# Patient Record
Sex: FEMALE | Race: WHITE | Employment: OTHER | URBAN - METROPOLITAN AREA
[De-identification: names, ages, dates, MRNs, and addresses within clinical notes are randomized per-mention and may not be internally consistent; named-entity substitution may affect disease eponyms.]

---

## 2017-01-27 PROBLEM — J44.9 MODERATE COPD (CHRONIC OBSTRUCTIVE PULMONARY DISEASE) (HCC): Status: ACTIVE | Noted: 2017-01-27

## 2017-03-16 ENCOUNTER — HOSPITAL ENCOUNTER (OUTPATIENT)
Dept: LAB | Age: 67
Discharge: HOME | End: 2017-03-16
Attending: INTERNAL MEDICINE
Payer: MEDICARE

## 2017-03-16 DIAGNOSIS — I25.10 CORONARY ARTERY DISEASE INVOLVING NATIVE CORONARY ARTERY OF NATIVE HEART WITHOUT ANGINA PECTORIS: ICD-10-CM

## 2017-03-16 LAB
CHOLEST SERPL-MCNC: 209 MG/DL
HDLC SERPL-MCNC: 88 MG/DL (ref 40–60)
HDLC SERPL: 2.4 {RATIO}
LDLC SERPL CALC-MCNC: 96.8 MG/DL
LIPID PROFILE,FLP: ABNORMAL
TRIGL SERPL-MCNC: 121 MG/DL (ref 35–150)
VLDLC SERPL CALC-MCNC: 24.2 MG/DL

## 2017-03-16 PROCEDURE — 36415 COLL VENOUS BLD VENIPUNCTURE: CPT | Performed by: INTERNAL MEDICINE

## 2017-03-16 PROCEDURE — 80061 LIPID PANEL: CPT | Performed by: INTERNAL MEDICINE

## 2017-03-23 PROBLEM — R53.83 FATIGUE: Status: ACTIVE | Noted: 2017-03-23

## 2017-03-23 PROBLEM — I10 ESSENTIAL HYPERTENSION WITH GOAL BLOOD PRESSURE LESS THAN 130/85: Status: ACTIVE | Noted: 2017-03-23

## 2017-07-25 ENCOUNTER — APPOINTMENT (OUTPATIENT)
Dept: GENERAL RADIOLOGY | Age: 67
DRG: 189 | End: 2017-07-25
Attending: EMERGENCY MEDICINE
Payer: MEDICARE

## 2017-07-25 ENCOUNTER — HOSPITAL ENCOUNTER (INPATIENT)
Age: 67
LOS: 2 days | Discharge: HOME | DRG: 189 | End: 2017-07-28
Attending: EMERGENCY MEDICINE | Admitting: INTERNAL MEDICINE
Payer: MEDICARE

## 2017-07-25 DIAGNOSIS — E11.8 TYPE 2 DIABETES MELLITUS WITH COMPLICATION, WITHOUT LONG-TERM CURRENT USE OF INSULIN (HCC): Chronic | ICD-10-CM

## 2017-07-25 DIAGNOSIS — J44.1 CHRONIC OBSTRUCTIVE PULMONARY DISEASE WITH ACUTE EXACERBATION (HCC): ICD-10-CM

## 2017-07-25 DIAGNOSIS — F17.200 TOBACCO USE DISORDER: Chronic | ICD-10-CM

## 2017-07-25 DIAGNOSIS — J96.01 ACUTE RESPIRATORY FAILURE WITH HYPOXIA (HCC): ICD-10-CM

## 2017-07-25 DIAGNOSIS — J44.9 CHRONIC OBSTRUCTIVE PULMONARY DISEASE, UNSPECIFIED COPD TYPE (HCC): Chronic | ICD-10-CM

## 2017-07-25 DIAGNOSIS — R06.02 SHORTNESS OF BREATH: Primary | ICD-10-CM

## 2017-07-25 DIAGNOSIS — G47.34 NOCTURNAL HYPOXIA: Chronic | ICD-10-CM

## 2017-07-25 DIAGNOSIS — J44.1 COPD EXACERBATION (HCC): ICD-10-CM

## 2017-07-25 DIAGNOSIS — J40 TRACHEOBRONCHITIS: ICD-10-CM

## 2017-07-25 DIAGNOSIS — G47.33 OSA (OBSTRUCTIVE SLEEP APNEA): Chronic | ICD-10-CM

## 2017-07-25 PROBLEM — R53.83 FATIGUE: Chronic | Status: ACTIVE | Noted: 2017-03-23

## 2017-07-25 PROBLEM — I10 ESSENTIAL HYPERTENSION WITH GOAL BLOOD PRESSURE LESS THAN 130/85: Chronic | Status: ACTIVE | Noted: 2017-03-23

## 2017-07-25 LAB
ALBUMIN SERPL BCP-MCNC: 3.2 G/DL (ref 3.2–4.6)
ALBUMIN/GLOB SERPL: 0.7 {RATIO} (ref 1.2–3.5)
ALP SERPL-CCNC: 144 U/L (ref 50–136)
ALT SERPL-CCNC: 35 U/L (ref 12–65)
ANION GAP BLD CALC-SCNC: 6 MMOL/L (ref 7–16)
AST SERPL W P-5'-P-CCNC: 20 U/L (ref 15–37)
ATRIAL RATE: 101 BPM
BACTERIA SPEC CULT: ABNORMAL
BACTERIA SPEC CULT: ABNORMAL
BASOPHILS # BLD AUTO: 0 K/UL (ref 0–0.2)
BASOPHILS # BLD: 0 % (ref 0–2)
BILIRUB SERPL-MCNC: 0.3 MG/DL (ref 0.2–1.1)
BNP SERPL-MCNC: 21 PG/ML
BUN SERPL-MCNC: 6 MG/DL (ref 8–23)
CALCIUM SERPL-MCNC: 8.7 MG/DL (ref 8.3–10.4)
CALCULATED P AXIS, ECG09: 84 DEGREES
CALCULATED R AXIS, ECG10: 89 DEGREES
CALCULATED T AXIS, ECG11: 86 DEGREES
CHLORIDE SERPL-SCNC: 101 MMOL/L (ref 98–107)
CO2 SERPL-SCNC: 36 MMOL/L (ref 21–32)
CREAT SERPL-MCNC: 0.58 MG/DL (ref 0.6–1)
DIAGNOSIS, 93000: NORMAL
DIFFERENTIAL METHOD BLD: ABNORMAL
EOSINOPHIL # BLD: 0 K/UL (ref 0–0.8)
EOSINOPHIL NFR BLD: 0 % (ref 0.5–7.8)
ERYTHROCYTE [DISTWIDTH] IN BLOOD BY AUTOMATED COUNT: 13.5 % (ref 11.9–14.6)
GLOBULIN SER CALC-MCNC: 4.7 G/DL (ref 2.3–3.5)
GLUCOSE BLD STRIP.AUTO-MCNC: 278 MG/DL (ref 65–100)
GLUCOSE BLD STRIP.AUTO-MCNC: 285 MG/DL (ref 65–100)
GLUCOSE SERPL-MCNC: 147 MG/DL (ref 65–100)
HCT VFR BLD AUTO: 48 % (ref 35.8–46.3)
HGB BLD-MCNC: 16.1 G/DL (ref 11.7–15.4)
IMM GRANULOCYTES # BLD: 0.1 K/UL (ref 0–0.5)
IMM GRANULOCYTES NFR BLD AUTO: 0.5 % (ref 0–5)
LYMPHOCYTES # BLD AUTO: 14 % (ref 13–44)
LYMPHOCYTES # BLD: 1.4 K/UL (ref 0.5–4.6)
MCH RBC QN AUTO: 31.1 PG (ref 26.1–32.9)
MCHC RBC AUTO-ENTMCNC: 33.5 G/DL (ref 31.4–35)
MCV RBC AUTO: 92.7 FL (ref 79.6–97.8)
MONOCYTES # BLD: 0.2 K/UL (ref 0.1–1.3)
MONOCYTES NFR BLD AUTO: 2 % (ref 4–12)
NEUTS SEG # BLD: 8.4 K/UL (ref 1.7–8.2)
NEUTS SEG NFR BLD AUTO: 84 % (ref 43–78)
P-R INTERVAL, ECG05: 100 MS
PLATELET # BLD AUTO: 287 K/UL (ref 150–450)
PMV BLD AUTO: 9.7 FL (ref 10.8–14.1)
POTASSIUM SERPL-SCNC: 4.3 MMOL/L (ref 3.5–5.1)
PROT SERPL-MCNC: 7.9 G/DL (ref 6.3–8.2)
Q-T INTERVAL, ECG07: 316 MS
QRS DURATION, ECG06: 88 MS
QTC CALCULATION (BEZET), ECG08: 409 MS
RBC # BLD AUTO: 5.18 M/UL (ref 4.05–5.25)
SERVICE CMNT-IMP: ABNORMAL
SODIUM SERPL-SCNC: 143 MMOL/L (ref 136–145)
TROPONIN I SERPL-MCNC: <0.02 NG/ML (ref 0.02–0.05)
VENTRICULAR RATE, ECG03: 101 BPM
WBC # BLD AUTO: 10.1 K/UL (ref 4.3–11.1)

## 2017-07-25 PROCEDURE — 93005 ELECTROCARDIOGRAM TRACING: CPT | Performed by: EMERGENCY MEDICINE

## 2017-07-25 PROCEDURE — 71020 XR CHEST PA LAT: CPT

## 2017-07-25 PROCEDURE — 74011250637 HC RX REV CODE- 250/637: Performed by: INTERNAL MEDICINE

## 2017-07-25 PROCEDURE — 83880 ASSAY OF NATRIURETIC PEPTIDE: CPT | Performed by: EMERGENCY MEDICINE

## 2017-07-25 PROCEDURE — 87641 MR-STAPH DNA AMP PROBE: CPT | Performed by: INTERNAL MEDICINE

## 2017-07-25 PROCEDURE — 94644 CONT INHLJ TX 1ST HOUR: CPT

## 2017-07-25 PROCEDURE — 74011000250 HC RX REV CODE- 250: Performed by: INTERNAL MEDICINE

## 2017-07-25 PROCEDURE — 99218 HC RM OBSERVATION: CPT

## 2017-07-25 PROCEDURE — 74011000250 HC RX REV CODE- 250: Performed by: EMERGENCY MEDICINE

## 2017-07-25 PROCEDURE — 74011250636 HC RX REV CODE- 250/636: Performed by: INTERNAL MEDICINE

## 2017-07-25 PROCEDURE — 74011250636 HC RX REV CODE- 250/636: Performed by: EMERGENCY MEDICINE

## 2017-07-25 PROCEDURE — 94640 AIRWAY INHALATION TREATMENT: CPT

## 2017-07-25 PROCEDURE — 77010033678 HC OXYGEN DAILY

## 2017-07-25 PROCEDURE — 82962 GLUCOSE BLOOD TEST: CPT

## 2017-07-25 PROCEDURE — 99285 EMERGENCY DEPT VISIT HI MDM: CPT | Performed by: EMERGENCY MEDICINE

## 2017-07-25 PROCEDURE — 99223 1ST HOSP IP/OBS HIGH 75: CPT | Performed by: INTERNAL MEDICINE

## 2017-07-25 PROCEDURE — 85025 COMPLETE CBC W/AUTO DIFF WBC: CPT | Performed by: EMERGENCY MEDICINE

## 2017-07-25 PROCEDURE — 94660 CPAP INITIATION&MGMT: CPT

## 2017-07-25 PROCEDURE — 94760 N-INVAS EAR/PLS OXIMETRY 1: CPT

## 2017-07-25 PROCEDURE — 84484 ASSAY OF TROPONIN QUANT: CPT | Performed by: EMERGENCY MEDICINE

## 2017-07-25 PROCEDURE — 80053 COMPREHEN METABOLIC PANEL: CPT | Performed by: EMERGENCY MEDICINE

## 2017-07-25 PROCEDURE — 96374 THER/PROPH/DIAG INJ IV PUSH: CPT | Performed by: EMERGENCY MEDICINE

## 2017-07-25 PROCEDURE — 74011636637 HC RX REV CODE- 636/637: Performed by: INTERNAL MEDICINE

## 2017-07-25 RX ORDER — ALBUTEROL SULFATE 0.83 MG/ML
2.5 SOLUTION RESPIRATORY (INHALATION)
Status: DISCONTINUED | OUTPATIENT
Start: 2017-07-25 | End: 2017-07-28 | Stop reason: HOSPADM

## 2017-07-25 RX ORDER — TRAZODONE HYDROCHLORIDE 50 MG/1
100 TABLET ORAL
Status: DISCONTINUED | OUTPATIENT
Start: 2017-07-25 | End: 2017-07-28 | Stop reason: HOSPADM

## 2017-07-25 RX ORDER — GABAPENTIN 300 MG/1
300 CAPSULE ORAL
Status: DISCONTINUED | OUTPATIENT
Start: 2017-07-25 | End: 2017-07-28 | Stop reason: HOSPADM

## 2017-07-25 RX ORDER — SODIUM CHLORIDE 0.9 % (FLUSH) 0.9 %
5-10 SYRINGE (ML) INJECTION EVERY 8 HOURS
Status: DISCONTINUED | OUTPATIENT
Start: 2017-07-25 | End: 2017-07-28 | Stop reason: HOSPADM

## 2017-07-25 RX ORDER — INSULIN LISPRO 100 [IU]/ML
INJECTION, SOLUTION INTRAVENOUS; SUBCUTANEOUS
Status: DISCONTINUED | OUTPATIENT
Start: 2017-07-25 | End: 2017-07-28 | Stop reason: HOSPADM

## 2017-07-25 RX ORDER — ALBUTEROL SULFATE 0.83 MG/ML
10 SOLUTION RESPIRATORY (INHALATION)
Status: DISCONTINUED | OUTPATIENT
Start: 2017-07-25 | End: 2017-07-25

## 2017-07-25 RX ORDER — MIRTAZAPINE 30 MG/1
15 TABLET, FILM COATED ORAL
Status: DISCONTINUED | OUTPATIENT
Start: 2017-07-25 | End: 2017-07-28 | Stop reason: HOSPADM

## 2017-07-25 RX ORDER — SODIUM CHLORIDE 9 MG/ML
50 INJECTION, SOLUTION INTRAVENOUS CONTINUOUS
Status: DISCONTINUED | OUTPATIENT
Start: 2017-07-25 | End: 2017-07-27

## 2017-07-25 RX ORDER — BUDESONIDE 0.5 MG/2ML
500 INHALANT ORAL 2 TIMES DAILY
Status: DISCONTINUED | OUTPATIENT
Start: 2017-07-25 | End: 2017-07-25 | Stop reason: SDUPTHER

## 2017-07-25 RX ORDER — ONDANSETRON 2 MG/ML
4 INJECTION INTRAMUSCULAR; INTRAVENOUS
Status: DISCONTINUED | OUTPATIENT
Start: 2017-07-25 | End: 2017-07-28 | Stop reason: HOSPADM

## 2017-07-25 RX ORDER — METHYLPHENIDATE HYDROCHLORIDE 5 MG/1
5 TABLET ORAL 2 TIMES DAILY
Status: DISCONTINUED | OUTPATIENT
Start: 2017-07-25 | End: 2017-07-28 | Stop reason: HOSPADM

## 2017-07-25 RX ORDER — GUAIFENESIN 100 MG/5ML
100 SOLUTION ORAL
Status: DISCONTINUED | OUTPATIENT
Start: 2017-07-25 | End: 2017-07-28 | Stop reason: HOSPADM

## 2017-07-25 RX ORDER — ENOXAPARIN SODIUM 100 MG/ML
40 INJECTION SUBCUTANEOUS EVERY 24 HOURS
Status: DISCONTINUED | OUTPATIENT
Start: 2017-07-25 | End: 2017-07-28 | Stop reason: HOSPADM

## 2017-07-25 RX ORDER — DULOXETIN HYDROCHLORIDE 30 MG/1
30 CAPSULE, DELAYED RELEASE ORAL DAILY
Status: DISCONTINUED | OUTPATIENT
Start: 2017-07-26 | End: 2017-07-28 | Stop reason: HOSPADM

## 2017-07-25 RX ORDER — MUPIROCIN 20 MG/G
OINTMENT TOPICAL 2 TIMES DAILY
Status: DISCONTINUED | OUTPATIENT
Start: 2017-07-25 | End: 2017-07-28 | Stop reason: HOSPADM

## 2017-07-25 RX ORDER — LEVOFLOXACIN 5 MG/ML
750 INJECTION, SOLUTION INTRAVENOUS EVERY 24 HOURS
Status: DISCONTINUED | OUTPATIENT
Start: 2017-07-25 | End: 2017-07-28 | Stop reason: HOSPADM

## 2017-07-25 RX ORDER — ROPINIROLE 1 MG/1
1 TABLET, FILM COATED ORAL DAILY
Status: DISCONTINUED | OUTPATIENT
Start: 2017-07-26 | End: 2017-07-28 | Stop reason: HOSPADM

## 2017-07-25 RX ORDER — IPRATROPIUM BROMIDE 0.5 MG/2.5ML
0.5 SOLUTION RESPIRATORY (INHALATION)
Status: COMPLETED | OUTPATIENT
Start: 2017-07-25 | End: 2017-07-25

## 2017-07-25 RX ORDER — ACETAMINOPHEN 325 MG/1
650 TABLET ORAL
Status: DISCONTINUED | OUTPATIENT
Start: 2017-07-25 | End: 2017-07-28 | Stop reason: HOSPADM

## 2017-07-25 RX ORDER — ALBUTEROL SULFATE 2.5 MG/.5ML
5 SOLUTION RESPIRATORY (INHALATION)
Status: COMPLETED | OUTPATIENT
Start: 2017-07-25 | End: 2017-07-25

## 2017-07-25 RX ORDER — BISACODYL 5 MG
5 TABLET, DELAYED RELEASE (ENTERIC COATED) ORAL DAILY PRN
Status: DISCONTINUED | OUTPATIENT
Start: 2017-07-25 | End: 2017-07-28 | Stop reason: HOSPADM

## 2017-07-25 RX ORDER — METFORMIN HYDROCHLORIDE 500 MG/1
500 TABLET ORAL 2 TIMES DAILY WITH MEALS
COMMUNITY
End: 2018-10-24

## 2017-07-25 RX ORDER — DILTIAZEM HYDROCHLORIDE 180 MG/1
360 CAPSULE, COATED, EXTENDED RELEASE ORAL DAILY
Status: DISCONTINUED | OUTPATIENT
Start: 2017-07-26 | End: 2017-07-28 | Stop reason: HOSPADM

## 2017-07-25 RX ORDER — BUDESONIDE 0.5 MG/2ML
500 INHALANT ORAL
Status: DISCONTINUED | OUTPATIENT
Start: 2017-07-25 | End: 2017-07-28 | Stop reason: HOSPADM

## 2017-07-25 RX ORDER — ASPIRIN 81 MG/1
81 TABLET ORAL DAILY
Status: DISCONTINUED | OUTPATIENT
Start: 2017-07-26 | End: 2017-07-28 | Stop reason: HOSPADM

## 2017-07-25 RX ORDER — SODIUM CHLORIDE 0.9 % (FLUSH) 0.9 %
5-10 SYRINGE (ML) INJECTION AS NEEDED
Status: DISCONTINUED | OUTPATIENT
Start: 2017-07-25 | End: 2017-07-28 | Stop reason: HOSPADM

## 2017-07-25 RX ADMIN — TRAZODONE HYDROCHLORIDE 100 MG: 50 TABLET ORAL at 21:57

## 2017-07-25 RX ADMIN — SODIUM CHLORIDE 75 ML/HR: 900 INJECTION, SOLUTION INTRAVENOUS at 17:29

## 2017-07-25 RX ADMIN — METHYLPREDNISOLONE SODIUM SUCCINATE 125 MG: 125 INJECTION, POWDER, FOR SOLUTION INTRAMUSCULAR; INTRAVENOUS at 12:05

## 2017-07-25 RX ADMIN — ENOXAPARIN SODIUM 40 MG: 40 INJECTION SUBCUTANEOUS at 16:41

## 2017-07-25 RX ADMIN — ALBUTEROL SULFATE 2.5 MG: 2.5 SOLUTION RESPIRATORY (INHALATION) at 19:28

## 2017-07-25 RX ADMIN — GUAIFENESIN 100 MG: 100 SOLUTION ORAL at 21:55

## 2017-07-25 RX ADMIN — LEVOFLOXACIN 750 MG: 5 INJECTION, SOLUTION INTRAVENOUS at 16:40

## 2017-07-25 RX ADMIN — ALBUTEROL SULFATE 2.5 MG: 2.5 SOLUTION RESPIRATORY (INHALATION) at 23:11

## 2017-07-25 RX ADMIN — METHYLPREDNISOLONE SODIUM SUCCINATE 60 MG: 125 INJECTION, POWDER, FOR SOLUTION INTRAMUSCULAR; INTRAVENOUS at 23:57

## 2017-07-25 RX ADMIN — INSULIN LISPRO 6 UNITS: 100 INJECTION, SOLUTION INTRAVENOUS; SUBCUTANEOUS at 16:42

## 2017-07-25 RX ADMIN — MIRTAZAPINE 15 MG: 30 TABLET, FILM COATED ORAL at 21:56

## 2017-07-25 RX ADMIN — IPRATROPIUM BROMIDE 0.5 MG: 0.5 SOLUTION RESPIRATORY (INHALATION) at 12:10

## 2017-07-25 RX ADMIN — ACETAMINOPHEN 650 MG: 325 TABLET ORAL at 16:43

## 2017-07-25 RX ADMIN — INSULIN LISPRO 6 UNITS: 100 INJECTION, SOLUTION INTRAVENOUS; SUBCUTANEOUS at 21:58

## 2017-07-25 RX ADMIN — MUPIROCIN: 20 OINTMENT TOPICAL at 23:59

## 2017-07-25 RX ADMIN — GABAPENTIN 300 MG: 300 CAPSULE ORAL at 21:57

## 2017-07-25 RX ADMIN — Medication 5 ML: at 16:42

## 2017-07-25 RX ADMIN — METHYLPHENIDATE HYDROCHLORIDE 5 MG: 5 TABLET ORAL at 17:45

## 2017-07-25 RX ADMIN — Medication 5 ML: at 21:57

## 2017-07-25 RX ADMIN — ALBUTEROL SULFATE 5 MG: 2.5 SOLUTION RESPIRATORY (INHALATION) at 12:09

## 2017-07-25 RX ADMIN — BUDESONIDE 500 MCG: 0.5 SUSPENSION RESPIRATORY (INHALATION) at 19:28

## 2017-07-25 RX ADMIN — METHYLPREDNISOLONE SODIUM SUCCINATE 60 MG: 125 INJECTION, POWDER, FOR SOLUTION INTRAMUSCULAR; INTRAVENOUS at 17:31

## 2017-07-25 NOTE — PROGRESS NOTES
TRANSFER - IN REPORT:    Verbal report received from SERGO Galvan on [address]  being received from ER for routine progression of care      Report consisted of patients Situation, Background, Assessment and   Recommendations(SBAR). Information from the following report(s) ED Summary was reviewed with the receiving nurse. Opportunity for questions and clarification was provided.

## 2017-07-25 NOTE — ED PROVIDER NOTES
HPI Comments: Patient is a 31-year-old female who presents with shortness of breath. States she's had worsening cough tthat is productive of green sputum for the past week. States she was seen by her primary care provider who started her on Keflex for presumed pneumonia versus cough and shortness of breath have worsened. Also states significant wheezing. Denies any chest pain, no abdominal pain, no fevers or chills, no further complaints. Patient is a 77 y.o. female presenting with shortness of breath. The history is provided by the patient. No  was used. Shortness of Breath   Associated symptoms include wheezing. Pertinent negatives include no fever, no headaches, no rhinorrhea, no sore throat, no neck pain, no cough, no chest pain, no vomiting, no abdominal pain, no rash and no leg swelling. Past Medical History:   Diagnosis Date    Acute respiratory failure ([address] 1/29/2015    Intubated 1/29/2015    Acute respiratory failure ([address] 1/29/2015    Intubated 1/29/2015     Arrhythmia     angina    Cancer ([address]     basal cell,squamous cell    Chest pain, unspecified 7/7/2016    Chiari I malformation (HCC)     history of    COPD exacerbation ([address] 9/14/12-9/17/12    COPD exacerbation ([address] 4/2013 to 5/2/13    hospitalization    Hypertension 1/29/2015    Paroxysmal tachycardia/NOS 7/7/2016    Pneumonia 9/16/2012    Pneumothorax     HX.  of  2 on the left- required chest tube    Sleep apnea 7/7/2016       Past Surgical History:   Procedure Laterality Date    HX CATARACT REMOVAL      HX HEART CATHETERIZATION  1/12    mild nonobstructive CAD    HX HEENT  2009    Bilateral cataracts and bleth    HX HYSTERECTOMY  1982    HX ORTHOPAEDIC  1998    right shoulder sx; left thumb    HX OTHER SURGICAL      left thumb sx,forehead resection of squamous cell    HX SEPTOPLASTY  04/2010    HX SHOULDER ARTHROSCOPY  1990         Family History:   Problem Relation Age of Onset    Cancer Mother      breast    Cancer Sister      esophageal    Other Father      neurological degeneration       Social History     Social History    Marital status: SINGLE     Spouse name: N/A    Number of children: N/A    Years of education: N/A     Occupational History    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 Retired . ALLERGIES: Azithromycin and Sulfa (sulfonamide antibiotics)    Review of Systems   Constitutional: Negative for chills and fever. HENT: Negative for rhinorrhea and sore throat. Eyes: Negative for visual disturbance. Respiratory: Positive for shortness of breath and wheezing. Negative for cough. Cardiovascular: Negative for chest pain and leg swelling. Gastrointestinal: Negative for abdominal pain, diarrhea, nausea and vomiting. Genitourinary: Negative for dysuria. Musculoskeletal: Negative for back pain and neck pain. Skin: Negative for rash. Neurological: Negative for weakness and headaches. Psychiatric/Behavioral: The patient is not nervous/anxious. Vitals:    07/25/17 1111   BP: 133/58   SpO2: (!) 89%            Physical Exam   Constitutional: She is oriented to person, place, and time. She appears well-developed and well-nourished. HENT:   Head: Normocephalic. Right Ear: External ear normal.   Left Ear: External ear normal.   Eyes: Conjunctivae and EOM are normal. Pupils are equal, round, and reactive to light.    Neck: Normal range of motion. Neck supple. No tracheal deviation present. Cardiovascular: Normal rate, regular rhythm, normal heart sounds and intact distal pulses. No murmur heard. Pulmonary/Chest: Effort normal. No respiratory distress. She has wheezes. Abdominal: Soft. There is no tenderness. Musculoskeletal: Normal range of motion. Neurological: She is alert and oriented to person, place, and time. No cranial nerve deficit. Skin: No rash noted. Nursing note and vitals reviewed. MDM  Number of Diagnoses or Management Options  Shortness of breath: new and requires workup     Amount and/or Complexity of Data Reviewed  Clinical lab tests: ordered and reviewed  Tests in the radiology section of CPT®: ordered and reviewed  Tests in the medicine section of CPT®: ordered and reviewed  Review and summarize past medical records: yes    Risk of Complications, Morbidity, and/or Mortality  Presenting problems: high  Diagnostic procedures: high  Management options: high    Patient Progress  Patient progress: stable    ED Course       Procedures    Recent Results (from the past 12 hour(s))   CBC WITH AUTOMATED DIFF    Collection Time: 07/25/17 11:25 AM   Result Value Ref Range    WBC 10.1 4.3 - 11.1 K/uL    RBC 5.18 4.05 - 5.25 M/uL    HGB 16.1 (H) 11.7 - 15.4 g/dL    HCT 48.0 (H) 35.8 - 46.3 %    MCV 92.7 79.6 - 97.8 FL    MCH 31.1 26.1 - 32.9 PG    MCHC 33.5 31.4 - 35.0 g/dL    RDW 13.5 11.9 - 14.6 %    PLATELET 544 946 - 364 K/uL    MPV 9.7 (L) 10.8 - 14.1 FL    DF AUTOMATED      NEUTROPHILS 84 (H) 43 - 78 %    LYMPHOCYTES 14 13 - 44 %    MONOCYTES 2 (L) 4.0 - 12.0 %    EOSINOPHILS 0 (L) 0.5 - 7.8 %    BASOPHILS 0 0.0 - 2.0 %    IMMATURE GRANULOCYTES 0.5 0.0 - 5.0 %    ABS. NEUTROPHILS 8.4 (H) 1.7 - 8.2 K/UL    ABS. LYMPHOCYTES 1.4 0.5 - 4.6 K/UL    ABS. MONOCYTES 0.2 0.1 - 1.3 K/UL    ABS. EOSINOPHILS 0.0 0.0 - 0.8 K/UL    ABS. BASOPHILS 0.0 0.0 - 0.2 K/UL    ABS. IMM.  GRANS. 0.1 0.0 - 0.5 K/UL   METABOLIC PANEL, COMPREHENSIVE    Collection Time: 07/25/17 11:25 AM   Result Value Ref Range    Sodium 143 136 - 145 mmol/L    Potassium 4.3 3.5 - 5.1 mmol/L    Chloride 101 98 - 107 mmol/L    CO2 36 (H) 21 - 32 mmol/L    Anion gap 6 (L) 7 - 16 mmol/L    Glucose 147 (H) 65 - 100 mg/dL    BUN 6 (L) 8 - 23 MG/DL    Creatinine 0.58 (L) 0.6 - 1.0 MG/DL    GFR est AA >60 >60 ml/min/1.73m2    GFR est non-AA >60 >60 ml/min/1.73m2    Calcium 8.7 8.3 - 10.4 MG/DL    Bilirubin, total 0.3 0.2 - 1.1 MG/DL    ALT (SGPT) 35 12 - 65 U/L    AST (SGOT) 20 15 - 37 U/L    Alk. phosphatase 144 (H) 50 - 136 U/L    Protein, total 7.9 6.3 - 8.2 g/dL    Albumin 3.2 3.2 - 4.6 g/dL    Globulin 4.7 (H) 2.3 - 3.5 g/dL    A-G Ratio 0.7 (L) 1.2 - 3.5     TROPONIN I    Collection Time: 07/25/17 11:25 AM   Result Value Ref Range    Troponin-I, Qt. <0.02 (L) 0.02 - 0.05 NG/ML   BNP    Collection Time: 07/25/17 11:25 AM   Result Value Ref Range    BNP 21 pg/mL   EKG, 12 LEAD, INITIAL    Collection Time: 07/25/17 12:20 PM   Result Value Ref Range    Ventricular Rate 101 BPM    Atrial Rate 101 BPM    P-R Interval 100 ms    QRS Duration 88 ms    Q-T Interval 316 ms    QTC Calculation (Bezet) 409 ms    Calculated P Axis 84 degrees    Calculated R Axis 89 degrees    Calculated T Axis 86 degrees    Diagnosis       !! AGE AND GENDER SPECIFIC ECG ANALYSIS !! Sinus tachycardia with short LA  Right atrial enlargement  Pulmonary disease pattern  RSR' or QR pattern in V1 suggests right ventricular conduction delay  Abnormal ECG  When compared with ECG of 16-JUN-2015 08:57,  No significant change was found       Xr Chest Pa Lat    Result Date: 7/25/2017  Chest X-ray INDICATION:  Shortness of breath PA and lateral views of the chest were obtained. FINDINGS: The lungs are slightly hyperexpanded. There are no infiltrates or effusions. The heart size is normal.  The bony thorax is intact.       IMPRESSION: Stable chronic lung disease          76 yo female with WHEEZING:    Continued hypoxia/tachycardia despite rx with continued moderate/severe wheezing.   [facility] pulmonology

## 2017-07-25 NOTE — ED TRIAGE NOTES
Pt. Arrives via EMS for increased shob. Baseline COPD. Recent increase in O2 usage. Being treated for a upper resp infection and has been on steroids.

## 2017-07-25 NOTE — IP AVS SNAPSHOT
Camelia Batista 
 
 
 [address] [facility]  [address] [facility] 
[phone] Patient: Isaiah Holland MRN: [id number] NNS:[id number] Current Discharge Medication List  
  
START taking these medications Dose & Instructions Dispensing Information Comments Morning Noon Evening Bedtime  
 levoFLOXacin 750 mg tablet Commonly known as:  Molly Love Your last dose was:  7/27/2017 5pm  
Your next dose is:  07/28/17 5pm  
   
 Dose:  750 mg Take 1 Tab by mouth daily for 4 days. Quantity:  4 Tab Refills:  0 CONTINUE these medications which have CHANGED Dose & Instructions Dispensing Information Comments Morning Noon Evening Bedtime  
 predniSONE 20 mg tablet Commonly known as:  Nikole Montiel What changed:  additional instructions Your last dose was:  07/28/17 Your next dose is:  07/29/17 Take 2 tablets daily for 3 days then, take 1 1/2 tablets daily for 3 days then, take 1 tablet daily for 3 days then, take 1/2 tablet daily for 3 days then stop. #20 Quantity:  20 Tab Refills:  0 CONTINUE these medications which have NOT CHANGED Dose & Instructions Dispensing Information Comments Morning Noon Evening Bedtime * albuterol 2.5 mg /3 mL (0.083 %) nebulizer solution Commonly known as:  PROVENTIL VENTOLIN Your last dose was:  07/28/17 8am  
Your next dose is:  Resume home schedule Dose:  2.5 mg  
2.5 mg by Nebulization route two (2) times a day. Refills:  0  
     
  
   
   
  
   
  
 * albuterol 90 mcg/actuation inhaler Commonly known as:  PROAIR HFA Your next dose is: Take on as needed schedule Dose:  2 Puff Take 2 Puffs by inhalation every six (6) hours as needed for Wheezing. Quantity:  3 Inhaler Refills:  3  
     
   
   
   
  
 aspirin delayed-release 81 mg tablet Your last dose was:  07/28/17 am  
Your next dose is:  07/29/17 am  
   
 Take  by mouth daily. Refills:  0  
     
  
   
   
   
  
 budesonide 0.5 mg/2 mL Nbsp Commonly known as:  PULMICORT Your last dose was:  07/28/17 8am  
Your next dose is:  07/28/17 8pm  
   
 Dose:  500 mcg 2 mL by Nebulization route two (2) times a day. Quantity:  180 Package Refills:  4 DX:COPD 496  
    
  
   
   
   
  
  
 cpap machine kit  
   
 by Does Not Apply route. 15cm with 5L of oxygen bled in Refills:  0  
     
   
   
   
  
 CYMBALTA 30 mg capsule Generic drug:  DULoxetine Your last dose was:  07/28/17 am  
Your next dose is:  07/29/17 am  
   
 Dose:  30 mg Take 30 mg by mouth daily. Refills:  0  
     
  
   
   
   
  
 dilTIAZem 360 mg ER capsule Commonly known as:  [facility] Your last dose was:  07/28/17 am  
Your next dose is:  07/29/17 am  
   
 Dose:  360 mg Take 1 Cap by mouth daily. Quantity:  30 Cap Refills:  11  
     
  
   
   
   
  
 gabapentin 300 mg capsule Commonly known as:  NEURONTIN Your last dose was:  7/27/2017 9pm  
Your next dose is:  07/28/17 bedtime Dose:  300 mg Take 300 mg by mouth nightly. 1 tab po qhs Refills:  0  
     
   
   
   
  
  
 metFORMIN 500 mg tablet Commonly known as:  GLUCOPHAGE Your next dose is:  Resume home schedule Dose:  500 mg Take 500 mg by mouth two (2) times daily (with meals). Refills:  0  
     
  
   
   
  
   
  
 methylphenidate 5 mg tablet Commonly known as:  RITALIN Your last dose was:  07/28/17 9am  
Your next dose is:  07/28/17 6pm  
   
 Dose:  5 mg Take 5 mg by mouth two (2) times a day. Refills:  0  
     
  
   
   
  
   
  
 mirtazapine 30 mg tablet Commonly known as:  Matty Font Your last dose was:  7/27/2017 9pm  
Your next dose is:  07/28/17 bedtime Dose:  15 mg Take 15 mg by mouth nightly. Refills:  0  
     
   
   
   
  
  
 nitroglycerin 0.4 mg SL tablet Commonly known as:  NITROSTAT Your next dose is: Take on as needed schedule  
   
 by SubLINGual route every five (5) minutes as needed. Refills:  0 OXYGEN-AIR DELIVERY SYSTEMS  
   
 by Nasal route. 5 lpm qhs Refills:  0  
     
   
   
   
  
 PATADAY 0.2 % Drop ophthalmic solution Generic drug:  olopatadine Your next dose is:  Resume home schedule Dose:  1 Drop Administer 1 drop to both eyes daily. Refills:  0  
     
  
   
   
   
  
 prednisoLONE acetate 1 % ophthalmic suspension Commonly known as:  PRED FORTE Your next dose is:  Resume home schedule Dose:  1 Drop Administer 1 drop to both eyes four (4) times daily. Refills:  0  
     
  
   
  
   
  
   
  
  
 rOPINIRole 1 mg tablet Commonly known as:  [place] Bouidominguezon Your last dose was:  07/28/17 am  
Your next dose is:  07/29/17 am  
   
 Take  by mouth daily. Refills:  0  
     
  
   
   
   
  
 traZODone 100 mg tablet Commonly known as:  Jose Manuel Carcamo Your last dose was:  7/27/2017 9pm  
Your next dose is:  07/28/17 bedtime Dose:  100 mg Take 100 mg by mouth nightly. Refills:  0  
     
   
   
   
  
  
 * Notice: This list has 2 medication(s) that are the same as other medications prescribed for you. Read the directions carefully, and ask your doctor or other care provider to review them with you. Where to Get Your Medications Information on where to get these meds will be given to you by the nurse or doctor. ! Ask your nurse or doctor about these medications  
  levoFLOXacin 750 mg tablet  
 predniSONE 20 mg tablet

## 2017-07-25 NOTE — PROGRESS NOTES
TRANSFER - IN REPORT:    Verbal report received from Tonya Carlton, [facility] on [employer]  being received from ED for routine progression of care      Report consisted of patients Situation, Background, Assessment and   Recommendations(SBAR). Information from the following report(s) SBAR, Kardex, ED Summary and Recent Results was reviewed with the receiving nurse. Assessment completed upon patients arrival to unit and care assumed.

## 2017-07-25 NOTE — H&P
HISTORY AND PHYSICAL      Shruti Aranda    7/25/2017    Date of Admission:  7/25/2017    The patient's chart is reviewed and the patient is discussed with the staff. Subjective:     Patient is a 77 y.o.  female presents with dyspnea and a congested, productive cough. She got sick several weeks ago following a tooth extraction. She had facial pain and was diagnosed with a \"MRSA\" infection by her dentist and oral surgeon and was treated with Keflex. This improved but then about 10 days ago she developed a productive cough (initially green then yellow secretions), dyspnea with wheezing. She went to the urgent care last Monday and was prescribed Keflex. She started on Prednisone which she had a home - took 30 mg per day for 3 days, then 25 mg per day for 3 days and she is currently taking 20 mg per day. She is still smoking. She has a home nebulizer which she uses four times per day (Albuterol QID and Pulmicort BID). She has home oxygen which she uses prn during the day (when she is sick) and every night with her CPAP (6 liters). She is followed in the sleep center with Dr. Pam Blas. She traveled to the beach a couple of weeks ago and felt OK then. She is unaware of any exposures.  She is typically able to manage her own ADLs, still drives, cleans house, etc.     Review of Systems  A comprehensive review of systems was negative except for: Constitutional: positive for none  Eyes: positive for photophobia - wears sunglasses  Ears, nose, mouth, throat, and face: positive for none  Respiratory: positive for cough, sputum, wheezing or dyspnea on exertion  Cardiovascular: positive for lower extremity edema  Gastrointestinal: positive for none  Genitourinary: positive for none  Integument/breast: positive for none  Hematologic/lymphatic: positive for none  Musculoskeletal: positive for knee and back pain  Neurological: positive for none  Behvioral/Psych: positive for sleep disturbance and tobacco use and depression  Endocrine: positive for diabetes  Allergic/Immunologic: positive for none    Patient Active Problem List   Diagnosis Code    Chronic sinusitis J32.9    JACQUELYN (obstructive sleep apnea) G47.33    Tobacco use disorder F17.200    Diabetes mellitus ([facility]) E11.9    Fibromyalgia M79.7    Restless leg syndrome G25.81    Depression F32.9    Debility R53.81    Chronic respiratory failure ([facility]) J96.10    ADHD (attention deficit hyperactivity disorder) F90.9    Coronary artery disease I25.10    Right heart enlargement I51.7    Polycythemia D75.1    Moderate COPD (chronic obstructive pulmonary disease) ([facility]) J44.9    Essential hypertension with goal blood pressure less than 130/85 I10    Fatigue R53.83    COPD exacerbation ([facility]) J44.1    Tracheobronchitis J40    Nocturnal hypoxia G47.34    Acute respiratory failure with hypoxia ([facility]) J96.01       Prior to Admission Medications   Prescriptions Last Dose Informant Patient Reported? Taking? DULoxetine (CYMBALTA) 30 mg capsule   Yes No   Sig: Take 30 mg by mouth daily. OXYGEN-AIR DELIVERY SYSTEMS   Yes No   Sig: by Nasal route. 5 lpm qhs   albuterol (PROAIR HFA) 90 mcg/actuation inhaler   No No   Sig: Take 2 Puffs by inhalation every six (6) hours as needed for Wheezing. albuterol (PROVENTIL VENTOLIN) 2.5 mg /3 mL (0.083 %) nebulizer solution   Yes No   Si.5 mg by Nebulization route two (2) times a day. aspirin delayed-release 81 mg tablet   Yes No   Sig: Take  by mouth daily. budesonide (PULMICORT) 0.5 mg/2 mL nebulizer suspension   No No   Si mL by Nebulization route two (2) times a day. cpap machine kit   Yes No   Sig: by Does Not Apply route. 15cm with 5L of oxygen bled in   dilTIAZem (TIAZAC) 360 mg ER capsule   No No   Sig: Take 1 Cap by mouth daily. gabapentin (NEURONTIN) 300 mg capsule   Yes No   Sig: Take 300 mg by mouth nightly.  1 tab po qhs   methylphenidate (RITALIN) 5 mg tablet   Yes No   Sig: Take 5 mg by mouth two (2) times a day. mirtazapine (REMERON) 30 mg tablet   Yes No   Sig: Take 30 mg by mouth nightly. 1/2 tablet   nitroglycerin (NITROSTAT) 0.4 mg SL tablet   Yes No   Sig: by SubLINGual route every five (5) minutes as needed. olopatadine (PATADAY) 0.2 % drop ophthalmic solution   Yes No   Sig: Administer 1 drop to both eyes daily. predniSONE (DELTASONE) 20 mg tablet   No No   Sig: 3 tablets daily for 5 days, then 2 tablets daily for 5 days, then one tablet a day until seen. prednisoLONE acetate (PRED FORTE) 1 % ophthalmic suspension   Yes No   Sig: Administer 1 drop to both eyes four (4) times daily. rOPINIRole (REQUIP) 1 mg tablet   Yes No   Sig: Take  by mouth daily. traZODone (DESYREL) 100 mg tablet   Yes No   Sig: Take 100 mg by mouth nightly. Facility-Administered Medications: None       Past Medical History:   Diagnosis Date    Acute respiratory failure ([facility] 1/29/2015    Intubated 1/29/2015    Acute respiratory failure ([facility] 1/29/2015    Intubated 1/29/2015     Cancer ([facility]     basal cell,squamous cell    Chest pain, unspecified 7/7/2016    Chiari I malformation ([facility]     history of    COPD exacerbation ([facility] 9/14/12-9/17/12    COPD exacerbation ([facility] 4/2013 to 5/2/13    hospitalization    Paroxysmal tachycardia/NOS 7/7/2016    Pneumonia 9/16/2012    Pneumothorax     HX.  of  2 on the left- required chest tube     Past Surgical History:   Procedure Laterality Date    HX CATARACT REMOVAL      HX HEART CATHETERIZATION  1/12    mild nonobstructive CAD    HX HEENT  2009    Bilateral cataracts and bleth    HX HYSTERECTOMY  1982    HX ORTHOPAEDIC  1998    right shoulder sx; left thumb    HX OTHER SURGICAL      left thumb sx,forehead resection of squamous cell    HX SEPTOPLASTY  04/2010    HX SHOULDER ARTHROSCOPY  1990     Social History     Social History    Marital status: SINGLE     Spouse name: N/A    Number of children: N/A    Years of education: N/A Occupational History    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 Retired . Family History   Problem Relation Age of Onset   [facility] Cancer Mother      breast    Other Father      neurological degeneration    Cancer Sister      exophageal     Allergies   Allergen Reactions    Azithromycin Diarrhea    Sulfa (Sulfonamide Antibiotics) Itching       No current facility-administered medications for this encounter. Current Outpatient Prescriptions   Medication Sig    albuterol (PROAIR HFA) 90 mcg/actuation inhaler Take 2 Puffs by inhalation every six (6) hours as needed for Wheezing.  DULoxetine (CYMBALTA) 30 mg capsule Take 30 mg by mouth daily.  dilTIAZem (TIAZAC) 360 mg ER capsule Take 1 Cap by mouth daily.  mirtazapine (REMERON) 30 mg tablet Take 30 mg by mouth nightly. 1/2 tablet    predniSONE (DELTASONE) 20 mg tablet 3 tablets daily for 5 days, then 2 tablets daily for 5 days, then one tablet a day until seen.  albuterol (PROVENTIL VENTOLIN) 2.5 mg /3 mL (0.083 %) nebulizer solution 2.5 mg by Nebulization route two (2) times a day.  budesonide (PULMICORT) 0.5 mg/2 mL nebulizer suspension 2 mL by Nebulization route two (2) times a day.  olopatadine (PATADAY) 0.2 % drop ophthalmic solution Administer 1 drop to both eyes daily.  OXYGEN-AIR DELIVERY SYSTEMS by Nasal route. 5 lpm qhs    aspirin delayed-release 81 mg tablet Take  by mouth daily.  methylphenidate (RITALIN) 5 mg tablet Take 5 mg by mouth two (2) times a day.  cpap machine kit by Does Not Apply route. 15cm with 5L of oxygen bled in    traZODone (DESYREL) 100 mg tablet Take 100 mg by mouth nightly.  rOPINIRole (REQUIP) 1 mg tablet Take  by mouth daily.  nitroglycerin (NITROSTAT) 0.4 mg SL tablet by SubLINGual route every five (5) minutes as needed.  prednisoLONE acetate (PRED FORTE) 1 % ophthalmic suspension Administer 1 drop to both eyes four (4) times daily.  gabapentin (NEURONTIN) 300 mg capsule Take 300 mg by mouth nightly. 1 tab po qhs           Objective:     Vitals:    07/25/17 1235 07/25/17 1246 07/25/17 1249 07/25/17 1256   BP: 132/59   157/75   Pulse: (!) 114 (!) 120  (!) 117   SpO2: 95%  (!) 89% (!) 77%       PHYSICAL EXAM     Constitutional:  the patient is well developed and in no acute distress  HEENT:  Sclera clear, pupils equal, oral mucosa moist  Respiratory: wheezing and rhonchi bilaterally. Congested sounding cough. Wearing nasal cannula  Cardiovascular:  RRR without M,G,R  Abd/GI: soft and non-tender; with positive bowel sounds. Ext: warm without cyanosis. There is no lower leg edema. Skin:  no jaundice or rashes, no wounds   Neuro: no gross neuro deficits     Musculoskeletal: moves all four extremities with equal strength. No deformities      Chest Xray:       Recent Labs      07/25/17   1125   WBC  10.1   HGB  16.1*   HCT  48.0*   PLT  287     Recent Labs      07/25/17   1125   NA  143   K  4.3   CL  101   GLU  147*   CO2  36*   BUN  6*   CREA  0.58*   CA  8.7   ALB  3.2   SGOT  20         Assessment:  (Medical Decision Making)     Hospital Problems  Date Reviewed: 1/27/2017          Codes Class Noted POA    COPD exacerbation ([facility] ICD-10-CM: J44.1  ICD-9-CM: 491.21  7/25/2017 Yes    Continue IV steroids, bronchodilators, and levaquin    Tracheobronchitis ICD-10-CM: J40  ICD-9-CM: 807  7/25/2017 Yes    Suspect bacterial infection.  Check sputum cultures and will treat with levaquin. Consider repeating CXR to r/o pneumonia once volume resuscitated    Nocturnal hypoxia (Chronic) ICD-10-CM: G47.34  ICD-9-CM: 327.24  7/25/2017 Yes    On CPAP nightly with O2. Now has resting hypoxemia    * (Principal)Acute respiratory failure with hypoxia [facility]) ICD-10-CM: J96.01  ICD-9-CM: 518.81  7/25/2017 Unknown    With severe hypoxemia and O2 sat in 70s on room air. JACQUELYN (obstructive sleep apnea) (Chronic) ICD-10-CM: G47.33  ICD-9-CM: 327.23  1/29/2015 Yes    Overview Addendum 2/2/2015  9:38 AM by Marlyn Murguia NP     Uses home CPAP with 6L bleed in O2         Continue nightly CPAP inpatient    Tobacco use disorder (Chronic) ICD-10-CM: M38.955  ICD-9-CM: 305.1  9/14/2012 Yes    Smoking cessation education    Diabetes mellitus ([facility] (Chronic) ICD-10-CM: E11.9  ICD-9-CM: 250.00  9/14/2012 Yes    FSBS ac/hs with sliding scale. Holding metformin while inpatient          Plan:  (Medical Decision Making)   1. Admit to floor bed  2. IV steroids bronchodilators  3. abx for bronchitis - get sputum culture - she reports recent MRSA in facial/oral infection and has a history of positive swab  4. Smoking cessation will be recommended - she was resistant to discussion in the ER  5. Nocturnal CPAP  6. assess oxygen needs at discharge - was wearing 6 liters with CPAP with sleep at home    RAMIN Greer  More than 50% of time documented was spent face-to-face contact with the patient and in the care of the patient on the floor/unit where the patient is located    I have spoken with and examined the patient. I agree with the above assessment and plan as documented. Mrs. Mariangel Lake has acute hypoxic respiratory failure with 4lpm O2 requirement and high work of breathing with speaking. This is due to a COPD exacerbation, in setting of GOLD Stage III COPD at baseline, as well as active smoking.      Gen: thin, gets winded speaking  Lungs:  Coarse on right with rhonchi  Heart:  RRR no MRG  Abd: NTND  Ext: no edema    Impression/Assessment:  66yoF with GOLD III COPD with acute respiratory failure due to moderately severe COPD exacerbation requiring hospitalization for monitoring of dyspnea/hypoxemia. Recommendations:   --Admit to observation, but there is a considerable likelihood that she may need inpatient length stay. Will continue aggressive therapy with frequent bronchodilators, IV steroids, nebulized glucocorticoids. --Levaquin for COPD exacerbation with possible bacterial infection. Sputum cultures ordered.   --FSBS and sliding scale insulin: anticipate hyperglycemia on steroids  --Continue home medications for pain, ADHD, depression  --DNR    Stevenson Vera MD

## 2017-07-25 NOTE — Clinical Note
Patient Class[de-identified] Observation [797] Type of Bed: Medical [8] Reason for Observation: COPD exacerbation Admitting Diagnosis: Acute respiratory failure with hypoxia ([facility] [[id number]] Admitting Physician: Jessie Boudreaux [[id number]] Attending Physician: Jessie Boudreaux [[id number]]

## 2017-07-25 NOTE — PROGRESS NOTES
Received shift report from Paulette Albright, [facility]. Assessment complete. Patient resting in bed, low and locked, call light within reach. Nasal swab sent to lab. Safety measures in place. Instructed to call for assistance, patient verbalized understanding.

## 2017-07-25 NOTE — ED NOTES
TRANSFER - OUT REPORT:    Verbal report given to Shonda RN(name) on Shruti Carreon  being transferred to  808(unit) for routine progression of care       Report consisted of patients Situation, Background, Assessment and   Recommendations(SBAR). Information from the following report(s) ED Summary was reviewed with the receiving nurse. Lines:       Opportunity for questions and clarification was provided.       Patient transported with:

## 2017-07-26 PROBLEM — J44.1 CHRONIC OBSTRUCTIVE PULMONARY DISEASE WITH ACUTE EXACERBATION (HCC): Status: ACTIVE | Noted: 2017-07-26

## 2017-07-26 LAB
ANION GAP BLD CALC-SCNC: 8 MMOL/L (ref 7–16)
BUN SERPL-MCNC: 8 MG/DL (ref 8–23)
CALCIUM SERPL-MCNC: 8.5 MG/DL (ref 8.3–10.4)
CHLORIDE SERPL-SCNC: 107 MMOL/L (ref 98–107)
CO2 SERPL-SCNC: 29 MMOL/L (ref 21–32)
CREAT SERPL-MCNC: 0.53 MG/DL (ref 0.6–1)
ERYTHROCYTE [DISTWIDTH] IN BLOOD BY AUTOMATED COUNT: 13.2 % (ref 11.9–14.6)
GLUCOSE BLD STRIP.AUTO-MCNC: 174 MG/DL (ref 65–100)
GLUCOSE BLD STRIP.AUTO-MCNC: 178 MG/DL (ref 65–100)
GLUCOSE BLD STRIP.AUTO-MCNC: 179 MG/DL (ref 65–100)
GLUCOSE BLD STRIP.AUTO-MCNC: 253 MG/DL (ref 65–100)
GLUCOSE SERPL-MCNC: 186 MG/DL (ref 65–100)
HCT VFR BLD AUTO: 45.2 % (ref 35.8–46.3)
HGB BLD-MCNC: 14.7 G/DL (ref 11.7–15.4)
MCH RBC QN AUTO: 30 PG (ref 26.1–32.9)
MCHC RBC AUTO-ENTMCNC: 32.5 G/DL (ref 31.4–35)
MCV RBC AUTO: 92.2 FL (ref 79.6–97.8)
PLATELET # BLD AUTO: 299 K/UL (ref 150–450)
PMV BLD AUTO: 9.7 FL (ref 10.8–14.1)
POTASSIUM SERPL-SCNC: 4.3 MMOL/L (ref 3.5–5.1)
RBC # BLD AUTO: 4.9 M/UL (ref 4.05–5.25)
SODIUM SERPL-SCNC: 144 MMOL/L (ref 136–145)
WBC # BLD AUTO: 11.7 K/UL (ref 4.3–11.1)

## 2017-07-26 PROCEDURE — 36415 COLL VENOUS BLD VENIPUNCTURE: CPT | Performed by: INTERNAL MEDICINE

## 2017-07-26 PROCEDURE — 77030020120 HC VLV RESP PEP HI -B

## 2017-07-26 PROCEDURE — 85027 COMPLETE CBC AUTOMATED: CPT | Performed by: INTERNAL MEDICINE

## 2017-07-26 PROCEDURE — 99218 HC RM OBSERVATION: CPT

## 2017-07-26 PROCEDURE — 80048 BASIC METABOLIC PNL TOTAL CA: CPT | Performed by: INTERNAL MEDICINE

## 2017-07-26 PROCEDURE — 82962 GLUCOSE BLOOD TEST: CPT

## 2017-07-26 PROCEDURE — 94640 AIRWAY INHALATION TREATMENT: CPT

## 2017-07-26 PROCEDURE — 74011636637 HC RX REV CODE- 636/637: Performed by: INTERNAL MEDICINE

## 2017-07-26 PROCEDURE — 77010033678 HC OXYGEN DAILY

## 2017-07-26 PROCEDURE — 65270000029 HC RM PRIVATE

## 2017-07-26 PROCEDURE — 99232 SBSQ HOSP IP/OBS MODERATE 35: CPT | Performed by: INTERNAL MEDICINE

## 2017-07-26 PROCEDURE — 94760 N-INVAS EAR/PLS OXIMETRY 1: CPT

## 2017-07-26 PROCEDURE — 94660 CPAP INITIATION&MGMT: CPT

## 2017-07-26 PROCEDURE — 74011250637 HC RX REV CODE- 250/637: Performed by: INTERNAL MEDICINE

## 2017-07-26 PROCEDURE — 77030012341 HC CHMB SPCR OPTC MDI VYRM -A

## 2017-07-26 PROCEDURE — 74011000250 HC RX REV CODE- 250: Performed by: INTERNAL MEDICINE

## 2017-07-26 PROCEDURE — 74011250636 HC RX REV CODE- 250/636: Performed by: INTERNAL MEDICINE

## 2017-07-26 RX ADMIN — LEVOFLOXACIN 750 MG: 5 INJECTION, SOLUTION INTRAVENOUS at 17:45

## 2017-07-26 RX ADMIN — MUPIROCIN: 20 OINTMENT TOPICAL at 17:51

## 2017-07-26 RX ADMIN — GUAIFENESIN 100 MG: 100 SOLUTION ORAL at 14:53

## 2017-07-26 RX ADMIN — SODIUM CHLORIDE 75 ML/HR: 900 INJECTION, SOLUTION INTRAVENOUS at 05:57

## 2017-07-26 RX ADMIN — Medication 5 ML: at 05:52

## 2017-07-26 RX ADMIN — INSULIN LISPRO 2 UNITS: 100 INJECTION, SOLUTION INTRAVENOUS; SUBCUTANEOUS at 12:11

## 2017-07-26 RX ADMIN — ASPIRIN 81 MG: 81 TABLET, COATED ORAL at 09:29

## 2017-07-26 RX ADMIN — DILTIAZEM HYDROCHLORIDE 360 MG: 180 CAPSULE, EXTENDED RELEASE ORAL at 09:29

## 2017-07-26 RX ADMIN — Medication 5 ML: at 14:48

## 2017-07-26 RX ADMIN — METHYLPHENIDATE HYDROCHLORIDE 5 MG: 5 TABLET ORAL at 17:46

## 2017-07-26 RX ADMIN — ALBUTEROL SULFATE 2.5 MG: 2.5 SOLUTION RESPIRATORY (INHALATION) at 11:15

## 2017-07-26 RX ADMIN — MUPIROCIN: 20 OINTMENT TOPICAL at 11:48

## 2017-07-26 RX ADMIN — MIRTAZAPINE 15 MG: 30 TABLET, FILM COATED ORAL at 21:46

## 2017-07-26 RX ADMIN — METHYLPHENIDATE HYDROCHLORIDE 5 MG: 5 TABLET ORAL at 09:28

## 2017-07-26 RX ADMIN — ALBUTEROL SULFATE 2.5 MG: 2.5 SOLUTION RESPIRATORY (INHALATION) at 19:25

## 2017-07-26 RX ADMIN — ACETAMINOPHEN 650 MG: 325 TABLET ORAL at 14:53

## 2017-07-26 RX ADMIN — BUDESONIDE 500 MCG: 0.5 SUSPENSION RESPIRATORY (INHALATION) at 20:00

## 2017-07-26 RX ADMIN — METHYLPREDNISOLONE SODIUM SUCCINATE 40 MG: 125 INJECTION, POWDER, FOR SOLUTION INTRAMUSCULAR; INTRAVENOUS at 17:46

## 2017-07-26 RX ADMIN — METHYLPREDNISOLONE SODIUM SUCCINATE 60 MG: 125 INJECTION, POWDER, FOR SOLUTION INTRAMUSCULAR; INTRAVENOUS at 05:51

## 2017-07-26 RX ADMIN — ALBUTEROL SULFATE 2.5 MG: 2.5 SOLUTION RESPIRATORY (INHALATION) at 15:08

## 2017-07-26 RX ADMIN — ALBUTEROL SULFATE 2.5 MG: 2.5 SOLUTION RESPIRATORY (INHALATION) at 03:40

## 2017-07-26 RX ADMIN — ALBUTEROL SULFATE 2.5 MG: 2.5 SOLUTION RESPIRATORY (INHALATION) at 07:20

## 2017-07-26 RX ADMIN — INSULIN LISPRO 6 UNITS: 100 INJECTION, SOLUTION INTRAVENOUS; SUBCUTANEOUS at 17:45

## 2017-07-26 RX ADMIN — INSULIN LISPRO 2 UNITS: 100 INJECTION, SOLUTION INTRAVENOUS; SUBCUTANEOUS at 05:48

## 2017-07-26 RX ADMIN — METHYLPREDNISOLONE SODIUM SUCCINATE 40 MG: 125 INJECTION, POWDER, FOR SOLUTION INTRAMUSCULAR; INTRAVENOUS at 12:10

## 2017-07-26 RX ADMIN — TRAZODONE HYDROCHLORIDE 100 MG: 50 TABLET ORAL at 21:34

## 2017-07-26 RX ADMIN — DULOXETINE 30 MG: 30 CAPSULE, DELAYED RELEASE ORAL at 09:29

## 2017-07-26 RX ADMIN — GABAPENTIN 300 MG: 300 CAPSULE ORAL at 21:34

## 2017-07-26 RX ADMIN — ACETAMINOPHEN 650 MG: 325 TABLET ORAL at 09:36

## 2017-07-26 RX ADMIN — ENOXAPARIN SODIUM 40 MG: 40 INJECTION SUBCUTANEOUS at 17:45

## 2017-07-26 RX ADMIN — INSULIN LISPRO 2 UNITS: 100 INJECTION, SOLUTION INTRAVENOUS; SUBCUTANEOUS at 21:35

## 2017-07-26 RX ADMIN — ROPINIROLE HYDROCHLORIDE 1 MG: 1 TABLET, FILM COATED ORAL at 09:28

## 2017-07-26 RX ADMIN — Medication 5 ML: at 21:36

## 2017-07-26 RX ADMIN — BUDESONIDE 500 MCG: 0.5 SUSPENSION RESPIRATORY (INHALATION) at 07:20

## 2017-07-26 RX ADMIN — GUAIFENESIN 100 MG: 100 SOLUTION ORAL at 09:36

## 2017-07-26 NOTE — PROGRESS NOTES
Ria in micro reports MRSA nasal swab positive. Contact isolation, Bactroban and Hibiclens bath ordered per protocol. Patient notified.

## 2017-07-26 NOTE — PROGRESS NOTES
Problem: Patient Education: Go to Patient Education Activity  Goal: Patient/Family Education  Outcome: Resolved/Met Date Met:  07/25/17  MRSA Facts About sheet given and reviewed with patient. Pt states she is  very familiar with isolation protocol from previous admission.

## 2017-07-26 NOTE — PROGRESS NOTES
Patient with dry hacking cough. Dr Wandy Cruz notified and ordered Robitussin DM. Orders confirmed and read back.

## 2017-07-26 NOTE — PROGRESS NOTES
Shruti Mcdaniel  Admission Date: 7/25/2017             Daily Progress Note: 7/26/2017    The patient's chart is reviewed and the patient is discussed with the staff.     Subjective:     Still with sob  On O2 @     Current Facility-Administered Medications   Medication Dose Route Frequency    methylPREDNISolone (PF) (SOLU-MEDROL) injection 40 mg  40 mg IntraVENous Q6H    albuterol (PROVENTIL VENTOLIN) nebulizer solution 2.5 mg  2.5 mg Nebulization Q4H RT    aspirin delayed-release tablet 81 mg  81 mg Oral DAILY    dilTIAZem CD (CARDIZEM CD) capsule 360 mg  360 mg Oral DAILY    DULoxetine (CYMBALTA) capsule 30 mg  30 mg Oral DAILY    gabapentin (NEURONTIN) capsule 300 mg  300 mg Oral QHS    methylphenidate (RITALIN) tablet 5 mg  5 mg Oral BID    mirtazapine (REMERON) tablet 15 mg  15 mg Oral QHS    rOPINIRole (REQUIP) tablet 1 mg  1 mg Oral DAILY    traZODone (DESYREL) tablet 100 mg  100 mg Oral QHS    sodium chloride (NS) flush 5-10 mL  5-10 mL IntraVENous Q8H    sodium chloride (NS) flush 5-10 mL  5-10 mL IntraVENous PRN    insulin lispro (HUMALOG) injection   SubCUTAneous AC&HS    budesonide (PULMICORT) 500 mcg/2 ml nebulizer suspension  500 mcg Nebulization BID RT    levoFLOXacin (LEVAQUIN) 750 mg in D5W IVPB  750 mg IntraVENous Q24H    acetaminophen (TYLENOL) tablet 650 mg  650 mg Oral Q4H PRN    ondansetron (ZOFRAN) injection 4 mg  4 mg IntraVENous Q4H PRN    bisacodyl (DULCOLAX) tablet 5 mg  5 mg Oral DAILY PRN    enoxaparin (LOVENOX) injection 40 mg  40 mg SubCUTAneous Q24H    albuterol (PROVENTIL VENTOLIN) nebulizer solution 2.5 mg  2.5 mg Nebulization Q4H PRN    0.9% sodium chloride infusion  50 mL/hr IntraVENous CONTINUOUS    guaiFENesin (ROBITUSSIN) 100 mg/5 mL oral liquid 100 mg  100 mg Oral Q4H PRN    mupirocin (BACTROBAN) 2 % ointment   Both Nostrils BID       Review of Systems         Constitutional: negative for fever, chills, sweats  Cardiovascular: negative for chest pain, palpitations, syncope, edema  Gastrointestinal:  negative for dysphagia, reflux, vomiting, diarrhea, abdominal pain, or melena  Neurologic:  negative for focal weakness, numbness, headache    Objective:     Vitals:    07/26/17 0323 07/26/17 0340 07/26/17 0723 07/26/17 0739   BP: 137/62   129/53   Pulse: 88   90   Resp: 18   18   Temp: 97.8 °F (36.6 °C)   97.5 °F (36.4 °C)   SpO2: 95% 94% 92% 92%   Weight:       Height:         Intake and Output:   07/24 1901 - 07/26 0700  In: 360 [P.O.:360]  Out: -   07/26 0701 - 07/26 1900  In: 1032 [I.V.:1032]  Out: -     Physical Exam:   Constitution:  the patient is well developed and in no acute distress  EENMT:  Sclera clear, pupils equal, oral mucosa moist  Respiratory: bilateral wheezing  Cardiovascular:  RRR without M,G,R  Gastrointestinal: soft and non-tender; with positive bowel sounds. Musculoskeletal: warm without cyanosis. There is  no lower leg edema. Skin:  no jaundice or rashes, no wounds   Neurologic: no gross neuro deficits     Psychiatric:  alert and oriented x 3    CXR:       LAB  Recent Labs      07/26/17   0527  07/25/17   2132  07/25/17   1639   GLUCPOC  174*  285*  278*      Recent Labs      07/26/17   0517  07/25/17   1125   WBC  11.7*  10.1   HGB  14.7  16.1*   HCT  45.2  48.0*   PLT  299  287     Recent Labs      07/26/17   0517  07/25/17   1125   NA  144  143   K  4.3  4.3   CL  107  101   CO2  29  36*   GLU  186*  147*   BUN  8  6*   CREA  0.53*  0.58*   CA  8.5  8.7   TROIQ   --   <0.02*   ALB   --   3.2   TBILI   --   0.3   ALT   --   35   SGOT   --   20     No results for input(s): PH, PCO2, PO2, HCO3 in the last 72 hours. No results for input(s): LCAD, LAC in the last 72 hours.       Assessment:  (Medical Decision Making)     Hospital Problems  Date Reviewed: 1/27/2017          Codes Class Noted POA    Chronic obstructive pulmonary disease with acute exacerbation ([facility] ICD-10-CM: J44.1  ICD-9-CM: 491.21  7/26/2017 Unknown Ongoing  Not better     COPD exacerbation ([facility] ICD-10-CM: J44.1  ICD-9-CM: 491.21  7/25/2017 Yes        Tracheobronchitis ICD-10-CM: J40  ICD-9-CM: 345  7/25/2017 Yes    On LVQ D #2    Nocturnal hypoxia (Chronic) ICD-10-CM: G47.34  ICD-9-CM: 327.24  7/25/2017 Yes        * (Principal)Acute respiratory failure with hypoxia ([facility]) ICD-10-CM: J96.01  ICD-9-CM: 518.81  7/25/2017 Unknown    Severe     JACQUELYN (obstructive sleep apnea) (Chronic) ICD-10-CM: C21.96  ICD-9-CM: 327.23  1/29/2015 Yes    Overview Addendum 2/2/2015  9:38 AM by Avelino Manzano NP     Uses home CPAP with 6L bleed in O2             Tobacco use disorder (Chronic) ICD-10-CM: Q87.244  ICD-9-CM: 305.1  9/14/2012 Yes        Diabetes mellitus ([facility]) (Chronic) ICD-10-CM: E11.9  ICD-9-CM: 250.00  9/14/2012 Yes    -563          Plan:  (Medical Decision Making)     Cont. Steroids  Cont. BD and O2  LVQ D #2  SSI  CPAP with sleep    More than 50% of the time documented was spent in face-to-face contact with the patient and in the care of the patient on the floor/unit where the patient is located.     Stefany Fish MD

## 2017-07-26 NOTE — PROGRESS NOTES
Problem: Interdisciplinary Rounds  Goal: Interdisciplinary Rounds  Outcome: Progressing Towards Goal  Interdisciplinary team rounds were held 7/26/2017 with the following team members:Care Management, Nursing and Clinical Coordinator and the patient. Plan of care discussed. See clinical pathway and/or care plan for interventions and desired outcomes.

## 2017-07-27 PROBLEM — E11.9 DIABETES MELLITUS (HCC): Chronic | Status: ACTIVE | Noted: 2017-07-26

## 2017-07-27 PROBLEM — G47.33 OSA (OBSTRUCTIVE SLEEP APNEA): Chronic | Status: ACTIVE | Noted: 2017-07-26

## 2017-07-27 PROBLEM — F17.200 TOBACCO USE DISORDER: Chronic | Status: ACTIVE | Noted: 2017-07-26

## 2017-07-27 LAB
ERYTHROCYTE [DISTWIDTH] IN BLOOD BY AUTOMATED COUNT: 13.7 % (ref 11.9–14.6)
GLUCOSE BLD STRIP.AUTO-MCNC: 210 MG/DL (ref 65–100)
GLUCOSE BLD STRIP.AUTO-MCNC: 222 MG/DL (ref 65–100)
GLUCOSE BLD STRIP.AUTO-MCNC: 250 MG/DL (ref 65–100)
GLUCOSE BLD STRIP.AUTO-MCNC: 300 MG/DL (ref 65–100)
HCT VFR BLD AUTO: 43 % (ref 35.8–46.3)
HGB BLD-MCNC: 14.4 G/DL (ref 11.7–15.4)
MCH RBC QN AUTO: 30.4 PG (ref 26.1–32.9)
MCHC RBC AUTO-ENTMCNC: 33.5 G/DL (ref 31.4–35)
MCV RBC AUTO: 90.7 FL (ref 79.6–97.8)
PLATELET # BLD AUTO: 276 K/UL (ref 150–450)
PMV BLD AUTO: 9.5 FL (ref 10.8–14.1)
RBC # BLD AUTO: 4.74 M/UL (ref 4.05–5.25)
WBC # BLD AUTO: 20.2 K/UL (ref 4.3–11.1)

## 2017-07-27 PROCEDURE — 74011250636 HC RX REV CODE- 250/636: Performed by: INTERNAL MEDICINE

## 2017-07-27 PROCEDURE — 85027 COMPLETE CBC AUTOMATED: CPT | Performed by: INTERNAL MEDICINE

## 2017-07-27 PROCEDURE — 74011000250 HC RX REV CODE- 250: Performed by: INTERNAL MEDICINE

## 2017-07-27 PROCEDURE — 94660 CPAP INITIATION&MGMT: CPT

## 2017-07-27 PROCEDURE — 74011250637 HC RX REV CODE- 250/637: Performed by: INTERNAL MEDICINE

## 2017-07-27 PROCEDURE — 77010033678 HC OXYGEN DAILY

## 2017-07-27 PROCEDURE — 94640 AIRWAY INHALATION TREATMENT: CPT

## 2017-07-27 PROCEDURE — 99232 SBSQ HOSP IP/OBS MODERATE 35: CPT | Performed by: INTERNAL MEDICINE

## 2017-07-27 PROCEDURE — 82962 GLUCOSE BLOOD TEST: CPT

## 2017-07-27 PROCEDURE — 36415 COLL VENOUS BLD VENIPUNCTURE: CPT | Performed by: INTERNAL MEDICINE

## 2017-07-27 PROCEDURE — 74011636637 HC RX REV CODE- 636/637: Performed by: INTERNAL MEDICINE

## 2017-07-27 PROCEDURE — 94760 N-INVAS EAR/PLS OXIMETRY 1: CPT

## 2017-07-27 PROCEDURE — 65270000029 HC RM PRIVATE

## 2017-07-27 RX ADMIN — ASPIRIN 81 MG: 81 TABLET, COATED ORAL at 09:35

## 2017-07-27 RX ADMIN — MIRTAZAPINE 15 MG: 30 TABLET, FILM COATED ORAL at 21:33

## 2017-07-27 RX ADMIN — DULOXETINE 30 MG: 30 CAPSULE, DELAYED RELEASE ORAL at 09:35

## 2017-07-27 RX ADMIN — SODIUM CHLORIDE 50 ML/HR: 900 INJECTION, SOLUTION INTRAVENOUS at 04:30

## 2017-07-27 RX ADMIN — Medication 5 ML: at 21:33

## 2017-07-27 RX ADMIN — GABAPENTIN 300 MG: 300 CAPSULE ORAL at 21:33

## 2017-07-27 RX ADMIN — MUPIROCIN: 20 OINTMENT TOPICAL at 09:36

## 2017-07-27 RX ADMIN — METHYLPREDNISOLONE SODIUM SUCCINATE 40 MG: 125 INJECTION, POWDER, FOR SOLUTION INTRAMUSCULAR; INTRAVENOUS at 05:58

## 2017-07-27 RX ADMIN — ALBUTEROL SULFATE 2.5 MG: 2.5 SOLUTION RESPIRATORY (INHALATION) at 19:48

## 2017-07-27 RX ADMIN — ENOXAPARIN SODIUM 40 MG: 40 INJECTION SUBCUTANEOUS at 16:50

## 2017-07-27 RX ADMIN — METHYLPREDNISOLONE SODIUM SUCCINATE 40 MG: 125 INJECTION, POWDER, FOR SOLUTION INTRAMUSCULAR; INTRAVENOUS at 00:20

## 2017-07-27 RX ADMIN — ALBUTEROL SULFATE 2.5 MG: 2.5 SOLUTION RESPIRATORY (INHALATION) at 11:19

## 2017-07-27 RX ADMIN — ALBUTEROL SULFATE 2.5 MG: 2.5 SOLUTION RESPIRATORY (INHALATION) at 23:49

## 2017-07-27 RX ADMIN — ROPINIROLE HYDROCHLORIDE 1 MG: 1 TABLET, FILM COATED ORAL at 09:35

## 2017-07-27 RX ADMIN — Medication 5 ML: at 05:59

## 2017-07-27 RX ADMIN — INSULIN LISPRO 4 UNITS: 100 INJECTION, SOLUTION INTRAVENOUS; SUBCUTANEOUS at 05:57

## 2017-07-27 RX ADMIN — ALBUTEROL SULFATE 2.5 MG: 2.5 SOLUTION RESPIRATORY (INHALATION) at 07:28

## 2017-07-27 RX ADMIN — MUPIROCIN: 20 OINTMENT TOPICAL at 16:52

## 2017-07-27 RX ADMIN — METHYLPREDNISOLONE SODIUM SUCCINATE 40 MG: 40 INJECTION, POWDER, FOR SOLUTION INTRAMUSCULAR; INTRAVENOUS at 16:51

## 2017-07-27 RX ADMIN — INSULIN LISPRO 4 UNITS: 100 INJECTION, SOLUTION INTRAVENOUS; SUBCUTANEOUS at 11:42

## 2017-07-27 RX ADMIN — BUDESONIDE 500 MCG: 0.5 SUSPENSION RESPIRATORY (INHALATION) at 07:28

## 2017-07-27 RX ADMIN — INSULIN LISPRO 6 UNITS: 100 INJECTION, SOLUTION INTRAVENOUS; SUBCUTANEOUS at 16:51

## 2017-07-27 RX ADMIN — Medication 10 ML: at 16:52

## 2017-07-27 RX ADMIN — BUDESONIDE 500 MCG: 0.5 SUSPENSION RESPIRATORY (INHALATION) at 19:48

## 2017-07-27 RX ADMIN — METHYLPHENIDATE HYDROCHLORIDE 5 MG: 5 TABLET ORAL at 09:35

## 2017-07-27 RX ADMIN — INSULIN LISPRO 8 UNITS: 100 INJECTION, SOLUTION INTRAVENOUS; SUBCUTANEOUS at 21:34

## 2017-07-27 RX ADMIN — TRAZODONE HYDROCHLORIDE 100 MG: 50 TABLET ORAL at 21:33

## 2017-07-27 RX ADMIN — DILTIAZEM HYDROCHLORIDE 360 MG: 180 CAPSULE, EXTENDED RELEASE ORAL at 09:35

## 2017-07-27 RX ADMIN — LEVOFLOXACIN 750 MG: 5 INJECTION, SOLUTION INTRAVENOUS at 16:51

## 2017-07-27 RX ADMIN — METHYLPHENIDATE HYDROCHLORIDE 5 MG: 5 TABLET ORAL at 16:51

## 2017-07-27 NOTE — PROGRESS NOTES
Mrs. Monae Median resting in bed with TV on. Alert, oriented in all spheres. Weaned to 2 lpm NC at this time. States some dyspnea on exertion only. Lungs diminished in bases bilaterally. No cough. No edema noted. Without complaints or needs. Will monitor closely with call light in lap and door open.

## 2017-07-27 NOTE — PROGRESS NOTES
Physical assessment completed, pt alert and oriented, resting in bed denies pain or distress, on 5 liters of Oxygen, breathing even and unlabored.

## 2017-07-27 NOTE — PROGRESS NOTES
Mrs. Ramírez Sayer resting in bed watching TV. Uneventful day. Ambulates in room and hallway freely without assistance. Room air ambulating O2 sat 83%. Remains on 2 lpm NC. Without needs or complaints. Call light in lap and door open.

## 2017-07-27 NOTE — PROGRESS NOTES
Shruti Lundberg [facility]  Admission Date: 7/25/2017             Daily Progress Note: 7/27/2017    The patient's chart is reviewed and the patient is discussed with the staff.    77 y.o. CF presents with dyspnea, wheezing, productive cough with yellow/green sputum. She had recent tooth extraction, developed facial pain and was diagnosed with a \"MRSA\" infection. Was treated with Keflex and Prednisone. She still smokes, uses home O2 PRN during the day, wears home CPAP with 6L at night and followed in sleep lab. Uses home nebulizer 4x daily with Albuterol and Pulmicort. Subjective:     Sitting up in bed eating breakfast and denies shortness of breath. No significant cough and no sputum production.     Current Facility-Administered Medications   Medication Dose Route Frequency    methylPREDNISolone (PF) (SOLU-MEDROL) injection 40 mg  40 mg IntraVENous Q6H    albuterol (PROVENTIL VENTOLIN) nebulizer solution 2.5 mg  2.5 mg Nebulization Q4H RT    aspirin delayed-release tablet 81 mg  81 mg Oral DAILY    dilTIAZem CD (CARDIZEM CD) capsule 360 mg  360 mg Oral DAILY    DULoxetine (CYMBALTA) capsule 30 mg  30 mg Oral DAILY    gabapentin (NEURONTIN) capsule 300 mg  300 mg Oral QHS    methylphenidate (RITALIN) tablet 5 mg  5 mg Oral BID    mirtazapine (REMERON) tablet 15 mg  15 mg Oral QHS    rOPINIRole (REQUIP) tablet 1 mg  1 mg Oral DAILY    traZODone (DESYREL) tablet 100 mg  100 mg Oral QHS    sodium chloride (NS) flush 5-10 mL  5-10 mL IntraVENous Q8H    sodium chloride (NS) flush 5-10 mL  5-10 mL IntraVENous PRN    insulin lispro (HUMALOG) injection   SubCUTAneous AC&HS    budesonide (PULMICORT) 500 mcg/2 ml nebulizer suspension  500 mcg Nebulization BID RT    levoFLOXacin (LEVAQUIN) 750 mg in D5W IVPB  750 mg IntraVENous Q24H    acetaminophen (TYLENOL) tablet 650 mg  650 mg Oral Q4H PRN    ondansetron (ZOFRAN) injection 4 mg  4 mg IntraVENous Q4H PRN    bisacodyl (DULCOLAX) tablet 5 mg 5 mg Oral DAILY PRN    enoxaparin (LOVENOX) injection 40 mg  40 mg SubCUTAneous Q24H    albuterol (PROVENTIL VENTOLIN) nebulizer solution 2.5 mg  2.5 mg Nebulization Q4H PRN    0.9% sodium chloride infusion  50 mL/hr IntraVENous CONTINUOUS    guaiFENesin (ROBITUSSIN) 100 mg/5 mL oral liquid 100 mg  100 mg Oral Q4H PRN    mupirocin (BACTROBAN) 2 % ointment   Both Nostrils BID       Review of Systems  Constitutional: negative for fever, chills, sweats  Cardiovascular: negative for chest pain, palpitations, syncope, edema  Gastrointestinal:  negative for dysphagia, reflux, vomiting, diarrhea, abdominal pain, or melena  Neurologic:  negative for focal weakness, numbness, headache    Objective:     Vitals:    07/26/17 1926 07/26/17 2301 07/26/17 2307 07/27/17 0354   BP:   123/60 151/58   Pulse:   87 74   Resp:   18 18   Temp:   98.1 °F (36.7 °C) 98.5 °F (36.9 °C)   SpO2: 97% 96% 91% 94%   Weight:       Height:         Intake and Output:   07/25 1901 - 07/27 0700  In: 2472 [P.O.:1440; I.V.:1032]  Out: -        Physical Exam:   Constitution:  the patient is well developed and in no acute distress, NC 6L, sat 94%  EENMT:  Sclera clear, pupils equal, oral mucosa moist  Respiratory: few bibasilar crackles, no wheezing and no sputum production  Cardiovascular:  RRR without M,G,R  Gastrointestinal: soft and non-tender; with positive bowel sounds. Musculoskeletal: warm without cyanosis. There is no lower leg edema.   Skin:  no jaundice or rashes, no wounds   Neurologic: no gross neuro deficits     Psychiatric:  alert and oriented x 3    CXR: None today    CXR 7/25/17:        LAB  Recent Labs      07/27/17   0534  07/26/17   2033  07/26/17   1532  07/26/17   1107  07/26/17   0527   GLUCPOC  210*  179*  253*  178*  174*      Recent Labs      07/27/17   0545  07/26/17   0517  07/25/17   1125   WBC  20.2*  11.7*  10.1   HGB  14.4  14.7  16.1*   HCT  43.0  45.2  48.0*   PLT  276  299  287     Recent Labs      07/26/17   0521 07/25/17   1125   NA  144  143   K  4.3  4.3   CL  107  101   CO2  29  36*   GLU  186*  147*   BUN  8  6*   CREA  0.53*  0.58*   CA  8.5  8.7   TROIQ   --   <0.02*   ALB   --   3.2   TBILI   --   0.3   ALT   --   35   SGOT   --   20     No results for input(s): PH, PCO2, PO2, HCO3 in the last 72 hours. No results for input(s): LCAD, LAC in the last 72 hours. Assessment:  (Medical Decision Making)     Hospital Problems  Date Reviewed: 7/27/2017          Codes Class Noted POA    JACQUELYN (obstructive sleep apnea) (Chronic) ICD-10-CM: I79.78  ICD-9-CM: 327.23  7/26/2017 Yes     Uses home CPAP with 6L bleed in O2         Wearing facility machine here    Tobacco use disorder (Chronic) ICD-10-CM: E17.891  ICD-9-CM: 305.1  7/26/2017 Yes    chronic    Diabetes mellitus (HCC) (Chronic) ICD-10-CM: E11.9  ICD-9-CM: 250.00  7/26/2017 Yes    Chronic--ranges 178-253--on SSI    Chronic obstructive pulmonary disease with acute exacerbation ([facility] ICD-10-CM: J44.1  ICD-9-CM: 491.21  7/26/2017 Yes    Continue current    Tracheobronchitis ICD-10-CM: J40  ICD-9-CM: 802  7/25/2017 Yes    improving    Nocturnal hypoxia (Chronic) ICD-10-CM: G47.34  ICD-9-CM: 327.24  7/25/2017 Yes    Chronic--uses CPAP     * (Principal)Acute respiratory failure with hypoxia (HCC) ICD-10-CM: J96.01  ICD-9-CM: 518.81  7/25/2017 Yes    On NC 6L--wean as tolerated          Plan:  (Medical Decision Making)     --NS 50ml/hr--stop  --Albuterol, Pulmicort  --Solu Medrol 40mg q6h  --Levaquin day 3  --No cultures obtained  --WBC up to 20.2  --Wearing CPAP here with 6L O2 bleed in--has home equipment she is compliant with. --Wean O2 as tolerated--used O2 PRN during the day at home. --Home 1-2 days    More than 50% of the time documented was spent in face-to-face contact with the patient and in the care of the patient on the floor/unit where the patient is located.     Jewell Castro NP     Lungs:  Fine exp wheezes anteriorly with good air movement  Heart:  RRR with no Murmur/Rubs/Gallops    Additional Comments:  Clinically better. Will drop steroids to BID and try to wean oxygen. Compliant with CPAP. I have spoken with and examined the patient. I agree with the above assessment and plan as documented.     Kilo Devi MD

## 2017-07-28 VITALS
OXYGEN SATURATION: 90 % | DIASTOLIC BLOOD PRESSURE: 63 MMHG | HEART RATE: 62 BPM | WEIGHT: 153.7 LBS | BODY MASS INDEX: 26.24 KG/M2 | TEMPERATURE: 97.7 F | SYSTOLIC BLOOD PRESSURE: 134 MMHG | RESPIRATION RATE: 19 BRPM | HEIGHT: 64 IN

## 2017-07-28 LAB — GLUCOSE BLD STRIP.AUTO-MCNC: 158 MG/DL (ref 65–100)

## 2017-07-28 PROCEDURE — 77010033678 HC OXYGEN DAILY

## 2017-07-28 PROCEDURE — 74011250637 HC RX REV CODE- 250/637: Performed by: INTERNAL MEDICINE

## 2017-07-28 PROCEDURE — 94760 N-INVAS EAR/PLS OXIMETRY 1: CPT

## 2017-07-28 PROCEDURE — 99238 HOSP IP/OBS DSCHRG MGMT 30/<: CPT | Performed by: INTERNAL MEDICINE

## 2017-07-28 PROCEDURE — 94640 AIRWAY INHALATION TREATMENT: CPT

## 2017-07-28 PROCEDURE — 74011000250 HC RX REV CODE- 250: Performed by: INTERNAL MEDICINE

## 2017-07-28 PROCEDURE — 74011636637 HC RX REV CODE- 636/637: Performed by: INTERNAL MEDICINE

## 2017-07-28 PROCEDURE — 82962 GLUCOSE BLOOD TEST: CPT

## 2017-07-28 PROCEDURE — 74011250636 HC RX REV CODE- 250/636: Performed by: INTERNAL MEDICINE

## 2017-07-28 RX ORDER — PREDNISONE 20 MG/1
TABLET ORAL
Qty: 20 TAB | Refills: 0 | Status: SHIPPED | OUTPATIENT
Start: 2017-07-28 | End: 2017-08-24

## 2017-07-28 RX ORDER — LEVOFLOXACIN 750 MG/1
750 TABLET ORAL DAILY
Qty: 4 TAB | Refills: 0 | Status: SHIPPED | OUTPATIENT
Start: 2017-07-28 | End: 2017-08-01

## 2017-07-28 RX ADMIN — BUDESONIDE 500 MCG: 0.5 SUSPENSION RESPIRATORY (INHALATION) at 08:05

## 2017-07-28 RX ADMIN — DULOXETINE 30 MG: 30 CAPSULE, DELAYED RELEASE ORAL at 09:01

## 2017-07-28 RX ADMIN — METHYLPHENIDATE HYDROCHLORIDE 5 MG: 5 TABLET ORAL at 09:00

## 2017-07-28 RX ADMIN — Medication 5 ML: at 06:07

## 2017-07-28 RX ADMIN — ALBUTEROL SULFATE 2.5 MG: 2.5 SOLUTION RESPIRATORY (INHALATION) at 08:05

## 2017-07-28 RX ADMIN — INSULIN LISPRO 2 UNITS: 100 INJECTION, SOLUTION INTRAVENOUS; SUBCUTANEOUS at 06:07

## 2017-07-28 RX ADMIN — METHYLPREDNISOLONE SODIUM SUCCINATE 40 MG: 40 INJECTION, POWDER, FOR SOLUTION INTRAMUSCULAR; INTRAVENOUS at 09:00

## 2017-07-28 RX ADMIN — DILTIAZEM HYDROCHLORIDE 360 MG: 180 CAPSULE, EXTENDED RELEASE ORAL at 09:01

## 2017-07-28 RX ADMIN — ALBUTEROL SULFATE 2.5 MG: 2.5 SOLUTION RESPIRATORY (INHALATION) at 03:49

## 2017-07-28 RX ADMIN — ASPIRIN 81 MG: 81 TABLET, COATED ORAL at 09:00

## 2017-07-28 RX ADMIN — ROPINIROLE HYDROCHLORIDE 1 MG: 1 TABLET, FILM COATED ORAL at 09:00

## 2017-07-28 RX ADMIN — MUPIROCIN: 20 OINTMENT TOPICAL at 09:03

## 2017-07-28 NOTE — PROGRESS NOTES
Patient is discharging home today. She has home O2 through [facility] that she uses on a PRN basis. She also has home CPAP and home nebulizer. No new discharge planning needs identified at this time. Case Management will remain available to assist as needed.     Care Management Interventions  Transition of Care Consult (CM Consult): Discharge Planning  Discharge Durable Medical Equipment: No  Physical Therapy Consult: No  Occupational Therapy Consult: No  Speech Therapy Consult: No  Current Support Network: Own Home  Confirm Follow Up Transport: Family  Plan discussed with Pt/Family/Caregiver: Yes  Freedom of Choice Offered: Yes  Discharge Location  Discharge Placement: Home

## 2017-07-28 NOTE — PROGRESS NOTES
Discharge instructions, follow up information, medication list, prescriptions (levaquin and prednisone), and medication side effects sheet provided and explained to the pt. IV removed from right AC, no remote telemetry to remove. Opportunity for questions provided. E-signer not working, patient given copy to sign and placed in chart. Instructed to call once ready to leave.

## 2017-07-28 NOTE — PROGRESS NOTES
Primary diagnosis of Respiratory Failure. COPD education given. Patient will not be a Bundled Payment patient, so not eligible for the Health  program.  Patient given 24 hour Help Line number with action plan. Seen by CM- no needs.       Heather Estrada RN- [facility], BSN  [facility] [employer]  [phone]

## 2017-07-28 NOTE — PROGRESS NOTES
Physical assessment completed, pt alert and oriented, on 2 liters of oxygen via nasal cannula, call light within reach.

## 2017-07-28 NOTE — DISCHARGE SUMMARY
Discharge Note    Shruti Sutherland  Admission date:  7/25/2017  Discharge date:  7/28/2017     Admitting Diagnosis:  Acute respiratory failure with hypoxia (HCC)  Acute respiratory failure with hypoxia (HCC)  Chronic obstructive pulmonary disease with acute exacerbation [facility])    Discharge Diagnoses:   Hospital Problems  Date Reviewed: 7/28/2017          Codes Class Noted POA    JACQUELYN (obstructive sleep apnea) (Chronic) ICD-10-CM: G47.33  ICD-9-CM: 327.23  7/26/2017 Yes    Overview Addendum 2/2/2015  9:38 AM by Ibeth Doherty NP     Uses home CPAP with 6L bleed in O2             Tobacco use disorder (Chronic) ICD-10-CM: N94.980  ICD-9-CM: 305.1  7/26/2017 Yes        Diabetes mellitus (HCC) (Chronic) ICD-10-CM: E11.9  ICD-9-CM: 250.00  7/26/2017 Yes        Chronic obstructive pulmonary disease with acute exacerbation ([facility] ICD-10-CM: J44.1  ICD-9-CM: 491.21  7/26/2017 Yes        Tracheobronchitis ICD-10-CM: J40  ICD-9-CM: 058  7/25/2017 Yes        Nocturnal hypoxia (Chronic) ICD-10-CM: G47.34  ICD-9-CM: 327.24  7/25/2017 Yes        * (Principal)Acute respiratory failure with hypoxia (HCC) ICD-10-CM: J96.01  ICD-9-CM: 518.81  7/25/2017 Yes              Consultants:  none    Studies/Procedures:  CXR    Condition on Discharge:  Stable    Disposition:  home      Hospital course:  66 y.o. CF presents with dyspnea, wheezing, productive cough with yellow/green sputum. She had recent tooth extraction, developed facial pain and was diagnosed with a \"MRSA\" infection. Was treated with Keflex and Prednisone. She still smokes, uses home O2 PRN during the day, wears home CPAP with 6L at night and followed in sleep lab. Uses home nebulizer 4x daily with Albuterol and Pulmicort. Patient was admitted for further medical management and continued on supplemental O2, given antibiotics, IV steroids and nebulizer treatments. She wore CPAP during hospital stay.     Symptoms improved with medical therapy and is now ready for discharge. Will be discharged to home with Prednisone taper and complete antibiotic course. We will follow up in the office for continued care in 4 weeks. Physical Exam:   Constitution:  the patient is well developed and in no acute distress  EENMT:  Sclera clear, pupils equal, oral mucosa moist  Respiratory: few anterior crackles, no wheezing  Cardiovascular:  RRR without M,G,R  Gastrointestinal: soft and non-tender; with positive bowel sounds. Musculoskeletal: warm without cyanosis. There is no lower leg edema. Skin:  no jaundice or rashes, no wounds   Neurologic: no gross neuro deficits     Psychiatric:  alert and oriented x 3      LAB  Recent Labs      07/27/17   0545  07/26/17   0517  07/25/17   1125   WBC  20.2*  11.7*  10.1   HGB  14.4  14.7  16.1*   HCT  43.0  45.2  48.0*   PLT  276  299  287     Recent Labs      07/26/17   0517  07/25/17   1125   NA  144  143   K  4.3  4.3   CL  107  101   CO2  29  36*   BUN  8  6*   CREA  0.53*  0.58*   ALB   --   3.2     No results for input(s): PH, PCO2, PO2, HCO3 in the last 72 hours. Discharge Medications:   Current Discharge Medication List      START taking these medications    Details   levoFLOXacin (LEVAQUIN) 750 mg tablet Take 1 Tab by mouth daily for 4 days. Qty: 4 Tab, Refills: 0         CONTINUE these medications which have CHANGED    Details   predniSONE (DELTASONE) 20 mg tablet Take 2 tablets daily for 3 days then, take 1 1/2 tablets daily for 3 days then, take 1 tablet daily for 3 days then, take 1/2 tablet daily for 3 days then stop. #20  Qty: 20 Tab, Refills: 0         CONTINUE these medications which have NOT CHANGED    Details   metFORMIN (GLUCOPHAGE) 500 mg tablet Take 500 mg by mouth two (2) times daily (with meals). albuterol (PROAIR HFA) 90 mcg/actuation inhaler Take 2 Puffs by inhalation every six (6) hours as needed for Wheezing.   Qty: 3 Inhaler, Refills: 3    Associated Diagnoses: Chronic obstructive pulmonary disease, unspecified COPD type ([facility])      DULoxetine (CYMBALTA) 30 mg capsule Take 30 mg by mouth daily. dilTIAZem (TIAZAC) 360 mg ER capsule Take 1 Cap by mouth daily. Qty: 30 Cap, Refills: 11      mirtazapine (REMERON) 30 mg tablet Take 15 mg by mouth nightly. albuterol (PROVENTIL VENTOLIN) 2.5 mg /3 mL (0.083 %) nebulizer solution 2.5 mg by Nebulization route two (2) times a day. budesonide (PULMICORT) 0.5 mg/2 mL nebulizer suspension 2 mL by Nebulization route two (2) times a day. Qty: 180 Package, Refills: 4    Comments: DX:COPD 496  Associated Diagnoses: COPD (chronic obstructive pulmonary disease) ([facility])      olopatadine (PATADAY) 0.2 % drop ophthalmic solution Administer 1 drop to both eyes daily. Associated Diagnoses: Chronic sinusitis; JACQUELYN (obstructive sleep apnea); Hypoxia; COPD (chronic obstructive pulmonary disease) ([address] Tobacco use disorder      OXYGEN-AIR DELIVERY SYSTEMS by Nasal route. 5 lpm qhs      aspirin delayed-release 81 mg tablet Take  by mouth daily. methylphenidate (RITALIN) 5 mg tablet Take 5 mg by mouth two (2) times a day. cpap machine kit by Does Not Apply route. 15cm with 5L of oxygen bled in      traZODone (DESYREL) 100 mg tablet Take 100 mg by mouth nightly. rOPINIRole (REQUIP) 1 mg tablet Take  by mouth daily. nitroglycerin (NITROSTAT) 0.4 mg SL tablet by SubLINGual route every five (5) minutes as needed. prednisoLONE acetate (PRED FORTE) 1 % ophthalmic suspension Administer 1 drop to both eyes four (4) times daily. Associated Diagnoses: Chronic sinusitis; JACQUELYN (obstructive sleep apnea); Hypoxia; COPD (chronic obstructive pulmonary disease) ([address] Tobacco use disorder      gabapentin (NEURONTIN) 300 mg capsule Take 300 mg by mouth nightly. 1 tab po qhs             Followup/Outpt Studies:  --Follow up appointment with [facility] in 4 weeks.   --Continue home O2 during the day and CPAP at night with 6L bleed in O2  --Continue Prednisone taper  --Continue Levaquin to complete 7 day antibiotic course. --Total discharge greater than 30 minutes in duration. More than 50% of the time documented was spent in face-to-face contact with the patient and in the care of the patient on the floor/unit where the patient is located. Faviola Espinosa, NP     Lungs: decreased BS with scattered rhonchi and fair air movement  Heart:  RRR with no Murmur/Rubs/Gallops    Additional Comments: Better and wants to go home. Will have her complete 7 day course of LVQ. F/U in office in 1 month. I have spoken with and examined the patient. I agree with the above assessment and plan as documented.     Kilo Devi MD

## 2017-10-02 PROBLEM — R07.9 CHEST PAIN AT REST: Status: ACTIVE | Noted: 2017-10-02

## 2018-01-31 ENCOUNTER — APPOINTMENT (RX ONLY)
Dept: URBAN - METROPOLITAN AREA CLINIC 349 | Facility: CLINIC | Age: 68
Setting detail: DERMATOLOGY
End: 2018-01-31

## 2018-01-31 DIAGNOSIS — L82.0 INFLAMED SEBORRHEIC KERATOSIS: ICD-10-CM

## 2018-01-31 DIAGNOSIS — L30.9 DERMATITIS, UNSPECIFIED: ICD-10-CM

## 2018-01-31 DIAGNOSIS — L82.1 OTHER SEBORRHEIC KERATOSIS: ICD-10-CM

## 2018-01-31 DIAGNOSIS — L85.3 XEROSIS CUTIS: ICD-10-CM

## 2018-01-31 DIAGNOSIS — Z85.828 PERSONAL HISTORY OF OTHER MALIGNANT NEOPLASM OF SKIN: ICD-10-CM

## 2018-01-31 DIAGNOSIS — L72.8 OTHER FOLLICULAR CYSTS OF THE SKIN AND SUBCUTANEOUS TISSUE: ICD-10-CM

## 2018-01-31 PROBLEM — F32.9 MAJOR DEPRESSIVE DISORDER, SINGLE EPISODE, UNSPECIFIED: Status: ACTIVE | Noted: 2018-01-31

## 2018-01-31 PROBLEM — L70.0 ACNE VULGARIS: Status: ACTIVE | Noted: 2018-01-31

## 2018-01-31 PROBLEM — F41.9 ANXIETY DISORDER, UNSPECIFIED: Status: ACTIVE | Noted: 2018-01-31

## 2018-01-31 PROCEDURE — ? DEFER

## 2018-01-31 PROCEDURE — ? COUNSELING

## 2018-01-31 PROCEDURE — ? TREATMENT REGIMEN

## 2018-01-31 PROCEDURE — 17110 DESTRUCTION B9 LES UP TO 14: CPT

## 2018-01-31 PROCEDURE — ? PRESCRIPTION

## 2018-01-31 PROCEDURE — 99202 OFFICE O/P NEW SF 15 MIN: CPT | Mod: 25

## 2018-01-31 PROCEDURE — ? LIQUID NITROGEN

## 2018-01-31 RX ORDER — TRIAMCINOLONE ACETONIDE 1 MG/G
CREAM TOPICAL
Qty: 1 | Refills: 1 | Status: ERX | COMMUNITY
Start: 2018-01-31

## 2018-01-31 RX ADMIN — TRIAMCINOLONE ACETONIDE: 1 CREAM TOPICAL at 00:00

## 2018-01-31 ASSESSMENT — LOCATION SIMPLE DESCRIPTION DERM
LOCATION SIMPLE: UPPER BACK
LOCATION SIMPLE: RIGHT EAR
LOCATION SIMPLE: NOSE
LOCATION SIMPLE: CHEST
LOCATION SIMPLE: LEFT TEMPLE
LOCATION SIMPLE: RIGHT UPPER BACK
LOCATION SIMPLE: POSTERIOR NECK

## 2018-01-31 ASSESSMENT — PAIN INTENSITY VAS: HOW INTENSE IS YOUR PAIN 0 BEING NO PAIN, 10 BEING THE MOST SEVERE PAIN POSSIBLE?: 1/10 PAIN

## 2018-01-31 ASSESSMENT — LOCATION DETAILED DESCRIPTION DERM
LOCATION DETAILED: UPPER STERNUM
LOCATION DETAILED: NASAL TIP
LOCATION DETAILED: RIGHT POSTERIOR EAR
LOCATION DETAILED: RIGHT MEDIAL TRAPEZIAL NECK
LOCATION DETAILED: SUPERIOR THORACIC SPINE
LOCATION DETAILED: RIGHT SUPERIOR UPPER BACK
LOCATION DETAILED: MIDDLE STERNUM
LOCATION DETAILED: RIGHT SUPERIOR MEDIAL UPPER BACK
LOCATION DETAILED: LEFT LATERAL TEMPLE
LOCATION DETAILED: LEFT MEDIAL SUPERIOR CHEST

## 2018-01-31 ASSESSMENT — LOCATION ZONE DERM
LOCATION ZONE: NECK
LOCATION ZONE: FACE
LOCATION ZONE: TRUNK
LOCATION ZONE: NOSE
LOCATION ZONE: EAR

## 2018-01-31 NOTE — HPI: RASH
How Severe Is Your Rash?: mild
Is This A New Presentation, Or A Follow-Up?: Rash
Additional History: Patient states she cannot use soap on her face due to it breaking out.  She doesn’t remember what she has used in the past for this condition.

## 2018-01-31 NOTE — PROCEDURE: TREATMENT REGIMEN
Initiate Treatment: Neutrigenia oil free acne wash \\nMoisturizer daily \\nApply TMC to body
Detail Level: Zone

## 2018-01-31 NOTE — PROCEDURE: LIQUID NITROGEN
Medical Necessity Clause: This procedure was medically necessary because the lesions that were treated were:
Detail Level: Detailed
Render Post-Care Instructions In Note?: no
Include Z78.9 (Other Specified Conditions Influencing Health Status) As An Associated Diagnosis?: Yes
Medical Necessity Information: It is in your best interest to select a reason for this procedure from the list below. All of these items fulfill various CMS LCD requirements except the new and changing color options.
Post-Care Instructions: I reviewed with the patient in detail post-care instructions. Patient is to wear sunprotection, and avoid picking at any of the treated lesions. Pt may apply Vaseline to crusted or scabbing areas.
Consent: The patient's consent was obtained including but not limited to risks of crusting, scabbing, blistering, scarring, darker or lighter pigmentary change, recurrence, incomplete removal and infection.
Number Of Freeze-Thaw Cycles: 1 freeze-thaw cycle

## 2018-02-28 ENCOUNTER — APPOINTMENT (OUTPATIENT)
Dept: CT IMAGING | Age: 68
End: 2018-02-28
Attending: STUDENT IN AN ORGANIZED HEALTH CARE EDUCATION/TRAINING PROGRAM
Payer: MEDICARE

## 2018-02-28 ENCOUNTER — APPOINTMENT (OUTPATIENT)
Dept: GENERAL RADIOLOGY | Age: 68
End: 2018-02-28
Attending: STUDENT IN AN ORGANIZED HEALTH CARE EDUCATION/TRAINING PROGRAM
Payer: MEDICARE

## 2018-02-28 ENCOUNTER — HOSPITAL ENCOUNTER (EMERGENCY)
Age: 68
Discharge: HOME | End: 2018-02-28
Attending: STUDENT IN AN ORGANIZED HEALTH CARE EDUCATION/TRAINING PROGRAM
Payer: MEDICARE

## 2018-02-28 VITALS
DIASTOLIC BLOOD PRESSURE: 61 MMHG | HEART RATE: 119 BPM | OXYGEN SATURATION: 95 % | TEMPERATURE: 98.4 F | SYSTOLIC BLOOD PRESSURE: 124 MMHG | RESPIRATION RATE: 31 BRPM

## 2018-02-28 DIAGNOSIS — R07.89 ATYPICAL CHEST PAIN: ICD-10-CM

## 2018-02-28 DIAGNOSIS — J44.1 COPD EXACERBATION (HCC): Primary | ICD-10-CM

## 2018-02-28 LAB
ALBUMIN SERPL-MCNC: 3 G/DL (ref 3.2–4.6)
ALBUMIN/GLOB SERPL: 0.7 {RATIO} (ref 1.2–3.5)
ALP SERPL-CCNC: 132 U/L (ref 50–136)
ALT SERPL-CCNC: 15 U/L (ref 12–65)
ANION GAP SERPL CALC-SCNC: 8 MMOL/L (ref 7–16)
AST SERPL-CCNC: 13 U/L (ref 15–37)
ATRIAL RATE: 113 BPM
ATRIAL RATE: 125 BPM
BASOPHILS # BLD: 0 K/UL (ref 0–0.2)
BASOPHILS NFR BLD: 0 % (ref 0–2)
BILIRUB SERPL-MCNC: 0.3 MG/DL (ref 0.2–1.1)
BNP SERPL-MCNC: 34 PG/ML
BUN SERPL-MCNC: 5 MG/DL (ref 8–23)
CALCIUM SERPL-MCNC: 8.1 MG/DL (ref 8.3–10.4)
CALCULATED P AXIS, ECG09: 79 DEGREES
CALCULATED P AXIS, ECG09: 88 DEGREES
CALCULATED R AXIS, ECG10: 88 DEGREES
CALCULATED R AXIS, ECG10: 91 DEGREES
CALCULATED T AXIS, ECG11: 82 DEGREES
CALCULATED T AXIS, ECG11: 85 DEGREES
CHLORIDE SERPL-SCNC: 104 MMOL/L (ref 98–107)
CO2 SERPL-SCNC: 27 MMOL/L (ref 21–32)
CREAT SERPL-MCNC: 0.5 MG/DL (ref 0.6–1)
D DIMER PPP FEU-MCNC: 0.79 UG/ML(FEU)
DIAGNOSIS, 93000: NORMAL
DIAGNOSIS, 93000: NORMAL
DIFFERENTIAL METHOD BLD: ABNORMAL
EOSINOPHIL # BLD: 1.8 K/UL (ref 0–0.8)
EOSINOPHIL NFR BLD: 19 % (ref 0.5–7.8)
ERYTHROCYTE [DISTWIDTH] IN BLOOD BY AUTOMATED COUNT: 14.5 % (ref 11.9–14.6)
GLOBULIN SER CALC-MCNC: 4.3 G/DL (ref 2.3–3.5)
GLUCOSE SERPL-MCNC: 135 MG/DL (ref 65–100)
HCT VFR BLD AUTO: 45.5 % (ref 35.8–46.3)
HGB BLD-MCNC: 15.1 G/DL (ref 11.7–15.4)
IMM GRANULOCYTES # BLD: 0 K/UL (ref 0–0.5)
IMM GRANULOCYTES NFR BLD AUTO: 0 % (ref 0–5)
LYMPHOCYTES # BLD: 1.1 K/UL (ref 0.5–4.6)
LYMPHOCYTES NFR BLD: 12 % (ref 13–44)
MCH RBC QN AUTO: 30.6 PG (ref 26.1–32.9)
MCHC RBC AUTO-ENTMCNC: 33.2 G/DL (ref 31.4–35)
MCV RBC AUTO: 92.3 FL (ref 79.6–97.8)
MONOCYTES # BLD: 0.1 K/UL (ref 0.1–1.3)
MONOCYTES NFR BLD: 1 % (ref 4–12)
NEUTS SEG # BLD: 6.6 K/UL (ref 1.7–8.2)
NEUTS SEG NFR BLD: 68 % (ref 43–78)
P-R INTERVAL, ECG05: 114 MS
P-R INTERVAL, ECG05: 120 MS
PLATELET # BLD AUTO: 278 K/UL (ref 150–450)
PMV BLD AUTO: 9.5 FL (ref 10.8–14.1)
POTASSIUM SERPL-SCNC: 4.1 MMOL/L (ref 3.5–5.1)
PROT SERPL-MCNC: 7.3 G/DL (ref 6.3–8.2)
Q-T INTERVAL, ECG07: 306 MS
Q-T INTERVAL, ECG07: 312 MS
QRS DURATION, ECG06: 84 MS
QRS DURATION, ECG06: 84 MS
QTC CALCULATION (BEZET), ECG08: 427 MS
QTC CALCULATION (BEZET), ECG08: 441 MS
RBC # BLD AUTO: 4.93 M/UL (ref 4.05–5.25)
SODIUM SERPL-SCNC: 139 MMOL/L (ref 136–145)
TROPONIN I BLD-MCNC: 0 NG/ML (ref 0.02–0.05)
TROPONIN I SERPL-MCNC: <0.02 NG/ML (ref 0.02–0.05)
VENTRICULAR RATE, ECG03: 113 BPM
VENTRICULAR RATE, ECG03: 125 BPM
WBC # BLD AUTO: 9.7 K/UL (ref 4.3–11.1)

## 2018-02-28 PROCEDURE — 84484 ASSAY OF TROPONIN QUANT: CPT | Performed by: STUDENT IN AN ORGANIZED HEALTH CARE EDUCATION/TRAINING PROGRAM

## 2018-02-28 PROCEDURE — 71260 CT THORAX DX C+: CPT

## 2018-02-28 PROCEDURE — 94640 AIRWAY INHALATION TREATMENT: CPT

## 2018-02-28 PROCEDURE — 71046 X-RAY EXAM CHEST 2 VIEWS: CPT

## 2018-02-28 PROCEDURE — 85379 FIBRIN DEGRADATION QUANT: CPT | Performed by: STUDENT IN AN ORGANIZED HEALTH CARE EDUCATION/TRAINING PROGRAM

## 2018-02-28 PROCEDURE — 93005 ELECTROCARDIOGRAM TRACING: CPT | Performed by: STUDENT IN AN ORGANIZED HEALTH CARE EDUCATION/TRAINING PROGRAM

## 2018-02-28 PROCEDURE — 96360 HYDRATION IV INFUSION INIT: CPT | Performed by: STUDENT IN AN ORGANIZED HEALTH CARE EDUCATION/TRAINING PROGRAM

## 2018-02-28 PROCEDURE — 83880 ASSAY OF NATRIURETIC PEPTIDE: CPT | Performed by: STUDENT IN AN ORGANIZED HEALTH CARE EDUCATION/TRAINING PROGRAM

## 2018-02-28 PROCEDURE — 74011000250 HC RX REV CODE- 250: Performed by: STUDENT IN AN ORGANIZED HEALTH CARE EDUCATION/TRAINING PROGRAM

## 2018-02-28 PROCEDURE — A9270 NON-COVERED ITEM OR SERVICE: HCPCS | Performed by: STUDENT IN AN ORGANIZED HEALTH CARE EDUCATION/TRAINING PROGRAM

## 2018-02-28 PROCEDURE — 74011250636 HC RX REV CODE- 250/636: Performed by: STUDENT IN AN ORGANIZED HEALTH CARE EDUCATION/TRAINING PROGRAM

## 2018-02-28 PROCEDURE — 74011636320 HC RX REV CODE- 636/320: Performed by: STUDENT IN AN ORGANIZED HEALTH CARE EDUCATION/TRAINING PROGRAM

## 2018-02-28 PROCEDURE — 74011636637 HC RX REV CODE- 636/637: Performed by: STUDENT IN AN ORGANIZED HEALTH CARE EDUCATION/TRAINING PROGRAM

## 2018-02-28 PROCEDURE — 85025 COMPLETE CBC W/AUTO DIFF WBC: CPT | Performed by: STUDENT IN AN ORGANIZED HEALTH CARE EDUCATION/TRAINING PROGRAM

## 2018-02-28 PROCEDURE — 74011000258 HC RX REV CODE- 258: Performed by: STUDENT IN AN ORGANIZED HEALTH CARE EDUCATION/TRAINING PROGRAM

## 2018-02-28 PROCEDURE — 80053 COMPREHEN METABOLIC PANEL: CPT | Performed by: STUDENT IN AN ORGANIZED HEALTH CARE EDUCATION/TRAINING PROGRAM

## 2018-02-28 PROCEDURE — 99285 EMERGENCY DEPT VISIT HI MDM: CPT | Performed by: STUDENT IN AN ORGANIZED HEALTH CARE EDUCATION/TRAINING PROGRAM

## 2018-02-28 RX ORDER — IPRATROPIUM BROMIDE AND ALBUTEROL SULFATE 2.5; .5 MG/3ML; MG/3ML
3 SOLUTION RESPIRATORY (INHALATION)
Status: COMPLETED | OUTPATIENT
Start: 2018-02-28 | End: 2018-02-28

## 2018-02-28 RX ORDER — SODIUM CHLORIDE 0.9 % (FLUSH) 0.9 %
10 SYRINGE (ML) INJECTION
Status: COMPLETED | OUTPATIENT
Start: 2018-02-28 | End: 2018-02-28

## 2018-02-28 RX ADMIN — SODIUM CHLORIDE 100 ML: 900 INJECTION, SOLUTION INTRAVENOUS at 14:48

## 2018-02-28 RX ADMIN — PREDNISONE 60 MG: 50 TABLET ORAL at 16:06

## 2018-02-28 RX ADMIN — Medication 10 ML: at 14:48

## 2018-02-28 RX ADMIN — SODIUM CHLORIDE 500 ML: 900 INJECTION, SOLUTION INTRAVENOUS at 13:17

## 2018-02-28 RX ADMIN — IPRATROPIUM BROMIDE AND ALBUTEROL SULFATE 3 ML: .5; 3 SOLUTION RESPIRATORY (INHALATION) at 13:46

## 2018-02-28 RX ADMIN — IOPAMIDOL 100 ML: 755 INJECTION, SOLUTION INTRAVENOUS at 14:48

## 2018-02-28 NOTE — ED PROVIDER NOTES
HPI Comments: 66-year-old female patient presents with reports of shortness of breath, intermittent chest discomfort and weakness. Patient states her symptoms have been present for at least a week. Her chest pain is described as dull pain at the left side of her chest lasting minutes and then resolving spontaneously. Patient reports increased pain and fatigue with exertion. She also feels that her heart races at times. She reports a history of COPD and requires CPAP at night. She does not use oxygen during the day, she is \"sick\". Patient states she woke this morning feeling very fatigued and short of breath. She checked her oxygen saturation and heart rate at which time she noticed a heart rate of 140 and her oxygen level at 83%. She denies history of cardiac stents, MI  In the past.  She states she was told she had an enlarged right heart by her cardiologist.  She currently uses nebulizers and inhalers as needed. She states these abdominal little to improve her symptoms. Of note, patient was recently treated with Levaquin for upper respiratory congestion. She reports mild improvement in her symptoms but states her shortness of breath has returned and is much worse. The history is provided by the patient. Past Medical History:   Diagnosis Date    Acute respiratory failure ([address] 1/29/2015    Intubated 1/29/2015    Acute respiratory failure ([address] 1/29/2015    Intubated 1/29/2015     Cancer ([address]     basal cell,squamous cell    Chest pain, unspecified 7/7/2016    Chiari I malformation (HCC)     history of    COPD exacerbation ([address] 9/14/12-9/17/12    COPD exacerbation ([address] 4/2013 to 5/2/13    hospitalization    Paroxysmal tachycardia/NOS 7/7/2016    Pneumonia 9/16/2012    Pneumothorax     HX.  of  2 on the left- required chest tube       Past Surgical History:   Procedure Laterality Date    HX CATARACT REMOVAL      HX HEART CATHETERIZATION  1/12    mild nonobstructive CAD    HX HEENT 2009    Bilateral cataracts and bleth    HX HYSTERECTOMY  1982    HX ORTHOPAEDIC  1998    right shoulder sx; left thumb    HX OTHER SURGICAL      left thumb sx,forehead resection of squamous cell    HX SEPTOPLASTY  04/2010    HX SHOULDER ARTHROSCOPY  1990         Family History:   Problem Relation Age of Onset    Cancer Mother      breast    Other Father      neurological degeneration    Cancer Sister      exophageal       Social History     Social History    Marital status: SINGLE     Spouse name: N/A    Number of children: N/A    Years of education: N/A     Occupational History    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 Retired . ALLERGIES: Azithromycin and Sulfa (sulfonamide antibiotics)    Review of Systems   Constitutional: Negative for chills, diaphoresis and fever. HENT: Negative for congestion, sneezing and sore throat. Eyes: Negative for visual disturbance. Respiratory: Negative for cough, chest tightness, shortness of breath and wheezing. Cardiovascular: Negative for chest pain and leg swelling. Gastrointestinal: Negative for abdominal pain, blood in stool, diarrhea, nausea and vomiting. Endocrine: Negative for polyuria. Genitourinary: Negative for difficulty urinating, dysuria, flank pain, hematuria and urgency. Musculoskeletal: Negative for back pain, myalgias, neck pain and neck stiffness.    Skin: Negative for color change and rash. Neurological: Negative for dizziness, syncope, speech difficulty, weakness, light-headedness, numbness and headaches. Psychiatric/Behavioral: Negative for behavioral problems. All other systems reviewed and are negative. Vitals:    02/28/18 1139   BP: 119/65   Pulse: (!) 116   Temp: 98.4 °F (36.9 °C)   SpO2: 91%            Physical Exam   Constitutional: She is oriented to person, place, and time. She appears well-developed and well-nourished. No distress. Alert and oriented to person, place and time. No acute distress. Speaks in clear, fluent sentences. HENT:   Head: Normocephalic and atraumatic. Right Ear: External ear normal.   Left Ear: External ear normal.   Nose: Nose normal.   Mouth/Throat: Oropharynx is clear and moist.   Eyes: Conjunctivae and EOM are normal. Pupils are equal, round, and reactive to light. Neck: Normal range of motion. Neck supple. No JVD present. No tracheal deviation present. Cardiovascular: Normal rate, regular rhythm, S1 normal, S2 normal, normal heart sounds and intact distal pulses. Exam reveals no gallop, no distant heart sounds and no friction rub. No murmur heard. Pulmonary/Chest: Effort normal. No accessory muscle usage or stridor. No tachypnea and no bradypnea. No respiratory distress. She has decreased breath sounds. She has wheezes. She has no rhonchi. She has no rales. She exhibits no tenderness. Diminished in bilateral bases, diffuse, faint end expiratory wheezing. Abdominal: Soft. Normal appearance. She exhibits no distension and no mass. There is no hepatosplenomegaly, splenomegaly or hepatomegaly. There is no tenderness. There is no rigidity, no rebound, no guarding, no CVA tenderness, no tenderness at McBurney's point and negative Christine's sign. Musculoskeletal: Normal range of motion. She exhibits no edema, tenderness or deformity. Neurological: She is alert and oriented to person, place, and time. No cranial nerve deficit. Skin: Skin is warm and dry. No rash noted. She is not diaphoretic. Psychiatric: She has a normal mood and affect. Her behavior is normal.   Nursing note and vitals reviewed. MDM  Number of Diagnoses or Management Options  Diagnosis management comments: EKG obtained on arrival shows a sinus tachycardia with a rate of 125. There is no evidence of acute ischemia. Review of patient's chart reveals previous echocardiogram obtained in 3/16/17 that showed a normal EF at 65-70% and mild tricuspid regurgitation. Initial troponin within normal limits. Attempted to collect ABG, patient unwilling to undergo further attempts at sample collection. 1:38 PM    CT imaging shows no evidence of pulmonary embolism or focal infectious process. Patient's tachycardia is improved at this time however she remained slightly tachycardic following her DuoNeb therapy. Repeat troponin is within normal limits. Repeat EKG shows a sinus tachycardia without evidence of acute ischemia. Patient is currently pain-free and states her breathing is normalized. I suspect patient's symptoms are likely secondary to COPD exacerbation. She is not hypoxic at this time and her vital signs are improved. We will attempt an outpatient course of steroids and regular albuterol therapy. Patient wishes to go home rather than stay for admission and agrees to follow up or return if her symptoms worsen. In addition I will arrange close outpatient follow-up with cardiology given patient's reports of intermittent chest pain. Low suspicion for cardiac cause of pain as it has been intermittent and lasting only seconds with onset. Again patient is pain-free at this time.              Amount and/or Complexity of Data Reviewed  Clinical lab tests: ordered and reviewed  Tests in the radiology section of CPT®: ordered and reviewed  Tests in the medicine section of CPT®: ordered and reviewed  Independent visualization of images, tracings, or specimens: yes    Risk of Complications, Morbidity, and/or Mortality  Presenting problems: moderate  Diagnostic procedures: low  Management options: moderate    Patient Progress  Patient progress: stable        ED Course       Procedures

## 2018-02-28 NOTE — ED NOTES
I have reviewed discharge instructions with the patient. The patient verbalized understanding. Patient left ED via Discharge Method: ambulatory to Home with self  . Opportunity for questions and clarification provided. Patient given 0 scripts. To continue your aftercare when you leave the hospital, you may receive an automated call from our care team to check in on how you are doing. This is a free service and part of our promise to provide the best care and service to meet your aftercare needs.  If you have questions, or wish to unsubscribe from this service please call [phone]. Thank you for Choosing our [facility].

## 2018-02-28 NOTE — DISCHARGE INSTRUCTIONS
COPD Exacerbation Plan: Care Instructions  Your Care Instructions    If you have chronic obstructive pulmonary disease (COPD), your usual shortness of breath could suddenly get worse. You may start coughing more and have more mucus. This flare-up is called a COPD exacerbation (say \"[id number]-COZ-dj-BAY-kelli\"). A lung infection or air pollution could set off an exacerbation. Sometimes it can happen after a quick change in temperature or being around chemicals. Work with your doctor to make a plan for dealing with an exacerbation. You can better manage it if you plan ahead. Follow-up care is a key part of your treatment and safety. Be sure to make and go to all appointments, and call your doctor if you are having problems. It's also a good idea to know your test results and keep a list of the medicines you take. How can you care for yourself at home? During an exacerbation  · Do not panic if you start to have one. Quick treatment at home may help you prevent serious breathing problems. If you have a COPD exacerbation plan that you developed with your doctor, follow it. · Take your medicines exactly as your doctor tells you. ¨ Use your inhaler as directed by your doctor. If your symptoms do not get better after you use your medicine, have someone take you to the emergency room. Call an ambulance if necessary. ¨ With inhaled medicines, a spacer or a nebulizer may help you get more medicine to your lungs. Ask your doctor or pharmacist how to use them properly. Practice using the spacer in front of a mirror before you have an exacerbation. This may help you get the medicine into your lungs quickly. ¨ If your doctor has given you steroid pills, take them as directed. ¨ Your doctor may have given you a prescription for antibiotics, which you can fill if you need it. ¨ Talk to your doctor if you have any problems with your medicine.  And call your doctor if you have to use your antibiotic or steroid pills.  Preventing an exacerbation  · Do not smoke. This is the most important step you can take to prevent more damage to your lungs and prevent problems. If you already smoke, it is never too late to stop. If you need help quitting, talk to your doctor about stop-smoking programs and medicines. These can increase your chances of quitting for good. · Take your daily medicines as prescribed. · Avoid colds and flu. ¨ Get a pneumococcal vaccine. ¨ Get a flu vaccine each year, as soon as it is available. Ask those you live or work with to do the same, so they will not get the flu and infect you. ¨ Try to stay away from people with colds or the flu. ¨ Wash your hands often. · Avoid secondhand smoke; air pollution; cold, dry air; hot, humid air; and high altitudes. Stay at home with your windows closed when air pollution is bad. · Learn breathing techniques for COPD, such as breathing through pursed lips. These techniques can help you breathe easier during an exacerbation. When should you call for help? Call 911 anytime you think you may need emergency care. For example, call if:  ? · You have severe trouble breathing. ? · You have severe chest pain. ?Call your doctor now or seek immediate medical care if:  ? · You have new or worse shortness of breath. ? · You develop new chest pain. ? · You are coughing more deeply or more often, especially if you notice more mucus or a change in the color of your mucus. ? · You cough up blood. ? · You have new or increased swelling in your legs or belly. ? · You have a fever. ? Watch closely for changes in your health, and be sure to contact your doctor if:  ? · You need to use your antibiotic or steroid pills. ? · Your symptoms are getting worse. Where can you learn more? Go to http://kathy-karen.info/. Enter K428 in the search box to learn more about \"COPD Exacerbation Plan: Care Instructions. \"  Current as of:  May 12, 2017  Content Version: 11.4  © 9985-6376 [employer]. Care instructions adapted under license by [employer] (which disclaims liability or warranty for this information). If you have questions about a medical condition or this instruction, always ask your healthcare professional. [address] any warranty or liability for your use of this information. Chest Pain: Care Instructions  Your Care Instructions    There are many things that can cause chest pain. Some are not serious and will get better on their own in a few days. But some kinds of chest pain need more testing and treatment. Your doctor may have recommended a follow-up visit in the next 8 to 12 hours. If you are not getting better, you may need more tests or treatment. Even though your doctor has released you, you still need to watch for any problems. The doctor carefully checked you, but sometimes problems can develop later. If you have new symptoms or if your symptoms do not get better, get medical care right away. If you have worse or different chest pain or pressure that lasts more than 5 minutes or you passed out (lost consciousness), call 911 or seek other emergency help right away. A medical visit is only one step in your treatment. Even if you feel better, you still need to do what your doctor recommends, such as going to all suggested follow-up appointments and taking medicines exactly as directed. This will help you recover and help prevent future problems. How can you care for yourself at home? · Rest until you feel better. · Take your medicine exactly as prescribed. Call your doctor if you think you are having a problem with your medicine. · Do not drive after taking a prescription pain medicine. When should you call for help? Call 911 if:  ? · You passed out (lost consciousness). ? · You have severe difficulty breathing. ? · You have symptoms of a heart attack.  These may include:  ¨ Chest pain or pressure, or a strange feeling in your chest.  ¨ Sweating. ¨ Shortness of breath. ¨ Nausea or vomiting. ¨ Pain, pressure, or a strange feeling in your back, neck, jaw, or upper belly or in one or both shoulders or arms. ¨ Lightheadedness or sudden weakness. ¨ A fast or irregular heartbeat. After you call 911, the  may tell you to chew 1 adult-strength or 2 to 4 low-dose aspirin. Wait for an ambulance. Do not try to drive yourself. ?Call your doctor today if:  ? · You have any trouble breathing. ? · Your chest pain gets worse. ? · You are dizzy or lightheaded, or you feel like you may faint. ? · You are not getting better as expected. ? · You are having new or different chest pain. Where can you learn more? Go to http://kathy-karen.info/. Enter A120 in the search box to learn more about \"Chest Pain: Care Instructions. \"  Current as of: March 20, 2017  Content Version: 11.4  © 9457-4440 [employer]. Care instructions adapted under license by [employer] (which disclaims liability or warranty for this information). If you have questions about a medical condition or this instruction, always ask your healthcare professional. [place] [address] any warranty or liability for your use of this information.

## 2018-02-28 NOTE — ED TRIAGE NOTES
Pt states having chest pain and shortness of breath since last week. Pt has a productive cough. Pt was given asa and nitro by ems with no relief.

## 2018-03-22 PROBLEM — R07.89 CHEST DISCOMFORT: Status: ACTIVE | Noted: 2018-03-22

## 2018-03-22 PROBLEM — I47.9 PAROXYSMAL TACHYCARDIA (HCC): Status: ACTIVE | Noted: 2018-03-22

## 2018-03-22 PROBLEM — G47.30 SLEEP APNEA: Status: ACTIVE | Noted: 2018-03-22

## 2018-03-27 ENCOUNTER — APPOINTMENT (RX ONLY)
Dept: URBAN - METROPOLITAN AREA CLINIC 349 | Facility: CLINIC | Age: 68
Setting detail: DERMATOLOGY
End: 2018-03-27

## 2018-03-27 DIAGNOSIS — L72.8 OTHER FOLLICULAR CYSTS OF THE SKIN AND SUBCUTANEOUS TISSUE: ICD-10-CM

## 2018-03-27 PROBLEM — L55.1 SUNBURN OF SECOND DEGREE: Status: ACTIVE | Noted: 2018-03-27

## 2018-03-27 PROCEDURE — 11441 EXC FACE-MM B9+MARG 0.6-1 CM: CPT | Mod: 76

## 2018-03-27 PROCEDURE — 12052 INTMD RPR FACE/MM 2.6-5.0 CM: CPT

## 2018-03-27 PROCEDURE — ? PATHOLOGY BILLING

## 2018-03-27 PROCEDURE — A4550 SURGICAL TRAYS: HCPCS

## 2018-03-27 PROCEDURE — ? COUNSELING

## 2018-03-27 PROCEDURE — 11441 EXC FACE-MM B9+MARG 0.6-1 CM: CPT

## 2018-03-27 PROCEDURE — 88304 TISSUE EXAM BY PATHOLOGIST: CPT

## 2018-03-27 PROCEDURE — ? EXCISION

## 2018-03-27 ASSESSMENT — LOCATION DETAILED DESCRIPTION DERM
LOCATION DETAILED: LEFT SUPERIOR PREAURICULAR CHEEK
LOCATION DETAILED: LEFT POSTERIOR EAR
LOCATION DETAILED: LEFT SUPERIOR PREAURICULAR CHEEK

## 2018-03-27 ASSESSMENT — LOCATION ZONE DERM
LOCATION ZONE: FACE
LOCATION ZONE: EAR
LOCATION ZONE: FACE

## 2018-03-27 ASSESSMENT — LOCATION SIMPLE DESCRIPTION DERM
LOCATION SIMPLE: LEFT EAR
LOCATION SIMPLE: LEFT CHEEK
LOCATION SIMPLE: LEFT CHEEK

## 2018-03-27 NOTE — PROCEDURE: EXCISION
Show Date Of Previous Biopsy Variable: Yes
Dressing: dry sterile dressing
Cartilage Graft Text: The defect edges were debeveled with a #15 scalpel blade.  Given the location of the defect, shape of the defect, the fact the defect involved a full thickness cartilage defect a cartilage graft was deemed most appropriate.  An appropriate donor site was identified, cleansed, and anesthetized. The cartilage graft was then harvested and transferred to the recipient site, oriented appropriately and then sutured into place.  The secondary defect was then repaired using a primary closure.
Star Wedge Flap Text: The defect edges were debeveled with a #15 scalpel blade.  Given the location of the defect, shape of the defect and the proximity to free margins a star wedge flap was deemed most appropriate.  Using a sterile surgical marker, an appropriate rotation flap was drawn incorporating the defect and placing the expected incisions within the relaxed skin tension lines where possible. The area thus outlined was incised deep to adipose tissue with a #15 scalpel blade.  The skin margins were undermined to an appropriate distance in all directions utilizing iris scissors.
Island Pedicle Flap-Requiring Vessel Identification Text: The defect edges were debeveled with a #15 scalpel blade.  Given the location of the defect, shape of the defect and the proximity to free margins an island pedicle advancement flap was deemed most appropriate.  Using a sterile surgical marker, an appropriate advancement flap was drawn, based on the axial vessel mentioned above, incorporating the defect, outlining the appropriate donor tissue and placing the expected incisions within the relaxed skin tension lines where possible.    The area thus outlined was incised deep to adipose tissue with a #15 scalpel blade.  The skin margins were undermined to an appropriate distance in all directions around the primary defect and laterally outward around the island pedicle utilizing iris scissors.  There was minimal undermining beneath the pedicle flap.
Additional Primary Defect Width (In Cm): -
Crescentic Intermediate Repair Preamble Text (Leave Blank If You Do Not Want): Undermining was performed with blunt dissection.
Z Plasty Text: The lesion was extirpated to the level of the fat with a #15 scalpel blade.  Given the location of the defect, shape of the defect and the proximity to free margins a Z-plasty was deemed most appropriate for repair.  Using a sterile surgical marker, the appropriate transposition arms of the Z-plasty were drawn incorporating the defect and placing the expected incisions within the relaxed skin tension lines where possible.    The area thus outlined was incised deep to adipose tissue with a #15 scalpel blade.  The skin margins were undermined to an appropriate distance in all directions utilizing iris scissors.  The opposing transposition arms were then transposed into place in opposite direction and anchored with interrupted buried subcutaneous sutures.
O-L Flap Text: The defect edges were debeveled with a #15 scalpel blade.  Given the location of the defect, shape of the defect and the proximity to free margins an O-L flap was deemed most appropriate.  Using a sterile surgical marker, an appropriate advancement flap was drawn incorporating the defect and placing the expected incisions within the relaxed skin tension lines where possible.    The area thus outlined was incised deep to adipose tissue with a #15 scalpel blade.  The skin margins were undermined to an appropriate distance in all directions utilizing iris scissors.
Bill [id number] For Specimen Handling/Conveyance To Laboratory?: no
Ear Star Wedge Flap Text: The defect edges were debeveled with a #15 blade scalpel.  Given the location of the defect and the proximity to free margins (helical rim) an ear star wedge flap was deemed most appropriate.  Using a sterile surgical marker, the appropriate flap was drawn incorporating the defect and placing the expected incisions between the helical rim and antihelix where possible.  The area thus outlined was incised through and through with a #15 scalpel blade.
V-Y Plasty Text: The defect edges were debeveled with a #15 scalpel blade.  Given the location of the defect, shape of the defect and the proximity to free margins an V-Y advancement flap was deemed most appropriate.  Using a sterile surgical marker, an appropriate advancement flap was drawn incorporating the defect and placing the expected incisions within the relaxed skin tension lines where possible.    The area thus outlined was incised deep to adipose tissue with a #15 scalpel blade.  The skin margins were undermined to an appropriate distance in all directions utilizing iris scissors.
Keystone Flap Text: The defect edges were debeveled with a #15 scalpel blade.  Given the location of the defect, shape of the defect a keystone flap was deemed most appropriate.  Using a sterile surgical marker, an appropriate keystone flap was drawn incorporating the defect, outlining the appropriate donor tissue and placing the expected incisions within the relaxed skin tension lines where possible. The area thus outlined was incised deep to adipose tissue with a #15 scalpel blade.  The skin margins were undermined to an appropriate distance in all directions around the primary defect and laterally outward around the flap utilizing iris scissors.
Detail Level: Detailed
Rotation Flap Text: The defect edges were debeveled with a #15 scalpel blade.  Given the location of the defect, shape of the defect and the proximity to free margins a rotation flap was deemed most appropriate.  Using a sterile surgical marker, an appropriate rotation flap was drawn incorporating the defect and placing the expected incisions within the relaxed skin tension lines where possible.    The area thus outlined was incised deep to adipose tissue with a #15 scalpel blade.  The skin margins were undermined to an appropriate distance in all directions utilizing iris scissors.
Excision Method: Elliptical
Crescentic Complex Repair Preamble Text (Leave Blank If You Do Not Want): Extensive wide undermining was performed.
Anesthesia Volume In Cc: 2
Advancement Flap (Double) Text: The defect edges were debeveled with a #15 scalpel blade.  Given the location of the defect and the proximity to free margins a double advancement flap was deemed most appropriate.  Using a sterile surgical marker, the appropriate advancement flaps were drawn incorporating the defect and placing the expected incisions within the relaxed skin tension lines where possible.    The area thus outlined was incised deep to adipose tissue with a #15 scalpel blade.  The skin margins were undermined to an appropriate distance in all directions utilizing iris scissors.
Size Of Margin In Cm: 0.1
No Repair - Repaired With Adjacent Surgical Defect Text (Leave Blank If You Do Not Want): After the excision the defect was repaired concurrently with another surgical defect which was in close approximation.
Cheek Interpolation Flap Text: A decision was made to reconstruct the defect utilizing an interpolation axial flap and a staged reconstruction.  A telfa template was made of the defect.  This telfa template was then used to outline the Cheek Interpolation flap.  The donor area for the pedicle flap was then injected with anesthesia.  The flap was excised through the skin and subcutaneous tissue down to the layer of the underlying musculature.  The interpolation flap was carefully excised within this deep plane to maintain its blood supply.  The edges of the donor site were undermined.   The donor site was closed in a primary fashion.  The pedicle was then rotated into position and sutured.  Once the tube was sutured into place, adequate blood supply was confirmed with blanching and refill.  The pedicle was then wrapped with xeroform gauze and dressed appropriately with a telfa and gauze bandage to ensure continued blood supply and protect the attached pedicle.
Complex Repair And Modified Advancement Flap Text: The defect edges were debeveled with a #15 scalpel blade.  The primary defect was closed partially with a complex linear closure.  Given the location of the remaining defect, shape of the defect and the proximity to free margins a modified advancement flap was deemed most appropriate for complete closure of the defect.  Using a sterile surgical marker, an appropriate advancement flap was drawn incorporating the defect and placing the expected incisions within the relaxed skin tension lines where possible.    The area thus outlined was incised deep to adipose tissue with a #15 scalpel blade.  The skin margins were undermined to an appropriate distance in all directions utilizing iris scissors.
Advancement Flap (Single) Text: The defect edges were debeveled with a #15 scalpel blade.  Given the location of the defect and the proximity to free margins a single advancement flap was deemed most appropriate.  Using a sterile surgical marker, an appropriate advancement flap was drawn incorporating the defect and placing the expected incisions within the relaxed skin tension lines where possible.    The area thus outlined was incised deep to adipose tissue with a #15 scalpel blade.  The skin margins were undermined to an appropriate distance in all directions utilizing iris scissors.
Complex Repair And Bilobe Flap Text: The defect edges were debeveled with a #15 scalpel blade.  The primary defect was closed partially with a complex linear closure.  Given the location of the remaining defect, shape of the defect and the proximity to free margins a bilobe flap was deemed most appropriate for complete closure of the defect.  Using a sterile surgical marker, an appropriate advancement flap was drawn incorporating the defect and placing the expected incisions within the relaxed skin tension lines where possible.    The area thus outlined was incised deep to adipose tissue with a #15 scalpel blade.  The skin margins were undermined to an appropriate distance in all directions utilizing iris scissors.
Ftsg Text: The defect edges were debeveled with a #15 scalpel blade.  Given the location of the defect, shape of the defect and the proximity to free margins a full thickness skin graft was deemed most appropriate.  Using a sterile surgical marker, the primary defect shape was transferred to the donor site. The area thus outlined was incised deep to adipose tissue with a #15 scalpel blade.  The harvested graft was then trimmed of adipose tissue until only dermis and epidermis was left.  The skin margins of the secondary defect were undermined to an appropriate distance in all directions utilizing iris scissors.  The secondary defect was closed with interrupted buried subcutaneous sutures.  The skin edges were then re-apposed with running  sutures.  The skin graft was then placed in the primary defect and oriented appropriately.
Split-Thickness Skin Graft Text: The defect edges were debeveled with a #15 scalpel blade.  Given the location of the defect, shape of the defect and the proximity to free margins a split thickness skin graft was deemed most appropriate.  Using a sterile surgical marker, the primary defect shape was transferred to the donor site. The split thickness graft was then harvested.  The skin graft was then placed in the primary defect and oriented appropriately.
Lip Wedge Excision Repair Text: Given the location of the defect and the proximity to free margins a full thickness wedge repair was deemed most appropriate.  Using a sterile surgical marker, the appropriate repair was drawn incorporating the defect and placing the expected incisions perpendicular to the vermillion border.  The vermillion border was also meticulously outlined to ensure appropriate reapproximation during the repair.  The area thus outlined was incised through and through with a #15 scalpel blade.  The muscularis and dermis were reaproximated with deep sutures following hemostasis. Care was taken to realign the vermillion border before proceeding with the superficial closure.  Once the vermillion was realigned the superfical and mucosal closure was finished.
Skin Substitute Text: The defect edges were debeveled with a #15 scalpel blade.  Given the location of the defect, shape of the defect and the proximity to free margins a skin substitute graft was deemed most appropriate.  The graft material was trimmed to fit the size of the defect. The graft was then placed in the primary defect and oriented appropriately.
Complex Repair And Dorsal Nasal Flap Text: The defect edges were debeveled with a #15 scalpel blade.  The primary defect was closed partially with a complex linear closure.  Given the location of the remaining defect, shape of the defect and the proximity to free margins a dorsal nasal flap was deemed most appropriate for complete closure of the defect.  Using a sterile surgical marker, an appropriate flap was drawn incorporating the defect and placing the expected incisions within the relaxed skin tension lines where possible.    The area thus outlined was incised deep to adipose tissue with a #15 scalpel blade.  The skin margins were undermined to an appropriate distance in all directions utilizing iris scissors.
Complex Repair And Skin Substitute Graft Text: The defect edges were debeveled with a #15 scalpel blade.  The primary defect was closed partially with a complex linear closure.  Given the location of the remaining defect, shape of the defect and the proximity to free margins a skin substitute graft was deemed most appropriate to repair the remaining defect.  The graft was trimmed to fit the size of the remaining defect.  The graft was then placed in the primary defect, oriented appropriately, and sutured into place.
Complex Repair And Xenograft Text: The defect edges were debeveled with a #15 scalpel blade.  The primary defect was closed partially with a complex linear closure.  Given the location of the defect, shape of the defect and the proximity to free margins an tissue cultured epidermal autograft was deemed most appropriate to repair the remaining defect.  The graft was trimmed to fit the size of the remaining defect.  The graft was then placed in the primary defect, oriented appropriately, and sutured into place.
O-T Advancement Flap Text: The defect edges were debeveled with a #15 scalpel blade.  Given the location of the defect, shape of the defect and the proximity to free margins an O-T advancement flap was deemed most appropriate.  Using a sterile surgical marker, an appropriate advancement flap was drawn incorporating the defect and placing the expected incisions within the relaxed skin tension lines where possible.    The area thus outlined was incised deep to adipose tissue with a #15 scalpel blade.  The skin margins were undermined to an appropriate distance in all directions utilizing iris scissors.
Repair Type: Intermediate
Cheek-To-Nose Interpolation Flap Text: A decision was made to reconstruct the defect utilizing an interpolation axial flap and a staged reconstruction.  A telfa template was made of the defect.  This telfa template was then used to outline the Cheek-To-Nose Interpolation flap.  The donor area for the pedicle flap was then injected with anesthesia.  The flap was excised through the skin and subcutaneous tissue down to the layer of the underlying musculature.  The interpolation flap was carefully excised within this deep plane to maintain its blood supply.  The edges of the donor site were undermined.   The donor site was closed in a primary fashion.  The pedicle was then rotated into position and sutured.  Once the tube was sutured into place, adequate blood supply was confirmed with blanching and refill.  The pedicle was then wrapped with xeroform gauze and dressed appropriately with a telfa and gauze bandage to ensure continued blood supply and protect the attached pedicle.
Paramedian Forehead Flap Text: A decision was made to reconstruct the defect utilizing an interpolation axial flap and a staged reconstruction.  A telfa template was made of the defect.  This telfa template was then used to outline the paramedian forehead pedicle flap.  The donor area for the pedicle flap was then injected with anesthesia.  The flap was excised through the skin and subcutaneous tissue down to the layer of the underlying musculature.  The pedicle flap was carefully excised within this deep plane to maintain its blood supply.  The edges of the donor site were undermined.   The donor site was closed in a primary fashion.  The pedicle was then rotated into position and sutured.  Once the tube was sutured into place, adequate blood supply was confirmed with blanching and refill.  The pedicle was then wrapped with xeroform gauze and dressed appropriately with a telfa and gauze bandage to ensure continued blood supply and protect the attached pedicle.
Alar Island Pedicle Flap Text: The defect edges were debeveled with a #15 scalpel blade.  Given the location of the defect, shape of the defect and the proximity to the alar rim an island pedicle advancement flap was deemed most appropriate.  Using a sterile surgical marker, an appropriate advancement flap was drawn incorporating the defect, outlining the appropriate donor tissue and placing the expected incisions within the nasal ala running parallel to the alar rim. The area thus outlined was incised with a #15 scalpel blade.  The skin margins were undermined minimally to an appropriate distance in all directions around the primary defect and laterally outward around the island pedicle utilizing iris scissors.  There was minimal undermining beneath the pedicle flap.
O-Z Plasty Text: The defect edges were debeveled with a #15 scalpel blade.  Given the location of the defect, shape of the defect and the proximity to free margins an O-Z plasty (double transposition flap) was deemed most appropriate.  Using a sterile surgical marker, the appropriate transposition flaps were drawn incorporating the defect and placing the expected incisions within the relaxed skin tension lines where possible.    The area thus outlined was incised deep to adipose tissue with a #15 scalpel blade.  The skin margins were undermined to an appropriate distance in all directions utilizing iris scissors.  Hemostasis was achieved with electrocautery.  The flaps were then transposed into place, one clockwise and the other counterclockwise, and anchored with interrupted buried subcutaneous sutures.
Wound Care: Aquaphor
Complex Repair And Rotation Flap Text: The defect edges were debeveled with a #15 scalpel blade.  The primary defect was closed partially with a complex linear closure.  Given the location of the remaining defect, shape of the defect and the proximity to free margins a rotation flap was deemed most appropriate for complete closure of the defect.  Using a sterile surgical marker, an appropriate advancement flap was drawn incorporating the defect and placing the expected incisions within the relaxed skin tension lines where possible.    The area thus outlined was incised deep to adipose tissue with a #15 scalpel blade.  The skin margins were undermined to an appropriate distance in all directions utilizing iris scissors.
Slit Excision Additional Text (Leave Blank If You Do Not Want): A linear line was drawn on the skin overlying the lesion. An incision was made slowly until the lesion was visualized.  Once visualized, the lesion was removed with blunt dissection.
Bilobed Flap Text: The defect edges were debeveled with a #15 scalpel blade.  Given the location of the defect and the proximity to free margins a bilobe flap was deemed most appropriate.  Using a sterile surgical marker, an appropriate bilobe flap drawn around the defect.    The area thus outlined was incised deep to adipose tissue with a #15 scalpel blade.  The skin margins were undermined to an appropriate distance in all directions utilizing iris scissors.
Perilesional Excision Additional Text (Leave Blank If You Do Not Want): The margin was drawn around the clinically apparent lesion. Incisions were then made along these lines to the appropriate tissue plane and the lesion was extirpated.
H Plasty Text: Given the location of the defect, shape of the defect and the proximity to free margins a H-plasty was deemed most appropriate for repair.  Using a sterile surgical marker, the appropriate advancement arms of the H-plasty were drawn incorporating the defect and placing the expected incisions within the relaxed skin tension lines where possible. The area thus outlined was incised deep to adipose tissue with a #15 scalpel blade. The skin margins were undermined to an appropriate distance in all directions utilizing iris scissors.  The opposing advancement arms were then advanced into place in opposite direction and anchored with interrupted buried subcutaneous sutures.
Complex Repair And Melolabial Flap Text: The defect edges were debeveled with a #15 scalpel blade.  The primary defect was closed partially with a complex linear closure.  Given the location of the remaining defect, shape of the defect and the proximity to free margins a melolabial flap was deemed most appropriate for complete closure of the defect.  Using a sterile surgical marker, an appropriate advancement flap was drawn incorporating the defect and placing the expected incisions within the relaxed skin tension lines where possible.    The area thus outlined was incised deep to adipose tissue with a #15 scalpel blade.  The skin margins were undermined to an appropriate distance in all directions utilizing iris scissors.
Complex Repair And Single Advancement Flap Text: The defect edges were debeveled with a #15 scalpel blade.  The primary defect was closed partially with a complex linear closure.  Given the location of the remaining defect, shape of the defect and the proximity to free margins a single advancement flap was deemed most appropriate for complete closure of the defect.  Using a sterile surgical marker, an appropriate advancement flap was drawn incorporating the defect and placing the expected incisions within the relaxed skin tension lines where possible.    The area thus outlined was incised deep to adipose tissue with a #15 scalpel blade.  The skin margins were undermined to an appropriate distance in all directions utilizing iris scissors.
A-T Advancement Flap Text: The defect edges were debeveled with a #15 scalpel blade.  Given the location of the defect, shape of the defect and the proximity to free margins an A-T advancement flap was deemed most appropriate.  Using a sterile surgical marker, an appropriate advancement flap was drawn incorporating the defect and placing the expected incisions within the relaxed skin tension lines where possible.    The area thus outlined was incised deep to adipose tissue with a #15 scalpel blade.  The skin margins were undermined to an appropriate distance in all directions utilizing iris scissors.
Island Pedicle Flap Text: The defect edges were debeveled with a #15 scalpel blade.  Given the location of the defect, shape of the defect and the proximity to free margins an island pedicle advancement flap was deemed most appropriate.  Using a sterile surgical marker, an appropriate advancement flap was drawn incorporating the defect, outlining the appropriate donor tissue and placing the expected incisions within the relaxed skin tension lines where possible.    The area thus outlined was incised deep to adipose tissue with a #15 scalpel blade.  The skin margins were undermined to an appropriate distance in all directions around the primary defect and laterally outward around the island pedicle utilizing iris scissors.  There was minimal undermining beneath the pedicle flap.
Hatchet Flap Text: The defect edges were debeveled with a #15 scalpel blade.  Given the location of the defect, shape of the defect and the proximity to free margins a hatchet flap was deemed most appropriate.  Using a sterile surgical marker, an appropriate hatchet flap was drawn incorporating the defect and placing the expected incisions within the relaxed skin tension lines where possible.    The area thus outlined was incised deep to adipose tissue with a #15 scalpel blade.  The skin margins were undermined to an appropriate distance in all directions utilizing iris scissors.
Complex Repair And M Plasty Text: The defect edges were debeveled with a #15 scalpel blade.  The primary defect was closed partially with a complex linear closure.  Given the location of the remaining defect, shape of the defect and the proximity to free margins an M plasty was deemed most appropriate for complete closure of the defect.  Using a sterile surgical marker, an appropriate advancement flap was drawn incorporating the defect and placing the expected incisions within the relaxed skin tension lines where possible.    The area thus outlined was incised deep to adipose tissue with a #15 scalpel blade.  The skin margins were undermined to an appropriate distance in all directions utilizing iris scissors.
Burow's Advancement Flap Text: The defect edges were debeveled with a #15 scalpel blade.  Given the location of the defect and the proximity to free margins a Burow's advancement flap was deemed most appropriate.  Using a sterile surgical marker, the appropriate advancement flap was drawn incorporating the defect and placing the expected incisions within the relaxed skin tension lines where possible.    The area thus outlined was incised deep to adipose tissue with a #15 scalpel blade.  The skin margins were undermined to an appropriate distance in all directions utilizing iris scissors.
Secondary Defect Width (In Cm): 0
Tissue Cultured Epidermal Autograft Text: The defect edges were debeveled with a #15 scalpel blade.  Given the location of the defect, shape of the defect and the proximity to free margins a tissue cultured epidermal autograft was deemed most appropriate.  The graft was then trimmed to fit the size of the defect.  The graft was then placed in the primary defect and oriented appropriately.
Composite Graft Text: The defect edges were debeveled with a #15 scalpel blade.  Given the location of the defect, shape of the defect, the proximity to free margins and the fact the defect was full thickness a composite graft was deemed most appropriate.  The defect was outline and then transferred to the donor site.  A full thickness graft was then excised from the donor site. The graft was then placed in the primary defect, oriented appropriately and then sutured into place.  The secondary defect was then repaired using a primary closure.
Helical Rim Advancement Flap Text: The defect edges were debeveled with a #15 blade scalpel.  Given the location of the defect and the proximity to free margins (helical rim) a double helical rim advancement flap was deemed most appropriate.  Using a sterile surgical marker, the appropriate advancement flaps were drawn incorporating the defect and placing the expected incisions between the helical rim and antihelix where possible.  The area thus outlined was incised through and through with a #15 scalpel blade.  With a skin hook and iris scissors, the flaps were gently and sharply undermined and freed up.
Complex Repair And Transposition Flap Text: The defect edges were debeveled with a #15 scalpel blade.  The primary defect was closed partially with a complex linear closure.  Given the location of the remaining defect, shape of the defect and the proximity to free margins a transposition flap was deemed most appropriate for complete closure of the defect.  Using a sterile surgical marker, an appropriate advancement flap was drawn incorporating the defect and placing the expected incisions within the relaxed skin tension lines where possible.    The area thus outlined was incised deep to adipose tissue with a #15 scalpel blade.  The skin margins were undermined to an appropriate distance in all directions utilizing iris scissors.
Muscle Hinge Flap Text: The defect edges were debeveled with a #15 scalpel blade.  Given the size, depth and location of the defect and the proximity to free margins a muscle hinge flap was deemed most appropriate.  Using a sterile surgical marker, an appropriate hinge flap was drawn incorporating the defect. The area thus outlined was incised with a #15 scalpel blade.  The skin margins were undermined to an appropriate distance in all directions utilizing iris scissors.
Medical Necessity Information: It is in your best interest to select a reason for this procedure from the list below. All of these items fulfill various CMS LCD requirements except lesion extends to a margin.
Anesthesia Type: 2% lidocaine with epinephrine
Complex Repair And Ftsg Text: The defect edges were debeveled with a #15 scalpel blade.  The primary defect was closed partially with a complex linear closure.  Given the location of the defect, shape of the defect and the proximity to free margins a full thickness skin graft was deemed most appropriate to repair the remaining defect.  The graft was trimmed to fit the size of the remaining defect.  The graft was then placed in the primary defect, oriented appropriately, and sutured into place.
Melolabial Interpolation Flap Text: A decision was made to reconstruct the defect utilizing an interpolation axial flap and a staged reconstruction.  A telfa template was made of the defect.  This telfa template was then used to outline the melolabial interpolation flap.  The donor area for the pedicle flap was then injected with anesthesia.  The flap was excised through the skin and subcutaneous tissue down to the layer of the underlying musculature.  The pedicle flap was carefully excised within this deep plane to maintain its blood supply.  The edges of the donor site were undermined.   The donor site was closed in a primary fashion.  The pedicle was then rotated into position and sutured.  Once the tube was sutured into place, adequate blood supply was confirmed with blanching and refill.  The pedicle was then wrapped with xeroform gauze and dressed appropriately with a telfa and gauze bandage to ensure continued blood supply and protect the attached pedicle.
Complex Repair And W Plasty Text: The defect edges were debeveled with a #15 scalpel blade.  The primary defect was closed partially with a complex linear closure.  Given the location of the remaining defect, shape of the defect and the proximity to free margins a W plasty was deemed most appropriate for complete closure of the defect.  Using a sterile surgical marker, an appropriate advancement flap was drawn incorporating the defect and placing the expected incisions within the relaxed skin tension lines where possible.    The area thus outlined was incised deep to adipose tissue with a #15 scalpel blade.  The skin margins were undermined to an appropriate distance in all directions utilizing iris scissors.
Epidermal Autograft Text: The defect edges were debeveled with a #15 scalpel blade.  Given the location of the defect, shape of the defect and the proximity to free margins an epidermal autograft was deemed most appropriate.  Using a sterile surgical marker, the primary defect shape was transferred to the donor site. The epidermal graft was then harvested.  The skin graft was then placed in the primary defect and oriented appropriately.
Complex Repair And Dermal Autograft Text: The defect edges were debeveled with a #15 scalpel blade.  The primary defect was closed partially with a complex linear closure.  Given the location of the defect, shape of the defect and the proximity to free margins an dermal autograft was deemed most appropriate to repair the remaining defect.  The graft was trimmed to fit the size of the remaining defect.  The graft was then placed in the primary defect, oriented appropriately, and sutured into place.
Bi-Rhombic Flap Text: The defect edges were debeveled with a #15 scalpel blade.  Given the location of the defect and the proximity to free margins a bi-rhombic flap was deemed most appropriate.  Using a sterile surgical marker, an appropriate rhombic flap was drawn incorporating the defect. The area thus outlined was incised deep to adipose tissue with a #15 scalpel blade.  The skin margins were undermined to an appropriate distance in all directions utilizing iris scissors.
Path Notes (To The Dermatopathologist): Please check margins.
Complex Repair And Double Advancement Flap Text: The defect edges were debeveled with a #15 scalpel blade.  The primary defect was closed partially with a complex linear closure.  Given the location of the remaining defect, shape of the defect and the proximity to free margins a double advancement flap was deemed most appropriate for complete closure of the defect.  Using a sterile surgical marker, an appropriate advancement flap was drawn incorporating the defect and placing the expected incisions within the relaxed skin tension lines where possible.    The area thus outlined was incised deep to adipose tissue with a #15 scalpel blade.  The skin margins were undermined to an appropriate distance in all directions utilizing iris scissors.
Double Island Pedicle Flap Text: The defect edges were debeveled with a #15 scalpel blade.  Given the location of the defect, shape of the defect and the proximity to free margins a double island pedicle advancement flap was deemed most appropriate.  Using a sterile surgical marker, an appropriate advancement flap was drawn incorporating the defect, outlining the appropriate donor tissue and placing the expected incisions within the relaxed skin tension lines where possible.    The area thus outlined was incised deep to adipose tissue with a #15 scalpel blade.  The skin margins were undermined to an appropriate distance in all directions around the primary defect and laterally outward around the island pedicle utilizing iris scissors.  There was minimal undermining beneath the pedicle flap.
Size Of Lesion In Cm: 0.5
Complex Repair And Split-Thickness Skin Graft Text: The defect edges were debeveled with a #15 scalpel blade.  The primary defect was closed partially with a complex linear closure.  Given the location of the defect, shape of the defect and the proximity to free margins a split thickness skin graft was deemed most appropriate to repair the remaining defect.  The graft was trimmed to fit the size of the remaining defect.  The graft was then placed in the primary defect, oriented appropriately, and sutured into place.
Crescentic Advancement Flap Text: The defect edges were debeveled with a #15 scalpel blade.  Given the location of the defect and the proximity to free margins a crescentic advancement flap was deemed most appropriate.  Using a sterile surgical marker, the appropriate advancement flap was drawn incorporating the defect and placing the expected incisions within the relaxed skin tension lines where possible.    The area thus outlined was incised deep to adipose tissue with a #15 scalpel blade.  The skin margins were undermined to an appropriate distance in all directions utilizing iris scissors.
W Plasty Text: The lesion was extirpated to the level of the fat with a #15 scalpel blade.  Given the location of the defect, shape of the defect and the proximity to free margins a W-plasty was deemed most appropriate for repair.  Using a sterile surgical marker, the appropriate transposition arms of the W-plasty were drawn incorporating the defect and placing the expected incisions within the relaxed skin tension lines where possible.    The area thus outlined was incised deep to adipose tissue with a #15 scalpel blade.  The skin margins were undermined to an appropriate distance in all directions utilizing iris scissors.  The opposing transposition arms were then transposed into place in opposite direction and anchored with interrupted buried subcutaneous sutures.
Epidermal Sutures: 5-0 Nylon
Post-Care Instructions: I reviewed with the patient in detail post-care instructions. Patient is not to engage in any heavy lifting, exercise, or swimming for the next 14 days. Should the patient develop any fevers, chills, bleeding, severe pain patient will contact the office immediately.  After the procedure, the patient was observed for 5-10 minutes and was oriented to person, place and time and demied feeling dizzy, queasy, and stated that they did not feel that they were going to faint.
Purse String (Intermediate) Text: Given the location of the defect and the characteristics of the surrounding skin a pursestring intermediate closure was deemed most appropriate.  Undermining was performed circumfirentially around the surgical defect.  A purstring suture was then placed and tightened.
Suture Removal: 6 days
Epidermal Closure Graft Donor Site (Optional): simple interrupted
Complex Repair And A-T Advancement Flap Text: The defect edges were debeveled with a #15 scalpel blade.  The primary defect was closed partially with a complex linear closure.  Given the location of the remaining defect, shape of the defect and the proximity to free margins an A-T advancement flap was deemed most appropriate for complete closure of the defect.  Using a sterile surgical marker, an appropriate advancement flap was drawn incorporating the defect and placing the expected incisions within the relaxed skin tension lines where possible.    The area thus outlined was incised deep to adipose tissue with a #15 scalpel blade.  The skin margins were undermined to an appropriate distance in all directions utilizing iris scissors.
Interpolation Flap Text: A decision was made to reconstruct the defect utilizing an interpolation axial flap and a staged reconstruction.  A telfa template was made of the defect.  This telfa template was then used to outline the interpolation flap.  The donor area for the pedicle flap was then injected with anesthesia.  The flap was excised through the skin and subcutaneous tissue down to the layer of the underlying musculature.  The interpolation flap was carefully excised within this deep plane to maintain its blood supply.  The edges of the donor site were undermined.   The donor site was closed in a primary fashion.  The pedicle was then rotated into position and sutured.  Once the tube was sutured into place, adequate blood supply was confirmed with blanching and refill.  The pedicle was then wrapped with xeroform gauze and dressed appropriately with a telfa and gauze bandage to ensure continued blood supply and protect the attached pedicle.
Eliptical Excision Additional Text (Leave Blank If You Do Not Want): The margin was drawn around the clinically apparent lesion.  An elliptical shape was then drawn on the skin incorporating the lesion and margins.  Incisions were then made along these lines to the appropriate tissue plane and the lesion was extirpated.
Hemostasis: Electrocautery
Melolabial Transposition Flap Text: The defect edges were debeveled with a #15 scalpel blade.  Given the location of the defect and the proximity to free margins a melolabial flap was deemed most appropriate.  Using a sterile surgical marker, an appropriate melolabial transposition flap was drawn incorporating the defect.    The area thus outlined was incised deep to adipose tissue with a #15 scalpel blade.  The skin margins were undermined to an appropriate distance in all directions utilizing iris scissors.
Intermediate / Complex Repair - Final Wound Length In Cm: 1.5
Complex Repair And Rhombic Flap Text: The defect edges were debeveled with a #15 scalpel blade.  The primary defect was closed partially with a complex linear closure.  Given the location of the remaining defect, shape of the defect and the proximity to free margins a rhombic flap was deemed most appropriate for complete closure of the defect.  Using a sterile surgical marker, an appropriate advancement flap was drawn incorporating the defect and placing the expected incisions within the relaxed skin tension lines where possible.    The area thus outlined was incised deep to adipose tissue with a #15 scalpel blade.  The skin margins were undermined to an appropriate distance in all directions utilizing iris scissors.
Rhombic Flap Text: The defect edges were debeveled with a #15 scalpel blade.  Given the location of the defect and the proximity to free margins a rhombic flap was deemed most appropriate.  Using a sterile surgical marker, an appropriate rhombic flap was drawn incorporating the defect.    The area thus outlined was incised deep to adipose tissue with a #15 scalpel blade.  The skin margins were undermined to an appropriate distance in all directions utilizing iris scissors.
Dermal Autograft Text: The defect edges were debeveled with a #15 scalpel blade.  Given the location of the defect, shape of the defect and the proximity to free margins a dermal autograft was deemed most appropriate.  Using a sterile surgical marker, the primary defect shape was transferred to the donor site. The area thus outlined was incised deep to adipose tissue with a #15 scalpel blade.  The harvested graft was then trimmed of adipose and epidermal tissue until only dermis was left.  The skin graft was then placed in the primary defect and oriented appropriately.
Posterior Auricular Interpolation Flap Text: A decision was made to reconstruct the defect utilizing an interpolation axial flap and a staged reconstruction.  A telfa template was made of the defect.  This telfa template was then used to outline the posterior auricular interpolation flap.  The donor area for the pedicle flap was then injected with anesthesia.  The flap was excised through the skin and subcutaneous tissue down to the layer of the underlying musculature.  The pedicle flap was carefully excised within this deep plane to maintain its blood supply.  The edges of the donor site were undermined.   The donor site was closed in a primary fashion.  The pedicle was then rotated into position and sutured.  Once the tube was sutured into place, adequate blood supply was confirmed with blanching and refill.  The pedicle was then wrapped with xeroform gauze and dressed appropriately with a telfa and gauze bandage to ensure continued blood supply and protect the attached pedicle.
Bilobed Transposition Flap Text: The defect edges were debeveled with a #15 scalpel blade.  Given the location of the defect and the proximity to free margins a bilobed transposition flap was deemed most appropriate.  Using a sterile surgical marker, an appropriate bilobe flap drawn around the defect.    The area thus outlined was incised deep to adipose tissue with a #15 scalpel blade.  The skin margins were undermined to an appropriate distance in all directions utilizing iris scissors.
Saucerization Excision Additional Text (Leave Blank If You Do Not Want): The margin was drawn around the clinically apparent lesion.  Incisions were then made along these lines, in a tangential fashion, to the appropriate tissue plane and the lesion was extirpated.
Complex Repair And Z Plasty Text: The defect edges were debeveled with a #15 scalpel blade.  The primary defect was closed partially with a complex linear closure.  Given the location of the remaining defect, shape of the defect and the proximity to free margins a Z plasty was deemed most appropriate for complete closure of the defect.  Using a sterile surgical marker, an appropriate advancement flap was drawn incorporating the defect and placing the expected incisions within the relaxed skin tension lines where possible.    The area thus outlined was incised deep to adipose tissue with a #15 scalpel blade.  The skin margins were undermined to an appropriate distance in all directions utilizing iris scissors.
Island Pedicle Flap With Canthal Suspension Text: The defect edges were debeveled with a #15 scalpel blade.  Given the location of the defect, shape of the defect and the proximity to free margins an island pedicle advancement flap was deemed most appropriate.  Using a sterile surgical marker, an appropriate advancement flap was drawn incorporating the defect, outlining the appropriate donor tissue and placing the expected incisions within the relaxed skin tension lines where possible. The area thus outlined was incised deep to adipose tissue with a #15 scalpel blade.  The skin margins were undermined to an appropriate distance in all directions around the primary defect and laterally outward around the island pedicle utilizing iris scissors.  There was minimal undermining beneath the pedicle flap. A suspension suture was placed in the canthal tendon to prevent tension and prevent ectropion.
Complex Repair And Epidermal Autograft Text: The defect edges were debeveled with a #15 scalpel blade.  The primary defect was closed partially with a complex linear closure.  Given the location of the defect, shape of the defect and the proximity to free margins an epidermal autograft was deemed most appropriate to repair the remaining defect.  The graft was trimmed to fit the size of the remaining defect.  The graft was then placed in the primary defect, oriented appropriately, and sutured into place.
Repair Performed By Another Provider Text (Leave Blank If You Do Not Want): After the tissue was excised the defect was repaired by another provider.
Billing Type: Third-Party Bill
Purse String (Simple) Text: Given the location of the defect and the characteristics of the surrounding skin a purse string simple closure was deemed most appropriate.  Undermining was performed circumferentially around the surgical defect.  A purse string suture was then placed and tightened.
Excision Depth: adipose tissue
Dorsal Nasal Flap Text: The defect edges were debeveled with a #15 scalpel blade.  Given the location of the defect and the proximity to free margins a dorsal nasal flap was deemed most appropriate.  Using a sterile surgical marker, an appropriate dorsal nasal flap was drawn around the defect.    The area thus outlined was incised deep to adipose tissue with a #15 scalpel blade.  The skin margins were undermined to an appropriate distance in all directions utilizing iris scissors.
Epidermal Closure: running
Estimated Blood Loss (Cc): minimal
Home Suture Removal Text: Patient was provided a home suture removal kit and will remove their sutures at home.  If they have any questions or difficulties they will call the office.
Spiral Flap Text: The defect edges were debeveled with a #15 scalpel blade.  Given the location of the defect, shape of the defect and the proximity to free margins a spiral flap was deemed most appropriate.  Using a sterile surgical marker, an appropriate rotation flap was drawn incorporating the defect and placing the expected incisions within the relaxed skin tension lines where possible. The area thus outlined was incised deep to adipose tissue with a #15 scalpel blade.  The skin margins were undermined to an appropriate distance in all directions utilizing iris scissors.
Complex Repair And V-Y Plasty Text: The defect edges were debeveled with a #15 scalpel blade.  The primary defect was closed partially with a complex linear closure.  Given the location of the remaining defect, shape of the defect and the proximity to free margins a V-Y plasty was deemed most appropriate for complete closure of the defect.  Using a sterile surgical marker, an appropriate advancement flap was drawn incorporating the defect and placing the expected incisions within the relaxed skin tension lines where possible.    The area thus outlined was incised deep to adipose tissue with a #15 scalpel blade.  The skin margins were undermined to an appropriate distance in all directions utilizing iris scissors.
Xenograft Text: The defect edges were debeveled with a #15 scalpel blade.  Given the location of the defect, shape of the defect and the proximity to free margins a xenograft was deemed most appropriate.  The graft was then trimmed to fit the size of the defect.  The graft was then placed in the primary defect and oriented appropriately.
Modified Advancement Flap Text: The defect edges were debeveled with a #15 scalpel blade.  Given the location of the defect, shape of the defect and the proximity to free margins a modified advancement flap was deemed most appropriate.  Using a sterile surgical marker, an appropriate advancement flap was drawn incorporating the defect and placing the expected incisions within the relaxed skin tension lines where possible.    The area thus outlined was incised deep to adipose tissue with a #15 scalpel blade.  The skin margins were undermined to an appropriate distance in all directions utilizing iris scissors.
Fusiform Excision Additional Text (Leave Blank If You Do Not Want): The margin was drawn around the clinically apparent lesion.  A fusiform shape was then drawn on the skin incorporating the lesion and margins.  Incisions were then made along these lines to the appropriate tissue plane and the lesion was extirpated.
V-Y Flap Text: The defect edges were debeveled with a #15 scalpel blade.  Given the location of the defect, shape of the defect and the proximity to free margins a V-Y flap was deemed most appropriate.  Using a sterile surgical marker, an appropriate advancement flap was drawn incorporating the defect and placing the expected incisions within the relaxed skin tension lines where possible.    The area thus outlined was incised deep to adipose tissue with a #15 scalpel blade.  The skin margins were undermined to an appropriate distance in all directions utilizing iris scissors.
Transposition Flap Text: The defect edges were debeveled with a #15 scalpel blade.  Given the location of the defect and the proximity to free margins a transposition flap was deemed most appropriate.  Using a sterile surgical marker, an appropriate transposition flap was drawn incorporating the defect.    The area thus outlined was incised deep to adipose tissue with a #15 scalpel blade.  The skin margins were undermined to an appropriate distance in all directions utilizing iris scissors.
Complex Repair And O-L Flap Text: The defect edges were debeveled with a #15 scalpel blade.  The primary defect was closed partially with a complex linear closure.  Given the location of the remaining defect, shape of the defect and the proximity to free margins an O-L flap was deemed most appropriate for complete closure of the defect.  Using a sterile surgical marker, an appropriate flap was drawn incorporating the defect and placing the expected incisions within the relaxed skin tension lines where possible.    The area thus outlined was incised deep to adipose tissue with a #15 scalpel blade.  The skin margins were undermined to an appropriate distance in all directions utilizing iris scissors.
Accession #: dr mart read
Excisional Biopsy Additional Text (Leave Blank If You Do Not Want): The margin was drawn around the clinically apparent lesion. An elliptical shape was then drawn on the skin incorporating the lesion and margins.  Incisions were then made along these lines to the appropriate tissue plane and the lesion was extirpated.
O-T Plasty Text: The defect edges were debeveled with a #15 scalpel blade.  Given the location of the defect, shape of the defect and the proximity to free margins an O-T plasty was deemed most appropriate.  Using a sterile surgical marker, an appropriate O-T plasty was drawn incorporating the defect and placing the expected incisions within the relaxed skin tension lines where possible.    The area thus outlined was incised deep to adipose tissue with a #15 scalpel blade.  The skin margins were undermined to an appropriate distance in all directions utilizing iris scissors.
Complex Repair And Double M Plasty Text: The defect edges were debeveled with a #15 scalpel blade.  The primary defect was closed partially with a complex linear closure.  Given the location of the remaining defect, shape of the defect and the proximity to free margins a double M plasty was deemed most appropriate for complete closure of the defect.  Using a sterile surgical marker, an appropriate advancement flap was drawn incorporating the defect and placing the expected incisions within the relaxed skin tension lines where possible.    The area thus outlined was incised deep to adipose tissue with a #15 scalpel blade.  The skin margins were undermined to an appropriate distance in all directions utilizing iris scissors.
Scalpel Size: 10 blade
Mastoid Interpolation Flap Text: A decision was made to reconstruct the defect utilizing an interpolation axial flap and a staged reconstruction.  A telfa template was made of the defect.  This telfa template was then used to outline the mastoid interpolation flap.  The donor area for the pedicle flap was then injected with anesthesia.  The flap was excised through the skin and subcutaneous tissue down to the layer of the underlying musculature.  The pedicle flap was carefully excised within this deep plane to maintain its blood supply.  The edges of the donor site were undermined.   The donor site was closed in a primary fashion.  The pedicle was then rotated into position and sutured.  Once the tube was sutured into place, adequate blood supply was confirmed with blanching and refill.  The pedicle was then wrapped with xeroform gauze and dressed appropriately with a telfa and gauze bandage to ensure continued blood supply and protect the attached pedicle.
Mucosal Advancement Flap Text: Given the location of the defect, shape of the defect and the proximity to free margins a mucosal advancement flap was deemed most appropriate. Incisions were made with a 15 blade scalpel in the appropriate fashion along the cutaneous vermillion border and the mucosal lip. The remaining actinically damaged mucosal tissue was excised.  The mucosal advancement flap was then elevated to the gingival sulcus with care taken to preserve the neurovascular structures and advanced into the primary defect. Care was taken to ensure that precise realignment of the vermillion border was achieved.
S Plasty Text: Given the location and shape of the defect, and the orientation of relaxed skin tension lines, an S-plasty was deemed most appropriate for repair.  Using a sterile surgical marker, the appropriate outline of the S-plasty was drawn, incorporating the defect and placing the expected incisions within the relaxed skin tension lines where possible.  The area thus outlined was incised deep to adipose tissue with a #15 scalpel blade.  The skin margins were undermined to an appropriate distance in all directions utilizing iris scissors. The skin flaps were advanced over the defect.  The opposing margins were then approximated with interrupted buried subcutaneous sutures.
Consent was obtained from the patient. The risks and benefits to therapy were discussed in detail. Specifically, the risks of infection, scarring, bleeding, prolonged wound healing, incomplete removal, allergy to anesthesia, nerve injury and recurrence were addressed. Prior to the procedure, the treatment site was clearly identified and confirmed by the patient. All components of Universal Protocol/PAUSE Rule completed.
Medical Necessity Clause: This procedure was medically necessary because the lesion that was treated was:
Deep Sutures: 4-0 Nylon
Trilobed Flap Text: The defect edges were debeveled with a #15 scalpel blade.  Given the location of the defect and the proximity to free margins a trilobed flap was deemed most appropriate.  Using a sterile surgical marker, an appropriate trilobed flap drawn around the defect.    The area thus outlined was incised deep to adipose tissue with a #15 scalpel blade.  The skin margins were undermined to an appropriate distance in all directions utilizing iris scissors.
Complex Repair And O-T Advancement Flap Text: The defect edges were debeveled with a #15 scalpel blade.  The primary defect was closed partially with a complex linear closure.  Given the location of the remaining defect, shape of the defect and the proximity to free margins an O-T advancement flap was deemed most appropriate for complete closure of the defect.  Using a sterile surgical marker, an appropriate advancement flap was drawn incorporating the defect and placing the expected incisions within the relaxed skin tension lines where possible.    The area thus outlined was incised deep to adipose tissue with a #15 scalpel blade.  The skin margins were undermined to an appropriate distance in all directions utilizing iris scissors.
Bilateral Helical Rim Advancement Flap Text: The defect edges were debeveled with a #15 blade scalpel.  Given the location of the defect and the proximity to free margins (helical rim) a bilateral helical rim advancement flap was deemed most appropriate.  Using a sterile surgical marker, the appropriate advancement flaps were drawn incorporating the defect and placing the expected incisions between the helical rim and antihelix where possible.  The area thus outlined was incised through and through with a #15 scalpel blade.  With a skin hook and iris scissors, the flaps were gently and sharply undermined and freed up.

## 2018-03-28 PROBLEM — J96.01 ACUTE RESPIRATORY FAILURE WITH HYPOXIA (HCC): Status: RESOLVED | Noted: 2017-07-25 | Resolved: 2018-03-28

## 2018-03-28 PROBLEM — J40 TRACHEOBRONCHITIS: Status: RESOLVED | Noted: 2017-07-25 | Resolved: 2018-03-28

## 2018-03-28 PROBLEM — J44.1 CHRONIC OBSTRUCTIVE PULMONARY DISEASE WITH ACUTE EXACERBATION (HCC): Status: RESOLVED | Noted: 2017-07-26 | Resolved: 2018-03-28

## 2018-03-28 PROBLEM — J44.1 COPD EXACERBATION (HCC): Status: RESOLVED | Noted: 2017-07-25 | Resolved: 2018-03-28

## 2018-04-02 ENCOUNTER — APPOINTMENT (RX ONLY)
Dept: URBAN - METROPOLITAN AREA CLINIC 349 | Facility: CLINIC | Age: 68
Setting detail: DERMATOLOGY
End: 2018-04-02

## 2018-04-02 DIAGNOSIS — Z48.02 ENCOUNTER FOR REMOVAL OF SUTURES: ICD-10-CM

## 2018-04-02 PROBLEM — Z85.828 PERSONAL HISTORY OF OTHER MALIGNANT NEOPLASM OF SKIN: Status: ACTIVE | Noted: 2018-04-02

## 2018-04-02 PROBLEM — J30.1 ALLERGIC RHINITIS DUE TO POLLEN: Status: ACTIVE | Noted: 2018-04-02

## 2018-04-02 PROCEDURE — ? SUTURE REMOVAL (GLOBAL PERIOD)

## 2018-04-02 PROCEDURE — 99024 POSTOP FOLLOW-UP VISIT: CPT

## 2018-04-02 ASSESSMENT — LOCATION SIMPLE DESCRIPTION DERM
LOCATION SIMPLE: LEFT EAR
LOCATION SIMPLE: LEFT TEMPLE

## 2018-04-02 ASSESSMENT — LOCATION DETAILED DESCRIPTION DERM
LOCATION DETAILED: LEFT LATERAL TEMPLE
LOCATION DETAILED: LEFT POSTERIOR EAR

## 2018-04-02 ASSESSMENT — LOCATION ZONE DERM
LOCATION ZONE: FACE
LOCATION ZONE: EAR

## 2018-04-02 NOTE — PROCEDURE: SUTURE REMOVAL (GLOBAL PERIOD)
Detail Level: Detailed
Add 01567 Cpt? (Important Note: In 2017 The Use Of 04945 Is Being Tracked By Cms To Determine Future Global Period Reimbursement For Global Periods): yes

## 2018-11-29 ENCOUNTER — HOSPITAL ENCOUNTER (OUTPATIENT)
Dept: CT IMAGING | Age: 68
Discharge: HOME | End: 2018-11-29
Attending: NURSE PRACTITIONER
Payer: MEDICARE

## 2018-11-29 DIAGNOSIS — R91.8 LUNG NODULES: ICD-10-CM

## 2018-11-29 PROCEDURE — 71250 CT THORAX DX C-: CPT

## 2018-11-29 NOTE — PROGRESS NOTES
Pt was notified that Ct reveals some mild scarring, but no worrisome nodules or masses. Pt verbalized understanding.

## 2019-09-19 ENCOUNTER — HOSPITAL ENCOUNTER (INPATIENT)
Age: 69
LOS: 6 days | Discharge: HOME HEALTH | DRG: 190 | End: 2019-09-25
Attending: EMERGENCY MEDICINE | Admitting: HOSPITALIST
Payer: MEDICARE

## 2019-09-19 ENCOUNTER — APPOINTMENT (OUTPATIENT)
Dept: GENERAL RADIOLOGY | Age: 69
DRG: 190 | End: 2019-09-19
Attending: EMERGENCY MEDICINE
Payer: MEDICARE

## 2019-09-19 DIAGNOSIS — F17.200 TOBACCO USE DISORDER: Chronic | ICD-10-CM

## 2019-09-19 DIAGNOSIS — J96.01 ACUTE RESPIRATORY FAILURE WITH HYPOXIA (HCC): Primary | ICD-10-CM

## 2019-09-19 DIAGNOSIS — J44.1 COPD WITH ACUTE EXACERBATION (HCC): ICD-10-CM

## 2019-09-19 DIAGNOSIS — R53.81 DEBILITY: Chronic | ICD-10-CM

## 2019-09-19 DIAGNOSIS — J44.1 ACUTE EXACERBATION OF CHRONIC OBSTRUCTIVE PULMONARY DISEASE (COPD) (HCC): ICD-10-CM

## 2019-09-19 DIAGNOSIS — G47.33 OSA (OBSTRUCTIVE SLEEP APNEA): Chronic | ICD-10-CM

## 2019-09-19 DIAGNOSIS — J96.21 ACUTE ON CHRONIC RESPIRATORY FAILURE WITH HYPOXIA AND HYPERCAPNIA (HCC): ICD-10-CM

## 2019-09-19 DIAGNOSIS — J41.1 MUCOPURULENT CHRONIC BRONCHITIS (HCC): ICD-10-CM

## 2019-09-19 DIAGNOSIS — G47.34 NOCTURNAL HYPOXIA: Chronic | ICD-10-CM

## 2019-09-19 DIAGNOSIS — J96.22 ACUTE ON CHRONIC RESPIRATORY FAILURE WITH HYPOXIA AND HYPERCAPNIA (HCC): ICD-10-CM

## 2019-09-19 DIAGNOSIS — I25.10 CORONARY ARTERY DISEASE INVOLVING NATIVE CORONARY ARTERY OF NATIVE HEART WITHOUT ANGINA PECTORIS: Chronic | ICD-10-CM

## 2019-09-19 DIAGNOSIS — D75.1 POLYCYTHEMIA: Chronic | ICD-10-CM

## 2019-09-19 DIAGNOSIS — E11.8 TYPE 2 DIABETES MELLITUS WITH COMPLICATION, WITHOUT LONG-TERM CURRENT USE OF INSULIN (HCC): Chronic | ICD-10-CM

## 2019-09-19 DIAGNOSIS — J20.9 ACUTE TRACHEOBRONCHITIS: ICD-10-CM

## 2019-09-19 DIAGNOSIS — G25.81 RESTLESS LEG SYNDROME: Chronic | ICD-10-CM

## 2019-09-19 LAB
ALBUMIN SERPL-MCNC: 3.6 G/DL (ref 3.2–4.6)
ALBUMIN/GLOB SERPL: 0.9 {RATIO} (ref 1.2–3.5)
ALP SERPL-CCNC: 130 U/L (ref 50–136)
ALT SERPL-CCNC: 32 U/L (ref 12–65)
ANION GAP SERPL CALC-SCNC: 3 MMOL/L (ref 7–16)
AST SERPL-CCNC: 19 U/L (ref 15–37)
BILIRUB SERPL-MCNC: 0.3 MG/DL (ref 0.2–1.1)
BUN SERPL-MCNC: 5 MG/DL (ref 8–23)
CALCIUM SERPL-MCNC: 9.1 MG/DL (ref 8.3–10.4)
CHLORIDE SERPL-SCNC: 103 MMOL/L (ref 98–107)
CO2 SERPL-SCNC: 30 MMOL/L (ref 21–32)
CREAT SERPL-MCNC: 0.56 MG/DL (ref 0.6–1)
DIFFERENTIAL METHOD BLD: ABNORMAL
ERYTHROCYTE [DISTWIDTH] IN BLOOD BY AUTOMATED COUNT: 15.6 % (ref 11.9–14.6)
FLUAV AG NPH QL IA: NEGATIVE
FLUBV AG NPH QL IA: NEGATIVE
GLOBULIN SER CALC-MCNC: 3.8 G/DL (ref 2.3–3.5)
GLUCOSE BLD STRIP.AUTO-MCNC: 272 MG/DL (ref 65–100)
GLUCOSE SERPL-MCNC: 171 MG/DL (ref 65–100)
HCT VFR BLD AUTO: 48.5 % (ref 35.8–46.3)
HGB BLD-MCNC: 15.9 G/DL (ref 11.7–15.4)
LACTATE BLD-SCNC: 1.82 MMOL/L (ref 0.5–1.9)
LYMPHOCYTES # BLD: 0.8 K/UL (ref 0.5–4.6)
LYMPHOCYTES NFR BLD MANUAL: 6 % (ref 16–44)
MAGNESIUM SERPL-MCNC: 2.1 MG/DL (ref 1.8–2.4)
MCH RBC QN AUTO: 30.6 PG (ref 26.1–32.9)
MCHC RBC AUTO-ENTMCNC: 32.8 G/DL (ref 31.4–35)
MCV RBC AUTO: 93.4 FL (ref 79.6–97.8)
MONOCYTES # BLD: 0.3 K/UL (ref 0.1–1.3)
MONOCYTES NFR BLD MANUAL: 2 % (ref 3–9)
NEUTS SEG # BLD: 11.6 K/UL (ref 1.7–8.2)
NEUTS SEG NFR BLD MANUAL: 92 % (ref 47–75)
NRBC # BLD: 0 K/UL (ref 0–0.2)
PLATELET # BLD AUTO: 296 K/UL (ref 150–450)
PLATELET COMMENTS,PCOM: ADEQUATE
PMV BLD AUTO: 9.4 FL (ref 9.4–12.3)
POTASSIUM SERPL-SCNC: 3.6 MMOL/L (ref 3.5–5.1)
PROT SERPL-MCNC: 7.4 G/DL (ref 6.3–8.2)
RBC # BLD AUTO: 5.19 M/UL (ref 4.05–5.2)
RBC MORPH BLD: ABNORMAL
SODIUM SERPL-SCNC: 136 MMOL/L (ref 136–145)
SPECIMEN SOURCE: NORMAL
WBC # BLD AUTO: 12.7 K/UL (ref 4.3–11.1)

## 2019-09-19 PROCEDURE — 74011250636 HC RX REV CODE- 250/636: Performed by: HOSPITALIST

## 2019-09-19 PROCEDURE — 87804 INFLUENZA ASSAY W/OPTIC: CPT

## 2019-09-19 PROCEDURE — 77030018846 HC SOL IRR STRL H20 ICUM -A

## 2019-09-19 PROCEDURE — 74011250636 HC RX REV CODE- 250/636: Performed by: EMERGENCY MEDICINE

## 2019-09-19 PROCEDURE — 71045 X-RAY EXAM CHEST 1 VIEW: CPT

## 2019-09-19 PROCEDURE — 94640 AIRWAY INHALATION TREATMENT: CPT

## 2019-09-19 PROCEDURE — 80053 COMPREHEN METABOLIC PANEL: CPT

## 2019-09-19 PROCEDURE — 74011250637 HC RX REV CODE- 250/637: Performed by: HOSPITALIST

## 2019-09-19 PROCEDURE — 74011636637 HC RX REV CODE- 636/637: Performed by: HOSPITALIST

## 2019-09-19 PROCEDURE — 74011000250 HC RX REV CODE- 250: Performed by: EMERGENCY MEDICINE

## 2019-09-19 PROCEDURE — 99284 EMERGENCY DEPT VISIT MOD MDM: CPT | Performed by: EMERGENCY MEDICINE

## 2019-09-19 PROCEDURE — 83735 ASSAY OF MAGNESIUM: CPT

## 2019-09-19 PROCEDURE — 65270000029 HC RM PRIVATE

## 2019-09-19 PROCEDURE — 93005 ELECTROCARDIOGRAM TRACING: CPT | Performed by: EMERGENCY MEDICINE

## 2019-09-19 PROCEDURE — 83605 ASSAY OF LACTIC ACID: CPT

## 2019-09-19 PROCEDURE — 82962 GLUCOSE BLOOD TEST: CPT

## 2019-09-19 PROCEDURE — 94760 N-INVAS EAR/PLS OXIMETRY 1: CPT

## 2019-09-19 PROCEDURE — 81003 URINALYSIS AUTO W/O SCOPE: CPT | Performed by: EMERGENCY MEDICINE

## 2019-09-19 PROCEDURE — 96368 THER/DIAG CONCURRENT INF: CPT | Performed by: EMERGENCY MEDICINE

## 2019-09-19 PROCEDURE — 96365 THER/PROPH/DIAG IV INF INIT: CPT | Performed by: EMERGENCY MEDICINE

## 2019-09-19 PROCEDURE — 94660 CPAP INITIATION&MGMT: CPT

## 2019-09-19 PROCEDURE — 77030013032 HC MSK BPAP/CPAP FISP -B

## 2019-09-19 PROCEDURE — 85025 COMPLETE CBC W/AUTO DIFF WBC: CPT

## 2019-09-19 RX ORDER — SODIUM CHLORIDE 0.9 % (FLUSH) 0.9 %
5-40 SYRINGE (ML) INJECTION EVERY 8 HOURS
Status: DISCONTINUED | OUTPATIENT
Start: 2019-09-19 | End: 2019-09-25 | Stop reason: HOSPADM

## 2019-09-19 RX ORDER — SODIUM CHLORIDE 0.9 % (FLUSH) 0.9 %
5-40 SYRINGE (ML) INJECTION AS NEEDED
Status: DISCONTINUED | OUTPATIENT
Start: 2019-09-19 | End: 2019-09-25 | Stop reason: HOSPADM

## 2019-09-19 RX ORDER — GUAIFENESIN/DEXTROMETHORPHAN 100-10MG/5
10 SYRUP ORAL
Status: DISCONTINUED | OUTPATIENT
Start: 2019-09-19 | End: 2019-09-20

## 2019-09-19 RX ORDER — MAGNESIUM SULFATE HEPTAHYDRATE 40 MG/ML
2 INJECTION, SOLUTION INTRAVENOUS
Status: COMPLETED | OUTPATIENT
Start: 2019-09-19 | End: 2019-09-19

## 2019-09-19 RX ORDER — BISACODYL 5 MG
5 TABLET, DELAYED RELEASE (ENTERIC COATED) ORAL DAILY PRN
Status: DISCONTINUED | OUTPATIENT
Start: 2019-09-19 | End: 2019-09-25 | Stop reason: HOSPADM

## 2019-09-19 RX ORDER — DEXTROSE 50 % IN WATER (D50W) INTRAVENOUS SYRINGE
25-50 AS NEEDED
Status: DISCONTINUED | OUTPATIENT
Start: 2019-09-19 | End: 2019-09-25 | Stop reason: HOSPADM

## 2019-09-19 RX ORDER — DULOXETIN HYDROCHLORIDE 30 MG/1
30 CAPSULE, DELAYED RELEASE ORAL DAILY
Status: DISCONTINUED | OUTPATIENT
Start: 2019-09-20 | End: 2019-09-25 | Stop reason: HOSPADM

## 2019-09-19 RX ORDER — INSULIN LISPRO 100 [IU]/ML
INJECTION, SOLUTION INTRAVENOUS; SUBCUTANEOUS
Status: DISCONTINUED | OUTPATIENT
Start: 2019-09-19 | End: 2019-09-25 | Stop reason: HOSPADM

## 2019-09-19 RX ORDER — KETOTIFEN FUMARATE 0.35 MG/ML
1 SOLUTION/ DROPS OPHTHALMIC 2 TIMES DAILY
Status: DISCONTINUED | OUTPATIENT
Start: 2019-09-20 | End: 2019-09-25 | Stop reason: HOSPADM

## 2019-09-19 RX ORDER — DEXTROSE 40 %
15 GEL (GRAM) ORAL AS NEEDED
Status: DISCONTINUED | OUTPATIENT
Start: 2019-09-19 | End: 2019-09-25 | Stop reason: HOSPADM

## 2019-09-19 RX ORDER — BUDESONIDE 0.5 MG/2ML
500 INHALANT ORAL
Status: DISCONTINUED | OUTPATIENT
Start: 2019-09-19 | End: 2019-09-25 | Stop reason: HOSPADM

## 2019-09-19 RX ORDER — PREDNISOLONE ACETATE 10 MG/ML
1 SUSPENSION/ DROPS OPHTHALMIC 4 TIMES DAILY
Status: DISCONTINUED | OUTPATIENT
Start: 2019-09-20 | End: 2019-09-23

## 2019-09-19 RX ORDER — ONDANSETRON 2 MG/ML
4 INJECTION INTRAMUSCULAR; INTRAVENOUS
Status: DISCONTINUED | OUTPATIENT
Start: 2019-09-19 | End: 2019-09-25 | Stop reason: HOSPADM

## 2019-09-19 RX ORDER — IPRATROPIUM BROMIDE AND ALBUTEROL SULFATE 2.5; .5 MG/3ML; MG/3ML
3 SOLUTION RESPIRATORY (INHALATION)
Status: DISCONTINUED | OUTPATIENT
Start: 2019-09-19 | End: 2019-09-25 | Stop reason: HOSPADM

## 2019-09-19 RX ORDER — GABAPENTIN 300 MG/1
300 CAPSULE ORAL 2 TIMES DAILY
Status: DISCONTINUED | OUTPATIENT
Start: 2019-09-19 | End: 2019-09-25 | Stop reason: HOSPADM

## 2019-09-19 RX ORDER — ENOXAPARIN SODIUM 100 MG/ML
40 INJECTION SUBCUTANEOUS EVERY 24 HOURS
Status: DISCONTINUED | OUTPATIENT
Start: 2019-09-19 | End: 2019-09-25 | Stop reason: HOSPADM

## 2019-09-19 RX ORDER — LEVOFLOXACIN 5 MG/ML
750 INJECTION, SOLUTION INTRAVENOUS EVERY 24 HOURS
Status: DISCONTINUED | OUTPATIENT
Start: 2019-09-19 | End: 2019-09-19 | Stop reason: HOSPADM

## 2019-09-19 RX ORDER — MIRTAZAPINE 15 MG/1
15 TABLET, FILM COATED ORAL
Status: DISCONTINUED | OUTPATIENT
Start: 2019-09-19 | End: 2019-09-25 | Stop reason: HOSPADM

## 2019-09-19 RX ORDER — NITROGLYCERIN 0.4 MG/1
0.4 TABLET SUBLINGUAL AS NEEDED
Status: DISCONTINUED | OUTPATIENT
Start: 2019-09-19 | End: 2019-09-25 | Stop reason: HOSPADM

## 2019-09-19 RX ORDER — ACETAMINOPHEN 325 MG/1
650 TABLET ORAL
Status: DISCONTINUED | OUTPATIENT
Start: 2019-09-19 | End: 2019-09-25 | Stop reason: HOSPADM

## 2019-09-19 RX ORDER — IPRATROPIUM BROMIDE AND ALBUTEROL SULFATE 2.5; .5 MG/3ML; MG/3ML
3 SOLUTION RESPIRATORY (INHALATION)
Status: DISCONTINUED | OUTPATIENT
Start: 2019-09-19 | End: 2019-09-20

## 2019-09-19 RX ORDER — ASPIRIN 81 MG/1
81 TABLET ORAL DAILY
Status: DISCONTINUED | OUTPATIENT
Start: 2019-09-20 | End: 2019-09-25 | Stop reason: HOSPADM

## 2019-09-19 RX ORDER — IPRATROPIUM BROMIDE AND ALBUTEROL SULFATE 2.5; .5 MG/3ML; MG/3ML
3 SOLUTION RESPIRATORY (INHALATION)
Status: COMPLETED | OUTPATIENT
Start: 2019-09-19 | End: 2019-09-19

## 2019-09-19 RX ORDER — DILTIAZEM HYDROCHLORIDE 180 MG/1
360 CAPSULE, COATED, EXTENDED RELEASE ORAL DAILY
Status: DISCONTINUED | OUTPATIENT
Start: 2019-09-20 | End: 2019-09-25 | Stop reason: HOSPADM

## 2019-09-19 RX ADMIN — IPRATROPIUM BROMIDE AND ALBUTEROL SULFATE 3 ML: .5; 3 SOLUTION RESPIRATORY (INHALATION) at 18:34

## 2019-09-19 RX ADMIN — GABAPENTIN 300 MG: 300 CAPSULE ORAL at 22:16

## 2019-09-19 RX ADMIN — MAGNESIUM SULFATE HEPTAHYDRATE 2 G: 40 INJECTION, SOLUTION INTRAVENOUS at 18:43

## 2019-09-19 RX ADMIN — ENOXAPARIN SODIUM 40 MG: 40 INJECTION SUBCUTANEOUS at 21:27

## 2019-09-19 RX ADMIN — INSULIN LISPRO 6 UNITS: 100 INJECTION, SOLUTION INTRAVENOUS; SUBCUTANEOUS at 22:16

## 2019-09-19 RX ADMIN — MIRTAZAPINE 15 MG: 15 TABLET, FILM COATED ORAL at 21:27

## 2019-09-19 RX ADMIN — IPRATROPIUM BROMIDE AND ALBUTEROL SULFATE 3 ML: .5; 3 SOLUTION RESPIRATORY (INHALATION) at 19:42

## 2019-09-19 RX ADMIN — Medication 5 ML: at 22:19

## 2019-09-19 RX ADMIN — SODIUM CHLORIDE 1000 ML: 900 INJECTION, SOLUTION INTRAVENOUS at 18:13

## 2019-09-19 RX ADMIN — LEVOFLOXACIN 750 MG: 5 INJECTION, SOLUTION INTRAVENOUS at 18:43

## 2019-09-19 NOTE — ED TRIAGE NOTES
Pt in from home via [facility] c/o sob. States history of copd. States she normally has flare ups with seasonal changes. Pt was 92% on home BIPAP. States productive cough with green sputum. Pt wears 6 liters nasal canula at night, states she has been having to wear o2 at all times. Given combivent 125mg solumedrol by ems.

## 2019-09-19 NOTE — ED PROVIDER NOTES
70-year-old female presents with progressive dyspnea over 3-day. States that changes in weather and allergies were usually trigger her symptoms  Does report some itchy eyes and irritation that is led to now increased work of breathing and wheezing    Patient has a long-standing history of COPD continues to smoke about 1 pack/day  She denies any specific ill contacts    Patient has been using her albuterol nebulizers every 3 hours with minimal improvement of her symptoms during the day today. She does report a cough productive of thick green and yellow sputum    Patient does not wear oxygen during the day normally but has had to use it continuously over the last 24 hours to provide some relief of her symptoms    Patient has a history of sleep apnea and is prescribed 6 L nasal cannula oxygen at night along with her CPAP    She is a patient of [facility]. The history is provided by the patient and the EMS personnel. COPD   This is a recurrent problem. The average episode lasts 3 days. The current episode started more than 2 days ago. The problem has been gradually worsening. Associated symptoms include cough, sputum production and wheezing. Pertinent negatives include no fever, no headaches, no rhinorrhea, no ear pain, no neck pain, no chest pain, no vomiting, no abdominal pain and no rash. The problem's precipitants include weather/humidity. Risk factors include smoking. She has tried beta-agonist inhalers for the symptoms. The treatment provided mild relief. She has had prior hospitalizations. She has had prior ED visits. Associated medical issues include COPD and pneumonia.         Past Medical History:   Diagnosis Date    Acute respiratory failure ([address] 1/29/2015    Intubated 1/29/2015    Acute respiratory failure ([address] 1/29/2015    Intubated 1/29/2015     Cancer ([facility]     basal cell,squamous cell    Chest pain, unspecified 7/7/2016    Chiari I malformation (HCC)     history of    COPD exacerbation ([facility] 9/14/12-9/17/12    COPD exacerbation ([facility] 4/2013 to 5/2/13    hospitalization    Paroxysmal tachycardia/NOS 7/7/2016    Pneumonia 9/16/2012    Pneumothorax     HX. of  2 on the left- required chest tube       Past Surgical History:   Procedure Laterality Date    HX CATARACT REMOVAL      HX HEART CATHETERIZATION  1/12    mild nonobstructive CAD    HX HEENT  2009    Bilateral cataracts and bleth    HX HYSTERECTOMY  1982    HX ORTHOPAEDIC  1998    right shoulder sx; left thumb    HX OTHER SURGICAL      left thumb sx,forehead resection of squamous cell    HX SEPTOPLASTY  04/2010    HX SHOULDER ARTHROSCOPY  1990         Family History:   Problem Relation Age of Onset    Cancer Mother         breast    Other Father         neurological degeneration    Cancer Sister         exophageal       Social History     Socioeconomic History    Marital status: SINGLE     Spouse name: Not on file    Number of children: Not on file    Years of education: Not on file    Highest education level: Not on file   Occupational History    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 beginning 11-5-12--LESS THAN 1 PK QD, CURRENT 0.50-1 ppd 6/14/16   Substance and Sexual Activity    Alcohol use:  Yes     Alcohol/week: 1.0 standard drinks     Types: 1 Glasses of wine per week     Comment: per month    Drug use: No    Sexual activity: Not on file   Lifestyle    Physical activity:     Days per week: Not on file     Minutes per session: Not on file    Stress: Not on file   Relationships    Social connections:     Talks on phone: Not on file     Gets together: Not on file     Attends [employer] service: Not on file     Active member of club or organization: Not on file     Attends meetings of clubs or organizations: Not on file     Relationship status: Not on file    Intimate partner violence:     Fear of current or ex partner: Not on file     Emotionally abused: Not on file     Physically abused: Not on file     Forced sexual activity: Not on file   Other Topics Concern    Not on file   Social History Narrative    Lives with granddaughter. Retired . ALLERGIES: Azithromycin and Sulfa (sulfonamide antibiotics)    Review of Systems   Constitutional: Negative for chills and fever. HENT: Negative for congestion, ear pain and rhinorrhea. Eyes: Negative for photophobia and discharge. Respiratory: Positive for cough, sputum production and wheezing. Negative for shortness of breath. Cardiovascular: Negative for chest pain and palpitations. Gastrointestinal: Negative for abdominal pain, constipation, diarrhea and vomiting. Endocrine: Negative for cold intolerance and heat intolerance. Genitourinary: Negative for dysuria and flank pain. Musculoskeletal: Positive for arthralgias and back pain. Negative for myalgias and neck pain. Skin: Negative for rash and wound. Allergic/Immunologic: Negative for environmental allergies and food allergies. Neurological: Negative for syncope and headaches. Hematological: Negative for adenopathy. Does not bruise/bleed easily. Psychiatric/Behavioral: Negative for dysphoric mood. The patient is not nervous/anxious. All other systems reviewed and are negative. Vitals:    09/19/19 1800   BP: 173/74   Pulse: (!) 120   Resp: 25   Temp: 99 °F (37.2 °C)   SpO2: 92%   Weight: 68.9 kg (152 lb)   Height: 5' 4\" (1.626 m)            Physical Exam   Constitutional: She is oriented to person, place, and time. She appears well-developed and well-nourished. She appears distressed. HENT:   Head: Normocephalic and atraumatic. Mouth/Throat: Oropharynx is clear and moist. No oropharyngeal exudate. Eyes: Pupils are equal, round, and reactive to light. Conjunctivae and EOM are normal.   Neck: Normal range of motion. Neck supple. No JVD present. Cardiovascular: Regular rhythm, normal heart sounds and intact distal pulses. Tachycardia present. Exam reveals no gallop and no friction rub. No murmur heard. Pulmonary/Chest: Effort normal. Tachypnea noted. She has decreased breath sounds. She has wheezes. Abdominal: Soft. Normal appearance and bowel sounds are normal. She exhibits no distension and no mass. There is no hepatosplenomegaly. There is no tenderness. Musculoskeletal: Normal range of motion. She exhibits no edema or deformity. Neurological: She is alert and oriented to person, place, and time. She has normal strength. No cranial nerve deficit or sensory deficit. She displays a negative Romberg sign. Gait normal.   Skin: Skin is warm and dry. Capillary refill takes less than 2 seconds. No rash noted. Psychiatric: She has a normal mood and affect. Her speech is normal and behavior is normal. Judgment and thought content normal. Cognition and memory are normal.   Nursing note and vitals reviewed.        MDM  Number of Diagnoses or Management Options  Acute exacerbation of chronic obstructive pulmonary disease (COPD) ([facility]): new and requires workup  Acute respiratory failure with hypoxia [facility]): new and requires workup  Mucopurulent chronic bronchitis ([facility]): new and requires workup  Diagnosis management comments: 66-year-old female with COPD exacerbation  Medrol administered in route with 1 DuoNeb treatment    Exam reveals persistent wheezing but reasonable airflow    Patient does meet sirs criteria    8:33 PM  Lactic negative  Chest x-ray COPD but no infiltrate    Reexam after treatment reveals persistent loud bilateral wheezing  Patient continues to have an oxygen demand or desat to 88%    Case reviewed with hospitalist  Will admit    7:02 PM  Lactic acid 1.8  Chest x-ray unremarkable       Amount and/or Complexity of Data Reviewed  Clinical lab tests: ordered and reviewed  Tests in the radiology section of CPT®: ordered and reviewed  Tests in the medicine section of CPT®: ordered and reviewed  Review and summarize past medical records: yes  Discuss the patient with other providers: yes  Independent visualization of images, tracings, or specimens: yes    Risk of Complications, Morbidity, and/or Mortality  Presenting problems: high  Diagnostic procedures: high  Management options: high  General comments: Elements of this note have been dictated via voice recognition software. Text and phrases may be limited by the accuracy of the software. The chart has been reviewed, but errors may still be present.       Critical Care  Total time providing critical care: 30-74 minutes (55)    Patient Progress  Patient progress: stable         Procedures

## 2019-09-19 NOTE — ED NOTES
Took report from Shyla Wallis, [address] [place] [address].  Provided snack to pt per request with md approval.

## 2019-09-20 LAB
ANION GAP SERPL CALC-SCNC: 2 MMOL/L (ref 7–16)
ATRIAL RATE: 122 BPM
BUN SERPL-MCNC: 5 MG/DL (ref 8–23)
CALCIUM SERPL-MCNC: 8.4 MG/DL (ref 8.3–10.4)
CALCULATED P AXIS, ECG09: 87 DEGREES
CALCULATED R AXIS, ECG10: 90 DEGREES
CALCULATED T AXIS, ECG11: 83 DEGREES
CHLORIDE SERPL-SCNC: 108 MMOL/L (ref 98–107)
CO2 SERPL-SCNC: 32 MMOL/L (ref 21–32)
CREAT SERPL-MCNC: 0.57 MG/DL (ref 0.6–1)
DIAGNOSIS, 93000: NORMAL
ERYTHROCYTE [DISTWIDTH] IN BLOOD BY AUTOMATED COUNT: 15.7 % (ref 11.9–14.6)
GLUCOSE BLD STRIP.AUTO-MCNC: 141 MG/DL (ref 65–100)
GLUCOSE BLD STRIP.AUTO-MCNC: 141 MG/DL (ref 65–100)
GLUCOSE BLD STRIP.AUTO-MCNC: 264 MG/DL (ref 65–100)
GLUCOSE SERPL-MCNC: 150 MG/DL (ref 65–100)
HCT VFR BLD AUTO: 48.8 % (ref 35.8–46.3)
HGB BLD-MCNC: 15.8 G/DL (ref 11.7–15.4)
MCH RBC QN AUTO: 30.5 PG (ref 26.1–32.9)
MCHC RBC AUTO-ENTMCNC: 32.4 G/DL (ref 31.4–35)
MCV RBC AUTO: 94.2 FL (ref 79.6–97.8)
NRBC # BLD: 0 K/UL (ref 0–0.2)
P-R INTERVAL, ECG05: 136 MS
PLATELET # BLD AUTO: 284 K/UL (ref 150–450)
PMV BLD AUTO: 9.4 FL (ref 9.4–12.3)
POTASSIUM SERPL-SCNC: 4.1 MMOL/L (ref 3.5–5.1)
Q-T INTERVAL, ECG07: 310 MS
QRS DURATION, ECG06: 82 MS
QTC CALCULATION (BEZET), ECG08: 441 MS
RBC # BLD AUTO: 5.18 M/UL (ref 4.05–5.2)
SODIUM SERPL-SCNC: 142 MMOL/L (ref 136–145)
VENTRICULAR RATE, ECG03: 122 BPM
WBC # BLD AUTO: 11.1 K/UL (ref 4.3–11.1)

## 2019-09-20 PROCEDURE — 80048 BASIC METABOLIC PNL TOTAL CA: CPT

## 2019-09-20 PROCEDURE — 99223 1ST HOSP IP/OBS HIGH 75: CPT | Performed by: INTERNAL MEDICINE

## 2019-09-20 PROCEDURE — 94664 DEMO&/EVAL PT USE INHALER: CPT

## 2019-09-20 PROCEDURE — 77010033711 HC HIGH FLOW OXYGEN

## 2019-09-20 PROCEDURE — 82962 GLUCOSE BLOOD TEST: CPT

## 2019-09-20 PROCEDURE — 94640 AIRWAY INHALATION TREATMENT: CPT

## 2019-09-20 PROCEDURE — 74011250637 HC RX REV CODE- 250/637: Performed by: INTERNAL MEDICINE

## 2019-09-20 PROCEDURE — 85027 COMPLETE CBC AUTOMATED: CPT

## 2019-09-20 PROCEDURE — 36415 COLL VENOUS BLD VENIPUNCTURE: CPT

## 2019-09-20 PROCEDURE — 65610000001 HC ROOM ICU GENERAL

## 2019-09-20 PROCEDURE — 77030013032 HC MSK BPAP/CPAP FISP -B

## 2019-09-20 PROCEDURE — 77030018846 HC SOL IRR STRL H20 ICUM -A

## 2019-09-20 PROCEDURE — 74011000250 HC RX REV CODE- 250: Performed by: INTERNAL MEDICINE

## 2019-09-20 PROCEDURE — 74011000250 HC RX REV CODE- 250: Performed by: HOSPITALIST

## 2019-09-20 PROCEDURE — 77030012793 HC CIRC VNTLTR FISP -B

## 2019-09-20 PROCEDURE — 94660 CPAP INITIATION&MGMT: CPT

## 2019-09-20 PROCEDURE — 74011250637 HC RX REV CODE- 250/637: Performed by: HOSPITALIST

## 2019-09-20 PROCEDURE — 74011250636 HC RX REV CODE- 250/636: Performed by: INTERNAL MEDICINE

## 2019-09-20 PROCEDURE — 74011250636 HC RX REV CODE- 250/636: Performed by: HOSPITALIST

## 2019-09-20 PROCEDURE — 74011636637 HC RX REV CODE- 636/637: Performed by: HOSPITALIST

## 2019-09-20 PROCEDURE — 74011000250 HC RX REV CODE- 250

## 2019-09-20 RX ORDER — BENZONATATE 100 MG/1
100 CAPSULE ORAL
Status: DISCONTINUED | OUTPATIENT
Start: 2019-09-20 | End: 2019-09-25 | Stop reason: HOSPADM

## 2019-09-20 RX ORDER — ALBUTEROL SULFATE 0.83 MG/ML
SOLUTION RESPIRATORY (INHALATION)
Status: COMPLETED
Start: 2019-09-20 | End: 2019-09-20

## 2019-09-20 RX ORDER — MORPHINE SULFATE 2 MG/ML
2 INJECTION, SOLUTION INTRAMUSCULAR; INTRAVENOUS
Status: COMPLETED | OUTPATIENT
Start: 2019-09-20 | End: 2019-09-20

## 2019-09-20 RX ORDER — GUAIFENESIN 600 MG/1
1200 TABLET, EXTENDED RELEASE ORAL EVERY 12 HOURS
Status: DISCONTINUED | OUTPATIENT
Start: 2019-09-20 | End: 2019-09-25 | Stop reason: HOSPADM

## 2019-09-20 RX ORDER — IPRATROPIUM BROMIDE AND ALBUTEROL SULFATE 2.5; .5 MG/3ML; MG/3ML
3 SOLUTION RESPIRATORY (INHALATION)
Status: DISCONTINUED | OUTPATIENT
Start: 2019-09-20 | End: 2019-09-25 | Stop reason: HOSPADM

## 2019-09-20 RX ORDER — MORPHINE SULFATE 2 MG/ML
2 INJECTION, SOLUTION INTRAMUSCULAR; INTRAVENOUS
Status: DISCONTINUED | OUTPATIENT
Start: 2019-09-20 | End: 2019-09-25 | Stop reason: HOSPADM

## 2019-09-20 RX ADMIN — IPRATROPIUM BROMIDE AND ALBUTEROL SULFATE 3 ML: .5; 3 SOLUTION RESPIRATORY (INHALATION) at 05:00

## 2019-09-20 RX ADMIN — IPRATROPIUM BROMIDE AND ALBUTEROL SULFATE 3 ML: .5; 3 SOLUTION RESPIRATORY (INHALATION) at 15:41

## 2019-09-20 RX ADMIN — PREDNISOLONE ACETATE 1 DROP: 10 SUSPENSION/ DROPS OPHTHALMIC at 13:13

## 2019-09-20 RX ADMIN — IPRATROPIUM BROMIDE AND ALBUTEROL SULFATE 3 ML: .5; 3 SOLUTION RESPIRATORY (INHALATION) at 19:20

## 2019-09-20 RX ADMIN — METHYLPREDNISOLONE SODIUM SUCCINATE 60 MG: 125 INJECTION, POWDER, FOR SOLUTION INTRAMUSCULAR; INTRAVENOUS at 13:13

## 2019-09-20 RX ADMIN — MORPHINE SULFATE 2 MG: 2 INJECTION, SOLUTION INTRAMUSCULAR; INTRAVENOUS at 16:47

## 2019-09-20 RX ADMIN — BUDESONIDE 500 MCG: 0.5 INHALANT RESPIRATORY (INHALATION) at 19:20

## 2019-09-20 RX ADMIN — ENOXAPARIN SODIUM 40 MG: 40 INJECTION SUBCUTANEOUS at 21:14

## 2019-09-20 RX ADMIN — KETOTIFEN FUMARATE 1 DROP: 0.35 SOLUTION/ DROPS OPHTHALMIC at 09:33

## 2019-09-20 RX ADMIN — MIRTAZAPINE 15 MG: 15 TABLET, FILM COATED ORAL at 21:13

## 2019-09-20 RX ADMIN — ALBUTEROL SULFATE 2.5 MG: 2.5 SOLUTION RESPIRATORY (INHALATION) at 09:29

## 2019-09-20 RX ADMIN — GUAIFENESIN 1200 MG: 600 TABLET ORAL at 21:13

## 2019-09-20 RX ADMIN — PREDNISOLONE ACETATE 1 DROP: 10 SUSPENSION/ DROPS OPHTHALMIC at 18:09

## 2019-09-20 RX ADMIN — IPRATROPIUM BROMIDE AND ALBUTEROL SULFATE 3 ML: .5; 3 SOLUTION RESPIRATORY (INHALATION) at 23:13

## 2019-09-20 RX ADMIN — DULOXETINE HYDROCHLORIDE 30 MG: 30 CAPSULE, DELAYED RELEASE ORAL at 09:16

## 2019-09-20 RX ADMIN — Medication 1 AMPULE: at 21:14

## 2019-09-20 RX ADMIN — Medication 10 ML: at 13:14

## 2019-09-20 RX ADMIN — GUAIFENESIN 1200 MG: 600 TABLET ORAL at 09:16

## 2019-09-20 RX ADMIN — METHYLPREDNISOLONE SODIUM SUCCINATE 60 MG: 125 INJECTION, POWDER, FOR SOLUTION INTRAMUSCULAR; INTRAVENOUS at 21:13

## 2019-09-20 RX ADMIN — PREDNISOLONE ACETATE 1 DROP: 10 SUSPENSION/ DROPS OPHTHALMIC at 21:22

## 2019-09-20 RX ADMIN — ASPIRIN 81 MG: 81 TABLET, COATED ORAL at 09:16

## 2019-09-20 RX ADMIN — DILTIAZEM HYDROCHLORIDE 360 MG: 180 CAPSULE, COATED, EXTENDED RELEASE ORAL at 09:15

## 2019-09-20 RX ADMIN — ACETAMINOPHEN 650 MG: 325 TABLET, FILM COATED ORAL at 15:37

## 2019-09-20 RX ADMIN — LEVOFLOXACIN 750 MG: 500 TABLET, FILM COATED ORAL at 21:13

## 2019-09-20 RX ADMIN — Medication 10 ML: at 21:14

## 2019-09-20 RX ADMIN — INSULIN LISPRO 6 UNITS: 100 INJECTION, SOLUTION INTRAVENOUS; SUBCUTANEOUS at 16:38

## 2019-09-20 RX ADMIN — KETOTIFEN FUMARATE 1 DROP: 0.35 SOLUTION/ DROPS OPHTHALMIC at 18:09

## 2019-09-20 RX ADMIN — METHYLPREDNISOLONE SODIUM SUCCINATE 60 MG: 125 INJECTION, POWDER, FOR SOLUTION INTRAMUSCULAR; INTRAVENOUS at 09:17

## 2019-09-20 RX ADMIN — MORPHINE SULFATE 2 MG: 2 INJECTION, SOLUTION INTRAMUSCULAR; INTRAVENOUS at 09:24

## 2019-09-20 RX ADMIN — BUDESONIDE 500 MCG: 0.5 INHALANT RESPIRATORY (INHALATION) at 09:30

## 2019-09-20 RX ADMIN — Medication 1 AMPULE: at 13:14

## 2019-09-20 RX ADMIN — IPRATROPIUM BROMIDE AND ALBUTEROL SULFATE 3 ML: .5; 3 SOLUTION RESPIRATORY (INHALATION) at 09:30

## 2019-09-20 RX ADMIN — GABAPENTIN 300 MG: 300 CAPSULE ORAL at 21:13

## 2019-09-20 RX ADMIN — PREDNISOLONE ACETATE 1 DROP: 10 SUSPENSION/ DROPS OPHTHALMIC at 09:33

## 2019-09-20 RX ADMIN — GABAPENTIN 300 MG: 300 CAPSULE ORAL at 09:16

## 2019-09-20 NOTE — PROGRESS NOTES
Pt complaining of increasing SOB. Asked to be placed back on BiPap. Pt placed back on BiPap settings 12/6, 45%, rate 16    Pt also complaining of severe headache. Administered PRN tylenol.

## 2019-09-20 NOTE — PROGRESS NOTES
Patient placed on bipap of 12/6 R 16 and 45% fio2 per Dr. Anabel Holguin for increased respiratory distress. Continuous sat # 09 placed on patient for monitoring. Patient tolerating at this time, sats are within range.  RN notified

## 2019-09-20 NOTE — H&P
Hospitalist H&P/Consult Note     Admit Date:  2019  5:53 PM   Name:  Sunny Jamison   Age:  71 y.o.  :  1950   MRN:  [id number]   PCP:  Kayleigh Le MD  Treatment Team: Attending Provider: Geoff Jones MD    HPI:   Patient is a 70 y/o female with hx COPD, smoking, JACQUELYN-CPAP, nocturnal O2 use who presents to ED with acute shortness of breath and wheezing. Symptoms began  and she developed production of thick green sputum yesterday. She tried multiple nebulizer treatments at home without relief and even took prednisone. She has chills but no fever. In ED she required increased O2 and had diffuse wheezing. Still symptomatic after multiple duonebs and IV solumedrol. Will admit for acute COPD exacerbation with tracheobronchitis and acute respiratory failure. 10 systems reviewed and negative except as noted in HPI. Past Medical History:   Diagnosis Date    Acute respiratory failure ([address] 2015    Intubated 2015    Acute respiratory failure ([address] 2015    Intubated 2015     Cancer ([address]     basal cell,squamous cell    Chest pain, unspecified 2016    Chiari I malformation (HCC)     history of    COPD exacerbation ([address] 12-12    COPD exacerbation ([address] 2013 to 13    hospitalization    Paroxysmal tachycardia/NOS 2016    Pneumonia 2012    Pneumothorax     HX. of  2 on the left- required chest tube      Past Surgical History:   Procedure Laterality Date    HX CATARACT REMOVAL      HX HEART CATHETERIZATION      mild nonobstructive CAD    HX HEENT      Bilateral cataracts and bleth    HX HYSTERECTOMY      HX ORTHOPAEDIC      right shoulder sx; left thumb    HX OTHER SURGICAL      left thumb sx,forehead resection of squamous cell    HX SEPTOPLASTY  2010    HX SHOULDER ARTHROSCOPY        Prior to Admission Medications   Prescriptions Last Dose Informant Patient Reported? Taking?    DULoxetine (CYMBALTA) 30 mg capsule   Yes Yes   Sig: Take 30 mg by mouth daily. OXYGEN-AIR DELIVERY SYSTEMS   Yes Yes   Sig: by Nasal route. 6 lpm qhs   albuterol (PROAIR HFA) 90 mcg/actuation inhaler   No Yes   Sig: Take 2 Puffs by inhalation every four (4) hours as needed for Wheezing. albuterol (PROVENTIL VENTOLIN) 2.5 mg /3 mL (0.083 %) nebulizer solution   Yes Yes   Si.5 mg by Nebulization route two (2) times a day. aspirin delayed-release 81 mg tablet   Yes Yes   Sig: Take  by mouth daily. budesonide-formoterol (SYMBICORT) 160-4.5 mcg/actuation HFAA   No Yes   Sig: Take 2 Puffs by inhalation two (2) times a day. cpap machine kit   Yes Yes   Sig: by Does Not Apply route. 15cm with 5L of oxygen bled in   dilTIAZem (TIAZAC) 360 mg ER capsule   No Yes   Sig: Take 1 Cap by mouth daily. gabapentin (NEURONTIN) 300 mg capsule   Yes Yes   Sig: Take 300 mg by mouth two (2) times a day. 1 tab po qhs    methylphenidate (RITALIN) 5 mg tablet   Yes Yes   Sig: Take 5 mg by mouth two (2) times a day. mirtazapine (REMERON) 30 mg tablet   Yes Yes   Sig: Take 15 mg by mouth nightly. nitroglycerin (NITROSTAT) 0.4 mg SL tablet   Yes Yes   Sig: by SubLINGual route every five (5) minutes as needed. olopatadine (PATADAY) 0.2 % drop ophthalmic solution   Yes Yes   Sig: Administer 1 drop to both eyes daily. predniSONE (DELTASONE) 20 mg tablet   No Yes   Si tabs po qd x 3 days, 1 and 1/2 tabs po qd x 3 days, 1 tab po qd x 3 days, 1/2 tab po qd x 3 days, or as directed. predniSONE (DELTASONE) 20 mg tablet   No Yes   Si tabs po qd x 3 days, 1 and 1/2 tabs po qd x 3 days, 1 tab po qd x 3 days, 1/2 tab po qd x 3 days, or as directed. prednisoLONE acetate (PRED FORTE) 1 % ophthalmic suspension   Yes Yes   Sig: Administer 1 drop to both eyes four (4) times daily. tiotropium bromide (SPIRIVA RESPIMAT) 2.5 mcg/actuation inhaler   No Yes   Sig: Take 2 Puffs by inhalation daily.    traZODone (DESYREL) 100 mg tablet   Yes Yes Sig: Take 100 mg by mouth nightly. Facility-Administered Medications: None     Allergies   Allergen Reactions    Azithromycin Diarrhea    Sulfa (Sulfonamide Antibiotics) Itching      Social History     Tobacco Use    Smoking status: Current Every Day Smoker     Packs/day: 1.00     Years: 45.00     Pack years: 45.00     Types: Cigarettes    Smokeless tobacco: Current User    Tobacco comment: 1 2 ppd; enrolled in [address] beginning 11-5-12--LESS THAN 1 PK QD, CURRENT 0.50-1 ppd 6/14/16   Substance Use Topics    Alcohol use: Yes     Alcohol/week: 1.0 standard drinks     Types: 1 Glasses of wine per week     Comment: per month      Family History   Problem Relation Age of Onset    Cancer Mother         breast    Other Father         neurological degeneration    Cancer Sister         exophageal      Immunization History   Administered Date(s) Administered    (RETIRED) Pneumococcal Vaccine (Unspecified Type) 09/15/2010    Influenza Vaccine 09/01/2014, 12/08/2015, 10/01/2016, 11/01/2017    Pneumococcal Vaccine (Unspecified Type) 01/01/2010, 10/01/2016       Objective:     Patient Vitals for the past 24 hrs:   Temp Pulse Resp BP SpO2   09/19/19 2104 97.9 °F (36.6 °C) (!) 111 (!) 32 139/66 90 %   09/19/19 2033  (!) 112 (!) 31 168/67 92 %   09/19/19 2027  (!) 116 (!) 38  93 %   09/19/19 1835     92 %   09/19/19 1800 99 °F (37.2 °C) (!) 120 25 173/74 92 %     Oxygen Therapy  O2 Sat (%): 90 % (09/19/19 2104)  Pulse via Oximetry: 112 beats per minute (09/19/19 2033)  O2 Device: Nasal cannula (09/19/19 1835)  O2 Flow Rate (L/min): 4 l/min (09/19/19 1835)  No intake or output data in the 24 hours ending 09/19/19 2245    Physical Exam:  General:    Well nourished. Alert. Congested cough  Eyes:   Normal sclera. Extraocular movements intact. ENT:  Normocephalic, atraumatic. Moist mucous membranes  CV:   Tachycardic. No murmur, rub, or gallop.     Lungs:  Diffuse wheezing with prolonged expiratory phase  Abdomen: Soft, nontender, nondistended. Bowel sounds normal.   Extremities: Warm and dry. No cyanosis or edema. Neurologic: CN II-XII grossly intact. Sensation intact. Skin:     No rashes or jaundice. No wounds. Psych:  Normal mood and affect. I reviewed the labs, imaging, EKGs, telemetry, and other studies done this admission. Data Review:   Recent Results (from the past 24 hour(s))   EKG, 12 LEAD, INITIAL    Collection Time: 09/19/19  6:00 PM   Result Value Ref Range    Ventricular Rate 122 BPM    Atrial Rate 122 BPM    P-R Interval 136 ms    QRS Duration 82 ms    Q-T Interval 310 ms    QTC Calculation (Bezet) 441 ms    Calculated P Axis 87 degrees    Calculated R Axis 90 degrees    Calculated T Axis 83 degrees    Diagnosis       Sinus tachycardia  Biatrial enlargement  Rightward axis  Pulmonary disease pattern  RSR' or QR pattern in V1 suggests right ventricular conduction delay  Septal infarct , age undetermined  Abnormal ECG  When compared with ECG of 17-NOV-2014 22:11,  Vent. rate has increased BY  46 BPM  RSR' pattern in V1 is now Present  Septal infarct is now Present     CBC WITH AUTOMATED DIFF    Collection Time: 09/19/19  6:14 PM   Result Value Ref Range    WBC 12.7 (H) 4.3 - 11.1 K/uL    RBC 5.19 4.05 - 5.2 M/uL    HGB 15.9 (H) 11.7 - 15.4 g/dL    HCT 48.5 (H) 35.8 - 46.3 %    MCV 93.4 79.6 - 97.8 FL    MCH 30.6 26.1 - 32.9 PG    MCHC 32.8 31.4 - 35.0 g/dL    RDW 15.6 (H) 11.9 - 14.6 %    PLATELET 513 247 - 174 K/uL    MPV 9.4 9.4 - 12.3 FL    ABSOLUTE NRBC 0.00 0.0 - 0.2 K/uL    NEUTROPHILS 92 (H) 47 - 75 %    LYMPHOCYTES 6 (L) 16 - 44 %    MONOCYTES 2 (L) 3 - 9 %    ABS. NEUTROPHILS 11.6 (H) 1.7 - 8.2 K/UL    ABS. LYMPHOCYTES 0.8 0.5 - 4.6 K/UL    ABS.  MONOCYTES 0.3 0.1 - 1.3 K/UL    RBC COMMENTS NORMOCYTIC/NORMOCHROMIC      PLATELET COMMENTS ADEQUATE      DF MANUAL     METABOLIC PANEL, COMPREHENSIVE    Collection Time: 09/19/19  6:14 PM   Result Value Ref Range    Sodium 136 136 - 145 mmol/L    Potassium 3.6 3.5 - 5.1 mmol/L    Chloride 103 98 - 107 mmol/L    CO2 30 21 - 32 mmol/L    Anion gap 3 (L) 7 - 16 mmol/L    Glucose 171 (H) 65 - 100 mg/dL    BUN 5 (L) 8 - 23 MG/DL    Creatinine 0.56 (L) 0.6 - 1.0 MG/DL    GFR est AA >60 >60 ml/min/1.73m2    GFR est non-AA >60 >60 ml/min/1.73m2    Calcium 9.1 8.3 - 10.4 MG/DL    Bilirubin, total 0.3 0.2 - 1.1 MG/DL    ALT (SGPT) 32 12 - 65 U/L    AST (SGOT) 19 15 - 37 U/L    Alk. phosphatase 130 50 - 136 U/L    Protein, total 7.4 6.3 - 8.2 g/dL    Albumin 3.6 3.2 - 4.6 g/dL    Globulin 3.8 (H) 2.3 - 3.5 g/dL    A-G Ratio 0.9 (L) 1.2 - 3.5     INFLUENZA A & B AG (RAPID TEST)    Collection Time: 09/19/19  6:14 PM   Result Value Ref Range    Influenza A Ag NEGATIVE  NEG      Influenza B Ag NEGATIVE  NEG      Source NASOPHARYNGEAL     MAGNESIUM    Collection Time: 09/19/19  6:14 PM   Result Value Ref Range    Magnesium 2.1 1.8 - 2.4 mg/dL   POC LACTIC ACID    Collection Time: 09/19/19  6:20 PM   Result Value Ref Range    Lactic Acid (POC) 1.82 0.5 - 1.9 mmol/L   GLUCOSE, POC    Collection Time: 09/19/19  9:46 PM   Result Value Ref Range    Glucose (POC) 272 (H) 65 - 100 mg/dL       Imaging Studies:  CXR Results  (Last 48 hours)               09/19/19 1815  XR CHEST PORT Final result    Impression:  IMPRESSION:    1. No acute cardiopulmonary process evident on single frontal view of the   chest.                   Narrative:  CHEST X-RAY, single portable view  9/19/2019       History: Shortness of breath, productive cough with green sputum, and decreased   oxygen saturation. Technique: Single frontal view of the chest.       Comparison: Chest x-ray 2/28/2018       Findings: The cardiac silhouette is normal in respect to size. The lungs are expanded   without evidence for pneumothorax. No consolidative airspace process or   pleural effusion is seen.                CT Results  (Last 48 hours)    None          Assessment and Plan:     Hospital Problems as of 9/19/2019 Date Reviewed: 6/12/2019          Codes Class Noted - Resolved POA    * (Principal) COPD with acute exacerbation ([facility] ICD-10-CM: J44.1  ICD-9-CM: 491.21  9/19/2019 - Present Yes        Acute tracheobronchitis ICD-10-CM: J20.9  ICD-9-CM: 466.0  9/19/2019 - Present Yes        JACQUELYN (obstructive sleep apnea) (Chronic) ICD-10-CM: G47.33  ICD-9-CM: 327.23  7/26/2017 - Present Yes    Overview Addendum 2/2/2015  9:38 AM by Mariluz Saleh, NP     Uses home CPAP with 6L bleed in O2             Tobacco use disorder (Chronic) ICD-10-CM: J14.891  ICD-9-CM: 305.1  7/26/2017 - Present Yes        Diabetes mellitus (HCC) (Chronic) ICD-10-CM: E11.9  ICD-9-CM: 250.00  7/26/2017 - Present Yes        Nocturnal hypoxia (Chronic) ICD-10-CM: G47.34  ICD-9-CM: 327.24  7/25/2017 - Present Yes        Essential hypertension with goal blood pressure less than 130/85 (Chronic) ICD-10-CM: I10  ICD-9-CM: 401.9  3/23/2017 - Present Yes        Coronary artery disease (Chronic) ICD-10-CM: I25.10  ICD-9-CM: 414.00  3/3/2015 - Present Yes        Debility (Chronic) ICD-10-CM: R53.81  ICD-9-CM: 799.3  6/5/2013 - Present Yes        Fibromyalgia (Chronic) ICD-10-CM: M79.7  ICD-9-CM: 729.1  4/30/2013 - Present Yes        Restless leg syndrome (Chronic) ICD-10-CM: G25.81  ICD-9-CM: 333.94  4/30/2013 - Present Yes              PLAN:  · Admit patient to medical bed  · Supplemental O2 to maintain sats 91% or greater  · duonebs Q 6 hr scheduled, and Q 4 hr prn  · IV solumedrol 60 mg BID.  Inhaled pulmicort BID  · Flutter valve, mucolytic  · Obtain sputum for culture  · Continue levaquin for bronchitis  · Continue other home medications  · Patient uses CPAP at night for JACQUELYN  · Provide influenza vaccine if patient desires  · Patient needs a home supply of prednisone for COPD flares  · Smoking cessation counseling to be provided  · SQ lovenox for DVT prophylaxis  · Follow-up with [facility] Pulmonary after discharge      Estimated LOS:  2 midnights    Signed:  Wheeler Closs, MD

## 2019-09-20 NOTE — CONSULTS
CONSULT NOTE    Erinnlamar Callaway Zonia    9/20/2019    Date of Admission:  9/19/2019    The patient's chart is reviewed and the patient is discussed with the staff. HPI:   Porfirio Banda is a 72yoF who is followed at [facility] for GOLD stage 2 COPD (1.3L; 57% predicted) with nocturnal hypoxemia with 45pkyr smoking history and active 1ppd smoking. PMH is also notable for JACQUELYN. , mild pulmonary hypertension with RVSP 30mmHg in 2017. She has required intubation in the past for severe exacerbation. She was admitted to [facility] on 9/19 with about 5-6 days of coughing, dyspnea and wheezing. In the ER she was treated with duonebs and solumedrol but hypoxemia and severe wheezing persisted. This morning she has polycythemia and mild hyperglycemia. This morning she is wearing her CPAP mask and feels she still cannot catch her breath. Her RR is in the high 30s currently. She is not lethargic or sleepy. No chest pain. REVIEW OF SYSTEMS:  CONSTITUTIONAL:  There is no history of fever, chills, night sweats, weight loss, weight gain, persistent fatigue, or   EYES:  Denies problems with eye pain, erythema, blurred vision, or visual field loss. ENTM:  Denies history of tinnitus, epistaxis, sore throat, hoarseness, or dysphonia. LYMPH:  Denies swollen glands. CARDIAC:  No chest pain, pressure, discomfort, palpitations, orthopnea, murmurs, or edema. GI:  No dysphagia, heartburn reflux, nausea/vomiting, diarrhea, abdominal pain, or bleeding. :Denies history of dysuria, hematuria, polyuria, or decreased urine output. MS:  No history of myalgias, arthralgias, bone pain, or muscle cramps. SKIN:  No history of rashes, jaundice, cyanosis, nodules, or ulcers. ENDO:  Negative for heat or cold intolerance. No history of DM. PSYCH:  Negative for anxiety, depression, insomnia, hallucinations.   NEURO:  There is no history of AMS, persistent headache, decreased level of consciousness, seizures, or motor or sensory deficits. Patient Active Problem List   Diagnosis Code    Chronic sinusitis J32.9    JACQUELYN (obstructive sleep apnea) G47.33    Tobacco use disorder F17.200    Diabetes mellitus ([facility]) E11.9    Fibromyalgia M79.7    Restless leg syndrome G25.81    Depression F32.9    Debility R53.81    Chronic respiratory failure ([facility]) J96.10    ADHD (attention deficit hyperactivity disorder) F90.9    Coronary artery disease I25.10    Right heart enlargement I51.7    Polycythemia D75.1    Moderate COPD (chronic obstructive pulmonary disease) ([facility]) J44.9    Essential hypertension with goal blood pressure less than 130/85 I10    Fatigue R53.83    Nocturnal hypoxia G47.34    Chest pain at rest R07.9    Chest discomfort R07.89    Sleep apnea G47.30    Paroxysmal tachycardia/NOS I47.9    Acute tracheobronchitis J20.9    COPD with acute exacerbation ([facility]) J44.1           Prior to Admission Medications   Prescriptions Last Dose Informant Patient Reported? Taking? DULoxetine (CYMBALTA) 30 mg capsule   Yes Yes   Sig: Take 30 mg by mouth daily. OXYGEN-AIR DELIVERY SYSTEMS   Yes Yes   Sig: by Nasal route. 6 lpm qhs   albuterol (PROAIR HFA) 90 mcg/actuation inhaler   No Yes   Sig: Take 2 Puffs by inhalation every four (4) hours as needed for Wheezing. albuterol (PROVENTIL VENTOLIN) 2.5 mg /3 mL (0.083 %) nebulizer solution   Yes Yes   Si.5 mg by Nebulization route two (2) times a day. aspirin delayed-release 81 mg tablet   Yes Yes   Sig: Take  by mouth daily. budesonide-formoterol (SYMBICORT) 160-4.5 mcg/actuation HFAA   No Yes   Sig: Take 2 Puffs by inhalation two (2) times a day. cpap machine kit   Yes Yes   Sig: by Does Not Apply route. 15cm with 5L of oxygen bled in   dilTIAZem (TIAZAC) 360 mg ER capsule   No Yes   Sig: Take 1 Cap by mouth daily. gabapentin (NEURONTIN) 300 mg capsule   Yes Yes   Sig: Take 300 mg by mouth two (2) times a day.  1 tab po qhs    methylphenidate (RITALIN) 5 mg tablet   Yes Yes   Sig: Take 5 mg by mouth two (2) times a day. mirtazapine (REMERON) 30 mg tablet   Yes Yes   Sig: Take 15 mg by mouth nightly. nitroglycerin (NITROSTAT) 0.4 mg SL tablet   Yes Yes   Sig: by SubLINGual route every five (5) minutes as needed. olopatadine (PATADAY) 0.2 % drop ophthalmic solution   Yes Yes   Sig: Administer 1 drop to both eyes daily. predniSONE (DELTASONE) 20 mg tablet   No Yes   Si tabs po qd x 3 days, 1 and 1/2 tabs po qd x 3 days, 1 tab po qd x 3 days, 1/2 tab po qd x 3 days, or as directed. predniSONE (DELTASONE) 20 mg tablet   No Yes   Si tabs po qd x 3 days, 1 and 1/2 tabs po qd x 3 days, 1 tab po qd x 3 days, 1/2 tab po qd x 3 days, or as directed. prednisoLONE acetate (PRED FORTE) 1 % ophthalmic suspension   Yes Yes   Sig: Administer 1 drop to both eyes four (4) times daily. tiotropium bromide (SPIRIVA RESPIMAT) 2.5 mcg/actuation inhaler   No Yes   Sig: Take 2 Puffs by inhalation daily. traZODone (DESYREL) 100 mg tablet   Yes Yes   Sig: Take 100 mg by mouth nightly. Facility-Administered Medications: None       Past Medical History:   Diagnosis Date    Acute respiratory failure ([facility] 2015    Intubated 2015    Acute respiratory failure ([facility] [address] [facility] 2015    Intubated 2015     Cancer ([facility] [address] [facility]     basal cell,squamous cell    Chest pain, unspecified 2016    Chiari I malformation ([facility] [address]     history of    COPD exacerbation ([facility] [address] 12-12    COPD exacerbation ([facility] [address] [facility] 2013 to 13    hospitalization    Paroxysmal tachycardia/NOS 2016    Pneumonia 2012    Pneumothorax     HX.  of  2 on the left- required chest tube     Past Surgical History:   Procedure Laterality Date    HX CATARACT REMOVAL      HX HEART CATHETERIZATION      mild nonobstructive CAD    HX HEENT      Bilateral cataracts and bleth    HX HYSTERECTOMY  1982    HX ORTHOPAEDIC  1998    right shoulder sx; left thumb    HX OTHER SURGICAL      left thumb sx,forehead resection of squamous cell    HX SEPTOPLASTY  04/2010    HX SHOULDER ARTHROSCOPY  1990     Social History     Socioeconomic History    Marital status: SINGLE     Spouse name: Not on file    Number of children: Not on file    Years of education: Not on file    Highest education level: Not on file   Occupational History    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 beginning 11-5-12--LESS THAN 1 PK QD, CURRENT 0.50-1 ppd 6/14/16   Substance and Sexual Activity    Alcohol use: Yes     Alcohol/week: 1.0 standard drinks     Types: 1 Glasses of wine per week     Comment: per month    Drug use: No    Sexual activity: Not on file   Lifestyle    Physical activity:     Days per week: Not on file     Minutes per session: Not on file    Stress: Not on file   Relationships    Social connections:     Talks on phone: Not on file     Gets together: Not on file     Attends Congregation service: Not on file     Active member of club or organization: Not on file     Attends meetings of clubs or organizations: Not on file     Relationship status: Not on file    Intimate partner violence:     Fear of current or ex partner: Not on file     Emotionally abused: Not on file     Physically abused: Not on file     Forced sexual activity: Not on file   Other Topics Concern    Not on file   Social History Narrative    Lives with granddaughter. Retired .      Family History   Problem Relation Age of Onset    Cancer Mother         breast    Other Father         neurological degeneration    Cancer Sister         exophageal     Allergies   Allergen Reactions    Azithromycin Diarrhea    Sulfa (Sulfonamide Antibiotics) Itching       Current Facility-Administered Medications   Medication Dose Route Frequency    guaiFENesin ER (MUCINEX) tablet 1,200 mg  1,200 mg Oral Q12H    benzonatate (TESSALON) capsule 100 mg  100 mg Oral TID PRN    albuterol-ipratropium (DUO-NEB) 2.5 MG-0.5 MG/3 ML  3 mL Nebulization Q6H RT    albuterol-ipratropium (DUO-NEB) 2.5 MG-0.5 MG/3 ML  3 mL Nebulization Q4H PRN    levoFLOXacin (LEVAQUIN) tablet 750 mg  750 mg Oral Q24H    aspirin delayed-release tablet 81 mg  81 mg Oral DAILY    DULoxetine (CYMBALTA) capsule 30 mg  30 mg Oral DAILY    gabapentin (NEURONTIN) capsule 300 mg  300 mg Oral BID    mirtazapine (REMERON) tablet 15 mg  15 mg Oral QHS    tiotropium (SPIRIVA) inhalation capsule 18 mcg  18 mcg Inhalation DAILY    nitroglycerin (NITROSTAT) tablet 0.4 mg  0.4 mg SubLINGual PRN    ketotifen (ZADITOR) 0.025 % (0.035 %) ophthalmic solution 1 Drop  1 Drop Both Eyes BID    prednisoLONE acetate (PRED FORTE) 1 % ophthalmic suspension 1 Drop  1 Drop Both Eyes QID    dilTIAZem CD (CARDIZEM CD) capsule 360 mg  360 mg Oral DAILY    dextrose 40% (GLUTOSE) oral gel 1 Tube  15 g Oral PRN    glucagon (GLUCAGEN) injection 1 mg  1 mg IntraMUSCular PRN    dextrose (D50W) injection syrg 12.5-25 g  25-50 mL IntraVENous PRN    insulin lispro (HUMALOG) injection   SubCUTAneous AC&HS    sodium chloride (NS) flush 5-40 mL  5-40 mL IntraVENous Q8H    sodium chloride (NS) flush 5-40 mL  5-40 mL IntraVENous PRN    budesonide (PULMICORT) 500 mcg/2 ml nebulizer suspension  500 mcg Nebulization BID RT    methylPREDNISolone (PF) (Solu-MEDROL) injection 60 mg  60 mg IntraVENous Q12H    acetaminophen (TYLENOL) tablet 650 mg  650 mg Oral Q4H PRN    ondansetron (ZOFRAN) injection 4 mg  4 mg IntraVENous Q4H PRN    bisacodyl (DULCOLAX) tablet 5 mg  5 mg Oral DAILY PRN    enoxaparin (LOVENOX) injection 40 mg  40 mg SubCUTAneous Q24H    influenza vaccine 2019-20 (6 mos+)(PF) (FLUARIX/FLULAVAL/FLUZONE QUAD) injection 0.5 mL  0.5 mL IntraMUSCular PRIOR TO DISCHARGE         Objective:     Vitals:    09/19/19 2104 09/19/19 2130 09/20/19 0030 09/20/19 0354   BP: 139/66  136/82 144/77   Pulse: (!) 111  100 (!) 101   Resp: (!) 32  26 24   Temp: 97.9 °F (36.6 °C)  98.1 °F (36.7 °C) 98.8 °F (37.1 °C)   SpO2: 90% 94% 95%    Weight:       Height:           PHYSICAL EXAM     Constitutional:  the patient is well developed and in no acute distress  EENMT:  Sclera clear, pupils equal, oral mucosa moist  Respiratory: wheezing in bases and decreased in upper lobes  Cardiovascular:  RRR without M,G,R  Gastrointestinal: soft and non-tender; with positive bowel sounds. Musculoskeletal: warm without cyanosis. There is no lower extremity edema. Skin:  no jaundice or rashes, no wounds   Neurologic: no gross neuro deficits     Psychiatric:  alert and oriented x 3    CXR:        Recent Labs     09/20/19  0358 09/19/19  1814   WBC 11.1 12.7*   HGB 15.8* 15.9*   HCT 48.8* 48.5*    296     Recent Labs     09/20/19  0358 09/19/19  1814    136   K 4.1 3.6   * 103   * 171*   CO2 32 30   BUN 5* 5*   CREA 0.57* 0.56*   MG  --  2.1   CA 8.4 9.1   ALB  --  3.6   TBILI  --  0.3   ALT  --  32   SGOT  --  19     No results for input(s): PH, PCO2, PO2, HCO3, PHI, PCO2I, PO2I, HCO3I in the last 72 hours. No results for input(s): LCAD, LAC in the last 72 hours.     Assessment:  (Medical Decision Making)     Hospital Problems  Date Reviewed: 6/12/2019          Codes Class Noted POA    Acute tracheobronchitis ICD-10-CM: J20.9  ICD-9-CM: 466.0  9/19/2019 Yes    Coughing isn't that severe    * (Principal) COPD with acute exacerbation ([facility] ICD-10-CM: J44.1  ICD-9-CM: 491.21  9/19/2019 Yes    Severe, likely needs BiPAP    JACQUELYN (obstructive sleep apnea) (Chronic) ICD-10-CM: G47.33  ICD-9-CM: 327.23  7/26/2017 Yes    Overview Addendum 2/2/2015  9:38 AM by Marycarmen Encarnacion NP     Uses home CPAP with 6L bleed in O2         BiPAP    Tobacco use disorder (Chronic) ICD-10-CM: G60.141  ICD-9-CM: 305.1  7/26/2017 Yes        Diabetes mellitus (HCC) (Chronic) ICD-10-CM: E11.9  ICD-9-CM: 250.00  7/26/2017 Yes    Monitor on steroids    Nocturnal hypoxia (Chronic) ICD-10-CM: G47.34  ICD-9-CM: 327.24  7/25/2017 Yes        Essential hypertension with goal blood pressure less than 130/85 (Chronic) ICD-10-CM: I10  ICD-9-CM: 401.9  3/23/2017 Yes        Coronary artery disease (Chronic) ICD-10-CM: I25.10  ICD-9-CM: 414.00  3/3/2015 Yes        Debility (Chronic) ICD-10-CM: R53.81  ICD-9-CM: 799.3  6/5/2013 Yes        Fibromyalgia (Chronic) ICD-10-CM: M79.7  ICD-9-CM: 729.1  4/30/2013 Yes        Restless leg syndrome (Chronic) ICD-10-CM: G25.81  ICD-9-CM: 333.94  4/30/2013 Yes            Plan:  (Medical Decision Making)     --Recommend BiPAP since patient has high work of breathing and poor air movement even on her CPAP  --Add prn morphine for dyspnea  --Steroids increased slightly, bronchodilators increased in frequency. --Will plan to see tomorrow    More than 50% of the time documented was spent in face-to-face contact with the patient and in the care of the patient on the floor/unit where the patient is located. Thank you very much for this referral.  We appreciate the opportunity to participate in this patient's care. Will follow along with above stated plan.     Sun Jackosn MD

## 2019-09-20 NOTE — PROGRESS NOTES
Pt O2 sat 88% on 6L NC. Placed pt on high flow NC at 8L. Pt O2 sat up to 92%. Pt denies any SOB. RR in 25s. Pt eating comfortably.

## 2019-09-20 NOTE — PROGRESS NOTES
Received report from Lorena Paul in [place] consists of situation,background,assessment and recommendation. Opportunity was given for questions and clarification.

## 2019-09-20 NOTE — CDMP QUERY
Patient admitted with acute exacerbation of chronic obstructive pulmonary disease. Documentation reflects acute respiratory failure in note dated 9/19. If possible, please specify in the progress notes and d/c summary if acute respiratory failure was: 
 
? Ruled out after study ? Still Suspected after study ? Confirmed after study The medical record reflects the following: 
  Risk Factors: age, history of COPD, smoking, JACQUELYN, nocturnal O2 use, patient currently admitted to hospital with acute exacerbation of COPD Clinical Indicators: per H&P written on 9/19 @ 2100 by Dr. Carmelina Wyman \"Will admit for acute COPD exacerbation with tracheobronchitis and acute respiratory failure. \" Treatment: Supplemental O2 to maintain sats 91& or greater, Pulmonary consult, continue to monitor Thanks, 
BREEZY Kahn, RN, [facility] Compliant Documentation Management Program 
[phone]

## 2019-09-20 NOTE — PROGRESS NOTES
Pt transported to ICU with 100% NRB mask. Placed back on bipap at previous settings. Pt tolerated transport well.

## 2019-09-20 NOTE — PROGRESS NOTES
Lying quietly with eyes closed. RR even and unlabored. No apparent distress noted. CPAP in use. Call light within reach.

## 2019-09-20 NOTE — PROGRESS NOTES
Pt admitted to ICU from [facility]. Pt arrived via stretcher on 15L rebreather mask. Pt exhibited increased WOB and dyspnea. Pt able to transfer self from stretcher to bed. Placed on BiPap at 45%. Pt bathed and dressed in gown. Skin CDI with no notable abnormalities. Lung fields coarse with wheezing noted. S1S2 auscultated. Bowel sounds present in all quadrants. Pulses palpable in all extremities.

## 2019-09-20 NOTE — PROGRESS NOTES
TRANSFER - IN REPORT:    Verbal report received from Melvin Chase RN(name) on [facility]  being received from Ortho(unit) for change in patient condition(increased WOB requiring BiPap)      Report consisted of patients Situation, Background, Assessment and   Recommendations(SBAR). Information from the following report(s) SBAR, Kardex, Intake/Output, MAR, Recent Results and Cardiac Rhythm ST was reviewed with the receiving nurse. Opportunity for questions and clarification was provided. Assessment completed upon patients arrival to unit and care assumed.

## 2019-09-20 NOTE — PROGRESS NOTES
To room in wheelchair,transferred to bed without difficulty. Oriented to bed controls,room lights and call system. O2 at [facility]. Specimen cup placed at bedside for sputum collection. Lungs wheezy throughout,stridorous bases.

## 2019-09-20 NOTE — PROGRESS NOTES
Pt complaining of headache and anxiety about breathing on the BiPap. Administered morphine per PRN order.

## 2019-09-20 NOTE — PROGRESS NOTES
TRANSFER - OUT REPORT:    Verbal report given to Misti Pete RN(name) on [facility]  being transferred to ICU rm375(unit) for routine progression of care       Report consisted of patients Situation, Background, Assessment and   Recommendations(SBAR). Information from the following report(s) SBAR, Kardex, Procedure Summary, Intake/Output, MAR and Recent Results was reviewed with the receiving nurse. Lines:   Peripheral IV 09/19/19 Left Forearm (Active)   Site Assessment Clean, dry, & intact 9/20/2019  9:21 AM   Phlebitis Assessment 0 9/20/2019  9:21 AM   Infiltration Assessment 0 9/20/2019  9:21 AM   Dressing Status Clean, dry, & intact 9/20/2019  9:21 AM   Dressing Type Tape;Transparent 9/20/2019  9:21 AM   Hub Color/Line Status Capped;Flushed 9/20/2019  9:21 AM   Alcohol Cap Used No 9/19/2019  9:04 PM       Peripheral IV 09/19/19 Right Forearm (Active)   Site Assessment Clean, dry, & intact 9/20/2019  9:21 AM   Phlebitis Assessment 0 9/20/2019  9:21 AM   Infiltration Assessment 0 9/20/2019  9:21 AM   Dressing Status Clean, dry, & intact 9/20/2019  9:21 AM   Dressing Type Tape;Transparent 9/20/2019  9:21 AM   Hub Color/Line Status Capped 9/20/2019  9:21 AM   Alcohol Cap Used No 9/19/2019  9:04 PM        Opportunity for questions and clarification was provided.       Patient transported with bipap

## 2019-09-20 NOTE — ED NOTES
TRANSFER - OUT REPORT:    Verbal report given to SERGO Turner on Aaliyah Mayberry  being transferred to [facility] for routine progression of care       Report consisted of patients Situation, Background, Assessment and   Recommendations(SBAR). Information from the following report(s) SBAR was reviewed with the receiving nurse. Lines:   Peripheral IV 09/19/19 Left Forearm (Active)   Site Assessment Clean, dry, & intact 9/19/2019  7:03 PM   Phlebitis Assessment 0 9/19/2019  7:03 PM   Infiltration Assessment 0 9/19/2019  7:03 PM   Dressing Status Clean, dry, & intact 9/19/2019  7:03 PM   Dressing Type Transparent 9/19/2019  7:03 PM   Hub Color/Line Status Green 9/19/2019  7:03 PM       Peripheral IV 09/19/19 Right Forearm (Active)   Site Assessment Clean, dry, & intact 9/19/2019  7:11 PM   Phlebitis Assessment 0 9/19/2019  7:11 PM   Infiltration Assessment 0 9/19/2019  7:11 PM   Dressing Status Clean, dry, & intact 9/19/2019  7:11 PM   Dressing Type Transparent 9/19/2019  7:11 PM   Hub Color/Line Status Blue 9/19/2019  7:11 PM        Opportunity for questions and clarification was provided.       Patient transported with:   O2 @ 3 liters with RN

## 2019-09-20 NOTE — PROGRESS NOTES
Problem: Falls - Risk of  Goal: *Absence of Falls  Description  Document Clyde Guardado Fall Risk and appropriate interventions in the flowsheet.   Outcome: Progressing Towards Goal  Note:   Fall Risk Interventions:  Mobility Interventions: Assess mobility with egress test, Bed/chair exit alarm, Communicate number of staff needed for ambulation/transfer, Patient to call before getting OOB         Medication Interventions: Evaluate medications/consider consulting pharmacy, Patient to call before getting OOB, Teach patient to arise slowly    Elimination Interventions: Call light in reach, Patient to call for help with toileting needs              Problem: Patient Education: Go to Patient Education Activity  Goal: Patient/Family Education  Outcome: Progressing Towards Goal     Problem: Chronic Obstructive Pulmonary Disease (COPD)  Goal: *Oxygen saturation during activity within specified parameters  Outcome: Progressing Towards Goal  Goal: *Able to remain out of bed as prescribed  Outcome: Progressing Towards Goal  Goal: *Absence of hypoxia  Outcome: Progressing Towards Goal  Goal: *Optimize nutritional status  Outcome: Progressing Towards Goal     Problem: Patient Education: Go to Patient Education Activity  Goal: Patient/Family Education  Outcome: Progressing Towards Goal     Problem: Patient Education: Go to Patient Education Activity  Goal: Patient/Family Education  Outcome: Progressing Towards Goal

## 2019-09-20 NOTE — PROGRESS NOTES
Hospitalist Progress Note    2019  Admit Date: 2019  5:53 PM   NAME: Yesi Aguayo   :  1950   MRN:  [id number]   Attending: Aaron Ferrell MD  PCP:  Angelina Canada MD    SUBJECTIVE:   Uyen Romero is a 72 yo F admitted with COPD exacerbation. She wears oxygen at home prn at 3 LPM, but normally does not have to wear. She states this morning she feels slightly better overall, but still feels poorly and having a hard time getting air in. Still on nasal CPAP mask due to this. States she has been intubated in the past during COPD exacerbation. Review of Systems negative with exception of pertinent positives noted above  PHYSICAL EXAM     Visit Vitals  /77   Pulse (!) 101   Temp 98.8 °F (37.1 °C)   Resp 24   Ht 5' 4\" (1.626 m)   Wt 68.9 kg (152 lb)   SpO2 95%   Breastfeeding? No   BMI 26.09 kg/m²      Temp (24hrs), Av.5 °F (36.9 °C), Min:97.9 °F (36.6 °C), Max:99 °F (37.2 °C)    Patient Vitals for the past 24 hrs:   Temp Pulse Resp BP SpO2   19 0354 98.8 °F (37.1 °C) (!) 101 24 144/77    19 0030 98.1 °F (36.7 °C) 100 26 136/82 95 %   19     94 %   19 97.9 °F (36.6 °C) (!) 111 (!) 32 139/66 90 %   19  (!) 112 (!) 31 168/67 92 %   19  (!) 116 (!) 38  93 %   19 1835     92 %   19 1800 99 °F (37.2 °C) (!) 120 25 173/74 92 %       Oxygen Therapy  O2 Sat (%): 95 % (19)  Pulse via Oximetry: 102 beats per minute (19)  O2 Device: Nasal cannula (19)  O2 Flow Rate (L/min): 4 l/min (19 2130)  No intake or output data in the 24 hours ending 19 0844   General: No acute distress, CPAP mask in place   Lungs:  Diffuse wheezes.    Heart:  Regular rhythm, tachycardic,  No murmur, rub, or gallop  Abdomen: Soft, Non distended, Non tender, Positive bowel sounds  Extremities: No cyanosis, clubbing or edema  Neurologic:  No focal deficits    Recent Results (from the past 24 hour(s))   EKG, 12 LEAD, INITIAL    Collection Time: 09/19/19  6:00 PM   Result Value Ref Range    Ventricular Rate 122 BPM    Atrial Rate 122 BPM    P-R Interval 136 ms    QRS Duration 82 ms    Q-T Interval 310 ms    QTC Calculation (Bezet) 441 ms    Calculated P Axis 87 degrees    Calculated R Axis 90 degrees    Calculated T Axis 83 degrees    Diagnosis       Sinus tachycardia  Biatrial enlargement  Rightward axis  Pulmonary disease pattern  RSR' or QR pattern in V1 suggests right ventricular conduction delay  Septal infarct , age undetermined  Abnormal ECG  When compared with ECG of 17-NOV-2014 22:11,  Vent. rate has increased BY  46 BPM  RSR' pattern in V1 is now Present  Septal infarct is now Present  Confirmed by [facility] TOSHIA & JAMES [facility]  MD (), Malachi HUSTON ([id number]) on 9/20/2019 6:28:09 AM     CBC WITH AUTOMATED DIFF    Collection Time: 09/19/19  6:14 PM   Result Value Ref Range    WBC 12.7 (H) 4.3 - 11.1 K/uL    RBC 5.19 4.05 - 5.2 M/uL    HGB 15.9 (H) 11.7 - 15.4 g/dL    HCT 48.5 (H) 35.8 - 46.3 %    MCV 93.4 79.6 - 97.8 FL    MCH 30.6 26.1 - 32.9 PG    MCHC 32.8 31.4 - 35.0 g/dL    RDW 15.6 (H) 11.9 - 14.6 %    PLATELET 002 225 - 480 K/uL    MPV 9.4 9.4 - 12.3 FL    ABSOLUTE NRBC 0.00 0.0 - 0.2 K/uL    NEUTROPHILS 92 (H) 47 - 75 %    LYMPHOCYTES 6 (L) 16 - 44 %    MONOCYTES 2 (L) 3 - 9 %    ABS. NEUTROPHILS 11.6 (H) 1.7 - 8.2 K/UL    ABS. LYMPHOCYTES 0.8 0.5 - 4.6 K/UL    ABS.  MONOCYTES 0.3 0.1 - 1.3 K/UL    RBC COMMENTS NORMOCYTIC/NORMOCHROMIC      PLATELET COMMENTS ADEQUATE      DF MANUAL     METABOLIC PANEL, COMPREHENSIVE    Collection Time: 09/19/19  6:14 PM   Result Value Ref Range    Sodium 136 136 - 145 mmol/L    Potassium 3.6 3.5 - 5.1 mmol/L    Chloride 103 98 - 107 mmol/L    CO2 30 21 - 32 mmol/L    Anion gap 3 (L) 7 - 16 mmol/L    Glucose 171 (H) 65 - 100 mg/dL    BUN 5 (L) 8 - 23 MG/DL    Creatinine 0.56 (L) 0.6 - 1.0 MG/DL    GFR est AA >60 >60 ml/min/1.73m2    GFR est non-AA >60 >60 ml/min/1.73m2    Calcium 9.1 8.3 - 10.4 MG/DL    Bilirubin, total 0.3 0.2 - 1.1 MG/DL    ALT (SGPT) 32 12 - 65 U/L    AST (SGOT) 19 15 - 37 U/L    Alk. phosphatase 130 50 - 136 U/L    Protein, total 7.4 6.3 - 8.2 g/dL    Albumin 3.6 3.2 - 4.6 g/dL    Globulin 3.8 (H) 2.3 - 3.5 g/dL    A-G Ratio 0.9 (L) 1.2 - 3.5     INFLUENZA A & B AG (RAPID TEST)    Collection Time: 09/19/19  6:14 PM   Result Value Ref Range    Influenza A Ag NEGATIVE  NEG      Influenza B Ag NEGATIVE  NEG      Source NASOPHARYNGEAL     MAGNESIUM    Collection Time: 09/19/19  6:14 PM   Result Value Ref Range    Magnesium 2.1 1.8 - 2.4 mg/dL   POC LACTIC ACID    Collection Time: 09/19/19  6:20 PM   Result Value Ref Range    Lactic Acid (POC) 1.82 0.5 - 1.9 mmol/L   GLUCOSE, POC    Collection Time: 09/19/19  9:46 PM   Result Value Ref Range    Glucose (POC) 272 (H) 65 - 671 mg/dL   METABOLIC PANEL, BASIC    Collection Time: 09/20/19  3:58 AM   Result Value Ref Range    Sodium 142 136 - 145 mmol/L    Potassium 4.1 3.5 - 5.1 mmol/L    Chloride 108 (H) 98 - 107 mmol/L    CO2 32 21 - 32 mmol/L    Anion gap 2 (L) 7 - 16 mmol/L    Glucose 150 (H) 65 - 100 mg/dL    BUN 5 (L) 8 - 23 MG/DL    Creatinine 0.57 (L) 0.6 - 1.0 MG/DL    GFR est AA >60 >60 ml/min/1.73m2    GFR est non-AA >60 >60 ml/min/1.73m2    Calcium 8.4 8.3 - 10.4 MG/DL   CBC W/O DIFF    Collection Time: 09/20/19  3:58 AM   Result Value Ref Range    WBC 11.1 4.3 - 11.1 K/uL    RBC 5.18 4.05 - 5.2 M/uL    HGB 15.8 (H) 11.7 - 15.4 g/dL    HCT 48.8 (H) 35.8 - 46.3 %    MCV 94.2 79.6 - 97.8 FL    MCH 30.5 26.1 - 32.9 PG    MCHC 32.4 31.4 - 35.0 g/dL    RDW 15.7 (H) 11.9 - 14.6 %    PLATELET 068 231 - 849 K/uL    MPV 9.4 9.4 - 12.3 FL    ABSOLUTE NRBC 0.00 0.0 - 0.2 K/uL     Imaging:    XR CHEST PORT   Final Result   IMPRESSION:    1.   No acute cardiopulmonary process evident on single frontal view of the   chest.                     ASSESSMENT      Hospital Problems as of 9/20/2019 Date Reviewed: 6/12/2019          Codes Class Noted - Resolved POA    Acute tracheobronchitis ICD-10-CM: J20.9  ICD-9-CM: 466.0  9/19/2019 - Present Yes        * (Principal) COPD with acute exacerbation ([address] ICD-10-CM: J44.1  ICD-9-CM: 491.21  9/19/2019 - Present Yes        JACQUELYN (obstructive sleep apnea) (Chronic) ICD-10-CM: G47.33  ICD-9-CM: 327.23  7/26/2017 - Present Yes    Overview Addendum 2/2/2015  9:38 AM by Rosanna Morrison, NP     Uses home CPAP with 6L bleed in O2             Tobacco use disorder (Chronic) ICD-10-CM: B03.837  ICD-9-CM: 305.1  7/26/2017 - Present Yes        Diabetes mellitus (HCC) (Chronic) ICD-10-CM: E11.9  ICD-9-CM: 250.00  7/26/2017 - Present Yes        Nocturnal hypoxia (Chronic) ICD-10-CM: G47.34  ICD-9-CM: 327.24  7/25/2017 - Present Yes        Essential hypertension with goal blood pressure less than 130/85 (Chronic) ICD-10-CM: I10  ICD-9-CM: 401.9  3/23/2017 - Present Yes        Coronary artery disease (Chronic) ICD-10-CM: I25.10  ICD-9-CM: 414.00  3/3/2015 - Present Yes        Debility (Chronic) ICD-10-CM: R53.81  ICD-9-CM: 799.3  6/5/2013 - Present Yes        Fibromyalgia (Chronic) ICD-10-CM: M79.7  ICD-9-CM: 729.1  4/30/2013 - Present Yes        Restless leg syndrome (Chronic) ICD-10-CM: G25.81  ICD-9-CM: 333.94  4/30/2013 - Present Yes            Plan:  · COPD exacerbation  · Continue levaquin  · Start scheduled mucinex and stop robitussin  · Prn tessalon  · IV solumedrol  · Nebs  · Will ask pulmonology to evaluate as she was intubated last admission and she feels she is having a hard time breathe  · HTN  · Continue present treatment  · JACQUELYN  · CPAP    Dispo- home when improved    DVT Prophylaxis: Lovenox    Signed By: Marine Floyd MD     September 20, 2019

## 2019-09-21 LAB
APPEARANCE UR: CLEAR
ARTERIAL PATENCY WRIST A: YES
BASE EXCESS BLD CALC-SCNC: 7 MMOL/L
BDY SITE: ABNORMAL
BILIRUB UR QL: NEGATIVE
BODY TEMPERATURE: 98.6
CO2 BLD-SCNC: 37 MMOL/L
COLLECT TIME,HTIME: 745
COLOR UR: YELLOW
FLOW RATE ISTAT,IFRATE: 8 L/MIN
GAS FLOW.O2 O2 DELIVERY SYS: ABNORMAL L/MIN
GLUCOSE BLD STRIP.AUTO-MCNC: 129 MG/DL (ref 65–100)
GLUCOSE BLD STRIP.AUTO-MCNC: 156 MG/DL (ref 65–100)
GLUCOSE BLD STRIP.AUTO-MCNC: 220 MG/DL (ref 65–100)
GLUCOSE BLD STRIP.AUTO-MCNC: 263 MG/DL (ref 65–100)
GLUCOSE UR STRIP.AUTO-MCNC: NEGATIVE MG/DL
HCO3 BLD-SCNC: 35.3 MMOL/L (ref 22–26)
HGB UR QL STRIP: NEGATIVE
KETONES UR QL STRIP.AUTO: NEGATIVE MG/DL
LEUKOCYTE ESTERASE UR QL STRIP.AUTO: NEGATIVE
NITRITE UR QL STRIP.AUTO: NEGATIVE
PCO2 BLD: 64.8 MMHG (ref 35–45)
PH BLD: 7.34 [PH] (ref 7.35–7.45)
PH UR STRIP: 6 [PH] (ref 5–9)
PO2 BLD: 104 MMHG (ref 75–100)
PROT UR STRIP-MCNC: NEGATIVE MG/DL
SAO2 % BLD: 97 % (ref 95–98)
SERVICE CMNT-IMP: ABNORMAL
SERVICE CMNT-IMP: ABNORMAL
SP GR UR REFRACTOMETRY: 1.01 (ref 1–1.02)
SPECIMEN TYPE: ABNORMAL
UROBILINOGEN UR QL STRIP.AUTO: 0.2 EU/DL (ref 0.2–1)

## 2019-09-21 PROCEDURE — 94640 AIRWAY INHALATION TREATMENT: CPT

## 2019-09-21 PROCEDURE — 99233 SBSQ HOSP IP/OBS HIGH 50: CPT | Performed by: INTERNAL MEDICINE

## 2019-09-21 PROCEDURE — 77010033711 HC HIGH FLOW OXYGEN

## 2019-09-21 PROCEDURE — 74011000250 HC RX REV CODE- 250: Performed by: HOSPITALIST

## 2019-09-21 PROCEDURE — 36600 WITHDRAWAL OF ARTERIAL BLOOD: CPT

## 2019-09-21 PROCEDURE — 65610000001 HC ROOM ICU GENERAL

## 2019-09-21 PROCEDURE — 74011250636 HC RX REV CODE- 250/636: Performed by: INTERNAL MEDICINE

## 2019-09-21 PROCEDURE — 81003 URINALYSIS AUTO W/O SCOPE: CPT

## 2019-09-21 PROCEDURE — 82803 BLOOD GASES ANY COMBINATION: CPT

## 2019-09-21 PROCEDURE — 74011250637 HC RX REV CODE- 250/637: Performed by: HOSPITALIST

## 2019-09-21 PROCEDURE — 74011000250 HC RX REV CODE- 250: Performed by: INTERNAL MEDICINE

## 2019-09-21 PROCEDURE — 82962 GLUCOSE BLOOD TEST: CPT

## 2019-09-21 PROCEDURE — 74011636637 HC RX REV CODE- 636/637: Performed by: HOSPITALIST

## 2019-09-21 PROCEDURE — 74011250637 HC RX REV CODE- 250/637: Performed by: INTERNAL MEDICINE

## 2019-09-21 PROCEDURE — 74011250636 HC RX REV CODE- 250/636: Performed by: HOSPITALIST

## 2019-09-21 RX ORDER — PANTOPRAZOLE SODIUM 40 MG/1
40 TABLET, DELAYED RELEASE ORAL
Status: DISCONTINUED | OUTPATIENT
Start: 2019-09-21 | End: 2019-09-25 | Stop reason: HOSPADM

## 2019-09-21 RX ADMIN — PREDNISOLONE ACETATE 1 DROP: 10 SUSPENSION/ DROPS OPHTHALMIC at 16:28

## 2019-09-21 RX ADMIN — GUAIFENESIN 1200 MG: 600 TABLET ORAL at 08:08

## 2019-09-21 RX ADMIN — DILTIAZEM HYDROCHLORIDE 360 MG: 180 CAPSULE, COATED, EXTENDED RELEASE ORAL at 08:08

## 2019-09-21 RX ADMIN — LEVOFLOXACIN 750 MG: 500 TABLET, FILM COATED ORAL at 21:26

## 2019-09-21 RX ADMIN — INSULIN LISPRO 2 UNITS: 100 INJECTION, SOLUTION INTRAVENOUS; SUBCUTANEOUS at 08:18

## 2019-09-21 RX ADMIN — PANTOPRAZOLE SODIUM 40 MG: 40 TABLET, DELAYED RELEASE ORAL at 08:08

## 2019-09-21 RX ADMIN — METHYLPREDNISOLONE SODIUM SUCCINATE 60 MG: 125 INJECTION, POWDER, FOR SOLUTION INTRAMUSCULAR; INTRAVENOUS at 21:29

## 2019-09-21 RX ADMIN — BUDESONIDE 500 MCG: 0.5 INHALANT RESPIRATORY (INHALATION) at 19:11

## 2019-09-21 RX ADMIN — PREDNISOLONE ACETATE 1 DROP: 10 SUSPENSION/ DROPS OPHTHALMIC at 12:12

## 2019-09-21 RX ADMIN — ENOXAPARIN SODIUM 40 MG: 40 INJECTION SUBCUTANEOUS at 21:28

## 2019-09-21 RX ADMIN — GABAPENTIN 300 MG: 300 CAPSULE ORAL at 08:08

## 2019-09-21 RX ADMIN — DULOXETINE HYDROCHLORIDE 30 MG: 30 CAPSULE, DELAYED RELEASE ORAL at 08:08

## 2019-09-21 RX ADMIN — IPRATROPIUM BROMIDE AND ALBUTEROL SULFATE 3 ML: .5; 3 SOLUTION RESPIRATORY (INHALATION) at 15:14

## 2019-09-21 RX ADMIN — Medication 1 AMPULE: at 21:28

## 2019-09-21 RX ADMIN — METHYLPREDNISOLONE SODIUM SUCCINATE 60 MG: 125 INJECTION, POWDER, FOR SOLUTION INTRAMUSCULAR; INTRAVENOUS at 13:17

## 2019-09-21 RX ADMIN — MORPHINE SULFATE 2 MG: 2 INJECTION, SOLUTION INTRAMUSCULAR; INTRAVENOUS at 08:09

## 2019-09-21 RX ADMIN — PREDNISOLONE ACETATE 1 DROP: 10 SUSPENSION/ DROPS OPHTHALMIC at 21:29

## 2019-09-21 RX ADMIN — GUAIFENESIN 1200 MG: 600 TABLET ORAL at 21:26

## 2019-09-21 RX ADMIN — IPRATROPIUM BROMIDE AND ALBUTEROL SULFATE 3 ML: .5; 3 SOLUTION RESPIRATORY (INHALATION) at 03:37

## 2019-09-21 RX ADMIN — INSULIN LISPRO 4 UNITS: 100 INJECTION, SOLUTION INTRAVENOUS; SUBCUTANEOUS at 21:41

## 2019-09-21 RX ADMIN — IPRATROPIUM BROMIDE AND ALBUTEROL SULFATE 3 ML: .5; 3 SOLUTION RESPIRATORY (INHALATION) at 07:23

## 2019-09-21 RX ADMIN — ASPIRIN 81 MG: 81 TABLET, COATED ORAL at 08:08

## 2019-09-21 RX ADMIN — GABAPENTIN 300 MG: 300 CAPSULE ORAL at 21:27

## 2019-09-21 RX ADMIN — MORPHINE SULFATE 2 MG: 2 INJECTION, SOLUTION INTRAMUSCULAR; INTRAVENOUS at 21:30

## 2019-09-21 RX ADMIN — METHYLPREDNISOLONE SODIUM SUCCINATE 60 MG: 125 INJECTION, POWDER, FOR SOLUTION INTRAMUSCULAR; INTRAVENOUS at 05:42

## 2019-09-21 RX ADMIN — IPRATROPIUM BROMIDE AND ALBUTEROL SULFATE 3 ML: .5; 3 SOLUTION RESPIRATORY (INHALATION) at 19:11

## 2019-09-21 RX ADMIN — PREDNISOLONE ACETATE 1 DROP: 10 SUSPENSION/ DROPS OPHTHALMIC at 08:09

## 2019-09-21 RX ADMIN — IPRATROPIUM BROMIDE AND ALBUTEROL SULFATE 3 ML: .5; 3 SOLUTION RESPIRATORY (INHALATION) at 12:06

## 2019-09-21 RX ADMIN — BUDESONIDE 500 MCG: 0.5 INHALANT RESPIRATORY (INHALATION) at 07:23

## 2019-09-21 RX ADMIN — KETOTIFEN FUMARATE 1 DROP: 0.35 SOLUTION/ DROPS OPHTHALMIC at 16:28

## 2019-09-21 RX ADMIN — MORPHINE SULFATE 2 MG: 2 INJECTION, SOLUTION INTRAMUSCULAR; INTRAVENOUS at 13:17

## 2019-09-21 RX ADMIN — INSULIN LISPRO 6 UNITS: 100 INJECTION, SOLUTION INTRAVENOUS; SUBCUTANEOUS at 12:12

## 2019-09-21 RX ADMIN — Medication 10 ML: at 21:29

## 2019-09-21 RX ADMIN — MIRTAZAPINE 15 MG: 15 TABLET, FILM COATED ORAL at 21:27

## 2019-09-21 RX ADMIN — KETOTIFEN FUMARATE 1 DROP: 0.35 SOLUTION/ DROPS OPHTHALMIC at 08:09

## 2019-09-21 RX ADMIN — Medication 10 ML: at 13:17

## 2019-09-21 RX ADMIN — Medication 10 ML: at 05:42

## 2019-09-21 RX ADMIN — Medication 1 AMPULE: at 08:09

## 2019-09-21 NOTE — PROGRESS NOTES
ICU Pulmonary Daily Progress NOTE    Shruti Alvarez    9/21/2019     Shruti Alvarez is a 72yoF who is followed at [address] for GOLD stage 2 COPD (1.3L; 57% predicted) with nocturnal hypoxemia with 45pkyr smoking history and active 1ppd smoking. PMH is also notable for JACQUELYN. , mild pulmonary hypertension with RVSP 30mmHg in 2017. She has required intubation in the past for severe exacerbation. She was admitted to [facility] on 9/19 with about 5-6 days of coughing, dyspnea and wheezing. In the ER she was treated with duonebs and solumedrol but hypoxemia and severe wheezing persisted. This morning she has polycythemia and mild hyperglycemia. This morning she is wearing her CPAP mask and feels she still cannot catch her breath. Her RR is in the high 30s currently. She is not lethargic or sleepy. No chest pain. Date of Admission:  9/19/2019    The patient's chart is reviewed and the patient is discussed with the staff. HPI:     Patient today is feels less dyspnea. Still on BIPAP at 12/6 and 45%. RR in 20's. Having some gerd.  sTill wheezing but less    Review of Systems:  -Fever  -Headaches  -Chest pain  +Dyspnea, +wheezing, +cough  -Abdominal pain, -constipation +gerd  -Leg swelling  All other organ systems grossly normal.        Current Facility-Administered Medications   Medication Dose Route Frequency    guaiFENesin ER (MUCINEX) tablet 1,200 mg  1,200 mg Oral Q12H    benzonatate (TESSALON) capsule 100 mg  100 mg Oral TID PRN    morphine injection 2 mg  2 mg IntraVENous Q4H PRN    methylPREDNISolone (PF) (Solu-MEDROL) injection 60 mg  60 mg IntraVENous Q8H    albuterol-ipratropium (DUO-NEB) 2.5 MG-0.5 MG/3 ML  3 mL Nebulization Q4H RT    alcohol 62% (NOZIN) nasal  1 Ampule  1 Ampule Topical Q12H    albuterol-ipratropium (DUO-NEB) 2.5 MG-0.5 MG/3 ML  3 mL Nebulization Q4H PRN    levoFLOXacin (LEVAQUIN) tablet 750 mg  750 mg Oral Q24H    aspirin delayed-release tablet 81 mg  81 mg Oral DAILY    DULoxetine (CYMBALTA) capsule 30 mg  30 mg Oral DAILY    gabapentin (NEURONTIN) capsule 300 mg  300 mg Oral BID    mirtazapine (REMERON) tablet 15 mg  15 mg Oral QHS    nitroglycerin (NITROSTAT) tablet 0.4 mg  0.4 mg SubLINGual PRN    ketotifen (ZADITOR) 0.025 % (0.035 %) ophthalmic solution 1 Drop  1 Drop Both Eyes BID    prednisoLONE acetate (PRED FORTE) 1 % ophthalmic suspension 1 Drop  1 Drop Both Eyes QID    dilTIAZem CD (CARDIZEM CD) capsule 360 mg  360 mg Oral DAILY    dextrose 40% (GLUTOSE) oral gel 1 Tube  15 g Oral PRN    glucagon (GLUCAGEN) injection 1 mg  1 mg IntraMUSCular PRN    dextrose (D50W) injection syrg 12.5-25 g  25-50 mL IntraVENous PRN    insulin lispro (HUMALOG) injection   SubCUTAneous AC&HS    sodium chloride (NS) flush 5-40 mL  5-40 mL IntraVENous Q8H    sodium chloride (NS) flush 5-40 mL  5-40 mL IntraVENous PRN    budesonide (PULMICORT) 500 mcg/2 ml nebulizer suspension  500 mcg Nebulization BID RT    acetaminophen (TYLENOL) tablet 650 mg  650 mg Oral Q4H PRN    ondansetron (ZOFRAN) injection 4 mg  4 mg IntraVENous Q4H PRN    bisacodyl (DULCOLAX) tablet 5 mg  5 mg Oral DAILY PRN    enoxaparin (LOVENOX) injection 40 mg  40 mg SubCUTAneous Q24H    influenza vaccine 2019-20 (6 mos+)(PF) (FLUARIX/FLULAVAL/FLUZONE QUAD) injection 0.5 mL  0.5 mL IntraMUSCular PRIOR TO DISCHARGE         Objective:     Vitals:    09/21/19 0337 09/21/19 0403 09/21/19 0405 09/21/19 0512   BP:  136/67  156/87   Pulse:  85  (!) 106   Resp:  15  (!) 36   Temp:       SpO2: 94% 95%  90%   Weight:   160 lb (72.6 kg)    Height:           PHYSICAL EXAM     Constitutional:  the patient is well developed and in no acute distress on BiPAP and comfortable on 45% and 12/6   EENMT:  Sclera clear, pupils equal, oral mucosa moist  Respiratory: mild expiratory wheezing in bases and decreased in upper lobes  Cardiovascular:  RRR without M,G,R  Gastrointestinal: soft and non-tender; with positive bowel sounds. Musculoskeletal: warm without cyanosis. There is no lower extremity edema. Skin:  no jaundice or rashes, no wounds   Neurologic: no gross neuro deficits     Psychiatric:  alert and oriented x 3    CXR:        Recent Labs     09/20/19  0358 09/19/19  1814   WBC 11.1 12.7*   HGB 15.8* 15.9*   HCT 48.8* 48.5*    296     Recent Labs     09/20/19  0358 09/19/19  1814    136   K 4.1 3.6   * 103   * 171*   CO2 32 30   BUN 5* 5*   CREA 0.57* 0.56*   MG  --  2.1   CA 8.4 9.1   ALB  --  3.6   TBILI  --  0.3   ALT  --  32   SGOT  --  19     No results for input(s): PH, PCO2, PO2, HCO3, PHI, PCO2I, PO2I, HCO3I in the last 72 hours. No results for input(s): LCAD, LAC in the last 72 hours. No results for input(s): PH, PCO2, PO2, HCO3, PHI, PCO2I, PO2I, HCO3I in the last 72 hours. No results for input(s): LCAD, LAC in the last 72 hours.       Assessment:  (Medical Decision Making)     Hospital Problems  Date Reviewed: 6/12/2019          Codes Class Noted POA    Acute tracheobronchitis ICD-10-CM: J20.9  ICD-9-CM: 466.0  9/19/2019 Yes         * (Principal) [facility] ICD-10-CM: J44.1  ICD-9-CM: 491.21  9/19/2019 Yes    On BIPAP    JACQUELYN (obstructive sleep apnea) (Chronic) ICD-10-CM: G47.33  ICD-9-CM: 327.23  7/26/2017 Yes    Overview Addendum 2/2/2015  9:38 AM by [place] NP     Uses home CPAP with 6L bleed in O2         BiPAP    Tobacco use disorder (Chronic) ICD-10-CM: F03.095  ICD-9-CM: 305.1  7/26/2017 Yes        Diabetes mellitus (HCC) (Chronic) ICD-10-CM: E11.9  ICD-9-CM: 250.00  7/26/2017 Yes    Monitor on steroids    Nocturnal hypoxia (Chronic) ICD-10-CM: G47.34  ICD-9-CM: 327.24  7/25/2017 Yes        Essential hypertension with goal blood pressure less than 130/85 (Chronic) ICD-10-CM: I10  ICD-9-CM: 401.9  3/23/2017 Yes        Coronary artery disease (Chronic) ICD-10-CM: I25.10  ICD-9-CM: 414.00  3/3/2015 Yes        Debility (Chronic) ICD-10-CM: R53.81  ICD-9-CM: 799.3  6/5/2013 Yes        Fibromyalgia (Chronic) ICD-10-CM: M79.7  ICD-9-CM: 729.1  4/30/2013 Yes        Restless leg syndrome (Chronic) ICD-10-CM: G73.53  ICD-9-CM: 333.94  4/30/2013 Yes          Elderly female with COPD/smoker with COPD exacerbation on BIPAP  Plan:  (Medical Decision Making)     --on BIPAP and change to optiflow to see if able to tolerate. Will recommend BIPAP again tonight  --taper FIO2 as tolerated  --continue high dose steroids today and taper tomorrow  --continue nebs  --continue morphine prn  --get baseline ABG reported was not able to get it yesterday  --continue remaining treatment. Will call tomorrow if need to see patient tomorrow, otherwise will seen on Monday. More than 50% of the time documented was spent in face-to-face contact with the patient and in the care of the patient on the floor/unit where the patient is located.        Edelmira Harkins MD

## 2019-09-21 NOTE — PROGRESS NOTES
ABG results shared with Dr Leslie Park. ABG was drawn with patient on 8 LPM HFNC.   Placed patient on airvo 40/45% after treatment

## 2019-09-21 NOTE — PROGRESS NOTES
Patient alert and oriented. Follow commands. Denies pain. Tachypnea. Head of bed elevated. Patient on 8 liters high flow nasal cannula. Oxygen saturation 93 %. . Sinus rhythm on cardiac monitor. Heart rate 94. Instructed patient to call for any needs. Verbalize an understanding. Bed in low/lock position. Bed alarm on. Call light within reach.

## 2019-09-21 NOTE — PROGRESS NOTES
Hospitalist Progress Note    2019  Admit Date: 2019  5:53 PM   NAME: Jose Rafael Shelby   :  1950   MRN:  [id number]   Attending: Bridgette Vogel MD  PCP:  Polly Godfrey MD    SUBJECTIVE:   Doron Ozuna is a 72 yo F admitted with COPD exacerbation. She wears oxygen at home prn at 3 LPM, but normally does not have to wear. She states this morning she feels slightly better overall, but still feels poorly and having a hard time getting air in. Still on nasal CPAP mask due to this. States she has been intubated in the past during COPD exacerbation. - reports breathing some better today. Was on BiPAP through the night and off presently. Still not feeling well and breathing still not near baseline. Review of Systems negative with exception of pertinent positives noted above  PHYSICAL EXAM     Visit Vitals  /72   Pulse (!) 101   Temp 98.4 °F (36.9 °C)   Resp 29   Ht 5' 4\" (1.626 m)   Wt 72.6 kg (160 lb)   SpO2 92%   Breastfeeding?  No   BMI 27.46 kg/m²      Temp (24hrs), Av.1 °F (36.7 °C), Min:96.6 °F (35.9 °C), Max:98.9 °F (37.2 °C)    Patient Vitals for the past 24 hrs:   Temp Pulse Resp BP SpO2   19 0903  (!) 101 29 145/72 92 %   19 0803  96 18 157/73 91 %   19 0731     94 %   19 0721 98.4 °F (36.9 °C)       19 0720     94 %   19 0703 98.4 °F (36.9 °C) 89 17 161/77 94 %   19 0603  (!) 103 22 161/76 95 %   19 0512  (!) 106 (!) 36 156/87 90 %   19 0403  85 15 136/67 95 %   19 0337     94 %   19 0303 97.3 °F (36.3 °C) 86 14 117/56 94 %   19 0203  83 16 123/62 95 %   19 0103  79 15 129/75 95 %   19 0003  86 20 143/63 94 %   19 2314     95 %   19 2303 96.6 °F (35.9 °C) 91 25 154/67 95 %   19 2203  93  147/77 94 %   19 2134     92 %   19 2103  93 13 144/72 96 %   196  84 30 125/54 95 %   19  92 28 139/73 91 % 09/20/19 1930 98.9 °F (37.2 °C) 94 (!) 33 145/72 94 %   09/20/19 1920     93 %   09/20/19 1900  95 19 139/63 93 %   09/20/19 1830  94 19 135/63 93 %   09/20/19 1700  86 16 147/69 94 %   09/20/19 1641 98.4 °F (36.9 °C)       09/20/19 1630 98.4 °F (36.9 °C) (!) 107 (!) 33 156/68 93 %   09/20/19 1600  96 17 139/61 94 %   09/20/19 1542     93 %   09/20/19 1530  100 (!) 33 153/63 93 %   09/20/19 1500  100 25 152/61 92 %   09/20/19 1430  97 21 155/69 90 %   09/20/19 1400  (!) 101 17 156/75 93 %   09/20/19 1330  (!) 101 (!) 99 147/64 94 %   09/20/19 1321   30     09/20/19 1320  96  149/66 93 %   09/20/19 1205 98.6 °F (37 °C) (!) 113 24 150/70 95 %   09/20/19 0932     95 %       Oxygen Therapy  O2 Sat (%): 92 % (09/21/19 0903)  Pulse via Oximetry: 106 beats per minute (09/21/19 0903)  O2 Device: Hi flow nasal cannula;Humidifier (09/21/19 0720)  O2 Flow Rate (L/min): 7 l/min (09/21/19 0720)  FIO2 (%): 45 % (09/21/19 0337)    Intake/Output Summary (Last 24 hours) at 9/21/2019 0927  Last data filed at 9/21/2019 0512  Gross per 24 hour   Intake 940 ml   Output 400 ml   Net 540 ml      General: No acute distress  Lungs:  Less wheezing, slight use of neck accessory muscles.    Heart:  Regular rhythm, tachycardic,  No murmur, rub, or gallop  Abdomen: Soft, Non distended, Non tender, Positive bowel sounds  Extremities: No cyanosis, clubbing or edema  Neurologic:  No focal deficits    Recent Results (from the past 24 hour(s))   GLUCOSE, POC    Collection Time: 09/20/19 11:52 AM   Result Value Ref Range    Glucose (POC) 141 (H) 65 - 100 mg/dL   GLUCOSE, POC    Collection Time: 09/20/19  4:35 PM   Result Value Ref Range    Glucose (POC) 264 (H) 65 - 100 mg/dL   GLUCOSE, POC    Collection Time: 09/20/19  9:17 PM   Result Value Ref Range    Glucose (POC) 141 (H) 65 - 100 mg/dL   URINALYSIS W/ RFLX MICROSCOPIC    Collection Time: 09/21/19  5:10 AM   Result Value Ref Range    Color YELLOW      Appearance CLEAR      Specific gravity 1.014 1.001 - 1.023      pH (UA) 6.0 5.0 - 9.0      Protein NEGATIVE  NEG mg/dL    Glucose NEGATIVE  mg/dL    Ketone NEGATIVE  NEG mg/dL    Bilirubin NEGATIVE  NEG      Blood NEGATIVE  NEG      Urobilinogen 0.2 0.2 - 1.0 EU/dL    Nitrites NEGATIVE  NEG      Leukocyte Esterase NEGATIVE  NEG     POC G3    Collection Time: 09/21/19  7:47 AM   Result Value Ref Range    Device: High Flow Nasal Cannula      pH (POC) 7.344 (L) 7.35 - 7.45      pCO2 (POC) 64.8 (HH) 35 - 45 MMHG    pO2 (POC) 104 (H) 75 - 100 MMHG    HCO3 (POC) 35.3 (H) 22 - 26 MMOL/L    sO2 (POC) 97 95 - 98 %    Base excess (POC) 7 mmol/L    Allens test (POC) YES      Site RIGHT RADIAL      Patient temp. 98.6      Specimen type (POC) ARTERIAL      Performed by [email or web address]     CO2, POC 37 MMOL/L    Flow rate (POC) 8.000 L/min    Critical value read back 00:01     COLLECT TIME 745     GLUCOSE, POC    Collection Time: 09/21/19  7:49 AM   Result Value Ref Range    Glucose (POC) 156 (H) 65 - 100 mg/dL     Imaging:    XR CHEST PORT   Final Result   IMPRESSION:    1.   No acute cardiopulmonary process evident on single frontal view of the   chest.                     ASSESSMENT      Hospital Problems as of 9/21/2019 Date Reviewed: 6/12/2019          Codes Class Noted - Resolved POA    Acute tracheobronchitis ICD-10-CM: J20.9  ICD-9-CM: 466.0  9/19/2019 - Present Yes        * (Principal) [facility] ICD-10-CM: J44.1  ICD-9-CM: 491.21  9/19/2019 - Present Yes        JACQUELYN (obstructive sleep apnea) (Chronic) ICD-10-CM: G47.33  ICD-9-CM: 327.23  7/26/2017 - Present Yes    Overview Addendum 2/2/2015  9:38 AM by Cassia Smith NP     Uses home CPAP with 6L bleed in O2             Tobacco use disorder (Chronic) ICD-10-CM: H18.464  ICD-9-CM: 305.1  7/26/2017 - Present Yes        Diabetes mellitus ([address] (Chronic) ICD-10-CM: E11.9  ICD-9-CM: 250.00  7/26/2017 - Present Yes        Nocturnal hypoxia (Chronic) ICD-10-CM: G47.34  ICD-9-CM: 327.24  7/25/2017 - Present Yes        Essential hypertension with goal blood pressure less than 130/85 (Chronic) ICD-10-CM: I10  ICD-9-CM: 401.9  3/23/2017 - Present Yes        Coronary artery disease (Chronic) ICD-10-CM: I25.10  ICD-9-CM: 414.00  3/3/2015 - Present Yes        Debility (Chronic) ICD-10-CM: R53.81  ICD-9-CM: 799.3  6/5/2013 - Present Yes        Fibromyalgia (Chronic) ICD-10-CM: M79.7  ICD-9-CM: 729.1  4/30/2013 - Present Yes        Restless leg syndrome (Chronic) ICD-10-CM: G25.81  ICD-9-CM: 333.94  4/30/2013 - Present Yes            Plan:  · COPD exacerbation  · Continue levaquin  · Continue mucinex  · Prn tessalon  · IV solumedrol- continue per pulm  · Nebs  · BiPAP qHS per pulmonology- appreciate recs    · HTN  · Continue present treatment    · JACQUELYN  · CPAP at home (BiPAP here due to COPD excacerbation)    Dispo- home when improved    DVT Prophylaxis: Lovenox    Signed By: Franco Baer MD     September 21, 2019

## 2019-09-22 PROBLEM — J96.21 ACUTE ON CHRONIC RESPIRATORY FAILURE WITH HYPOXIA AND HYPERCAPNIA (HCC): Status: ACTIVE | Noted: 2019-09-22

## 2019-09-22 PROBLEM — J96.22 ACUTE ON CHRONIC RESPIRATORY FAILURE WITH HYPOXIA AND HYPERCAPNIA (HCC): Status: ACTIVE | Noted: 2019-09-22

## 2019-09-22 LAB
ANION GAP SERPL CALC-SCNC: 4 MMOL/L (ref 7–16)
BASOPHILS # BLD: 0 K/UL (ref 0–0.2)
BASOPHILS NFR BLD: 0 % (ref 0–2)
BUN SERPL-MCNC: 16 MG/DL (ref 8–23)
CALCIUM SERPL-MCNC: 8.4 MG/DL (ref 8.3–10.4)
CHLORIDE SERPL-SCNC: 104 MMOL/L (ref 98–107)
CO2 SERPL-SCNC: 36 MMOL/L (ref 21–32)
CREAT SERPL-MCNC: 0.59 MG/DL (ref 0.6–1)
DIFFERENTIAL METHOD BLD: ABNORMAL
EOSINOPHIL # BLD: 0 K/UL (ref 0–0.8)
EOSINOPHIL NFR BLD: 0 % (ref 0.5–7.8)
ERYTHROCYTE [DISTWIDTH] IN BLOOD BY AUTOMATED COUNT: 15.3 % (ref 11.9–14.6)
GLUCOSE BLD STRIP.AUTO-MCNC: 157 MG/DL (ref 65–100)
GLUCOSE BLD STRIP.AUTO-MCNC: 165 MG/DL (ref 65–100)
GLUCOSE BLD STRIP.AUTO-MCNC: 205 MG/DL (ref 65–100)
GLUCOSE BLD STRIP.AUTO-MCNC: 212 MG/DL (ref 65–100)
GLUCOSE SERPL-MCNC: 185 MG/DL (ref 65–100)
HCT VFR BLD AUTO: 50.6 % (ref 35.8–46.3)
HGB BLD-MCNC: 16.1 G/DL (ref 11.7–15.4)
IMM GRANULOCYTES # BLD AUTO: 0.1 K/UL (ref 0–0.5)
IMM GRANULOCYTES NFR BLD AUTO: 1 % (ref 0–5)
LYMPHOCYTES # BLD: 0.7 K/UL (ref 0.5–4.6)
LYMPHOCYTES NFR BLD: 5 % (ref 13–44)
MCH RBC QN AUTO: 30.3 PG (ref 26.1–32.9)
MCHC RBC AUTO-ENTMCNC: 31.8 G/DL (ref 31.4–35)
MCV RBC AUTO: 95.1 FL (ref 79.6–97.8)
MONOCYTES # BLD: 0.2 K/UL (ref 0.1–1.3)
MONOCYTES NFR BLD: 2 % (ref 4–12)
NEUTS SEG # BLD: 12 K/UL (ref 1.7–8.2)
NEUTS SEG NFR BLD: 92 % (ref 43–78)
NRBC # BLD: 0 K/UL (ref 0–0.2)
PLATELET # BLD AUTO: 275 K/UL (ref 150–450)
PMV BLD AUTO: 9.4 FL (ref 9.4–12.3)
POTASSIUM SERPL-SCNC: 4 MMOL/L (ref 3.5–5.1)
RBC # BLD AUTO: 5.32 M/UL (ref 4.05–5.2)
SODIUM SERPL-SCNC: 144 MMOL/L (ref 136–145)
WBC # BLD AUTO: 13 K/UL (ref 4.3–11.1)

## 2019-09-22 PROCEDURE — 74011636637 HC RX REV CODE- 636/637: Performed by: HOSPITALIST

## 2019-09-22 PROCEDURE — 74011250636 HC RX REV CODE- 250/636: Performed by: INTERNAL MEDICINE

## 2019-09-22 PROCEDURE — 94660 CPAP INITIATION&MGMT: CPT

## 2019-09-22 PROCEDURE — 74011250637 HC RX REV CODE- 250/637: Performed by: INTERNAL MEDICINE

## 2019-09-22 PROCEDURE — 74011250636 HC RX REV CODE- 250/636: Performed by: HOSPITALIST

## 2019-09-22 PROCEDURE — 94640 AIRWAY INHALATION TREATMENT: CPT

## 2019-09-22 PROCEDURE — 85025 COMPLETE CBC W/AUTO DIFF WBC: CPT

## 2019-09-22 PROCEDURE — 74011000250 HC RX REV CODE- 250: Performed by: HOSPITALIST

## 2019-09-22 PROCEDURE — 74011250637 HC RX REV CODE- 250/637: Performed by: HOSPITALIST

## 2019-09-22 PROCEDURE — 36415 COLL VENOUS BLD VENIPUNCTURE: CPT

## 2019-09-22 PROCEDURE — 65610000001 HC ROOM ICU GENERAL

## 2019-09-22 PROCEDURE — 82962 GLUCOSE BLOOD TEST: CPT

## 2019-09-22 PROCEDURE — 74011000250 HC RX REV CODE- 250: Performed by: INTERNAL MEDICINE

## 2019-09-22 PROCEDURE — 80048 BASIC METABOLIC PNL TOTAL CA: CPT

## 2019-09-22 PROCEDURE — 77010033711 HC HIGH FLOW OXYGEN

## 2019-09-22 RX ORDER — FLUTICASONE PROPIONATE 50 MCG
2 SPRAY, SUSPENSION (ML) NASAL DAILY
Status: DISCONTINUED | OUTPATIENT
Start: 2019-09-22 | End: 2019-09-25 | Stop reason: HOSPADM

## 2019-09-22 RX ORDER — MORPHINE SULFATE ORAL SOLUTION 10 MG/5ML
5 SOLUTION ORAL
Status: DISCONTINUED | OUTPATIENT
Start: 2019-09-22 | End: 2019-09-25 | Stop reason: HOSPADM

## 2019-09-22 RX ORDER — IBUPROFEN 200 MG
1 TABLET ORAL EVERY 24 HOURS
Status: DISCONTINUED | OUTPATIENT
Start: 2019-09-22 | End: 2019-09-25 | Stop reason: HOSPADM

## 2019-09-22 RX ADMIN — MORPHINE SULFATE 5 MG: 10 SOLUTION ORAL at 15:49

## 2019-09-22 RX ADMIN — METHYLPREDNISOLONE SODIUM SUCCINATE 60 MG: 125 INJECTION, POWDER, FOR SOLUTION INTRAMUSCULAR; INTRAVENOUS at 14:06

## 2019-09-22 RX ADMIN — PREDNISOLONE ACETATE 1 DROP: 10 SUSPENSION/ DROPS OPHTHALMIC at 12:04

## 2019-09-22 RX ADMIN — MORPHINE SULFATE 5 MG: 10 SOLUTION ORAL at 21:47

## 2019-09-22 RX ADMIN — BUDESONIDE 500 MCG: 0.5 INHALANT RESPIRATORY (INHALATION) at 19:43

## 2019-09-22 RX ADMIN — PREDNISOLONE ACETATE 1 DROP: 10 SUSPENSION/ DROPS OPHTHALMIC at 21:40

## 2019-09-22 RX ADMIN — Medication 1 AMPULE: at 08:01

## 2019-09-22 RX ADMIN — GUAIFENESIN 1200 MG: 600 TABLET ORAL at 21:31

## 2019-09-22 RX ADMIN — IPRATROPIUM BROMIDE AND ALBUTEROL SULFATE 3 ML: .5; 3 SOLUTION RESPIRATORY (INHALATION) at 08:41

## 2019-09-22 RX ADMIN — Medication 10 ML: at 21:34

## 2019-09-22 RX ADMIN — DILTIAZEM HYDROCHLORIDE 360 MG: 180 CAPSULE, COATED, EXTENDED RELEASE ORAL at 08:01

## 2019-09-22 RX ADMIN — INSULIN LISPRO 4 UNITS: 100 INJECTION, SOLUTION INTRAVENOUS; SUBCUTANEOUS at 21:30

## 2019-09-22 RX ADMIN — PANTOPRAZOLE SODIUM 40 MG: 40 TABLET, DELAYED RELEASE ORAL at 08:01

## 2019-09-22 RX ADMIN — ASPIRIN 81 MG: 81 TABLET, COATED ORAL at 08:00

## 2019-09-22 RX ADMIN — LEVOFLOXACIN 750 MG: 500 TABLET, FILM COATED ORAL at 19:41

## 2019-09-22 RX ADMIN — PREDNISOLONE ACETATE 1 DROP: 10 SUSPENSION/ DROPS OPHTHALMIC at 08:01

## 2019-09-22 RX ADMIN — GABAPENTIN 300 MG: 300 CAPSULE ORAL at 21:32

## 2019-09-22 RX ADMIN — DULOXETINE HYDROCHLORIDE 30 MG: 30 CAPSULE, DELAYED RELEASE ORAL at 08:01

## 2019-09-22 RX ADMIN — MORPHINE SULFATE 5 MG: 10 SOLUTION ORAL at 09:07

## 2019-09-22 RX ADMIN — BUDESONIDE 500 MCG: 0.5 INHALANT RESPIRATORY (INHALATION) at 08:41

## 2019-09-22 RX ADMIN — IPRATROPIUM BROMIDE AND ALBUTEROL SULFATE 3 ML: .5; 3 SOLUTION RESPIRATORY (INHALATION) at 15:47

## 2019-09-22 RX ADMIN — Medication 10 ML: at 05:41

## 2019-09-22 RX ADMIN — IPRATROPIUM BROMIDE AND ALBUTEROL SULFATE 3 ML: .5; 3 SOLUTION RESPIRATORY (INHALATION) at 03:22

## 2019-09-22 RX ADMIN — IPRATROPIUM BROMIDE AND ALBUTEROL SULFATE 3 ML: .5; 3 SOLUTION RESPIRATORY (INHALATION) at 23:09

## 2019-09-22 RX ADMIN — MIRTAZAPINE 15 MG: 15 TABLET, FILM COATED ORAL at 21:32

## 2019-09-22 RX ADMIN — ENOXAPARIN SODIUM 40 MG: 40 INJECTION SUBCUTANEOUS at 21:32

## 2019-09-22 RX ADMIN — INSULIN LISPRO 2 UNITS: 100 INJECTION, SOLUTION INTRAVENOUS; SUBCUTANEOUS at 08:00

## 2019-09-22 RX ADMIN — Medication 10 ML: at 14:06

## 2019-09-22 RX ADMIN — METHYLPREDNISOLONE SODIUM SUCCINATE 60 MG: 125 INJECTION, POWDER, FOR SOLUTION INTRAMUSCULAR; INTRAVENOUS at 05:41

## 2019-09-22 RX ADMIN — GUAIFENESIN 1200 MG: 600 TABLET ORAL at 08:00

## 2019-09-22 RX ADMIN — IPRATROPIUM BROMIDE AND ALBUTEROL SULFATE 3 ML: .5; 3 SOLUTION RESPIRATORY (INHALATION) at 12:35

## 2019-09-22 RX ADMIN — FLUTICASONE PROPIONATE 2 SPRAY: 50 SPRAY, METERED NASAL at 09:07

## 2019-09-22 RX ADMIN — KETOTIFEN FUMARATE 1 DROP: 0.35 SOLUTION/ DROPS OPHTHALMIC at 08:01

## 2019-09-22 RX ADMIN — KETOTIFEN FUMARATE 1 DROP: 0.35 SOLUTION/ DROPS OPHTHALMIC at 16:59

## 2019-09-22 RX ADMIN — Medication 1 AMPULE: at 21:32

## 2019-09-22 RX ADMIN — IPRATROPIUM BROMIDE AND ALBUTEROL SULFATE 3 ML: .5; 3 SOLUTION RESPIRATORY (INHALATION) at 00:16

## 2019-09-22 RX ADMIN — METHYLPREDNISOLONE SODIUM SUCCINATE 60 MG: 125 INJECTION, POWDER, FOR SOLUTION INTRAMUSCULAR; INTRAVENOUS at 21:32

## 2019-09-22 RX ADMIN — IPRATROPIUM BROMIDE AND ALBUTEROL SULFATE 3 ML: .5; 3 SOLUTION RESPIRATORY (INHALATION) at 19:43

## 2019-09-22 RX ADMIN — INSULIN LISPRO 4 UNITS: 100 INJECTION, SOLUTION INTRAVENOUS; SUBCUTANEOUS at 16:57

## 2019-09-22 RX ADMIN — INSULIN LISPRO 2 UNITS: 100 INJECTION, SOLUTION INTRAVENOUS; SUBCUTANEOUS at 11:26

## 2019-09-22 RX ADMIN — GABAPENTIN 300 MG: 300 CAPSULE ORAL at 08:00

## 2019-09-22 NOTE — PROGRESS NOTES
09/22/19 0740   Oxygen Therapy   O2 Sat (%) 94 %   Pulse via Oximetry 82 beats per minute   O2 Device Hi flow nasal cannula; Heated;Humidifier   O2 Flow Rate (L/min) 40 l/min   O2 Temperature 87.8 °F (31 °C)   FIO2 (%) 45 %

## 2019-09-22 NOTE — PROGRESS NOTES
Patient alert and oriented. Follow commands. Denies pain. Head of bed elevated. Tachypnea. Dyspnea at rest. Oxygen saturation 93 % on heated high flow nasal cannula 40 liters FIO2. Sinus rhythm on cardiac monitor. Heart rate 92. Instructed patient to call for any needs. Verbalize an understanding. Call light within reach. Bed in low/lock position. Bed alarm on.

## 2019-09-22 NOTE — PROGRESS NOTES
Patient alert and oriented. Follow commands. Dyspnea on exertion. Head of bed elevated. Oxygen saturation 92 % on heated high flow nasal cannula. Sinus rhythm on cardiac monitor. Heart rate 85. Instructed patient to call for any needs. Verbalize an understanding. Bed in low/lock position. Bed alarm on.

## 2019-09-22 NOTE — PROGRESS NOTES
Hospitalist Progress Note    2019  Admit Date: 2019  5:53 PM   NAME: Gogo Martinez   :  1950   MRN:  [id number]   Attending: Nayeli Sweet MD  PCP:  Lon Weldon MD    SUBJECTIVE:   Libby Sinclair is a 72 yo F admitted with COPD exacerbation. She wears oxygen at home prn at 3 LPM, but normally does not have to wear. She states this morning she feels slightly better overall, but still feels poorly and having a hard time getting air in. Still on nasal CPAP mask due to this. States she has been intubated in the past during COPD exacerbation. - reports breathing some better today. Was on BiPAP through the night and off presently. Still not feeling well and breathing still not near baseline. - still reports she is having 'a hard time getting enough air in.'  Has frontal headache and feels like nasal congestion. She does not feel near her baseline yet. Requesting nicotine patch. Review of Systems negative with exception of pertinent positives noted above  PHYSICAL EXAM     Visit Vitals  /63   Pulse 75   Temp 98.3 °F (36.8 °C)   Resp 15   Ht 5' 4\" (1.626 m)   Wt 72.6 kg (160 lb)   SpO2 94%   Breastfeeding?  No   BMI 27.46 kg/m²      Temp (24hrs), Av.3 °F (36.8 °C), Min:97.9 °F (36.6 °C), Max:98.9 °F (37.2 °C)    Patient Vitals for the past 24 hrs:   Temp Pulse Resp BP SpO2   19 0740     94 %   19 0719 98.3 °F (36.8 °C)       19 0603  75 15 133/63 95 %   19 0543  70 16 138/62 95 %   19 0403  73 15 117/60 95 %   19 0322   26  95 %   19 0303 98.1 °F (36.7 °C) 68 16 119/58 95 %   19 0203  71 20 125/57 95 %   19 0103  77 24 114/58 95 %   19 0016     95 %   19 0003  71 16 135/66 95 %   19 2326  77 20  95 %   19 2303 97.9 °F (36.6 °C) 77 20 131/67 95 %   19 2203  88  134/74 93 %   19 2201     92 %   19 2103  85 26 121/71 92 %   19  83 23 146/66 94 %   09/21/19 1911     94 %   09/21/19 1903 98.9 °F (37.2 °C) 85 24 134/65 94 %   09/21/19 1703  (!) 106 24 162/80 92 %   09/21/19 1603 98.4 °F (36.9 °C) 95 29 151/63 94 %   09/21/19 1514     93 %   09/21/19 1503  97 30 145/70 93 %   09/21/19 1403  (!) 104 29 119/63 93 %   09/21/19 1307  99 22 145/65 90 %   09/21/19 1208     93 %   09/21/19 1203 98.2 °F (36.8 °C) (!) 104 (!) 34 149/71 92 %   09/21/19 1103  (!) 103 22 159/74 94 %   09/21/19 1003  95 20 147/57 93 %   09/21/19 0903  (!) 101 29 145/72 92 %       Oxygen Therapy  O2 Sat (%): 94 % (09/22/19 0740)  Pulse via Oximetry: 82 beats per minute (09/22/19 0740)  O2 Device: Hi flow nasal cannula; Heated;Humidifier (09/22/19 0740)  O2 Flow Rate (L/min): 40 l/min (09/22/19 0740)  O2 Temperature: 87.8 °F (31 °C) (09/22/19 0740)  FIO2 (%): 45 % (09/22/19 0740)    Intake/Output Summary (Last 24 hours) at 9/22/2019 0806  Last data filed at 9/22/2019 6955  Gross per 24 hour   Intake    Output 950 ml   Net -950 ml      General: No acute distress  Lungs: No wheeze, use of neck accessory muscles.    Heart:  Regular rhythm, tachycardic,  No murmur, rub, or gallop  Abdomen: Soft, Non distended, Non tender, Positive bowel sounds  Extremities: No cyanosis, clubbing or edema  Neurologic:  No focal deficits    Recent Results (from the past 24 hour(s))   GLUCOSE, POC    Collection Time: 09/21/19 11:46 AM   Result Value Ref Range    Glucose (POC) 263 (H) 65 - 100 mg/dL   GLUCOSE, POC    Collection Time: 09/21/19  4:25 PM   Result Value Ref Range    Glucose (POC) 129 (H) 65 - 100 mg/dL   GLUCOSE, POC    Collection Time: 09/21/19  9:35 PM   Result Value Ref Range    Glucose (POC) 220 (H) 65 - 100 mg/dL   GLUCOSE, POC    Collection Time: 09/22/19  7:29 AM   Result Value Ref Range    Glucose (POC) 165 (H) 65 - 100 mg/dL   CBC WITH AUTOMATED DIFF    Collection Time: 09/22/19  7:31 AM   Result Value Ref Range    WBC 13.0 (H) 4.3 - 11.1 K/uL    RBC 5.32 (H) 4.05 - 5.2 M/uL    HGB 16.1 (H) 11.7 - 15.4 g/dL    HCT 50.6 (H) 35.8 - 46.3 %    MCV 95.1 79.6 - 97.8 FL    MCH 30.3 26.1 - 32.9 PG    MCHC 31.8 31.4 - 35.0 g/dL    RDW 15.3 (H) 11.9 - 14.6 %    PLATELET 904 629 - 435 K/uL    MPV 9.4 9.4 - 12.3 FL    ABSOLUTE NRBC 0.00 0.0 - 0.2 K/uL    DF AUTOMATED      NEUTROPHILS 92 (H) 43 - 78 %    LYMPHOCYTES 5 (L) 13 - 44 %    MONOCYTES 2 (L) 4.0 - 12.0 %    EOSINOPHILS 0 (L) 0.5 - 7.8 %    BASOPHILS 0 0.0 - 2.0 %    IMMATURE GRANULOCYTES 1 0.0 - 5.0 %    ABS. NEUTROPHILS 12.0 (H) 1.7 - 8.2 K/UL    ABS. LYMPHOCYTES 0.7 0.5 - 4.6 K/UL    ABS. MONOCYTES 0.2 0.1 - 1.3 K/UL    ABS. EOSINOPHILS 0.0 0.0 - 0.8 K/UL    ABS. BASOPHILS 0.0 0.0 - 0.2 K/UL    ABS. IMM. GRANS. 0.1 0.0 - 0.5 K/UL     Imaging:    XR CHEST PORT   Final Result   IMPRESSION:    1.   No acute cardiopulmonary process evident on single frontal view of the   chest.                     ASSESSMENT      Hospital Problems as of 9/22/2019 Date Reviewed: 6/12/2019          Codes Class Noted - Resolved POA    Acute on chronic respiratory failure with hypoxia and hypercapnia (HCC) ICD-10-CM: J96.21, J96.22  ICD-9-CM: 518.84, 786.09, 799.02  9/22/2019 - Present Yes        Acute tracheobronchitis ICD-10-CM: J20.9  ICD-9-CM: 466.0  9/19/2019 - Present Yes        * (Principal) COPD with acute exacerbation ([facility] ICD-10-CM: J44.1  ICD-9-CM: 491.21  9/19/2019 - Present Yes        JACQUELYN (obstructive sleep apnea) (Chronic) ICD-10-CM: G47.33  ICD-9-CM: 327.23  7/26/2017 - Present Yes    Overview Addendum 2/2/2015  9:38 AM by Jenn Carcamo NP     Uses home CPAP with 6L bleed in O2             Tobacco use disorder (Chronic) ICD-10-CM: Z27.224  ICD-9-CM: 305.1  7/26/2017 - Present Yes        Diabetes mellitus ([facility] (Chronic) ICD-10-CM: E11.9  ICD-9-CM: 250.00  7/26/2017 - Present Yes        Nocturnal hypoxia (Chronic) ICD-10-CM: G47.34  ICD-9-CM: 327.24  7/25/2017 - Present Yes        Essential hypertension with goal blood pressure less than 130/85 (Chronic) ICD-10-CM: I10  ICD-9-CM: 401.9  3/23/2017 - Present Yes        Coronary artery disease (Chronic) ICD-10-CM: I25.10  ICD-9-CM: 414.00  3/3/2015 - Present Yes        Debility (Chronic) ICD-10-CM: R53.81  ICD-9-CM: 799.3  6/5/2013 - Present Yes        Fibromyalgia (Chronic) ICD-10-CM: M79.7  ICD-9-CM: 729.1  4/30/2013 - Present Yes        Restless leg syndrome (Chronic) ICD-10-CM: G25.81  ICD-9-CM: 333.94  4/30/2013 - Present Yes            Plan:  · COPD exacerbation  · Continue levaquin  · Continue mucinex  · Prn tessalon  · IV solumedrol- continue per pulm  · Nebs  · BiPAP qHS per pulmonology- appreciate recs  · As IV morphine helps with dyspnea, will try prn PO morphine solution to see if this helps dyspnea as well.   · Wean oxygen as tolerated- still requiring Airvo    · Nasal congestion/frontal headache  · Start flonase and nasal saline    · HTN  · Continue present treatment    · JACQUELYN  · CPAP at home (BiPAP here due to COPD excacerbation)    Dispo- home when improved    DVT Prophylaxis: Lovenox    Signed By: Francisco Roach MD     September 22, 2019

## 2019-09-23 ENCOUNTER — APPOINTMENT (OUTPATIENT)
Dept: GENERAL RADIOLOGY | Age: 69
DRG: 190 | End: 2019-09-23
Attending: INTERNAL MEDICINE
Payer: MEDICARE

## 2019-09-23 LAB
ANION GAP SERPL CALC-SCNC: 2 MMOL/L (ref 7–16)
BUN SERPL-MCNC: 15 MG/DL (ref 8–23)
CALCIUM SERPL-MCNC: 8.1 MG/DL (ref 8.3–10.4)
CHLORIDE SERPL-SCNC: 102 MMOL/L (ref 98–107)
CO2 SERPL-SCNC: 37 MMOL/L (ref 21–32)
CREAT SERPL-MCNC: 0.55 MG/DL (ref 0.6–1)
GLUCOSE BLD STRIP.AUTO-MCNC: 174 MG/DL (ref 65–100)
GLUCOSE BLD STRIP.AUTO-MCNC: 196 MG/DL (ref 65–100)
GLUCOSE BLD STRIP.AUTO-MCNC: 245 MG/DL (ref 65–100)
GLUCOSE BLD STRIP.AUTO-MCNC: 254 MG/DL (ref 65–100)
GLUCOSE SERPL-MCNC: 170 MG/DL (ref 65–100)
POTASSIUM SERPL-SCNC: 4.3 MMOL/L (ref 3.5–5.1)
SODIUM SERPL-SCNC: 141 MMOL/L (ref 136–145)

## 2019-09-23 PROCEDURE — 94762 N-INVAS EAR/PLS OXIMTRY CONT: CPT

## 2019-09-23 PROCEDURE — 71045 X-RAY EXAM CHEST 1 VIEW: CPT

## 2019-09-23 PROCEDURE — 77010033711 HC HIGH FLOW OXYGEN

## 2019-09-23 PROCEDURE — 77030021668 HC NEB PREFIL KT VYRM -A

## 2019-09-23 PROCEDURE — 80048 BASIC METABOLIC PNL TOTAL CA: CPT

## 2019-09-23 PROCEDURE — 82962 GLUCOSE BLOOD TEST: CPT

## 2019-09-23 PROCEDURE — 97161 PT EVAL LOW COMPLEX 20 MIN: CPT

## 2019-09-23 PROCEDURE — 74011250637 HC RX REV CODE- 250/637: Performed by: INTERNAL MEDICINE

## 2019-09-23 PROCEDURE — 74011000250 HC RX REV CODE- 250: Performed by: HOSPITALIST

## 2019-09-23 PROCEDURE — 94640 AIRWAY INHALATION TREATMENT: CPT

## 2019-09-23 PROCEDURE — 36415 COLL VENOUS BLD VENIPUNCTURE: CPT

## 2019-09-23 PROCEDURE — 99232 SBSQ HOSP IP/OBS MODERATE 35: CPT | Performed by: INTERNAL MEDICINE

## 2019-09-23 PROCEDURE — 74011250637 HC RX REV CODE- 250/637: Performed by: HOSPITALIST

## 2019-09-23 PROCEDURE — 74011250636 HC RX REV CODE- 250/636: Performed by: HOSPITALIST

## 2019-09-23 PROCEDURE — 74011000250 HC RX REV CODE- 250: Performed by: INTERNAL MEDICINE

## 2019-09-23 PROCEDURE — 94660 CPAP INITIATION&MGMT: CPT

## 2019-09-23 PROCEDURE — 65270000029 HC RM PRIVATE

## 2019-09-23 PROCEDURE — 94760 N-INVAS EAR/PLS OXIMETRY 1: CPT

## 2019-09-23 PROCEDURE — 74011636637 HC RX REV CODE- 636/637: Performed by: HOSPITALIST

## 2019-09-23 PROCEDURE — 74011250636 HC RX REV CODE- 250/636: Performed by: INTERNAL MEDICINE

## 2019-09-23 RX ORDER — DIPHENHYDRAMINE HCL 25 MG
25 CAPSULE ORAL
Status: DISCONTINUED | OUTPATIENT
Start: 2019-09-23 | End: 2019-09-23

## 2019-09-23 RX ORDER — DOCUSATE SODIUM 100 MG/1
100 CAPSULE, LIQUID FILLED ORAL DAILY
Status: DISCONTINUED | OUTPATIENT
Start: 2019-09-23 | End: 2019-09-25 | Stop reason: HOSPADM

## 2019-09-23 RX ORDER — PREDNISONE 10 MG/1
40 TABLET ORAL
Status: DISCONTINUED | OUTPATIENT
Start: 2019-09-24 | End: 2019-09-25 | Stop reason: HOSPADM

## 2019-09-23 RX ORDER — PREDNISOLONE ACETATE 10 MG/ML
1 SUSPENSION/ DROPS OPHTHALMIC EVERY 12 HOURS
Status: DISCONTINUED | OUTPATIENT
Start: 2019-09-23 | End: 2019-09-25 | Stop reason: HOSPADM

## 2019-09-23 RX ORDER — DIPHENHYDRAMINE HCL 25 MG
25 CAPSULE ORAL
Status: DISCONTINUED | OUTPATIENT
Start: 2019-09-23 | End: 2019-09-25 | Stop reason: HOSPADM

## 2019-09-23 RX ADMIN — IPRATROPIUM BROMIDE AND ALBUTEROL SULFATE 3 ML: .5; 3 SOLUTION RESPIRATORY (INHALATION) at 08:13

## 2019-09-23 RX ADMIN — PANTOPRAZOLE SODIUM 40 MG: 40 TABLET, DELAYED RELEASE ORAL at 07:56

## 2019-09-23 RX ADMIN — METHYLPREDNISOLONE SODIUM SUCCINATE 60 MG: 125 INJECTION, POWDER, FOR SOLUTION INTRAMUSCULAR; INTRAVENOUS at 17:11

## 2019-09-23 RX ADMIN — GABAPENTIN 300 MG: 300 CAPSULE ORAL at 21:59

## 2019-09-23 RX ADMIN — INSULIN LISPRO 6 UNITS: 100 INJECTION, SOLUTION INTRAVENOUS; SUBCUTANEOUS at 22:45

## 2019-09-23 RX ADMIN — DOCUSATE SODIUM 100 MG: 100 CAPSULE, LIQUID FILLED ORAL at 07:56

## 2019-09-23 RX ADMIN — INSULIN LISPRO 2 UNITS: 100 INJECTION, SOLUTION INTRAVENOUS; SUBCUTANEOUS at 07:55

## 2019-09-23 RX ADMIN — Medication 10 ML: at 06:07

## 2019-09-23 RX ADMIN — Medication 1 AMPULE: at 21:08

## 2019-09-23 RX ADMIN — INSULIN LISPRO 6 UNITS: 100 INJECTION, SOLUTION INTRAVENOUS; SUBCUTANEOUS at 17:09

## 2019-09-23 RX ADMIN — GUAIFENESIN 1200 MG: 600 TABLET ORAL at 08:45

## 2019-09-23 RX ADMIN — LEVOFLOXACIN 750 MG: 500 TABLET, FILM COATED ORAL at 08:47

## 2019-09-23 RX ADMIN — MIRTAZAPINE 15 MG: 15 TABLET, FILM COATED ORAL at 21:59

## 2019-09-23 RX ADMIN — IPRATROPIUM BROMIDE AND ALBUTEROL SULFATE 3 ML: .5; 3 SOLUTION RESPIRATORY (INHALATION) at 11:16

## 2019-09-23 RX ADMIN — BUDESONIDE 500 MCG: 0.5 INHALANT RESPIRATORY (INHALATION) at 20:00

## 2019-09-23 RX ADMIN — INSULIN LISPRO 2 UNITS: 100 INJECTION, SOLUTION INTRAVENOUS; SUBCUTANEOUS at 11:48

## 2019-09-23 RX ADMIN — Medication 10 ML: at 14:00

## 2019-09-23 RX ADMIN — BUDESONIDE 500 MCG: 0.5 INHALANT RESPIRATORY (INHALATION) at 08:13

## 2019-09-23 RX ADMIN — Medication 1 AMPULE: at 07:57

## 2019-09-23 RX ADMIN — IPRATROPIUM BROMIDE AND ALBUTEROL SULFATE 3 ML: .5; 3 SOLUTION RESPIRATORY (INHALATION) at 15:50

## 2019-09-23 RX ADMIN — IPRATROPIUM BROMIDE AND ALBUTEROL SULFATE 3 ML: .5; 3 SOLUTION RESPIRATORY (INHALATION) at 18:49

## 2019-09-23 RX ADMIN — MORPHINE SULFATE 5 MG: 10 SOLUTION ORAL at 08:51

## 2019-09-23 RX ADMIN — DULOXETINE HYDROCHLORIDE 30 MG: 30 CAPSULE, DELAYED RELEASE ORAL at 07:56

## 2019-09-23 RX ADMIN — GUAIFENESIN 1200 MG: 600 TABLET ORAL at 21:07

## 2019-09-23 RX ADMIN — ENOXAPARIN SODIUM 40 MG: 40 INJECTION SUBCUTANEOUS at 21:08

## 2019-09-23 RX ADMIN — IPRATROPIUM BROMIDE AND ALBUTEROL SULFATE 3 ML: .5; 3 SOLUTION RESPIRATORY (INHALATION) at 03:50

## 2019-09-23 RX ADMIN — GABAPENTIN 300 MG: 300 CAPSULE ORAL at 07:56

## 2019-09-23 RX ADMIN — IPRATROPIUM BROMIDE AND ALBUTEROL SULFATE 3 ML: .5; 3 SOLUTION RESPIRATORY (INHALATION) at 21:17

## 2019-09-23 RX ADMIN — METHYLPREDNISOLONE SODIUM SUCCINATE 60 MG: 125 INJECTION, POWDER, FOR SOLUTION INTRAMUSCULAR; INTRAVENOUS at 06:06

## 2019-09-23 RX ADMIN — ASPIRIN 81 MG: 81 TABLET, COATED ORAL at 07:56

## 2019-09-23 RX ADMIN — Medication 10 ML: at 21:08

## 2019-09-23 RX ADMIN — DILTIAZEM HYDROCHLORIDE 360 MG: 180 CAPSULE, COATED, EXTENDED RELEASE ORAL at 07:57

## 2019-09-23 NOTE — PROGRESS NOTES
Patient admitted from ICU. Pt resting in bed and is alert and oriented x 4. She denies pain and is on 5  L NC. RR even and unlabored. Patient oriented to call light, room, and staff and pt instructed to call for assistance if needed. Will monitor.

## 2019-09-23 NOTE — PROGRESS NOTES
No change in assessment. Patient resting quietly. Call light within reach. Bed alarm on. Bed in low/lock position.

## 2019-09-23 NOTE — PROGRESS NOTES
09/23/19 1542   Dual Skin Pressure Injury Assessment   Dual Skin Pressure Injury Assessment WDL   Second Care Provider (Based on [address] [place] [address]) jerson RN   Skin Integumentary   Skin Integumentary (WDL) WDL    Pressure  Injury Documentation No Pressure Injury Noted-Pressure Ulcer Prevention Initiated   Skin Integrity Tattoos (comment)

## 2019-09-23 NOTE — INTERDISCIPLINARY ROUNDS
Interdisciplinary team rounds were held 9/23/2019 with the following team members:Care Management, Nursing, Physical Therapy and Physician and the patient. Plan of care discussed. See clinical pathway and/or care plan for interventions and desired outcomes.

## 2019-09-23 NOTE — PROGRESS NOTES
ICU Pulmonary Daily Progress NOTE    Shruti Ponce    9/23/2019     Shruti Ponce is a 72yoF who is followed at [address] for GOLD stage 2 COPD (1.3L; 57% predicted) with nocturnal hypoxemia with 45pkyr smoking history and active 1ppd smoking. PMH is also notable for JACQUELYN. , mild pulmonary hypertension with RVSP 30mmHg in 2017. She has required intubation in the past for severe exacerbation. She was admitted to [facility] on 9/19 with about 5-6 days of coughing, dyspnea and wheezing. In the ER she was treated with duonebs and solumedrol but hypoxemia and severe wheezing persisted. Hospital day #1 she was unable to stay off BiPAP due to severe dyspnea and wheezing. HPI:   Weaned to optiflow 45% and 40lpm.  ABG Saturday suggestive of acute on chronic respiratory acidosis with hypoxemia. 7.34/64.8/104  Nasal congestion a little worse on optiflow. Hasn't been out of bed yet.       Review of Systems:  -Fever  -Headaches  -Chest pain  +Dyspnea, +wheezing, +cough  -Abdominal pain, -constipation +gerd  -Leg swelling  All other organ systems grossly normal.      Current Facility-Administered Medications   Medication Dose Route Frequency    prednisoLONE acetate (PRED FORTE) 1 % ophthalmic suspension 1 Drop  1 Drop Both Eyes Q12H    docusate sodium (COLACE) capsule 100 mg  100 mg Oral DAILY    nicotine (NICODERM CQ) 21 mg/24 hr patch 1 Patch  1 Patch TransDERmal Q24H    morphine 10 mg/5 mL oral solution 5 mg  5 mg Oral Q4H PRN    fluticasone propionate (FLONASE) 50 mcg/actuation nasal spray 2 Spray  2 Spray Both Nostrils DAILY    sodium chloride (OCEAN) 0.65 % nasal squeeze bottle 2 Spray  2 Spray Both Nostrils Q2H PRN    pantoprazole (PROTONIX) tablet 40 mg  40 mg Oral ACB    guaiFENesin ER (MUCINEX) tablet 1,200 mg  1,200 mg Oral Q12H    benzonatate (TESSALON) capsule 100 mg  100 mg Oral TID PRN    morphine injection 2 mg  2 mg IntraVENous Q4H PRN    methylPREDNISolone (PF) (Solu-MEDROL) injection 60 mg  60 mg IntraVENous Q8H    albuterol-ipratropium (DUO-NEB) 2.5 MG-0.5 MG/3 ML  3 mL Nebulization Q4H RT    alcohol 62% (NOZIN) nasal  1 Ampule  1 Ampule Topical Q12H    albuterol-ipratropium (DUO-NEB) 2.5 MG-0.5 MG/3 ML  3 mL Nebulization Q4H PRN    levoFLOXacin (LEVAQUIN) tablet 750 mg  750 mg Oral Q24H    aspirin delayed-release tablet 81 mg  81 mg Oral DAILY    DULoxetine (CYMBALTA) capsule 30 mg  30 mg Oral DAILY    gabapentin (NEURONTIN) capsule 300 mg  300 mg Oral BID    mirtazapine (REMERON) tablet 15 mg  15 mg Oral QHS    nitroglycerin (NITROSTAT) tablet 0.4 mg  0.4 mg SubLINGual PRN    ketotifen (ZADITOR) 0.025 % (0.035 %) ophthalmic solution 1 Drop  1 Drop Both Eyes BID    dilTIAZem CD (CARDIZEM CD) capsule 360 mg  360 mg Oral DAILY    dextrose 40% (GLUTOSE) oral gel 1 Tube  15 g Oral PRN    glucagon (GLUCAGEN) injection 1 mg  1 mg IntraMUSCular PRN    dextrose (D50W) injection syrg 12.5-25 g  25-50 mL IntraVENous PRN    insulin lispro (HUMALOG) injection   SubCUTAneous AC&HS    sodium chloride (NS) flush 5-40 mL  5-40 mL IntraVENous Q8H    sodium chloride (NS) flush 5-40 mL  5-40 mL IntraVENous PRN    budesonide (PULMICORT) 500 mcg/2 ml nebulizer suspension  500 mcg Nebulization BID RT    acetaminophen (TYLENOL) tablet 650 mg  650 mg Oral Q4H PRN    ondansetron (ZOFRAN) injection 4 mg  4 mg IntraVENous Q4H PRN    bisacodyl (DULCOLAX) tablet 5 mg  5 mg Oral DAILY PRN    enoxaparin (LOVENOX) injection 40 mg  40 mg SubCUTAneous Q24H    influenza vaccine 2019-20 (6 mos+)(PF) (FLUARIX/FLULAVAL/FLUZONE QUAD) injection 0.5 mL  0.5 mL IntraMUSCular PRIOR TO DISCHARGE         Objective:     Vitals:    09/23/19 0434 09/23/19 0459 09/23/19 0602 09/23/19 0705   BP:  149/89 159/81 146/71   Pulse:  75 78 76   Resp:  14 16 13   Temp:    98.1 °F (36.7 °C)   SpO2:  96% 92% 94%   Weight: 164 lb (74.4 kg)      Height:           PHYSICAL EXAM     Constitutional:  the patient is well developed  EENMT:  Sclera clear, pupils equal, oral mucosa moist  Respiratory: mild expiratory wheezing in bases and decreased in upper lobes  Cardiovascular:  RRR without M,G,R  Gastrointestinal: soft and non-tender; with positive bowel sounds. Musculoskeletal: warm without cyanosis. There is no lower extremity edema. Skin:  no jaundice or rashes, no wounds   Neurologic: no gross neuro deficits     Psychiatric:  alert and oriented x 3    CXR:        Recent Labs     09/22/19  0731   WBC 13.0*   HGB 16.1*   HCT 50.6*        Recent Labs     09/23/19  0321 09/22/19  0731    144   K 4.3 4.0    104   * 185*   CO2 37* 36*   BUN 15 16   CREA 0.55* 0.59*   CA 8.1* 8.4     Recent Labs     09/21/19  0747   PHI 7.344*   PCO2I 64.8*   PO2I 104*   HCO3I 35.3*     No results for input(s): LCAD, LAC in the last 72 hours. Recent Labs     09/21/19  0747   PHI 7.344*   PCO2I 64.8*   PO2I 104*   HCO3I 35.3*     No results for input(s): LCAD, LAC in the last 72 hours.       Assessment:  (Medical Decision Making)     Hospital Problems  Date Reviewed: 6/12/2019          Codes Class Noted POA    Acute tracheobronchitis ICD-10-CM: J20.9  ICD-9-CM: 466.0  9/19/2019 Yes         * (Principal) COPD with acute exacerbation ([facility] ICD-10-CM: J44.1  ICD-9-CM: 491.21  9/19/2019 Yes    On BIPAP    JACQUELYN (obstructive sleep apnea) (Chronic) ICD-10-CM: G47.33  ICD-9-CM: 327.23  7/26/2017 Yes    Overview Addendum 2/2/2015  9:38 AM by Max Lindsey NP     Uses home CPAP with 6L bleed in O2         BiPAP    Tobacco use disorder (Chronic) ICD-10-CM: W17.528  ICD-9-CM: 305.1  7/26/2017 Yes        Diabetes mellitus (HCC) (Chronic) ICD-10-CM: E11.9  ICD-9-CM: 250.00  7/26/2017 Yes    Monitor on steroids    Nocturnal hypoxia (Chronic) ICD-10-CM: G47.34  ICD-9-CM: 327.24  7/25/2017 Yes        Essential hypertension with goal blood pressure less than 130/85 (Chronic) ICD-10-CM: I10  ICD-9-CM: 401.9  3/23/2017 Yes        Coronary artery disease (Chronic) ICD-10-CM: I25.10  ICD-9-CM: 414.00  3/3/2015 Yes        Debility (Chronic) ICD-10-CM: R53.81  ICD-9-CM: 799.3  6/5/2013 Yes        Fibromyalgia (Chronic) ICD-10-CM: M79.7  ICD-9-CM: 729.1  4/30/2013 Yes        Restless leg syndrome (Chronic) ICD-10-CM: E70.88  ICD-9-CM: 333.94  4/30/2013 Yes          Elderly female with COPD/smoker with COPD exacerbation requiring BiPAP    Plan:  (Medical Decision Making)     Stable for transfer to floor. PT evaluation- has been bedbound and on high dose steroids 3 days. CPAP tonight with home settings  Step down steroids. Try oral tomorrow morning. Continue bronchodilators. Stop levaquin after today's dose to complete 5 days. More than 50% of the time documented was spent in face-to-face contact with the patient and in the care of the patient on the floor/unit where the patient is located.        Sonja Flannery MD

## 2019-09-23 NOTE — PROGRESS NOTES
TRANSFER - IN REPORT:    Verbal report received from Renate Us RN(name) on Shruti [address]  being received from ICU(unit) for routine progression of care      Report consisted of patients Situation, Background, Assessment and   Recommendations(SBAR). Information from the following report(s) SBAR, Kardex, Procedure Summary, Intake/Output and MAR was reviewed with the receiving nurse. Opportunity for questions and clarification was provided. Assessment completed upon patients arrival to unit and care assumed.

## 2019-09-23 NOTE — PROGRESS NOTES
TRANSFER - OUT REPORT:    Verbal report given to Vivien Mendez on Sloven[place] Gurpreet  being transferred to [id number] for routine progression of care       Report consisted of patients Situation, Background, Assessment and   Recommendations(SBAR). Information from the following report(s) SBAR, Procedure Summary, Intake/Output, MAR and Accordion was reviewed with the receiving nurse. Lines:   Peripheral IV 09/19/19 Left Forearm (Active)   Site Assessment Clean, dry, & intact 9/23/2019  2:52 AM   Phlebitis Assessment 0 9/23/2019  2:52 AM   Infiltration Assessment 0 9/23/2019  2:52 AM   Dressing Status Clean, dry, & intact 9/23/2019  2:52 AM   Dressing Type Tape;Transparent 9/23/2019  2:52 AM   Hub Color/Line Status Green;Capped 9/23/2019  2:52 AM   Alcohol Cap Used No 9/22/2019 11:41 PM       Peripheral IV 09/19/19 Right Forearm (Active)   Site Assessment Clean, dry, & intact 9/23/2019  2:52 AM   Phlebitis Assessment 0 9/23/2019  2:52 AM   Infiltration Assessment 0 9/23/2019  2:52 AM   Dressing Status Clean, dry, & intact 9/23/2019  2:52 AM   Dressing Type Tape;Transparent 9/23/2019  2:52 AM   Hub Color/Line Status Blue;Capped 9/23/2019  2:52 AM   Alcohol Cap Used No 9/22/2019 11:41 PM        Opportunity for questions and clarification was provided.       Patient transported with:

## 2019-09-23 NOTE — PROGRESS NOTES
Hospitalist Progress Note    2019  Admit Date: 2019  5:53 PM   NAME: Leopold Edelman   :  1950   MRN:  [id number]   Attending: Eloina Greene MD  PCP:  Shirley Diaz MD    SUBJECTIVE:   Oriana Hawthorne is a 70 yo F admitted with COPD exacerbation. She wears oxygen at home prn at 3 LPM, but normally does not have to wear. She had severe wheezing and difficulty breathing initially. Started on IV steroids. Pulm consulted and transferred to unit for BiPAP and weaned to Airvo during day and BiPAP qHS. Trial of roxanol/morphine for work of breathing and this has helped. - breathing easier and asking when she can go home. Still on Airvo. Review of Systems negative with exception of pertinent positives noted above  PHYSICAL EXAM     Visit Vitals  /81   Pulse 88   Temp 98.1 °F (36.7 °C)   Resp (!) 31   Ht 5' 4\" (1.626 m)   Wt 74.4 kg (164 lb)   SpO2 92%   Breastfeeding?  No   BMI 28.15 kg/m²      Temp (24hrs), Av °F (36.7 °C), Min:96.6 °F (35.9 °C), Max:98.5 °F (36.9 °C)    Patient Vitals for the past 24 hrs:   Temp Pulse Resp BP SpO2   19 0813     92 %   19 0809  88 (!) 31 155/81 92 %   19 0705 98.1 °F (36.7 °C) 76 13 146/71 94 %   19 0602  78 16 159/81 92 %   19 0459  75 14 149/89 96 %   19 0355  75 14 148/74 94 %   19 0352     95 %   19 0351     95 %   19 0252 96.6 °F (35.9 °C) 75 16 144/76 95 %   19 0148  74 14 140/70 95 %   19 0045  78 16 134/65 95 %   19 2341 97.9 °F (36.6 °C) 80 18 145/73 95 %   09/22/19 2309     95 %   19 2238  82 20 142/73 95 %   19 2134  93 22 162/78 93 %   19 2031  80 13 159/78 93 %   19 1943     93 %   19 1927 98.5 °F (36.9 °C) 93 20 152/81 92 %   19 1824  89 24 162/75 93 %   19 1725  87 25 163/83 90 %   19 1617 98.5 °F (36.9 °C) 80 16 140/70 91 %   19 1547     91 %   19 1513  87 23 139/69 92 %   09/22/19 1425  89 24 150/67 91 %   09/22/19 1306  82 27 146/61 94 %   09/22/19 1237     90 %   09/22/19 1203 98.4 °F (36.9 °C) 80 19 148/75 92 %   09/22/19 0942  82 24 135/62 93 %   09/22/19 0845     92 %       Oxygen Therapy  O2 Sat (%): 92 % (09/23/19 0813)  Pulse via Oximetry: 94 beats per minute (09/23/19 0813)  O2 Device: Other (comment)(airvo) (09/23/19 0813)  O2 Flow Rate (L/min): 40 l/min (09/23/19 0813)  O2 Temperature: 87.8 °F (31 °C) (09/23/19 0813)  FIO2 (%): 45 % (09/23/19 0813)    Intake/Output Summary (Last 24 hours) at 9/23/2019 0823  Last data filed at 9/22/2019 1927  Gross per 24 hour   Intake 150 ml   Output 200 ml   Net -50 ml      General: No acute distress  Lungs:  Slight inspiratory and expiratory wheeze (much improved from admission), lungs otherwise clear.    Heart:  Regular rhythm, tachycardic,  No murmur, rub, or gallop  Abdomen: Soft, Non distended, Non tender, Positive bowel sounds  Extremities: No cyanosis, clubbing or edema  Neurologic:  No focal deficits    Recent Results (from the past 24 hour(s))   GLUCOSE, POC    Collection Time: 09/22/19 11:17 AM   Result Value Ref Range    Glucose (POC) 157 (H) 65 - 100 mg/dL   GLUCOSE, POC    Collection Time: 09/22/19  4:27 PM   Result Value Ref Range    Glucose (POC) 212 (H) 65 - 100 mg/dL   GLUCOSE, POC    Collection Time: 09/22/19  9:24 PM   Result Value Ref Range    Glucose (POC) 205 (H) 65 - 206 mg/dL   METABOLIC PANEL, BASIC    Collection Time: 09/23/19  3:21 AM   Result Value Ref Range    Sodium 141 136 - 145 mmol/L    Potassium 4.3 3.5 - 5.1 mmol/L    Chloride 102 98 - 107 mmol/L    CO2 37 (H) 21 - 32 mmol/L    Anion gap 2 (L) 7 - 16 mmol/L    Glucose 170 (H) 65 - 100 mg/dL    BUN 15 8 - 23 MG/DL    Creatinine 0.55 (L) 0.6 - 1.0 MG/DL    GFR est AA >60 >60 ml/min/1.73m2    GFR est non-AA >60 >60 ml/min/1.73m2    Calcium 8.1 (L) 8.3 - 10.4 MG/DL   GLUCOSE, POC    Collection Time: 09/23/19  7:48 AM   Result Value Ref Range    Glucose (POC) 174 (H) 65 - 100 mg/dL     Imaging:    XR CHEST PORT   Final Result   IMPRESSION:    1.   No acute cardiopulmonary process evident on single frontal view of the   chest.               XR CHEST SNGL V    (Results Pending)         Ras[facility] Al[address] Problems as of 9/23/2019 Date Reviewed: 6/12/2019          Codes Class Noted - Resolved POA    Acute on chronic respiratory failure with hypoxia and hypercapnia (HCC) ICD-10-CM: J96.21, J96.22  ICD-9-CM: 518.84, 786.09, 799.02  9/22/2019 - Present Yes        Acute tracheobronchitis ICD-10-CM: J20.9  ICD-9-CM: 466.0  9/19/2019 - Present Yes        * (Principal) COPD with acute exacerbation ([facility] ICD-10-CM: J44.1  ICD-9-CM: 491.21  9/19/2019 - Present Yes        JACQUELYN (obstructive sleep apnea) (Chronic) ICD-10-CM: G47.33  ICD-9-CM: 327.23  7/26/2017 - Present Yes    Overview Addendum 2/2/2015  9:38 AM by Patricia Carter NP     Uses home CPAP with 6L bleed in O2             Tobacco use disorder (Chronic) ICD-10-CM: Q23.488  ICD-9-CM: 305.1  7/26/2017 - Present Yes        Diabetes mellitus (HCC) (Chronic) ICD-10-CM: E11.9  ICD-9-CM: 250.00  7/26/2017 - Present Yes        Nocturnal hypoxia (Chronic) ICD-10-CM: G47.34  ICD-9-CM: 327.24  7/25/2017 - Present Yes        Essential hypertension with goal blood pressure less than 130/85 (Chronic) ICD-10-CM: I10  ICD-9-CM: 401.9  3/23/2017 - Present Yes        Coronary artery disease (Chronic) ICD-10-CM: I25.10  ICD-9-CM: 414.00  3/3/2015 - Present Yes        Debility (Chronic) ICD-10-CM: R53.81  ICD-9-CM: 799.3  6/5/2013 - Present Yes        Fibromyalgia (Chronic) ICD-10-CM: M79.7  ICD-9-CM: 729.1  4/30/2013 - Present Yes        Restless leg syndrome (Chronic) ICD-10-CM: G25.81  ICD-9-CM: 333.94  4/30/2013 - Present Yes            Plan:  · COPD exacerbation  · Continue levaquin (EOT 9/25)  · Continue mucinex  · Prn tessalon  · IV solumedrol- continue per pulm  · Nebs  · BiPAP qHS per pulmonology- appreciate recs  · Continue prn roxanol for dyspnea and will benefit from prescription for roxanol on discharge with follow up with palliative care  · Wean oxygen as tolerated- still requiring Airvo  · Recheck CXR  · If unable to wean, may need bronch for BAL as she has required this in the past.    · Nasal congestion/frontal headache  · Continue flonase and nasal saline    · HTN  · Continue present treatment    · JACQUELYN  · CPAP at home (BiPAP here due to COPD excacerbation)    Dispo- home when improved    DVT Prophylaxis: Lovenox    Signed By: Bekah Tipton MD     September 23, 2019

## 2019-09-23 NOTE — PROGRESS NOTES
Assisted physical therapy with patient walking in hallway. O2 sat down to 83% on 5L while ambulating. No c/o of shortness of breath and did not observe any labored breathing at this time. Assisted PT with patient back to room and patient assisted back to recliner. Patient O2 sat rebounded to 90 to 91% after a couple of minutes. No c/o verbalized at this time.

## 2019-09-24 LAB
ANION GAP SERPL CALC-SCNC: 4 MMOL/L (ref 7–16)
BUN SERPL-MCNC: 16 MG/DL (ref 8–23)
CALCIUM SERPL-MCNC: 8.4 MG/DL (ref 8.3–10.4)
CHLORIDE SERPL-SCNC: 101 MMOL/L (ref 98–107)
CO2 SERPL-SCNC: 38 MMOL/L (ref 21–32)
CREAT SERPL-MCNC: 0.55 MG/DL (ref 0.6–1)
GLUCOSE BLD STRIP.AUTO-MCNC: 135 MG/DL (ref 65–100)
GLUCOSE BLD STRIP.AUTO-MCNC: 172 MG/DL (ref 65–100)
GLUCOSE BLD STRIP.AUTO-MCNC: 185 MG/DL (ref 65–100)
GLUCOSE BLD STRIP.AUTO-MCNC: 191 MG/DL (ref 65–100)
GLUCOSE BLD STRIP.AUTO-MCNC: 263 MG/DL (ref 65–100)
GLUCOSE SERPL-MCNC: 195 MG/DL (ref 65–100)
POTASSIUM SERPL-SCNC: 4.2 MMOL/L (ref 3.5–5.1)
SODIUM SERPL-SCNC: 143 MMOL/L (ref 136–145)

## 2019-09-24 PROCEDURE — 74011636637 HC RX REV CODE- 636/637: Performed by: INTERNAL MEDICINE

## 2019-09-24 PROCEDURE — 97530 THERAPEUTIC ACTIVITIES: CPT

## 2019-09-24 PROCEDURE — 97535 SELF CARE MNGMENT TRAINING: CPT

## 2019-09-24 PROCEDURE — 97165 OT EVAL LOW COMPLEX 30 MIN: CPT

## 2019-09-24 PROCEDURE — 94640 AIRWAY INHALATION TREATMENT: CPT

## 2019-09-24 PROCEDURE — 74011250637 HC RX REV CODE- 250/637: Performed by: INTERNAL MEDICINE

## 2019-09-24 PROCEDURE — 77010033678 HC OXYGEN DAILY

## 2019-09-24 PROCEDURE — 74011250637 HC RX REV CODE- 250/637: Performed by: HOSPITALIST

## 2019-09-24 PROCEDURE — 74011250636 HC RX REV CODE- 250/636: Performed by: HOSPITALIST

## 2019-09-24 PROCEDURE — 80048 BASIC METABOLIC PNL TOTAL CA: CPT

## 2019-09-24 PROCEDURE — 74011000250 HC RX REV CODE- 250: Performed by: HOSPITALIST

## 2019-09-24 PROCEDURE — 94660 CPAP INITIATION&MGMT: CPT

## 2019-09-24 PROCEDURE — 36415 COLL VENOUS BLD VENIPUNCTURE: CPT

## 2019-09-24 PROCEDURE — 74011636637 HC RX REV CODE- 636/637: Performed by: HOSPITALIST

## 2019-09-24 PROCEDURE — 94762 N-INVAS EAR/PLS OXIMTRY CONT: CPT

## 2019-09-24 PROCEDURE — 94760 N-INVAS EAR/PLS OXIMETRY 1: CPT

## 2019-09-24 PROCEDURE — 74011000250 HC RX REV CODE- 250: Performed by: INTERNAL MEDICINE

## 2019-09-24 PROCEDURE — 97116 GAIT TRAINING THERAPY: CPT

## 2019-09-24 PROCEDURE — 65270000029 HC RM PRIVATE

## 2019-09-24 RX ADMIN — DILTIAZEM HYDROCHLORIDE 360 MG: 180 CAPSULE, COATED, EXTENDED RELEASE ORAL at 08:18

## 2019-09-24 RX ADMIN — IPRATROPIUM BROMIDE AND ALBUTEROL SULFATE 3 ML: .5; 3 SOLUTION RESPIRATORY (INHALATION) at 00:56

## 2019-09-24 RX ADMIN — IPRATROPIUM BROMIDE AND ALBUTEROL SULFATE 3 ML: .5; 3 SOLUTION RESPIRATORY (INHALATION) at 07:40

## 2019-09-24 RX ADMIN — IPRATROPIUM BROMIDE AND ALBUTEROL SULFATE 3 ML: .5; 3 SOLUTION RESPIRATORY (INHALATION) at 15:40

## 2019-09-24 RX ADMIN — ACETAMINOPHEN 650 MG: 325 TABLET, FILM COATED ORAL at 16:35

## 2019-09-24 RX ADMIN — INSULIN LISPRO 2 UNITS: 100 INJECTION, SOLUTION INTRAVENOUS; SUBCUTANEOUS at 22:09

## 2019-09-24 RX ADMIN — Medication 1 AMPULE: at 22:09

## 2019-09-24 RX ADMIN — FLUTICASONE PROPIONATE 2 SPRAY: 50 SPRAY, METERED NASAL at 08:17

## 2019-09-24 RX ADMIN — INSULIN LISPRO 6 UNITS: 100 INJECTION, SOLUTION INTRAVENOUS; SUBCUTANEOUS at 16:37

## 2019-09-24 RX ADMIN — BUDESONIDE 500 MCG: 0.5 INHALANT RESPIRATORY (INHALATION) at 07:40

## 2019-09-24 RX ADMIN — PANTOPRAZOLE SODIUM 40 MG: 40 TABLET, DELAYED RELEASE ORAL at 08:17

## 2019-09-24 RX ADMIN — GUAIFENESIN 1200 MG: 600 TABLET ORAL at 08:18

## 2019-09-24 RX ADMIN — INSULIN LISPRO 2 UNITS: 100 INJECTION, SOLUTION INTRAVENOUS; SUBCUTANEOUS at 08:17

## 2019-09-24 RX ADMIN — ASPIRIN 81 MG: 81 TABLET, COATED ORAL at 08:18

## 2019-09-24 RX ADMIN — IPRATROPIUM BROMIDE AND ALBUTEROL SULFATE 3 ML: .5; 3 SOLUTION RESPIRATORY (INHALATION) at 11:20

## 2019-09-24 RX ADMIN — Medication 10 ML: at 05:57

## 2019-09-24 RX ADMIN — BUDESONIDE 500 MCG: 0.5 INHALANT RESPIRATORY (INHALATION) at 20:42

## 2019-09-24 RX ADMIN — DOCUSATE SODIUM 100 MG: 100 CAPSULE, LIQUID FILLED ORAL at 08:17

## 2019-09-24 RX ADMIN — GABAPENTIN 300 MG: 300 CAPSULE ORAL at 08:18

## 2019-09-24 RX ADMIN — IPRATROPIUM BROMIDE AND ALBUTEROL SULFATE 3 ML: .5; 3 SOLUTION RESPIRATORY (INHALATION) at 20:42

## 2019-09-24 RX ADMIN — ENOXAPARIN SODIUM 40 MG: 40 INJECTION SUBCUTANEOUS at 22:11

## 2019-09-24 RX ADMIN — INSULIN LISPRO 2 UNITS: 100 INJECTION, SOLUTION INTRAVENOUS; SUBCUTANEOUS at 11:30

## 2019-09-24 RX ADMIN — GABAPENTIN 300 MG: 300 CAPSULE ORAL at 22:09

## 2019-09-24 RX ADMIN — MIRTAZAPINE 15 MG: 15 TABLET, FILM COATED ORAL at 22:10

## 2019-09-24 RX ADMIN — GUAIFENESIN 1200 MG: 600 TABLET ORAL at 22:10

## 2019-09-24 RX ADMIN — DULOXETINE HYDROCHLORIDE 30 MG: 30 CAPSULE, DELAYED RELEASE ORAL at 08:18

## 2019-09-24 RX ADMIN — Medication 1 AMPULE: at 08:17

## 2019-09-24 RX ADMIN — IPRATROPIUM BROMIDE AND ALBUTEROL SULFATE 3 ML: .5; 3 SOLUTION RESPIRATORY (INHALATION) at 05:21

## 2019-09-24 RX ADMIN — PREDNISONE 40 MG: 10 TABLET ORAL at 08:18

## 2019-09-24 RX ADMIN — Medication 10 ML: at 22:14

## 2019-09-24 NOTE — PROGRESS NOTES
Problem: Falls - Risk of  Goal: *Absence of Falls  Description  Document [facility] Fall Risk and appropriate interventions in the flowsheet.   Outcome: Progressing Towards Goal  Note:   Fall Risk Interventions:  Mobility Interventions: Patient to call before getting OOB         Medication Interventions: Patient to call before getting OOB    Elimination Interventions: Call light in reach              Problem: Patient Education: Go to Patient Education Activity  Goal: Patient/Family Education  Outcome: Progressing Towards Goal     Problem: Chronic Obstructive Pulmonary Disease (COPD)  Goal: *Oxygen saturation during activity within specified parameters  Outcome: Progressing Towards Goal  Goal: *Able to remain out of bed as prescribed  Outcome: Progressing Towards Goal  Goal: *Absence of hypoxia  Outcome: Progressing Towards Goal  Goal: *Optimize nutritional status  Outcome: Progressing Towards Goal     Problem: Patient Education: Go to Patient Education Activity  Goal: Patient/Family Education  Outcome: Progressing Towards Goal     Problem: Patient Education: Go to Patient Education Activity  Goal: Patient/Family Education  Outcome: Progressing Towards Goal

## 2019-09-24 NOTE — PROGRESS NOTES
Saw pt in interdisciplinarily rounds with the following disciplines: Physician, Case Management, Nursing, Dietary,Therapy and Pharmacy. The plan of care was discussed along with discharge date/ location. Pt is still requiring O2, will continue to monitor, may d/c tomorrow.

## 2019-09-24 NOTE — PROGRESS NOTES
Problem: Self Care Deficits Care Plan (Adult)  Goal: *Acute Goals and Plan of Care (Insert Text)  Description  1. Patient will perform grooming with supervision. 2. Patient will perform Upper body dressing with supervision  3. Patient will perform lower body dressing with supervision  4. Patient will perform upper and lower body bathing with supervision. 5. Patient will perform toilet transfers with CGA. 6. Patient will perform shower transfer with CGA. 7. Patient will participate in 30 + minutes of ADL/ therapeutic exercise/therapeutic activity with min rest breaks to increase activity tolerance for self care. 8. Patient will perform ADL functional mobility in room with CGA. Goals to be achieved in 7 days. Outcome: Progressing Towards Goal     OCCUPATIONAL THERAPY: Initial Assessment, Daily Note and AM 9/24/2019  INPATIENT:    [place] / [place] / Product Type: Medicare /      NAME/AGE/GENDER: Vj Owens is a 71 y.o. female   PRIMARY DIAGNOSIS:  COPD with acute exacerbation ([facility] [J44.1]  Acute tracheobronchitis [J20.9] COPD with acute exacerbation ([facility] [address]   COPD with acute exacerbation ([facility] [address]          ICD-10: Treatment Diagnosis:    Other lack of cordination (R27.8)  Difficulty in walking, Not elsewhere classified (R26.2)   Precautions/Allergies:    Azithromycin and Sulfa (sulfonamide antibiotics)      ASSESSMENT:     Ms. Louisa Nolasco admitted with above diagnosis. Pt presents with decreased self care, functional mobility, strength and endurance. Pt would benefit from skilled OT to increase independence. Pt completed toileting and hygiene with setup. Pt CGA for functional transfers and mobility. Pt had loss of balance in the bathroom. Pt left with PT for treatment.      This section established at most recent assessment   PROBLEM LIST (Impairments causing functional limitations):  Decreased Strength  Decreased ADL/Functional Activities  Decreased Transfer Abilities  Decreased Ambulation Ability/Technique  Decreased Balance  Decreased Activity Tolerance   INTERVENTIONS PLANNED: (Benefits and precautions of occupational therapy have been discussed with the patient.)  Activities of daily living training  Adaptive equipment training  Balance training  Clothing management  Therapeutic activity  Therapeutic exercise     TREATMENT PLAN: Frequency/Duration: Follow patient 3x/week to address above goals. Rehabilitation Potential For Stated Goals: Good     REHAB RECOMMENDATIONS (at time of discharge pending progress):    Placement: It is my opinion, based on this patient's performance to date, that Ms. Kinga Dao may benefit from participating in 1-2 additional therapy sessions in order to continue to assess for rehab potential and then make recommendation for disposition at discharge. Equipment:   None at this time              [facility]:   History of Present Injury/Illness (Reason for Referral):  COPD with acute exacerbation  Past Medical History/Comorbidities:   Ms. Kinga Dao  has a past medical history of Acute respiratory failure ([address] (1/29/2015), Acute respiratory failure ([address] (1/29/2015), Cancer ([address] Chest pain, unspecified (7/7/2016), Chiari I malformation ([address] COPD exacerbation ([address] (9/14/12-9/17/12), COPD exacerbation ([address] (4/2013 to 5/2/13), Paroxysmal tachycardia/NOS (7/7/2016), Pneumonia (9/16/2012), and Pneumothorax. She also has no past medical history of Aneurysm ([address] Difficult intubation, GERD (gastroesophageal reflux disease), Heart failure ([address] Liver disease, Malignant hyperthermia due to anesthesia, Morbid obesity ([address] Nausea & vomiting, Pseudocholinesterase deficiency, Seizures ([address] Thromboembolus ([address] or Unspecified adverse effect of anesthesia.   Ms. Kinga Dao  has a past surgical history that includes hx heent (2009); hx other surgical; hx heart catheterization (1/12); hx orthopaedic (1998); hx cataract removal; hx septoplasty (04/2010); hx shoulder arthroscopy (1990); and hx hysterectomy (1982). Social History/Living Environment:   Home Environment: Private residence  # Steps to Enter: 2  One/Two Story Residence: One story  Living Alone: No  Support Systems: Family member(s), Friends \ neighbors  Patient Expects to be Discharged to[de-identified] Private residence  Current DME Used/Available at Home: mita Barreto  Prior Level of Function/Work/Activity:  independent   Number of Personal Factors/Comorbidities that affect the Plan of Care: Brief history (0):  LOW COMPLEXITY   ASSESSMENT OF OCCUPATIONAL PERFORMANCE[de-identified]   Activities of Daily Living:   Basic ADLs (From Assessment) Complex ADLs (From Assessment)   Feeding: Setup  Oral Facial Hygiene/Grooming: Setup  Bathing: Contact guard assistance  Upper Body Dressing: Setup  Lower Body Dressing: Minimum assistance  Toileting: Setup     Grooming/Bathing/Dressing Activities of Daily Living     Cognitive Retraining  Safety/Judgement: Fall prevention                 Functional Transfers  Bathroom Mobility: Contact guard assistance  Toilet Transfer : Contact guard assistance  Shower Transfer: Contact guard assistance     Bed/Mat Mobility  Supine to Sit: Stand-by assistance  Sit to Stand: Contact guard assistance  Stand to Sit: Contact guard assistance     Most Recent Physical Functioning:   Gross Assessment:  AROM: Within functional limits(BUE)  Strength: Generally decreased, functional(BUE)  Coordination: Generally decreased, functional(BUE)  Tone: Normal  Sensation: Intact(BUE)               Posture:  Posture (WDL): Exceptions to WDL  Posture Assessment: Rounded shoulders  Balance:  Sitting: Intact  Standing: With support; Impaired  Standing - Static: Good  Standing - Dynamic : Fair Bed Mobility:  Supine to Sit: Stand-by assistance  Wheelchair Mobility:     Transfers:  Sit to Stand: Contact guard assistance  Stand to Sit: Contact guard assistance            Patient Vitals for the past 6 hrs: BP BP Patient Position SpO2 O2 Flow Rate (L/min) Pulse   19 0521 -- -- 93 % -- --   19 0524 -- -- 93 % -- --   19 0740 144/78 At rest 96 % 5 l/min 89   19 0800 -- -- -- 5 l/min --       Mental Status  Neurologic State: Alert  Orientation Level: Oriented X4  Cognition: Follows commands  Perception: Appears intact  Perseveration: No perseveration noted  Safety/Judgement: Fall prevention                          Physical Skills Involved:  Balance  Strength  Activity Tolerance Cognitive Skills Affected (resulting in the inability to perform in a timely and safe manner):  none  Psychosocial Skills Affected:  none    Number of elements that affect the Plan of Care: 1-3:  LOW COMPLEXITY   CLINICAL DECISION MAKIN[address] [place] [id number] AM-PAC 6 Clicks   Daily Activity Inpatient Short Form  How much help from another person does the patient currently need. .. Total A Lot A Little None   1. Putting on and taking off regular lower body clothing? ? 1   ? 2   ? 3   ? 4   2. Bathing (including washing, rinsing, drying)? ? 1   ? 2   ? 3   ? 4   3. Toileting, which includes using toilet, bedpan or urinal?   ? 1   ? 2   ? 3   ? 4   4. Putting on and taking off regular upper body clothing? ? 1   ? 2   ? 3   ? 4   5. Taking care of personal grooming such as brushing teeth? ? 1   ? 2   ? 3   ? 4   6. Eating meals? ? 1   ? 2   ? 3   ? 4   © 2007, Trustees of [address] [place] [address], under license to [employer]. All rights reserved      Score:  Initial: 21 Most Recent: X (Date: -- )    Interpretation of Tool:  Represents activities that are increasingly more difficult (i.e. Bed mobility, Transfers, Gait). Medical Necessity:     Patient is expected to demonstrate progress in strength, balance, coordination and functional technique   to increase independence with self care and functional mobility   .   Reason for Services/Other Comments:  Patient continues to require skilled intervention due to decrease self care and mobility   . Use of outcome tool(s) and clinical judgement create a POC that gives a: LOW COMPLEXITY         TREATMENT:   (In addition to Assessment/Re-Assessment sessions the following treatments were rendered)     Pre-treatment Symptoms/Complaints:  tolerated evaluation  Pain: Initial:   Pain Intensity 1: 0  Post Session:  0     Self Care: (10): Procedure(s) (per grid) utilized to improve and/or restore self-care/home management as related to toileting, grooming and functional transfers . Required minimal verbal and   cueing to facilitate activities of daily living skills and compensatory activities. Assessment/Reassessment (5)    Braces/Orthotics/Lines/Etc:   O2 Device: Hi flow nasal cannula 5 Liters  Treatment/Session Assessment:    Response to Treatment:  tolerated fairly  Interdisciplinary Collaboration:   Physical Therapy Assistant  Registered Nurse  After treatment position/precautions:   Supine in bed  Bed/Chair-wheels locked  Bed in low position  Call light within reach  RN notified  Side rails x 3   Compliance with Program/Exercises: Compliant all of the time. Recommendations/Intent for next treatment session: \"Next visit will focus on advancements to more challenging activities and reduction in assistance provided\".   Total Treatment Duration:  OT Patient Time In/Time Out  Time In: 1030  Time Out: 85 [facility] Christine Jansen

## 2019-09-24 NOTE — PROGRESS NOTES
Pt assessment completed. Alert and oriented. Lungs coarse and diminished bilaterally with SOB upon exertion. Continuous pulse ox present in room. Pt denies pain needs. Abdomen soft and non tender with active bowel sounds. Iv present with no s/sx of complications. Heart sounds regular. All needs met at this time.

## 2019-09-24 NOTE — PROGRESS NOTES
09/24/19 0740   Oxygen Therapy   O2 Sat (%) 92 %   Pulse via Oximetry 89 beats per minute   O2 Device Hi flow nasal cannula   O2 Flow Rate (L/min) 5 l/min

## 2019-09-24 NOTE — PROGRESS NOTES
Spoke with nursing today but did not examine today. Mrs. Radha De La Rosa has weaned to 5lpm and was able to participate in PT yesterday. Spoke with Dr. Marilyn Jefferson as well and we are available for questions today. Monitor on oral prednisone, continued bronchodilators. Will resume rounding tomororow.   Imelda Carrington MD

## 2019-09-24 NOTE — PROGRESS NOTES
Placed patient in BIPAP at 21:30 after she received her breathing treatment. Settings are 12/6, rate 16 and 45%. Medium full face mask fit is good. PT tolerating well.

## 2019-09-24 NOTE — PROGRESS NOTES
Hospitalist Progress Note    2019  Admit Date: 2019  5:53 PM   NAME: Yesi Aguayo   :  1950   MRN:  [id number]   Attending: Jackelyn Rivera MD  PCP:  Angelina Canada MD    SUBJECTIVE:   Uyen Romero is a 70 yo F admitted with COPD exacerbation. She wears oxygen at home prn at 3 LPM, but normally does not have to wear. She had severe wheezing and difficulty breathing initially. Started on IV steroids. Pulm consulted and transferred to unit for BiPAP and weaned to Airvo during day and BiPAP qHS. Trial of roxanol/morphine for work of breathing and this has helped. - O2 92% on 5L HFNC. Feeling very fatigued this morning, but reports breathing continues to improve slowly. Review of Systems negative with exception of pertinent positives noted above  PHYSICAL EXAM     Visit Vitals  /78 (BP 1 Location: Left arm, BP Patient Position: At rest)   Pulse 89   Temp 97.8 °F (36.6 °C)   Resp 16   Ht 5' 4\" (1.626 m)   Wt 74.4 kg (164 lb)   SpO2 96%   Breastfeeding?  No   BMI 28.15 kg/m²      Temp (24hrs), Av.3 °F (36.8 °C), Min:97.8 °F (36.6 °C), Max:98.7 °F (37.1 °C)    Patient Vitals for the past 24 hrs:   Temp Pulse Resp BP SpO2   19 0740 97.8 °F (36.6 °C) 89 16 144/78 96 %   19 0524     93 %   19 0521     93 %   19 0510 98.1 °F (36.7 °C) 89 16 144/69 91 %   19 0103     93 %   19 2337 98.2 °F (36.8 °C) 90 20 132/76 94 %   19 2130     93 %   19 2120     (!) 89 %   19 1910 98.5 °F (36.9 °C) 92 18 130/73 94 %   19 1849     90 %   19 1557 98.7 °F (37.1 °C) 88 20 147/71 90 %   19 1550     90 %   19 1430  95 27 169/60 91 %   19 1302  91 (!) 31 174/74 91 %   19 1141 98.3 °F (36.8 °C) 92 24 160/79 90 %   19 1120  91 23 160/79 91 %   19 1118     (!) 89 %   19 1028     92 %   19 1016  92 19 160/82 92 %       Oxygen Therapy  O2 Sat (%): 96 % (09/24/19 0740)  Pulse via Oximetry: 89 beats per minute (09/24/19 0740)  O2 Device: Hi flow nasal cannula (09/24/19 0800)  O2 Flow Rate (L/min): 5 l/min (09/24/19 0800)  O2 Temperature: 84.7 °F (29.3 °C) (09/24/19 0524)  FIO2 (%): 45 % (09/24/19 0524)    Intake/Output Summary (Last 24 hours) at 9/24/2019 0935  Last data filed at 9/24/2019 0833  Gross per 24 hour   Intake    Output 850 ml   Net -850 ml      General: No acute distress  Lungs:  No distress on 5L, no wheeze, diminished throughout  Heart:  RRR  Abdomen: Soft, Non distended, Non tender, Positive bowel sounds  Extremities: No cyanosis, clubbing or edema  Neurologic:  No focal deficits    Recent Results (from the past 24 hour(s))   GLUCOSE, POC    Collection Time: 09/23/19 11:46 AM   Result Value Ref Range    Glucose (POC) 196 (H) 65 - 100 mg/dL   GLUCOSE, POC    Collection Time: 09/23/19  4:09 PM   Result Value Ref Range    Glucose (POC) 254 (H) 65 - 100 mg/dL   GLUCOSE, POC    Collection Time: 09/23/19  9:52 PM   Result Value Ref Range    Glucose (POC) 245 (H) 65 - 813 mg/dL   METABOLIC PANEL, BASIC    Collection Time: 09/24/19  4:12 AM   Result Value Ref Range    Sodium 143 136 - 145 mmol/L    Potassium 4.2 3.5 - 5.1 mmol/L    Chloride 101 98 - 107 mmol/L    CO2 38 (H) 21 - 32 mmol/L    Anion gap 4 (L) 7 - 16 mmol/L    Glucose 195 (H) 65 - 100 mg/dL    BUN 16 8 - 23 MG/DL    Creatinine 0.55 (L) 0.6 - 1.0 MG/DL    GFR est AA >60 >60 ml/min/1.73m2    GFR est non-AA >60 >60 ml/min/1.73m2    Calcium 8.4 8.3 - 10.4 MG/DL   GLUCOSE, POC    Collection Time: 09/24/19  7:41 AM   Result Value Ref Range    Glucose (POC) 185 (H) 65 - 100 mg/dL     Imaging:    XR CHEST SNGL V   Final Result   IMPRESSION: No consolidation. XR CHEST PORT   Final Result   IMPRESSION:    1.   No acute cardiopulmonary process evident on single frontal view of the   chest.                     ASSESSMENT      Hospital Problems as of 9/24/2019 Date Reviewed: 6/12/2019 Codes Class Noted - Resolved POA    Acute on chronic respiratory failure with hypoxia and hypercapnia (HCC) ICD-10-CM: J96.21, J96.22  ICD-9-CM: 518.84, 786.09, 799.02  9/22/2019 - Present Yes        Acute tracheobronchitis ICD-10-CM: J20.9  ICD-9-CM: 466.0  9/19/2019 - Present Yes        * (Principal) COPD with acute exacerbation ([facility] [address] ICD-10-CM: J44.1  ICD-9-CM: 491.21  9/19/2019 - Present Yes        JACQUELYN (obstructive sleep apnea) (Chronic) ICD-10-CM: G47.33  ICD-9-CM: 327.23  7/26/2017 - Present Yes    Overview Addendum 2/2/2015  9:38 AM by Jenn Carcamo NP     Uses home CPAP with 6L bleed in O2             Tobacco use disorder (Chronic) ICD-10-CM: F92.646  ICD-9-CM: 305.1  7/26/2017 - Present Yes        Diabetes mellitus (HCC) (Chronic) ICD-10-CM: E11.9  ICD-9-CM: 250.00  7/26/2017 - Present Yes        Nocturnal hypoxia (Chronic) ICD-10-CM: Q38.58  ICD-9-CM: 327.24  7/25/2017 - Present Yes        Essential hypertension with goal blood pressure less than 130/85 (Chronic) ICD-10-CM: I10  ICD-9-CM: 401.9  3/23/2017 - Present Yes        Coronary artery disease (Chronic) ICD-10-CM: I25.10  ICD-9-CM: 414.00  3/3/2015 - Present Yes        Debility (Chronic) ICD-10-CM: R53.81  ICD-9-CM: 799.3  6/5/2013 - Present Yes        Fibromyalgia (Chronic) ICD-10-CM: M79.7  ICD-9-CM: 729.1  4/30/2013 - Present Yes        Restless leg syndrome (Chronic) ICD-10-CM: G25.81  ICD-9-CM: 333.94  4/30/2013 - Present Yes            Plan:  · COPD exacerbation   · S/p 5d levaquin   · Continue mucinex  · Prn tessalon  · solumedrol- transitioned to PO per pulm  · Nebs  · Has been on BiPAP qHS per pulmonology- changing back to home cpap  · Continue prn roxanol for dyspnea and will benefit from prescription for roxanol on discharge with follow up with palliative care  · Wean oxygen as tolerated- still requiring HFNC   · Continue flonase and nasal saline    · HTN  · Continue present treatment    · JACQUELYN  · CPAP    Dispo- home when improved    DVT Prophylaxis: Lovenox    Signed By: Devera Burkitt, MD     September 24, 2019

## 2019-09-24 NOTE — PROGRESS NOTES
Problem: Mobility Impaired (Adult and Pediatric)  Goal: *Acute Goals and Plan of Care (Insert Text)  Description    LTG:  (1.)Ms. Mariah Sargent will move from supine to sit and sit to supine  in bed with SUPERVISION within 7 treatment day(s). (2.)Ms. Mariah Sargent will transfer from bed to chair and chair to bed with SUPERVISION using the least restrictive device within 7 treatment day(s). (3.)Ms. Mariah Sargent will ambulate with SUPERVISION for 300 feet with the least restrictive device within 7 treatment day(s). (4)Ms. Mariah Sargent will perform HEP with cues and assistance to increase safety on her feet in 7 days. (5)Ms. Mariah Sargent will go up 2 steps min assist in 7 days. ________________________________________________________________________________________________     Outcome: Progressing Towards Goal     PHYSICAL THERAPY: Daily Note and AM 9/24/2019  INPATIENT: PT Visit Days : 2  [place] / [place] / Product Type: Medicare /       NAME/AGE/GENDER: Janette Stern is a 71 y.o. female   PRIMARY DIAGNOSIS: COPD with acute exacerbation ([facility] [J44.1]  Acute tracheobronchitis [J20.9] COPD with acute exacerbation ([facility]   COPD with acute exacerbation ([facility]         ICD-10: Treatment Diagnosis:    · Generalized Muscle Weakness (M62.81)  · Other abnormalities of gait and mobility (R26.89)   Precaution/Allergies:  Azithromycin and Sulfa (sulfonamide antibiotics)      ASSESSMENT:     Ms. Mariah Sargent presents with decreased functional mobility and gait. Pt. Is normally independent at baseline, occasional use of a cane. She drives. She is up in the chair on contact. She is visiting with friends now who live close by and can assist her as needed when she gets home. She ambulated CGA on 5L O2 into the gastelum. She is a little unsteady. May benefit from using a cane as we tried the walker but she did not like it. O2 sats 89% at rest, decreased to 83% walking and increase back to 88% after resting a few minutes.   Gait distance limited by breathing and O2 sats. Will follow. 9/24 am supine upon arrival.  Work on bed mobility as follows: supine>EOB with verbal cues and CGA. Ambulated 120 ft on 5 L of 02 and stats drop to 85%, but increase to 95% after taking in several breaths. During gait LOB x 3 but need help recovering. Left in supine with call near. This section established at most recent assessment   PROBLEM LIST (Impairments causing functional limitations):  1. Decreased Strength  2. Decreased ADL/Functional Activities  3. Decreased Transfer Abilities  4. Decreased Ambulation Ability/Technique  5. Decreased Balance  6. Decreased Activity Tolerance  7. Increased Fatigue  8. Increased Shortness of Breath  9. Decreased Flexibility/Joint Mobility  10. Decreased [place] with Home Exercise Program   INTERVENTIONS PLANNED: (Benefits and precautions of physical therapy have been discussed with the patient.)  1. Balance Exercise  2. Bed Mobility  3. Family Education  4. Gait Training  5. Home Exercise Program (HEP)  6. Range of Motion (ROM)  7. Therapeutic Activites  8. Therapeutic Exercise/Strengthening  9. Transfer Training     TREATMENT PLAN: Frequency/Duration: daily for duration of hospital stay  Rehabilitation Potential For Stated Goals: Good     REHAB RECOMMENDATIONS (at time of discharge pending progress):    Placement:  HHPT  Equipment:    None at this time              HISTORY:   History of Present Injury/Illness (Reason for Referral):  Jina Palumbo is a 72 yo F admitted with COPD exacerbation. She wears oxygen at home prn at 3 LPM, but normally does not have to wear. She had severe wheezing and difficulty breathing initially. Started on IV steroids. Pulm consulted and transferred to unit for BiPAP and weaned to Airvo during day and BiPAP qHS. Trial of roxanol/morphine for work of breathing and this has helped. 9/23- breathing easier and asking when she can go home. Still on Airvo.   Past Medical History/Comorbidities:   Ms. Nitesh Belcher  has a past medical history of Acute respiratory failure ([address] (1/29/2015), Acute respiratory failure ([address] (1/29/2015), Cancer [facility]), Chest pain, unspecified (7/7/2016), Chiari I malformation ([address] COPD exacerbation ([address] (9/14/12-9/17/12), COPD exacerbation ([address] (4/2013 to 5/2/13), Paroxysmal tachycardia/NOS (7/7/2016), Pneumonia (9/16/2012), and Pneumothorax. She also has no past medical history of Aneurysm ([address] Difficult intubation, GERD (gastroesophageal reflux disease), Heart failure ([address] Liver disease, Malignant hyperthermia due to anesthesia, Morbid obesity ([address] Nausea & vomiting, Pseudocholinesterase deficiency, Seizures ([address] Thromboembolus ([address] or Unspecified adverse effect of anesthesia. Ms. Nitesh Belcher  has a past surgical history that includes hx heent (2009); hx other surgical; hx heart catheterization (1/12); hx orthopaedic (1998); hx cataract removal; hx septoplasty (04/2010); hx shoulder arthroscopy (1990); and hx hysterectomy (1982). Social History/Living Environment:   Home Environment: Private residence  # Steps to Enter: 2  One/Two Story Residence: One story  Living Alone: No  Support Systems: Family member(s), Friends \ neighbors  Patient Expects to be Discharged to[de-identified] Private residence  Current DME Used/Available at Home: [address] [facility], [facility]  Prior Level of Function/Work/Activity:  independent     Number of Personal Factors/Comorbidities that affect the Plan of Care: 1-2: MODERATE COMPLEXITY   EXAMINATION:   Most Recent Physical Functioning:   Gross Assessment:                  Posture:     Balance:  Sitting: Intact  Standing: Impaired; With support  Standing - Static: Good  Standing - Dynamic : Fair Bed Mobility:  Supine to Sit: Stand-by assistance  Sit to Supine: Stand-by assistance  Wheelchair Mobility:     Transfers:  Sit to Stand: Contact guard assistance  Stand to Sit: Contact guard assistance  Duration: 10 Minutes  Gait:     Speed/Lizzeth: Delayed  Step Length: Left shortened;Right shortened  Gait Abnormalities: Decreased step clearance  Distance (ft): 120 Feet (ft)  Ambulation - Level of Assistance: Contact guard assistance  Interventions: Manual cues; Safety awareness training  Duration: 15 Minutes      Body Structures Involved:  1. Bones  2. Joints  3. Muscles  4. Ligaments Body Functions Affected:  1. Movement Related Activities and Participation Affected:  1. Mobility   Number of elements that affect the Plan of Care: 3: MODERATE COMPLEXITY   CLINICAL PRESENTATION:   Presentation: Stable and uncomplicated: LOW COMPLEXITY   CLINICAL DECISION MAKIN[address] [place] [address] AM-PAC 6 Clicks   Basic Mobility Inpatient Short Form  How much difficulty does the patient currently have. .. Unable A Lot A Little None   1. Turning over in bed (including adjusting bedclothes, sheets and blankets)? ? 1   ? 2   ? 3   ? 4   2. Sitting down on and standing up from a chair with arms ( e.g., wheelchair, bedside commode, etc.)   ? 1   ? 2   ? 3   ? 4   3. Moving from lying on back to sitting on the side of the bed?   ? 1   ? 2   ? 3   ? 4   How much help from another person does the patient currently need. .. Total A Lot A Little None   4. Moving to and from a bed to a chair (including a wheelchair)? ? 1   ? 2   ? 3   ? 4   5. Need to walk in hospital room? ? 1   ? 2   ? 3   ? 4   6. Climbing 3-5 steps with a railing? ? 1   ? 2   ? 3   ? 4   © , Trustees of [address] [place] [address], under license to [employer]. All rights reserved      Score:  Initial: 19 Most Recent: X (Date: -- )    Interpretation of Tool:  Represents activities that are increasingly more difficult (i.e. Bed mobility, Transfers, Gait). Medical Necessity:     · Patient is expected to demonstrate progress in strength, range of motion and balance  ·  to decrease assistance required with exercises and functional mobility   · .   Reason for Services/Other Comments:  · Patient continues to require present interventions due to patient's inability to perform exercises and functional mobility independently   · . Use of outcome tool(s) and clinical judgement create a POC that gives a: Questionable prediction of patient's progress: MODERATE COMPLEXITY            TREATMENT:   (In addition to Assessment/Re-Assessment sessions the following treatments were rendered)   Pre-treatment Symptoms/Complaints:  sob  Pain: Initial:   Pain Intensity 1: 0(about the same)  Post Session:      Therapeutic Activity: (  10 Minutes ):  Therapeutic activities including bed mobility and transfer with 02 @5 L throughout tx. Gait Training (15 Minutes):  Gait training to improve and/or restore physical functioning as related to mobility. Ambulated 120 Feet (ft) with Contact guard assistance using a   and minimal Manual cues; Safety awareness training rBraces/Orthotics/Lines/Etc:   · Telemetry lines   · O2 Device: Hi flow nasal cannula  Treatment/Session Assessment:    · Response to Treatment:  Pt. Did fine, RN with O2 sat monitor throughout tx  · Interdisciplinary Collaboration:   o Registered Nurse  o Rehabilitation Attendant  · After treatment position/precautions:   o Up in chair  o Bed/Chair-wheels locked  o Bed in low position  o Caregiver at bedside  o Call light within reach  o RN notified  o Family at bedside   · Compliance with Program/Exercises: Will assess as treatment progresses  · Recommendations/Intent for next treatment session: \"Next visit will focus on advancements to more challenging activities and reduction in assistance provided\".   Total Treatment Duration:  PT Patient Time In/Time Out  Time In: 1100  Time Out: [address] Beth Rivas, PTA

## 2019-09-24 NOTE — PROGRESS NOTES
Care Management Interventions  Mode of Transport at Discharge: Other (see comment)  Transition of Care Consult (CM Consult): Other  Current Support Network: Own Home  Confirm Follow Up Transport: Family  Plan discussed with Pt/Family/Caregiver: Yes  Freedom of Choice Offered: Yes  Discharge Location  Discharge Placement: Home  Visited with pt regarding plans for discharge, pt lives at home, alone, inde with ADL's, drives, has a dog, mother and sister live \"down the road\" and are supportive. Uses CPAP @ HS and has concentrators during the day \"If I need it\". Will continue to follow until d/c to help with any home going needs.

## 2019-09-25 VITALS
HEIGHT: 64 IN | DIASTOLIC BLOOD PRESSURE: 78 MMHG | BODY MASS INDEX: 28 KG/M2 | OXYGEN SATURATION: 90 % | TEMPERATURE: 97.5 F | HEART RATE: 106 BPM | WEIGHT: 164 LBS | SYSTOLIC BLOOD PRESSURE: 149 MMHG | RESPIRATION RATE: 16 BRPM

## 2019-09-25 LAB
ANION GAP SERPL CALC-SCNC: 4 MMOL/L (ref 7–16)
BUN SERPL-MCNC: 13 MG/DL (ref 8–23)
CALCIUM SERPL-MCNC: 7.8 MG/DL (ref 8.3–10.4)
CHLORIDE SERPL-SCNC: 104 MMOL/L (ref 98–107)
CO2 SERPL-SCNC: 37 MMOL/L (ref 21–32)
CREAT SERPL-MCNC: 0.5 MG/DL (ref 0.6–1)
ERYTHROCYTE [DISTWIDTH] IN BLOOD BY AUTOMATED COUNT: 15.3 % (ref 11.9–14.6)
GLUCOSE BLD STRIP.AUTO-MCNC: 100 MG/DL (ref 65–100)
GLUCOSE BLD STRIP.AUTO-MCNC: 182 MG/DL (ref 65–100)
GLUCOSE SERPL-MCNC: 130 MG/DL (ref 65–100)
HCT VFR BLD AUTO: 46.5 % (ref 35.8–46.3)
HGB BLD-MCNC: 15.1 G/DL (ref 11.7–15.4)
MCH RBC QN AUTO: 30.3 PG (ref 26.1–32.9)
MCHC RBC AUTO-ENTMCNC: 32.5 G/DL (ref 31.4–35)
MCV RBC AUTO: 93.2 FL (ref 79.6–97.8)
NRBC # BLD: 0 K/UL (ref 0–0.2)
PLATELET # BLD AUTO: 248 K/UL (ref 150–450)
PMV BLD AUTO: 9.1 FL (ref 9.4–12.3)
POTASSIUM SERPL-SCNC: 3.8 MMOL/L (ref 3.5–5.1)
RBC # BLD AUTO: 4.99 M/UL (ref 4.05–5.2)
SODIUM SERPL-SCNC: 145 MMOL/L (ref 136–145)
WBC # BLD AUTO: 10.8 K/UL (ref 4.3–11.1)

## 2019-09-25 PROCEDURE — 99232 SBSQ HOSP IP/OBS MODERATE 35: CPT | Performed by: INTERNAL MEDICINE

## 2019-09-25 PROCEDURE — 90471 IMMUNIZATION ADMIN: CPT

## 2019-09-25 PROCEDURE — 74011000250 HC RX REV CODE- 250: Performed by: INTERNAL MEDICINE

## 2019-09-25 PROCEDURE — 77010033678 HC OXYGEN DAILY

## 2019-09-25 PROCEDURE — 36415 COLL VENOUS BLD VENIPUNCTURE: CPT

## 2019-09-25 PROCEDURE — 74011250637 HC RX REV CODE- 250/637: Performed by: INTERNAL MEDICINE

## 2019-09-25 PROCEDURE — 90686 IIV4 VACC NO PRSV 0.5 ML IM: CPT | Performed by: HOSPITALIST

## 2019-09-25 PROCEDURE — 94761 N-INVAS EAR/PLS OXIMETRY MLT: CPT

## 2019-09-25 PROCEDURE — 74011250636 HC RX REV CODE- 250/636: Performed by: HOSPITALIST

## 2019-09-25 PROCEDURE — 94640 AIRWAY INHALATION TREATMENT: CPT

## 2019-09-25 PROCEDURE — 80048 BASIC METABOLIC PNL TOTAL CA: CPT

## 2019-09-25 PROCEDURE — 94760 N-INVAS EAR/PLS OXIMETRY 1: CPT

## 2019-09-25 PROCEDURE — 82962 GLUCOSE BLOOD TEST: CPT

## 2019-09-25 PROCEDURE — 85027 COMPLETE CBC AUTOMATED: CPT

## 2019-09-25 PROCEDURE — 74011636637 HC RX REV CODE- 636/637: Performed by: INTERNAL MEDICINE

## 2019-09-25 PROCEDURE — 97116 GAIT TRAINING THERAPY: CPT

## 2019-09-25 PROCEDURE — 74011000250 HC RX REV CODE- 250: Performed by: HOSPITALIST

## 2019-09-25 PROCEDURE — 74011250637 HC RX REV CODE- 250/637: Performed by: HOSPITALIST

## 2019-09-25 RX ORDER — PREDNISONE 10 MG/1
TABLET ORAL
Qty: 30 TAB | Refills: 0 | Status: SHIPPED | OUTPATIENT
Start: 2019-09-25 | End: 2019-10-02

## 2019-09-25 RX ADMIN — ASPIRIN 81 MG: 81 TABLET, COATED ORAL at 09:19

## 2019-09-25 RX ADMIN — GABAPENTIN 300 MG: 300 CAPSULE ORAL at 09:19

## 2019-09-25 RX ADMIN — DULOXETINE HYDROCHLORIDE 30 MG: 30 CAPSULE, DELAYED RELEASE ORAL at 09:19

## 2019-09-25 RX ADMIN — INFLUENZA VIRUS VACCINE 0.5 ML: 15; 15; 15; 15 SUSPENSION INTRAMUSCULAR at 11:36

## 2019-09-25 RX ADMIN — FLUTICASONE PROPIONATE 2 SPRAY: 50 SPRAY, METERED NASAL at 08:30

## 2019-09-25 RX ADMIN — IPRATROPIUM BROMIDE AND ALBUTEROL SULFATE 3 ML: .5; 3 SOLUTION RESPIRATORY (INHALATION) at 11:23

## 2019-09-25 RX ADMIN — PREDNISONE 40 MG: 10 TABLET ORAL at 07:39

## 2019-09-25 RX ADMIN — GUAIFENESIN 1200 MG: 600 TABLET ORAL at 09:19

## 2019-09-25 RX ADMIN — PANTOPRAZOLE SODIUM 40 MG: 40 TABLET, DELAYED RELEASE ORAL at 07:39

## 2019-09-25 RX ADMIN — Medication 1 AMPULE: at 09:19

## 2019-09-25 RX ADMIN — IPRATROPIUM BROMIDE AND ALBUTEROL SULFATE 3 ML: .5; 3 SOLUTION RESPIRATORY (INHALATION) at 08:10

## 2019-09-25 RX ADMIN — BUDESONIDE 500 MCG: 0.5 INHALANT RESPIRATORY (INHALATION) at 08:10

## 2019-09-25 RX ADMIN — Medication 5 ML: at 06:00

## 2019-09-25 RX ADMIN — DILTIAZEM HYDROCHLORIDE 360 MG: 180 CAPSULE, COATED, EXTENDED RELEASE ORAL at 09:19

## 2019-09-25 RX ADMIN — IPRATROPIUM BROMIDE AND ALBUTEROL SULFATE 3 ML: .5; 3 SOLUTION RESPIRATORY (INHALATION) at 00:18

## 2019-09-25 RX ADMIN — IPRATROPIUM BROMIDE AND ALBUTEROL SULFATE 3 ML: .5; 3 SOLUTION RESPIRATORY (INHALATION) at 04:53

## 2019-09-25 NOTE — PROGRESS NOTES
ICU Pulmonary Daily Progress NOTE    Shruti Ponce    9/25/2019     Shruti Ponce is a 72yoF who is followed at [facility] for GOLD stage 2 COPD (1.3L; 57% predicted) with nocturnal hypoxemia with 45pkyr smoking history and active 1ppd smoking. PMH is also notable for JACQUELYN. , mild pulmonary hypertension with RVSP 30mmHg in 2017. She has required intubation in the past for severe exacerbation. She was admitted to [place] on 9/19 with about 5-6 days of coughing, dyspnea and wheezing. In the ER she was treated with duonebs and solumedrol but hypoxemia and severe wheezing persisted. Hospital day #1 she was unable to stay off BiPAP due to severe dyspnea and wheezing. HPI:   Weaned to 4L NC. Tolerated CPAP though states her home machine is much more comfortable and she uses it every night at home. She states breathing is better and wants to go home. Tired of being in hospital. Coughing is better and wheezing is resolved.        Review of Systems:  -Fever  -Headaches  -Chest pain  -Abdominal pain, -constipation +gerd  -Leg swelling  All other organ systems grossly normal.    Current Facility-Administered Medications   Medication Dose Route Frequency    prednisoLONE acetate (PRED FORTE) 1 % ophthalmic suspension 1 Drop  1 Drop Both Eyes Q12H    docusate sodium (COLACE) capsule 100 mg  100 mg Oral DAILY    predniSONE (DELTASONE) tablet 40 mg  40 mg Oral DAILY WITH BREAKFAST    diphenhydrAMINE (BENADRYL) capsule 25 mg  25 mg Oral QHS PRN    nicotine (NICODERM CQ) 21 mg/24 hr patch 1 Patch  1 Patch TransDERmal Q24H    morphine 10 mg/5 mL oral solution 5 mg  5 mg Oral Q4H PRN    fluticasone propionate (FLONASE) 50 mcg/actuation nasal spray 2 Spray  2 Spray Both Nostrils DAILY    sodium chloride (OCEAN) 0.65 % nasal squeeze bottle 2 Spray  2 Spray Both Nostrils Q2H PRN    pantoprazole (PROTONIX) tablet 40 mg  40 mg Oral ACB    guaiFENesin ER (MUCINEX) tablet 1,200 mg 1,200 mg Oral Q12H    benzonatate (TESSALON) capsule 100 mg  100 mg Oral TID PRN    morphine injection 2 mg  2 mg IntraVENous Q4H PRN    albuterol-ipratropium (DUO-NEB) 2.5 MG-0.5 MG/3 ML  3 mL Nebulization Q4H RT    alcohol 62% (NOZIN) nasal  1 Ampule  1 Ampule Topical Q12H    albuterol-ipratropium (DUO-NEB) 2.5 MG-0.5 MG/3 ML  3 mL Nebulization Q4H PRN    aspirin delayed-release tablet 81 mg  81 mg Oral DAILY    DULoxetine (CYMBALTA) capsule 30 mg  30 mg Oral DAILY    gabapentin (NEURONTIN) capsule 300 mg  300 mg Oral BID    mirtazapine (REMERON) tablet 15 mg  15 mg Oral QHS    nitroglycerin (NITROSTAT) tablet 0.4 mg  0.4 mg SubLINGual PRN    ketotifen (ZADITOR) 0.025 % (0.035 %) ophthalmic solution 1 Drop  1 Drop Both Eyes BID    dilTIAZem CD (CARDIZEM CD) capsule 360 mg  360 mg Oral DAILY    dextrose 40% (GLUTOSE) oral gel 1 Tube  15 g Oral PRN    glucagon (GLUCAGEN) injection 1 mg  1 mg IntraMUSCular PRN    dextrose (D50W) injection syrg 12.5-25 g  25-50 mL IntraVENous PRN    insulin lispro (HUMALOG) injection   SubCUTAneous AC&HS    sodium chloride (NS) flush 5-40 mL  5-40 mL IntraVENous Q8H    sodium chloride (NS) flush 5-40 mL  5-40 mL IntraVENous PRN    budesonide (PULMICORT) 500 mcg/2 ml nebulizer suspension  500 mcg Nebulization BID RT    acetaminophen (TYLENOL) tablet 650 mg  650 mg Oral Q4H PRN    ondansetron (ZOFRAN) injection 4 mg  4 mg IntraVENous Q4H PRN    bisacodyl (DULCOLAX) tablet 5 mg  5 mg Oral DAILY PRN    enoxaparin (LOVENOX) injection 40 mg  40 mg SubCUTAneous Q24H    influenza vaccine 2019-20 (6 mos+)(PF) (FLUARIX/FLULAVAL/FLUZONE QUAD) injection 0.5 mL  0.5 mL IntraMUSCular PRIOR TO DISCHARGE     Objective:     Vitals:    09/24/19 2334 09/25/19 0020 09/25/19 0429 09/25/19 0454   BP: 129/63  133/68    Pulse: 84  80    Resp: 18  18    Temp: 97 °F (36.1 °C)  97.5 °F (36.4 °C)    SpO2: 92% 90% 92% 98%   Weight:       Height:         PHYSICAL EXAM Constitutional:  the patient is well developed  EENMT:  Sclera clear, pupils equal, oral mucosa moist  Respiratory: no wheezing this morning, diminished, but clear  Cardiovascular:  RRR without M,G,R  Gastrointestinal: soft and non-tender; with positive bowel sounds. Musculoskeletal: warm without cyanosis. There is no lower extremity edema. Skin:  no jaundice or rashes, no wounds   Neurologic: no gross neuro deficits     Psychiatric:  alert and oriented x 3    CXR:        Recent Labs     09/25/19  0413   WBC 10.8   HGB 15.1   HCT 46.5*        Recent Labs     09/25/19  0413 09/24/19  0412 09/23/19  0321    143 141   K 3.8 4.2 4.3    101 102   * 195* 170*   CO2 37* 38* 37*   BUN 13 16 15   CREA 0.50* 0.55* 0.55*   CA 7.8* 8.4 8.1*     No results for input(s): PH, PCO2, PO2, HCO3, PHI, PCO2I, PO2I, HCO3I in the last 72 hours. No results for input(s): LCAD, LAC in the last 72 hours. No results for input(s): PH, PCO2, PO2, HCO3, PHI, PCO2I, PO2I, HCO3I in the last 72 hours. No results for input(s): LCAD, LAC in the last 72 hours.       Assessment:  (Medical Decision Making)     Hospital Problems  Date Reviewed: 6/12/2019          Codes Class Noted POA    Acute tracheobronchitis ICD-10-CM: J20.9  ICD-9-CM: 466.0  9/19/2019 Yes         * (Principal) COPD with acute exacerbation ([facility] ICD-10-CM: J44.1  ICD-9-CM: 491.21  9/19/2019 Yes    On BIPAP    JACQUELYN (obstructive sleep apnea) (Chronic) ICD-10-CM: G47.33  ICD-9-CM: 327.23  7/26/2017 Yes    Overview Addendum 2/2/2015  9:38 AM by Darrell Molina NP     Uses home CPAP with 6L bleed in O2         BiPAP    Tobacco use disorder (Chronic) ICD-10-CM: I59.760  ICD-9-CM: 305.1  7/26/2017 Yes        Diabetes mellitus (HCC) (Chronic) ICD-10-CM: E11.9  ICD-9-CM: 250.00  7/26/2017 Yes    Monitor on steroids    Nocturnal hypoxia (Chronic) ICD-10-CM: G47.34  ICD-9-CM: 327.24  7/25/2017 Yes        Essential hypertension with goal blood pressure less than 130/85 (Chronic) ICD-10-CM: I10  ICD-9-CM: 401.9  3/23/2017 Yes        Coronary artery disease (Chronic) ICD-10-CM: I25.10  ICD-9-CM: 414.00  3/3/2015 Yes        Debility (Chronic) ICD-10-CM: R53.81  ICD-9-CM: 799.3  6/5/2013 Yes        Fibromyalgia (Chronic) ICD-10-CM: M79.7  ICD-9-CM: 729.1  4/30/2013 Yes        Restless leg syndrome (Chronic) ICD-10-CM: N92.67  ICD-9-CM: 333.94  4/30/2013 Yes          Elderly female with COPD/smoker with COPD exacerbation requiring BiPAP    Plan:  (Medical Decision Making)     Check RA and ambulatory sats  If O2 requirement <6L could discharge home with clinical follow up  CPAP tonight with home settings, could bring in home machine if stays another night  Taper 40mg prednisone over 10 days  Continue bronchodilators. Completed 5 days of Levuin  Has home O2 with concentrator and tanks already, will re-evaluate O2 requirements at outpatient follow up. Recommend pulmonary follow up in 1-2 weeks. More than 50% of the time documented was spent in face-to-face contact with the patient and in the care of the patient on the floor/unit where the patient is located.        Blade Reynoso MD

## 2019-09-25 NOTE — PROGRESS NOTES
Care Management Interventions  Mode of Transport at Discharge: Other (see comment)  Transition of Care Consult (CM Consult):  Other  Current Support Network: Own Home  Confirm Follow Up Transport: Family  Plan discussed with Pt/Family/Caregiver: Yes  Freedom of Choice Offered: Yes  Discharge Location  Discharge Placement: Home  Pt ready to d/c home with family, no further needs at this time, CM signing off

## 2019-09-25 NOTE — DISCHARGE INSTRUCTIONS
DISCHARGE SUMMARY from Nurse    PATIENT INSTRUCTIONS:    After general anesthesia or intravenous sedation, for 24 hours or while taking prescription Narcotics:  · Limit your activities  · Do not drive and operate hazardous machinery  · Do not make important personal or business decisions  · Do  not drink alcoholic beverages  · If you have not urinated within 8 hours after discharge, please contact your surgeon on call. Report the following to your surgeon:  · Excessive pain, swelling, redness or odor of or around the surgical area  · Temperature over 100.5  · Nausea and vomiting lasting longer than 4 hours or if unable to take medications  · Any signs of decreased circulation or nerve impairment to extremity: change in color, persistent  numbness, tingling, coldness or increase pain  · Any questions    What to do at Home:  Recommended activity: Activity as tolerated, 4L O2 at rest, 6L O2 with activity, CPAP at night, follow up with PCP and Pulmonary. Complete steroid as prescribed. If you experience any of the following symptoms: worsening shortness of breath, chest pain, fever, chills, wheezing, any other concerning symptoms, please follow up with PCP, pulmonary, return to ER. *  Please give a list of your current medications to your Primary Care Provider. *  Please update this list whenever your medications are discontinued, doses are      changed, or new medications (including over-the-counter products) are added. *  Please carry medication information at all times in case of emergency situations. These are general instructions for a healthy lifestyle:    No smoking/ No tobacco products/ Avoid exposure to second hand smoke  Surgeon General's Warning:  Quitting smoking now greatly reduces serious risk to your health.     Obesity, smoking, and sedentary lifestyle greatly increases your risk for illness    A healthy diet, regular physical exercise & weight monitoring are important for maintaining a healthy lifestyle    You may be retaining fluid if you have a history of heart failure or if you experience any of the following symptoms:  Weight gain of 3 pounds or more overnight or 5 pounds in a week, increased swelling in our hands or feet or shortness of breath while lying flat in bed. Please call your doctor as soon as you notice any of these symptoms; do not wait until your next office visit. The discharge information has been reviewed with the patient. The patient verbalized understanding. Discharge medications reviewed with the patient and appropriate educational materials and side effects teaching were provided. ___________________________________________________________________________________________________________________________________  Patient Education        Chronic Obstructive Pulmonary Disease (COPD): Care Instructions  Your Care Instructions    Chronic obstructive pulmonary disease (COPD) is a general term for a group of lung diseases, including emphysema and chronic bronchitis. People with COPD have decreased airflow in and out of the lungs, which makes it hard to breathe. The airways also can get clogged with thick mucus. Cigarette smoking is a major cause of COPD. Although there is no cure for COPD, you can slow its progress. Following your treatment plan and taking care of yourself can help you feel better and live longer. Follow-up care is a key part of your treatment and safety. Be sure to make and go to all appointments, and call your doctor if you are having problems. It's also a good idea to know your test results and keep a list of the medicines you take. How can you care for yourself at home?   Staying healthy    · Do not smoke. This is the most important step you can take to prevent more damage to your lungs. If you need help quitting, talk to your doctor about stop-smoking programs and medicines.  These can increase your chances of quitting for good.     · Avoid colds and flu. Get a pneumococcal vaccine shot. If you have had one before, ask your doctor whether you need a second dose. Get the flu vaccine every fall. If you must be around people with colds or the flu, wash your hands often.     · Avoid secondhand smoke, air pollution, and high altitudes. Also avoid cold, dry air and hot, humid air. Stay at home with your windows closed when air pollution is bad.    Medicines and oxygen therapy    · Take your medicines exactly as prescribed. Call your doctor if you think you are having a problem with your medicine.     · You may be taking medicines such as:  ? Bronchodilators. These help open your airways and make breathing easier. Bronchodilators are either short-acting (work for 6 to 9 hours) or long-acting (work for 24 hours). You inhale most bronchodilators, so they start to act quickly. Always carry your quick-relief inhaler with you in case you need it while you are away from home. ? Corticosteroids (prednisone, budesonide). These reduce airway inflammation. They come in pill or inhaled form. You must take these medicines every day for them to work well.     · A spacer may help you get more inhaled medicine to your lungs. Ask your doctor or pharmacist if a spacer is right for you. If it is, ask how to use it properly.     · Do not take any vitamins, over-the-counter medicine, or herbal products without talking to your doctor first.     · If your doctor prescribed antibiotics, take them as directed. Do not stop taking them just because you feel better. You need to take the full course of antibiotics.     · Oxygen therapy boosts the amount of oxygen in your blood and helps you breathe easier. Use the flow rate your doctor has recommended, and do not change it without talking to your doctor first.   Activity    · Get regular exercise. Walking is an easy way to get exercise. Start out slowly, and walk a little more each day.     · Pay attention to your breathing.  You are exercising too hard if you cannot talk while you are exercising.     · Take short rest breaks when doing household chores and other activities.     · Learn breathing methods--such as breathing through pursed lips--to help you become less short of breath.     · If your doctor has not set you up with a pulmonary rehabilitation program, talk to him or her about whether rehab is right for you. Rehab includes exercise programs, education about your disease and how to manage it, help with diet and other changes, and emotional support. Diet    · Eat regular, healthy meals. Use bronchodilators about 1 hour before you eat to make it easier to eat. Eat several small meals instead of three large ones. Drink beverages at the end of the meal. Avoid foods that are hard to chew.     · Eat foods that contain protein so that you do not lose muscle mass.     · Talk with your doctor if you gain too much weight or if you lose weight without trying.    Mental health    · Talk to your family, friends, or a therapist about your feelings. It is normal to feel frightened, angry, hopeless, helpless, and even guilty. Talking openly about bad feelings can help you cope. If these feelings last, talk to your doctor. When should you call for help? Call 911 anytime you think you may need emergency care. For example, call if:    · You have severe trouble breathing.    Call your doctor now or seek immediate medical care if:    · You have new or worse trouble breathing.     · You cough up blood.     · You have a fever.    Watch closely for changes in your health, and be sure to contact your doctor if:    · You cough more deeply or more often, especially if you notice more mucus or a change in the color of your mucus.     · You have new or worse swelling in your legs or belly.     · You are not getting better as expected. Where can you learn more? Go to http://kathy-karen.info/.   Enter O489 in the search box to learn more about \"Chronic Obstructive Pulmonary Disease (COPD): Care Instructions. \"  Current as of: June 9, 2019  Content Version: 12.2  © 9091-5901 [employer]. Care instructions adapted under license by [employer] (which disclaims liability or warranty for this information). If you have questions about a medical condition or this instruction, always ask your healthcare professional. [address] any warranty or liability for your use of this information.

## 2019-09-25 NOTE — PROGRESS NOTES
Shift assessment complete. A&OX4. Lungs coarse and diminished in the bases. Respirations present. Even and unlabored. No s/s of distress. Zero c/o pain at this time. Call light within reach. Encouraged patient to call for assistance. Patient verbalizes understanding. See Doc Flowsheet for assessment details. Patient resting in bed. Will continue to monitor.

## 2019-09-25 NOTE — DISCHARGE SUMMARY
Hospitalist Discharge Summary     Patient ID:  Yohannes Del Angel  [id number]  38 y.o.  1950  Admit date: 9/19/2019  5:53 PM  Discharge date and time: 9/25/2019  Attending: Camila Martinez MD  PCP:  Racquel Packer MD  Treatment Team: Attending Provider: Camila Martinez MD; Consulting Provider: Justin Moody MD; Utilization Review: Bandar Astorga RN; Care Manager: Joshua Hall RN    Principal Diagnosis COPD with acute exacerbation [facility])   Principal Problem:    COPD with acute exacerbation ([facility] [address] (9/19/2019)    Active Problems:    JACQUELYN (obstructive sleep apnea) (7/26/2017)      Overview: Uses home CPAP with 6L bleed in O2      Tobacco use disorder (7/26/2017)      Diabetes mellitus ([facility] [address] (7/26/2017)      Fibromyalgia (4/30/2013)      Restless leg syndrome (4/30/2013)      Debility (6/5/2013)      Coronary artery disease (3/3/2015)      Essential hypertension with goal blood pressure less than 130/85 (3/23/2017)      Nocturnal hypoxia (7/25/2017)      Acute tracheobronchitis (9/19/2019)      Acute on chronic respiratory failure with hypoxia and hypercapnia ([facility] (9/22/2019)             Hospital Course:  Please refer to the admission H&P for details of presentation. In summary, the patient is a 69yo with hx oxygen dependant chronic respiratory failure admitted with COPD exacerbation. Started on IV steroids and inhaled BDs. Transferred to ICU for bipap, weaned to airvo during the day and bipap at night. Continued to slowly wean steroids and oxygen. At time of discharge, still on increased oxygen requirement on 6L NC when ambulatory, 4L at rest and wearing home cpap at night. Will slowly taper PO prednisone and follow-up with pulmonology. Significant Diagnostic Studies:   XR CHEST SNGL V   Final Result   IMPRESSION: No consolidation. XR CHEST PORT   Final Result   IMPRESSION:    1.   No acute cardiopulmonary process evident on single frontal view of the   chest. Labs: Results:       Chemistry Recent Labs     09/25/19  0413 09/24/19  0412 09/23/19  0321   * 195* 170*    143 141   K 3.8 4.2 4.3    101 102   CO2 37* 38* 37*   BUN 13 16 15   CREA 0.50* 0.55* 0.55*   CA 7.8* 8.4 8.1*   AGAP 4* 4* 2*      CBC w/Diff Recent Labs     09/25/19 0413   WBC 10.8   RBC 4.99   HGB 15.1   HCT 46.5*         Cardiac Enzymes No results for input(s): CPK, CKND1, ROBERT in the last 72 hours. No lab exists for component: CKRMB, TROIP   Coagulation No results for input(s): PTP, INR, APTT, INREXT in the last 72 hours. Lipid Panel Lab Results   Component Value Date/Time    Cholesterol, total 209 (H) 03/16/2017 09:15 AM    HDL Cholesterol 88 (H) 03/16/2017 09:15 AM    LDL, calculated 96.8 03/16/2017 09:15 AM    VLDL, calculated 24.2 03/16/2017 09:15 AM    Triglyceride 121 03/16/2017 09:15 AM    CHOL/HDL Ratio 2.4 03/16/2017 09:15 AM      BNP No results for input(s): BNPP in the last 72 hours. Liver Enzymes No results for input(s): TP, ALB, TBIL, AP, SGOT, GPT in the last 72 hours. No lab exists for component: DBIL   Thyroid Studies Lab Results   Component Value Date/Time    T4, Total 7.7 01/02/2014 12:59 PM    TSH 1.350 01/02/2014 12:59 PM            Discharge Exam:  Visit Vitals  /74 (BP 1 Location: Left arm, BP Patient Position: At rest)   Pulse 89   Temp 97.5 °F (36.4 °C)   Resp 16   Ht 5' 4\" (1.626 m)   Wt 74.4 kg (164 lb)   SpO2 93%   Breastfeeding?  No   BMI 28.15 kg/m²     General:          No acute distress  Lungs:             No distress on 4L, no wheeze, diminished throughout  Heart:              RRR  Abdomen:        Soft, Non distended, Non tender, Positive bowel sounds  Extremities:     No cyanosis, clubbing or edema  Neurologic:      No focal deficits    Disposition: home  Discharge Condition: stable  Patient Instructions:   Current Discharge Medication List      CONTINUE these medications which have CHANGED    Details   predniSONE (DELTASONE) 10 mg tablet Take 40 mg by mouth daily for 3 days, THEN 30 mg daily for 3 days, THEN 20 mg daily for 3 days, THEN 10 mg daily for 3 days. Qty: 30 Tab, Refills: 0         CONTINUE these medications which have NOT CHANGED    Details   albuterol (PROAIR HFA) 90 mcg/actuation inhaler Take 2 Puffs by inhalation every four (4) hours as needed for Wheezing. Qty: 3 Inhaler, Refills: 3    Associated Diagnoses: Moderate COPD (chronic obstructive pulmonary disease) (HCC)      budesonide-formoterol (SYMBICORT) 160-4.5 mcg/actuation HFAA Take 2 Puffs by inhalation two (2) times a day. Qty: 1 Inhaler, Refills: 11      tiotropium bromide (SPIRIVA RESPIMAT) 2.5 mcg/actuation inhaler Take 2 Puffs by inhalation daily. Qty: 1 Inhaler, Refills: 11      DULoxetine (CYMBALTA) 30 mg capsule Take 30 mg by mouth daily. dilTIAZem (TIAZAC) 360 mg ER capsule Take 1 Cap by mouth daily. Qty: 30 Cap, Refills: 11      mirtazapine (REMERON) 30 mg tablet Take 15 mg by mouth nightly. albuterol (PROVENTIL VENTOLIN) 2.5 mg /3 mL (0.083 %) nebulizer solution 2.5 mg by Nebulization route two (2) times a day. prednisoLONE acetate (PRED FORTE) 1 % ophthalmic suspension Administer 1 drop to both eyes four (4) times daily. Associated Diagnoses: Chronic sinusitis; JACQUELYN (obstructive sleep apnea); Hypoxia; COPD (chronic obstructive pulmonary disease) ([address] Tobacco use disorder      olopatadine (PATADAY) 0.2 % drop ophthalmic solution Administer 1 drop to both eyes daily. Associated Diagnoses: Chronic sinusitis; JACQUELYN (obstructive sleep apnea); Hypoxia; COPD (chronic obstructive pulmonary disease) ([address] Tobacco use disorder      OXYGEN-AIR DELIVERY SYSTEMS by Nasal route. 6 lpm qhs      aspirin delayed-release 81 mg tablet Take  by mouth daily. gabapentin (NEURONTIN) 300 mg capsule Take 300 mg by mouth two (2) times a day. 1 tab po qhs       methylphenidate (RITALIN) 5 mg tablet Take 5 mg by mouth two (2) times a day. cpap machine kit by Does Not Apply route. 15cm with 5L of oxygen bled in      traZODone (DESYREL) 100 mg tablet Take 100 mg by mouth nightly. nitroglycerin (NITROSTAT) 0.4 mg SL tablet by SubLINGual route every five (5) minutes as needed. Activity: Activity as tolerated  Diet: Resume previous diet    Follow-up:     Follow-up Appointments   Procedures    FOLLOW UP VISIT Appointment in: Other (Specify) pulm 1-2w     pulm 1-2w     Standing Status:   Standing     Number of Occurrences:   1     Order Specific Question:   Appointment in     Answer:    Other (Specify)           Time spent to discharge patient 35 minutes  Signed:  Colby Soto MD  9/25/2019  11:05 AM

## 2019-09-25 NOTE — PROGRESS NOTES
Pt with no new complaints overnight. Feeling better overall. Appetite improved. O2 at baseline. Anticipates discharge today.

## 2019-09-25 NOTE — PROGRESS NOTES
Discharge instructions were reviewed with the patient. Opportunity for questions given. Patient verbalized understanding of discharge and follow up instructions, as well as S/S to report to MD or return to ER for. PIV was removed. Prescriptions provided. Patient will D/C to home. Ride will be here after lunch.

## 2019-09-25 NOTE — PROGRESS NOTES
Oxygen Qualifier       Room air: SpO2 with O2 and liter flow   Resting SpO2  4L home o2  95% on 4L   Ambulating SpO2  88% on 4L  92% on 6L     Patient wears 4L of oxygen at home. While ambulating on 4L patient dropped to 88%. It took 6L of 02 to get the patient above 90% while ambulating.      Completed by:    Horacio Hutchinson

## 2019-09-25 NOTE — PROGRESS NOTES
Problem: Mobility Impaired (Adult and Pediatric)  Goal: *Acute Goals and Plan of Care (Insert Text)  Description    LTG:  (1.)Ms. Natacha Headley will move from supine to sit and sit to supine  in bed with SUPERVISION within 7 treatment day(s). (2.)Ms. Natacha Headley will transfer from bed to chair and chair to bed with SUPERVISION using the least restrictive device within 7 treatment day(s). (3.)Ms. Natacha Headley will ambulate with SUPERVISION for 300 feet with the least restrictive device within 7 treatment day(s). (4)Ms. Natacha Headley will perform HEP with cues and assistance to increase safety on her feet in 7 days. (5)Ms. Natacha Headley will go up 2 steps min assist in 7 days. ________________________________________________________________________________________________     Outcome: Progressing Towards Goal     PHYSICAL THERAPY: Daily Note and AM 9/25/2019  INPATIENT: PT Visit Days : 3  [place] / [place] / Product Type: Medicare /       NAME/AGE/GENDER: Allan Villalta is a 71 y.o. female   PRIMARY DIAGNOSIS: COPD with acute exacerbation ([facility] [J44.1]  Acute tracheobronchitis [J20.9] COPD with acute exacerbation ([facility]   COPD with acute exacerbation ([facility] [address]         ICD-10: Treatment Diagnosis:    · Generalized Muscle Weakness (M62.81)  · Other abnormalities of gait and mobility (R26.89)   Precaution/Allergies:  Azithromycin and Sulfa (sulfonamide antibiotics)      ASSESSMENT:     Ms. Natacha Headley presents with decreased functional mobility and gait. Pt. Is normally independent at baseline, occasional use of a cane. She drives. She is up in the chair on contact. She is visiting with friends now who live close by and can assist her as needed when she gets home. She ambulated CGA on 5L O2 into the gastelum. She is a little unsteady. May benefit from using a cane as we tried the walker but she did not like it. O2 sats 89% at rest, decreased to 83% walking and increase back to 88% after resting a few minutes.   Gait distance limited by breathing and O2 sats. Will follow. 9/24 am supine upon arrival.  Work on bed mobility as follows: supine>EOB with verbal cues and CGA. Ambulated 120 ft on 5 L of 02 and stats drop to 85%, but increase to 95% after taking in several breaths. During gait LOB x 3 but need help recovering. Left in supine with call near. 9/25 am supine upon arrival.  Bed mobility as follows supine>EOB SBA, ambulates 650 ft using no AD and CGA with couple of LOB, but recover. Wears 5L of 02 throughout tx. Return back to bed with SBA, call light in reach. This section established at most recent assessment   PROBLEM LIST (Impairments causing functional limitations):  1. Decreased Strength  2. Decreased ADL/Functional Activities  3. Decreased Transfer Abilities  4. Decreased Ambulation Ability/Technique  5. Decreased Balance  6. Decreased Activity Tolerance  7. Increased Fatigue  8. Increased Shortness of Breath  9. Decreased Flexibility/Joint Mobility  10. Decreased Wirt with Home Exercise Program   INTERVENTIONS PLANNED: (Benefits and precautions of physical therapy have been discussed with the patient.)  1. Balance Exercise  2. Bed Mobility  3. Family Education  4. Gait Training  5. Home Exercise Program (HEP)  6. Range of Motion (ROM)  7. Therapeutic Activites  8. Therapeutic Exercise/Strengthening  9. Transfer Training     TREATMENT PLAN: Frequency/Duration: daily for duration of hospital stay  Rehabilitation Potential For Stated Goals: Good     REHAB RECOMMENDATIONS (at time of discharge pending progress):    Placement:  HHPT  Equipment:    None at this time              HISTORY:   History of Present Injury/Illness (Reason for Referral):  Oriana Melendez is a 72 yo F admitted with COPD exacerbation. She wears oxygen at home prn at 3 LPM, but normally does not have to wear. She had severe wheezing and difficulty breathing initially. Started on IV steroids.   Pulm consulted and transferred to unit for BiPAP and weaned to Airvo during day and BiPAP qHS. Trial of roxanol/morphine for work of breathing and this has helped. 9/23- breathing easier and asking when she can go home. Still on Airvo. Past Medical History/Comorbidities:   Ms. Vladimir Hollis  has a past medical history of Acute respiratory failure ([address] (1/29/2015), Acute respiratory failure ([address] (1/29/2015), Cancer [facility]), Chest pain, unspecified (7/7/2016), Chiari I malformation ([address] COPD exacerbation ([address] (9/14/12-9/17/12), COPD exacerbation ([address] (4/2013 to 5/2/13), Paroxysmal tachycardia/NOS (7/7/2016), Pneumonia (9/16/2012), and Pneumothorax. She also has no past medical history of Aneurysm ([address] Difficult intubation, GERD (gastroesophageal reflux disease), Heart failure ([address] Liver disease, Malignant hyperthermia due to anesthesia, Morbid obesity ([address] Nausea & vomiting, Pseudocholinesterase deficiency, Seizures ([address] Thromboembolus ([address] or Unspecified adverse effect of anesthesia. Ms. Vladimir Hollis  has a past surgical history that includes hx heent (2009); hx other surgical; hx heart catheterization (1/12); hx orthopaedic (1998); hx cataract removal; hx septoplasty (04/2010); hx shoulder arthroscopy (1990); and hx hysterectomy (1982). Social History/Living Environment:   Home Environment: Private residence  # Steps to Enter: 2  One/Two Story Residence: One story  Living Alone: No  Support Systems: Family member(s), Friends \ neighbors  Patient Expects to be Discharged to[de-identified] Private residence  Current DME Used/Available at Home: mita Duong  Prior Level of Function/Work/Activity:  independent     Number of Personal Factors/Comorbidities that affect the Plan of Care: 1-2: MODERATE COMPLEXITY   EXAMINATION:   Most Recent Physical Functioning:   Gross Assessment:                  Posture:     Balance:  Sitting: Intact  Standing: Impaired; With support  Standing - Static: Good  Standing - Dynamic : Fair Bed Mobility:  Supine to Sit: Stand-by assistance  Sit to Supine: Stand-by assistance  Wheelchair Mobility:     Transfers:  Sit to Stand: Contact guard assistance  Stand to Sit: Contact guard assistance  Duration: 0 Minutes  Gait:     Speed/Lizzeth: Delayed  Step Length: Left shortened;Right shortened  Gait Abnormalities: Decreased step clearance  Distance (ft): 650 Feet (ft)  Ambulation - Level of Assistance: Contact guard assistance  Interventions: Manual cues; Safety awareness training  Duration: 25 Minutes      Body Structures Involved:  1. Bones  2. Joints  3. Muscles  4. Ligaments Body Functions Affected:  1. Movement Related Activities and Participation Affected:  1. Mobility   Number of elements that affect the Plan of Care: 3: MODERATE COMPLEXITY   CLINICAL PRESENTATION:   Presentation: Stable and uncomplicated: LOW COMPLEXITY   CLINICAL DECISION MAKIN[address] [place] [address] AM-PAC 6 Clicks   Basic Mobility Inpatient Short Form  How much difficulty does the patient currently have. .. Unable A Lot A Little None   1. Turning over in bed (including adjusting bedclothes, sheets and blankets)? ? 1   ? 2   ? 3   ? 4   2. Sitting down on and standing up from a chair with arms ( e.g., wheelchair, bedside commode, etc.)   ? 1   ? 2   ? 3   ? 4   3. Moving from lying on back to sitting on the side of the bed?   ? 1   ? 2   ? 3   ? 4   How much help from another person does the patient currently need. .. Total A Lot A Little None   4. Moving to and from a bed to a chair (including a wheelchair)? ? 1   ? 2   ? 3   ? 4   5. Need to walk in hospital room? ? 1   ? 2   ? 3   ? 4   6. Climbing 3-5 steps with a railing? ? 1   ? 2   ? 3   ? 4   © , Trustees of [address] [place] [id number], under license to [employer]. All rights reserved      Score:  Initial: 19 Most Recent: X (Date: -- )    Interpretation of Tool:  Represents activities that are increasingly more difficult (i.e. Bed mobility, Transfers, Gait).     Medical Necessity:     · Patient is expected to demonstrate progress in strength, range of motion and balance  ·  to decrease assistance required with exercises and functional mobility   · . Reason for Services/Other Comments:  · Patient continues to require present interventions due to patient's inability to perform exercises and functional mobility independently   · . Use of outcome tool(s) and clinical judgement create a POC that gives a: Questionable prediction of patient's progress: MODERATE COMPLEXITY            TREATMENT:   (In addition to Assessment/Re-Assessment sessions the following treatments were rendered)   Pre-treatment Symptoms/Complaints:  Doing fine  Pain: Initial:   Pain Intensity 1: 0  Post Session:      Therapeutic Activity: (  0 Minutes ):  Therapeutic activities including bed mobility and transfer with 02 @5 L throughout tx. Gait Training (25 Minutes):  Gait training to improve and/or restore physical functioning as related to mobility. Ambulated 650 Feet (ft) with Contact guard assistance using a   and minimal Manual cues; Safety awareness training rBraces/Orthotics/Lines/Etc:   · Telemetry lines   · O2 Device: Hi flow nasal cannula  Treatment/Session Assessment:    · Response to Treatment:  Pt. Doing well, making progress  · Interdisciplinary Collaboration:   o Registered Nurse  · After treatment position/precautions:   o Supine in bed  o Bed/Chair-wheels locked  o Bed in low position  o Caregiver at bedside  o Call light within reach  o RN notified  o Family at bedside   · Compliance with Program/Exercises: Will assess as treatment progresses  · Recommendations/Intent for next treatment session: \"Next visit will focus on advancements to more challenging activities and reduction in assistance provided\".   Total Treatment Duration:  PT Patient Time In/Time Out  Time In: 1025  Time Out: C/ Kira 29 MERRICK Rivas

## 2019-09-25 NOTE — PROGRESS NOTES
Problem: Falls - Risk of  Goal: *Absence of Falls  Description  Document David Mendez Fall Risk and appropriate interventions in the flowsheet.   Outcome: Progressing Towards Goal  Note:   Fall Risk Interventions:  Mobility Interventions: Assess mobility with egress test, Bed/chair exit alarm         Medication Interventions: Patient to call before getting OOB    Elimination Interventions: Call light in reach              Problem: Patient Education: Go to Patient Education Activity  Goal: Patient/Family Education  Outcome: Progressing Towards Goal     Problem: Chronic Obstructive Pulmonary Disease (COPD)  Goal: *Oxygen saturation during activity within specified parameters  Outcome: Progressing Towards Goal  Goal: *Able to remain out of bed as prescribed  Outcome: Progressing Towards Goal  Goal: *Absence of hypoxia  Outcome: Progressing Towards Goal  Goal: *Optimize nutritional status  Outcome: Progressing Towards Goal     Problem: Patient Education: Go to Patient Education Activity  Goal: Patient/Family Education  Outcome: Progressing Towards Goal     Problem: Patient Education: Go to Patient Education Activity  Goal: Patient/Family Education  Outcome: Progressing Towards Goal     Problem: Patient Education: Go to Patient Education Activity  Goal: Patient/Family Education  Outcome: Progressing Towards Goal

## 2019-09-27 ENCOUNTER — APPOINTMENT (OUTPATIENT)
Dept: CT IMAGING | Age: 69
DRG: 177 | End: 2019-09-27
Attending: INTERNAL MEDICINE
Payer: MEDICARE

## 2019-09-27 ENCOUNTER — HOSPITAL ENCOUNTER (INPATIENT)
Age: 69
LOS: 5 days | Discharge: HOME | DRG: 177 | End: 2019-10-02
Attending: EMERGENCY MEDICINE | Admitting: FAMILY MEDICINE
Payer: MEDICARE

## 2019-09-27 ENCOUNTER — APPOINTMENT (OUTPATIENT)
Dept: GENERAL RADIOLOGY | Age: 69
DRG: 177 | End: 2019-09-27
Attending: EMERGENCY MEDICINE
Payer: MEDICARE

## 2019-09-27 DIAGNOSIS — J20.9 ACUTE TRACHEOBRONCHITIS: ICD-10-CM

## 2019-09-27 DIAGNOSIS — J96.21 ACUTE ON CHRONIC RESPIRATORY FAILURE WITH HYPOXIA AND HYPERCAPNIA (HCC): ICD-10-CM

## 2019-09-27 DIAGNOSIS — J96.22 ACUTE ON CHRONIC RESPIRATORY FAILURE WITH HYPOXIA AND HYPERCAPNIA (HCC): ICD-10-CM

## 2019-09-27 DIAGNOSIS — G47.33 OSA (OBSTRUCTIVE SLEEP APNEA): Chronic | ICD-10-CM

## 2019-09-27 DIAGNOSIS — J44.1 COPD EXACERBATION (HCC): Primary | ICD-10-CM

## 2019-09-27 DIAGNOSIS — J18.9 PNEUMONIA OF RIGHT UPPER LOBE DUE TO INFECTIOUS ORGANISM: ICD-10-CM

## 2019-09-27 DIAGNOSIS — J18.9 PNEUMONIA OF RIGHT MIDDLE LOBE DUE TO INFECTIOUS ORGANISM: ICD-10-CM

## 2019-09-27 DIAGNOSIS — J18.9 PNEUMONIA OF RIGHT LUNG DUE TO INFECTIOUS ORGANISM, UNSPECIFIED PART OF LUNG: ICD-10-CM

## 2019-09-27 DIAGNOSIS — F17.200 TOBACCO USE DISORDER: Chronic | ICD-10-CM

## 2019-09-27 LAB
ALBUMIN SERPL-MCNC: 2.6 G/DL (ref 3.2–4.6)
ALBUMIN/GLOB SERPL: 0.6 {RATIO} (ref 1.2–3.5)
ALP SERPL-CCNC: 105 U/L (ref 50–130)
ALT SERPL-CCNC: 69 U/L (ref 12–65)
ANION GAP SERPL CALC-SCNC: 9 MMOL/L (ref 7–16)
ARTERIAL PATENCY WRIST A: YES
AST SERPL-CCNC: 49 U/L (ref 15–37)
ATRIAL RATE: 125 BPM
BASE EXCESS BLD CALC-SCNC: 6 MMOL/L
BASOPHILS # BLD: 0.1 K/UL (ref 0–0.2)
BASOPHILS NFR BLD: 0 % (ref 0–2)
BDY SITE: ABNORMAL
BILIRUB SERPL-MCNC: 0.5 MG/DL (ref 0.2–1.1)
BNP SERPL-MCNC: 49 PG/ML
BODY TEMPERATURE: 98.6
BUN SERPL-MCNC: 8 MG/DL (ref 8–23)
CALCIUM SERPL-MCNC: 8.2 MG/DL (ref 8.3–10.4)
CALCULATED P AXIS, ECG09: 84 DEGREES
CALCULATED R AXIS, ECG10: 76 DEGREES
CALCULATED T AXIS, ECG11: 78 DEGREES
CHLORIDE SERPL-SCNC: 104 MMOL/L (ref 98–107)
CO2 BLD-SCNC: 32 MMOL/L
CO2 SERPL-SCNC: 29 MMOL/L (ref 21–32)
COLLECT TIME,HTIME: 949
CREAT SERPL-MCNC: 0.51 MG/DL (ref 0.6–1)
DIAGNOSIS, 93000: NORMAL
DIFFERENTIAL METHOD BLD: ABNORMAL
EOSINOPHIL # BLD: 0 K/UL (ref 0–0.8)
EOSINOPHIL NFR BLD: 0 % (ref 0.5–7.8)
ERYTHROCYTE [DISTWIDTH] IN BLOOD BY AUTOMATED COUNT: 15.7 % (ref 11.9–14.6)
FLOW RATE ISTAT,IFRATE: 4 L/MIN
GAS FLOW.O2 O2 DELIVERY SYS: ABNORMAL L/MIN
GLOBULIN SER CALC-MCNC: 4.1 G/DL (ref 2.3–3.5)
GLUCOSE BLD STRIP.AUTO-MCNC: 147 MG/DL (ref 65–100)
GLUCOSE BLD STRIP.AUTO-MCNC: 324 MG/DL (ref 65–100)
GLUCOSE SERPL-MCNC: 204 MG/DL (ref 65–100)
HCO3 BLD-SCNC: 30.7 MMOL/L (ref 22–26)
HCT VFR BLD AUTO: 49.6 % (ref 35.8–46.3)
HGB BLD-MCNC: 16.3 G/DL (ref 11.7–15.4)
IMM GRANULOCYTES # BLD AUTO: 0.3 K/UL (ref 0–0.5)
IMM GRANULOCYTES NFR BLD AUTO: 1 % (ref 0–5)
LACTATE BLD-SCNC: 1.18 MMOL/L (ref 0.5–1.9)
LYMPHOCYTES # BLD: 1.1 K/UL (ref 0.5–4.6)
LYMPHOCYTES NFR BLD: 4 % (ref 13–44)
MCH RBC QN AUTO: 30.4 PG (ref 26.1–32.9)
MCHC RBC AUTO-ENTMCNC: 32.9 G/DL (ref 31.4–35)
MCV RBC AUTO: 92.5 FL (ref 79.6–97.8)
MONOCYTES # BLD: 0.7 K/UL (ref 0.1–1.3)
MONOCYTES NFR BLD: 2 % (ref 4–12)
NEUTS SEG # BLD: 26.9 K/UL (ref 1.7–8.2)
NEUTS SEG NFR BLD: 93 % (ref 43–78)
NRBC # BLD: 0 K/UL (ref 0–0.2)
P-R INTERVAL, ECG05: 114 MS
PCO2 BLD: 41.3 MMHG (ref 35–45)
PH BLD: 7.48 [PH] (ref 7.35–7.45)
PLATELET # BLD AUTO: 236 K/UL (ref 150–450)
PMV BLD AUTO: 10.2 FL (ref 9.4–12.3)
PO2 BLD: 73 MMHG (ref 75–100)
POTASSIUM SERPL-SCNC: 4.6 MMOL/L (ref 3.5–5.1)
PROCALCITONIN SERPL-MCNC: <0.1 NG/ML
PROT SERPL-MCNC: 6.7 G/DL (ref 6.3–8.2)
Q-T INTERVAL, ECG07: 310 MS
QRS DURATION, ECG06: 86 MS
QTC CALCULATION (BEZET), ECG08: 447 MS
RBC # BLD AUTO: 5.36 M/UL (ref 4.05–5.2)
SAO2 % BLD: 95 % (ref 95–98)
SERVICE CMNT-IMP: 1
SERVICE CMNT-IMP: ABNORMAL
SODIUM SERPL-SCNC: 142 MMOL/L (ref 136–145)
SPECIMEN TYPE: ABNORMAL
TROPONIN I SERPL-MCNC: <0.02 NG/ML (ref 0.02–0.05)
VENTRICULAR RATE, ECG03: 125 BPM
WBC # BLD AUTO: 29 K/UL (ref 4.3–11.1)

## 2019-09-27 PROCEDURE — 99285 EMERGENCY DEPT VISIT HI MDM: CPT | Performed by: EMERGENCY MEDICINE

## 2019-09-27 PROCEDURE — 77010033678 HC OXYGEN DAILY

## 2019-09-27 PROCEDURE — 36600 WITHDRAWAL OF ARTERIAL BLOOD: CPT

## 2019-09-27 PROCEDURE — 71045 X-RAY EXAM CHEST 1 VIEW: CPT

## 2019-09-27 PROCEDURE — 74011000258 HC RX REV CODE- 258: Performed by: FAMILY MEDICINE

## 2019-09-27 PROCEDURE — 93005 ELECTROCARDIOGRAM TRACING: CPT | Performed by: EMERGENCY MEDICINE

## 2019-09-27 PROCEDURE — 94640 AIRWAY INHALATION TREATMENT: CPT

## 2019-09-27 PROCEDURE — 94760 N-INVAS EAR/PLS OXIMETRY 1: CPT

## 2019-09-27 PROCEDURE — 87040 BLOOD CULTURE FOR BACTERIA: CPT

## 2019-09-27 PROCEDURE — 77030021668 HC NEB PREFIL KT VYRM -A

## 2019-09-27 PROCEDURE — 96374 THER/PROPH/DIAG INJ IV PUSH: CPT | Performed by: EMERGENCY MEDICINE

## 2019-09-27 PROCEDURE — 97165 OT EVAL LOW COMPLEX 30 MIN: CPT

## 2019-09-27 PROCEDURE — 83605 ASSAY OF LACTIC ACID: CPT

## 2019-09-27 PROCEDURE — 74011636637 HC RX REV CODE- 636/637: Performed by: FAMILY MEDICINE

## 2019-09-27 PROCEDURE — 74011250636 HC RX REV CODE- 250/636: Performed by: EMERGENCY MEDICINE

## 2019-09-27 PROCEDURE — 74011250636 HC RX REV CODE- 250/636: Performed by: FAMILY MEDICINE

## 2019-09-27 PROCEDURE — 84145 PROCALCITONIN (PCT): CPT

## 2019-09-27 PROCEDURE — 99223 1ST HOSP IP/OBS HIGH 75: CPT | Performed by: INTERNAL MEDICINE

## 2019-09-27 PROCEDURE — 74011636320 HC RX REV CODE- 636/320: Performed by: FAMILY MEDICINE

## 2019-09-27 PROCEDURE — 83880 ASSAY OF NATRIURETIC PEPTIDE: CPT

## 2019-09-27 PROCEDURE — 84484 ASSAY OF TROPONIN QUANT: CPT

## 2019-09-27 PROCEDURE — 82803 BLOOD GASES ANY COMBINATION: CPT

## 2019-09-27 PROCEDURE — 65270000029 HC RM PRIVATE

## 2019-09-27 PROCEDURE — 74011000250 HC RX REV CODE- 250: Performed by: EMERGENCY MEDICINE

## 2019-09-27 PROCEDURE — 96375 TX/PRO/DX INJ NEW DRUG ADDON: CPT | Performed by: EMERGENCY MEDICINE

## 2019-09-27 PROCEDURE — 97161 PT EVAL LOW COMPLEX 20 MIN: CPT

## 2019-09-27 PROCEDURE — 82962 GLUCOSE BLOOD TEST: CPT

## 2019-09-27 PROCEDURE — 74011000250 HC RX REV CODE- 250: Performed by: FAMILY MEDICINE

## 2019-09-27 PROCEDURE — 85025 COMPLETE CBC W/AUTO DIFF WBC: CPT

## 2019-09-27 PROCEDURE — 80053 COMPREHEN METABOLIC PANEL: CPT

## 2019-09-27 PROCEDURE — 77030013140 HC MSK NEB VYRM -A

## 2019-09-27 PROCEDURE — 74011250637 HC RX REV CODE- 250/637: Performed by: FAMILY MEDICINE

## 2019-09-27 PROCEDURE — 71260 CT THORAX DX C+: CPT

## 2019-09-27 RX ORDER — ONDANSETRON 2 MG/ML
4 INJECTION INTRAMUSCULAR; INTRAVENOUS
Status: DISCONTINUED | OUTPATIENT
Start: 2019-09-27 | End: 2019-10-02 | Stop reason: HOSPADM

## 2019-09-27 RX ORDER — SODIUM CHLORIDE 0.9 % (FLUSH) 0.9 %
5-40 SYRINGE (ML) INJECTION EVERY 8 HOURS
Status: DISCONTINUED | OUTPATIENT
Start: 2019-09-27 | End: 2019-10-02 | Stop reason: HOSPADM

## 2019-09-27 RX ORDER — IBUPROFEN 200 MG
1 TABLET ORAL EVERY 24 HOURS
Status: DISCONTINUED | OUTPATIENT
Start: 2019-09-27 | End: 2019-10-02 | Stop reason: HOSPADM

## 2019-09-27 RX ORDER — MIRTAZAPINE 15 MG/1
15 TABLET, FILM COATED ORAL
Status: DISCONTINUED | OUTPATIENT
Start: 2019-09-27 | End: 2019-10-02 | Stop reason: HOSPADM

## 2019-09-27 RX ORDER — IPRATROPIUM BROMIDE AND ALBUTEROL SULFATE 2.5; .5 MG/3ML; MG/3ML
3 SOLUTION RESPIRATORY (INHALATION)
Status: COMPLETED | OUTPATIENT
Start: 2019-09-27 | End: 2019-09-27

## 2019-09-27 RX ORDER — BUDESONIDE 0.5 MG/2ML
500 INHALANT ORAL
Status: DISCONTINUED | OUTPATIENT
Start: 2019-09-27 | End: 2019-10-02 | Stop reason: HOSPADM

## 2019-09-27 RX ORDER — ACETAMINOPHEN 325 MG/1
650 TABLET ORAL
Status: DISCONTINUED | OUTPATIENT
Start: 2019-09-27 | End: 2019-10-02 | Stop reason: HOSPADM

## 2019-09-27 RX ORDER — DIPHENHYDRAMINE HCL 25 MG
25 CAPSULE ORAL
Status: DISCONTINUED | OUTPATIENT
Start: 2019-09-27 | End: 2019-10-02 | Stop reason: HOSPADM

## 2019-09-27 RX ORDER — PREDNISOLONE ACETATE 10 MG/ML
1 SUSPENSION/ DROPS OPHTHALMIC EVERY 12 HOURS
Status: DISCONTINUED | OUTPATIENT
Start: 2019-09-27 | End: 2019-10-02 | Stop reason: HOSPADM

## 2019-09-27 RX ORDER — CEFTRIAXONE 1 G/1
1 INJECTION, POWDER, FOR SOLUTION INTRAMUSCULAR; INTRAVENOUS ONCE
Status: DISCONTINUED | OUTPATIENT
Start: 2019-09-27 | End: 2019-09-27 | Stop reason: SDUPTHER

## 2019-09-27 RX ORDER — DEXTROSE 40 %
15 GEL (GRAM) ORAL AS NEEDED
Status: DISCONTINUED | OUTPATIENT
Start: 2019-09-27 | End: 2019-10-02 | Stop reason: HOSPADM

## 2019-09-27 RX ORDER — GABAPENTIN 300 MG/1
300 CAPSULE ORAL 2 TIMES DAILY
Status: DISCONTINUED | OUTPATIENT
Start: 2019-09-27 | End: 2019-10-02 | Stop reason: HOSPADM

## 2019-09-27 RX ORDER — IPRATROPIUM BROMIDE AND ALBUTEROL SULFATE 2.5; .5 MG/3ML; MG/3ML
3 SOLUTION RESPIRATORY (INHALATION)
Status: DISCONTINUED | OUTPATIENT
Start: 2019-09-27 | End: 2019-10-02 | Stop reason: HOSPADM

## 2019-09-27 RX ORDER — SODIUM CHLORIDE 0.9 % (FLUSH) 0.9 %
5-40 SYRINGE (ML) INJECTION AS NEEDED
Status: DISCONTINUED | OUTPATIENT
Start: 2019-09-27 | End: 2019-10-02 | Stop reason: HOSPADM

## 2019-09-27 RX ORDER — GUAIFENESIN 600 MG/1
1200 TABLET, EXTENDED RELEASE ORAL EVERY 12 HOURS
Status: DISCONTINUED | OUTPATIENT
Start: 2019-09-27 | End: 2019-10-02 | Stop reason: HOSPADM

## 2019-09-27 RX ORDER — ONDANSETRON 2 MG/ML
4 INJECTION INTRAMUSCULAR; INTRAVENOUS
Status: COMPLETED | OUTPATIENT
Start: 2019-09-27 | End: 2019-09-27

## 2019-09-27 RX ORDER — PANTOPRAZOLE SODIUM 40 MG/1
40 TABLET, DELAYED RELEASE ORAL
Status: DISCONTINUED | OUTPATIENT
Start: 2019-09-28 | End: 2019-10-02 | Stop reason: HOSPADM

## 2019-09-27 RX ORDER — ENOXAPARIN SODIUM 100 MG/ML
40 INJECTION SUBCUTANEOUS EVERY 24 HOURS
Status: DISCONTINUED | OUTPATIENT
Start: 2019-09-27 | End: 2019-10-02 | Stop reason: HOSPADM

## 2019-09-27 RX ORDER — KETOTIFEN FUMARATE 0.35 MG/ML
1 SOLUTION/ DROPS OPHTHALMIC 2 TIMES DAILY
Status: DISCONTINUED | OUTPATIENT
Start: 2019-09-27 | End: 2019-10-02 | Stop reason: HOSPADM

## 2019-09-27 RX ORDER — BENZONATATE 100 MG/1
100 CAPSULE ORAL
Status: DISCONTINUED | OUTPATIENT
Start: 2019-09-27 | End: 2019-10-02 | Stop reason: HOSPADM

## 2019-09-27 RX ORDER — ASPIRIN 81 MG/1
81 TABLET ORAL DAILY
Status: DISCONTINUED | OUTPATIENT
Start: 2019-09-27 | End: 2019-10-02 | Stop reason: HOSPADM

## 2019-09-27 RX ORDER — DOCUSATE SODIUM 100 MG/1
100 CAPSULE, LIQUID FILLED ORAL DAILY
Status: DISCONTINUED | OUTPATIENT
Start: 2019-09-27 | End: 2019-10-02 | Stop reason: HOSPADM

## 2019-09-27 RX ORDER — NITROGLYCERIN 0.4 MG/1
0.4 TABLET SUBLINGUAL
Status: DISCONTINUED | OUTPATIENT
Start: 2019-09-27 | End: 2019-10-02 | Stop reason: HOSPADM

## 2019-09-27 RX ORDER — DEXTROSE 50 % IN WATER (D50W) INTRAVENOUS SYRINGE
25-50 AS NEEDED
Status: DISCONTINUED | OUTPATIENT
Start: 2019-09-27 | End: 2019-10-02 | Stop reason: HOSPADM

## 2019-09-27 RX ORDER — SODIUM CHLORIDE 0.9 % (FLUSH) 0.9 %
10 SYRINGE (ML) INJECTION
Status: COMPLETED | OUTPATIENT
Start: 2019-09-27 | End: 2019-09-27

## 2019-09-27 RX ORDER — FLUTICASONE PROPIONATE 50 MCG
2 SPRAY, SUSPENSION (ML) NASAL DAILY
Status: DISCONTINUED | OUTPATIENT
Start: 2019-09-27 | End: 2019-10-02 | Stop reason: HOSPADM

## 2019-09-27 RX ORDER — DULOXETIN HYDROCHLORIDE 30 MG/1
30 CAPSULE, DELAYED RELEASE ORAL DAILY
Status: DISCONTINUED | OUTPATIENT
Start: 2019-09-27 | End: 2019-10-02 | Stop reason: HOSPADM

## 2019-09-27 RX ORDER — BISACODYL 5 MG
5 TABLET, DELAYED RELEASE (ENTERIC COATED) ORAL DAILY PRN
Status: DISCONTINUED | OUTPATIENT
Start: 2019-09-27 | End: 2019-10-02 | Stop reason: HOSPADM

## 2019-09-27 RX ORDER — INSULIN LISPRO 100 [IU]/ML
INJECTION, SOLUTION INTRAVENOUS; SUBCUTANEOUS
Status: DISCONTINUED | OUTPATIENT
Start: 2019-09-27 | End: 2019-10-02 | Stop reason: HOSPADM

## 2019-09-27 RX ORDER — IPRATROPIUM BROMIDE AND ALBUTEROL SULFATE 2.5; .5 MG/3ML; MG/3ML
3 SOLUTION RESPIRATORY (INHALATION)
Status: DISCONTINUED | OUTPATIENT
Start: 2019-09-27 | End: 2019-10-01

## 2019-09-27 RX ORDER — MORPHINE SULFATE 2 MG/ML
2 INJECTION, SOLUTION INTRAMUSCULAR; INTRAVENOUS ONCE
Status: COMPLETED | OUTPATIENT
Start: 2019-09-27 | End: 2019-09-27

## 2019-09-27 RX ORDER — DILTIAZEM HYDROCHLORIDE 180 MG/1
360 CAPSULE, COATED, EXTENDED RELEASE ORAL DAILY
Status: DISCONTINUED | OUTPATIENT
Start: 2019-09-27 | End: 2019-10-02 | Stop reason: HOSPADM

## 2019-09-27 RX ADMIN — IOPAMIDOL 100 ML: 755 INJECTION, SOLUTION INTRAVENOUS at 14:54

## 2019-09-27 RX ADMIN — BUDESONIDE 500 MCG: 0.5 INHALANT RESPIRATORY (INHALATION) at 20:10

## 2019-09-27 RX ADMIN — ACETAMINOPHEN 650 MG: 325 TABLET, FILM COATED ORAL at 12:30

## 2019-09-27 RX ADMIN — Medication 10 ML: at 22:00

## 2019-09-27 RX ADMIN — GUAIFENESIN 1200 MG: 600 TABLET ORAL at 22:16

## 2019-09-27 RX ADMIN — DILTIAZEM HYDROCHLORIDE 360 MG: 180 CAPSULE, COATED, EXTENDED RELEASE ORAL at 12:16

## 2019-09-27 RX ADMIN — DULOXETINE HYDROCHLORIDE 30 MG: 30 CAPSULE, DELAYED RELEASE ORAL at 12:16

## 2019-09-27 RX ADMIN — DOCUSATE SODIUM 100 MG: 100 CAPSULE, LIQUID FILLED ORAL at 12:16

## 2019-09-27 RX ADMIN — Medication 1 AMPULE: at 22:17

## 2019-09-27 RX ADMIN — AZITHROMYCIN MONOHYDRATE 500 MG: 500 INJECTION, POWDER, LYOPHILIZED, FOR SOLUTION INTRAVENOUS at 10:43

## 2019-09-27 RX ADMIN — Medication 5 ML: at 12:31

## 2019-09-27 RX ADMIN — SODIUM CHLORIDE 500 ML: 900 INJECTION, SOLUTION INTRAVENOUS at 10:44

## 2019-09-27 RX ADMIN — ENOXAPARIN SODIUM 40 MG: 40 INJECTION SUBCUTANEOUS at 12:17

## 2019-09-27 RX ADMIN — IPRATROPIUM BROMIDE AND ALBUTEROL SULFATE 3 ML: .5; 3 SOLUTION RESPIRATORY (INHALATION) at 13:16

## 2019-09-27 RX ADMIN — FLUTICASONE PROPIONATE 2 SPRAY: 50 SPRAY, METERED NASAL at 12:16

## 2019-09-27 RX ADMIN — ONDANSETRON 4 MG: 2 INJECTION INTRAMUSCULAR; INTRAVENOUS at 10:14

## 2019-09-27 RX ADMIN — KETOTIFEN FUMARATE 1 DROP: 0.35 SOLUTION/ DROPS OPHTHALMIC at 21:00

## 2019-09-27 RX ADMIN — Medication 1 AMPULE: at 12:16

## 2019-09-27 RX ADMIN — INSULIN LISPRO 8 UNITS: 100 INJECTION, SOLUTION INTRAVENOUS; SUBCUTANEOUS at 16:47

## 2019-09-27 RX ADMIN — IPRATROPIUM BROMIDE AND ALBUTEROL SULFATE 3 ML: .5; 3 SOLUTION RESPIRATORY (INHALATION) at 09:53

## 2019-09-27 RX ADMIN — IPRATROPIUM BROMIDE AND ALBUTEROL SULFATE 3 ML: .5; 3 SOLUTION RESPIRATORY (INHALATION) at 20:10

## 2019-09-27 RX ADMIN — MIRTAZAPINE 15 MG: 15 TABLET, FILM COATED ORAL at 22:16

## 2019-09-27 RX ADMIN — ASPIRIN 81 MG: 81 TABLET, COATED ORAL at 12:16

## 2019-09-27 RX ADMIN — PREDNISOLONE ACETATE 1 DROP: 10 SUSPENSION/ DROPS OPHTHALMIC at 21:00

## 2019-09-27 RX ADMIN — METHYLPREDNISOLONE SODIUM SUCCINATE 125 MG: 125 INJECTION, POWDER, FOR SOLUTION INTRAMUSCULAR; INTRAVENOUS at 09:44

## 2019-09-27 RX ADMIN — GABAPENTIN 300 MG: 300 CAPSULE ORAL at 22:16

## 2019-09-27 RX ADMIN — GUAIFENESIN 1200 MG: 600 TABLET ORAL at 12:16

## 2019-09-27 RX ADMIN — Medication 10 ML: at 14:54

## 2019-09-27 RX ADMIN — GABAPENTIN 300 MG: 300 CAPSULE ORAL at 12:15

## 2019-09-27 RX ADMIN — IPRATROPIUM BROMIDE AND ALBUTEROL SULFATE 3 ML: .5; 3 SOLUTION RESPIRATORY (INHALATION) at 16:33

## 2019-09-27 RX ADMIN — CEFTRIAXONE 1 G: 1 INJECTION, POWDER, FOR SOLUTION INTRAMUSCULAR; INTRAVENOUS at 16:12

## 2019-09-27 RX ADMIN — MORPHINE SULFATE 2 MG: 2 INJECTION, SOLUTION INTRAMUSCULAR; INTRAVENOUS at 10:14

## 2019-09-27 RX ADMIN — SODIUM CHLORIDE 100 ML: 900 INJECTION, SOLUTION INTRAVENOUS at 14:54

## 2019-09-27 NOTE — H&P
HOSPITALIST H&P/CONSULT  NAME:  Alexandra Stallworth   Age:  71 y.o.  :   1950   MRN:   [id number]  PCP: Dennis Apodaca MD  Consulting MD:  Treatment Team: Attending Provider: Kaylene Go MD; Primary Nurse: Hanane Mercado RN  HPI:   Patient is a 26QW with hx COPD on 4L at home who was admitted  to  with COPD exacerbation. She was initially on bipap, weaned eventually down to 4L at rest. At time of discharge, still required 6L with ambulation and home CPAP hs. She began having coughing worsening one day after discharge with green sputum. She also woke up this morning feeling disoriented. She has been wearing her cpap at night and oxygen during the day. Oxygen requirement is not increased. Received rocephin and azithro in the ER for RML infiltrate on CXR. Received 5d levaquin while inpatient for last copd exacerbation and was still on prednisone 40 as part of prolonged taper. Complete ROS done and is as stated in HPI or otherwise negative  Past Medical History:   Diagnosis Date    Acute respiratory failure ([address] 2015    Intubated 2015    Acute respiratory failure ([address] 2015    Intubated 2015     Cancer ([address]     basal cell,squamous cell    Chest pain, unspecified 2016    Chiari I malformation ([address]     history of    COPD exacerbation ([address] 12-12    COPD exacerbation ([address] 2013 to 13    hospitalization    Paroxysmal tachycardia/NOS 2016    Pneumonia 2012    Pneumothorax     HX.  of  2 on the left- required chest tube      Past Surgical History:   Procedure Laterality Date    HX CATARACT REMOVAL      HX HEART CATHETERIZATION      mild nonobstructive CAD    HX HEENT      Bilateral cataracts and bleth    HX HYSTERECTOMY      HX ORTHOPAEDIC      right shoulder sx; left thumb    HX OTHER SURGICAL      left thumb sx,forehead resection of squamous cell    HX SEPTOPLASTY  2010    HX SHOULDER ARTHROSCOPY  1990    reviewed  Prior to Admission Medications   Prescriptions Last Dose Informant Patient Reported? Taking? DULoxetine (CYMBALTA) 30 mg capsule 2019 at Unknown time  Yes Yes   Sig: Take 30 mg by mouth daily. OXYGEN-AIR DELIVERY SYSTEMS 2019 at Unknown time  Yes Yes   Sig: by Nasal route. 6 lpm qhs   albuterol (PROAIR HFA) 90 mcg/actuation inhaler 2019 at Unknown time  No Yes   Sig: Take 2 Puffs by inhalation every four (4) hours as needed for Wheezing. albuterol (PROVENTIL VENTOLIN) 2.5 mg /3 mL (0.083 %) nebulizer solution 2019 at Unknown time  Yes Yes   Si.5 mg by Nebulization route two (2) times a day. aspirin delayed-release 81 mg tablet 2019 at Unknown time  Yes Yes   Sig: Take  by mouth daily. budesonide-formoterol (SYMBICORT) 160-4.5 mcg/actuation HFAA Not Taking at Unknown time  No No   Sig: Take 2 Puffs by inhalation two (2) times a day. cpap machine kit 2019 at Unknown time  Yes Yes   Sig: by Does Not Apply route. 15cm with 5L of oxygen bled in   dilTIAZem NGUYEN [facility]) 360 mg ER capsule 2019 at Unknown time  No Yes   Sig: Take 1 Cap by mouth daily. gabapentin (NEURONTIN) 300 mg capsule 2019 at Unknown time  Yes Yes   Sig: Take 300 mg by mouth two (2) times a day. 1 tab po qhs    methylphenidate (RITALIN) 5 mg tablet 2019 at Unknown time  Yes Yes   Sig: Take 5 mg by mouth two (2) times a day. mirtazapine (REMERON) 30 mg tablet 2019 at Unknown time  Yes Yes   Sig: Take 15 mg by mouth nightly. nitroglycerin (NITROSTAT) 0.4 mg SL tablet Unknown at Unknown time  Yes No   Sig: by SubLINGual route every five (5) minutes as needed. olopatadine (PATADAY) 0.2 % drop ophthalmic solution 2019 at Unknown time  Yes Yes   Sig: Administer 1 drop to both eyes daily.    predniSONE (DELTASONE) 10 mg tablet 2019 at Unknown time  No Yes   Sig: Take 40 mg by mouth daily for 3 days, THEN 30 mg daily for 3 days, THEN 20 mg daily for 3 days, THEN 10 mg daily for 3 days. prednisoLONE acetate (PRED FORTE) 1 % ophthalmic suspension 2019 at Unknown time  Yes Yes   Sig: Administer 1 drop to both eyes four (4) times daily. tiotropium bromide (SPIRIVA RESPIMAT) 2.5 mcg/actuation inhaler 2019 at Unknown time  No Yes   Sig: Take 2 Puffs by inhalation daily. traZODone (DESYREL) 100 mg tablet 2019 at Unknown time  Yes Yes   Sig: Take 100 mg by mouth nightly. Facility-Administered Medications: None     Allergies   Allergen Reactions    Azithromycin Diarrhea    Sulfa (Sulfonamide Antibiotics) Itching      Social History     Tobacco Use    Smoking status: Current Every Day Smoker     Packs/day: 1.00     Years: 45.00     Pack years: 45.00     Types: Cigarettes    Smokeless tobacco: Current User    Tobacco comment: 1 2 ppd; enrolled in [address] beginning 12--LESS THAN 1 PK QD, CURRENT 0.50-1 ppd 16   Substance Use Topics    Alcohol use: Yes     Alcohol/week: 1.0 standard drinks     Types: 1 Glasses of wine per week     Comment: per month      Family History   Problem Relation Age of Onset    Cancer Mother         breast    Other Father         neurological degeneration    Cancer Sister         exophageal    reviewed  Objective:     Visit Vitals  /62 (BP 1 Location: Left arm, BP Patient Position: Sitting)   Pulse (!) 124   Temp 98.9 °F (37.2 °C)   Resp 28   Ht 5' 4\" (1.626 m)   Wt 68.5 kg (151 lb)   SpO2 96%   BMI 25.92 kg/m²      Temp (24hrs), Av.9 °F (37.2 °C), Min:98.9 °F (37.2 °C), Max:98.9 °F (37.2 °C)    Oxygen Therapy  O2 Sat (%): 96 % (19)  Pulse via Oximetry: 129 beats per minute (19)  O2 Device: Nasal cannula (19)  O2 Flow Rate (L/min): 4 l/min (19)  Physical Exam:  General:    Alert, cooperative, no distress, appears stated age. Head:   Normocephalic, without obvious abnormality, atraumatic.   Nose:  Nares normal. No drainage or sinus tenderness. Lungs:   Clear to auscultation bilaterally. No Wheezing or Rhonchi. No rales. Heart:   Regular rate and rhythm,  no murmur, rub or gallop. Abdomen:   Soft, non-tender. Not distended. Bowel sounds normal.   Extremities: No cyanosis. No edema. No clubbing  Skin:     Texture, turgor normal. No rashes or lesions. Not Jaundiced  Neurologic: Alert and oriented x 3, no focal deficits   Data Review:   Recent Results (from the past 24 hour(s))   CBC WITH AUTOMATED DIFF    Collection Time: 09/27/19  9:15 AM   Result Value Ref Range    WBC 29.0 (H) 4.3 - 11.1 K/uL    RBC 5.36 (H) 4.05 - 5.2 M/uL    HGB 16.3 (H) 11.7 - 15.4 g/dL    HCT 49.6 (H) 35.8 - 46.3 %    MCV 92.5 79.6 - 97.8 FL    MCH 30.4 26.1 - 32.9 PG    MCHC 32.9 31.4 - 35.0 g/dL    RDW 15.7 (H) 11.9 - 14.6 %    PLATELET 135 382 - 647 K/uL    MPV 10.2 9.4 - 12.3 FL    ABSOLUTE NRBC 0.00 0.0 - 0.2 K/uL    DF AUTOMATED      NEUTROPHILS 93 (H) 43 - 78 %    LYMPHOCYTES 4 (L) 13 - 44 %    MONOCYTES 2 (L) 4.0 - 12.0 %    EOSINOPHILS 0 (L) 0.5 - 7.8 %    BASOPHILS 0 0.0 - 2.0 %    IMMATURE GRANULOCYTES 1 0.0 - 5.0 %    ABS. NEUTROPHILS 26.9 (H) 1.7 - 8.2 K/UL    ABS. LYMPHOCYTES 1.1 0.5 - 4.6 K/UL    ABS. MONOCYTES 0.7 0.1 - 1.3 K/UL    ABS. EOSINOPHILS 0.0 0.0 - 0.8 K/UL    ABS. BASOPHILS 0.1 0.0 - 0.2 K/UL    ABS. IMM. GRANS. 0.3 0.0 - 0.5 K/UL   METABOLIC PANEL, COMPREHENSIVE    Collection Time: 09/27/19  9:15 AM   Result Value Ref Range    Sodium 142 136 - 145 mmol/L    Potassium 4.6 3.5 - 5.1 mmol/L    Chloride 104 98 - 107 mmol/L    CO2 29 21 - 32 mmol/L    Anion gap 9 7 - 16 mmol/L    Glucose 204 (H) 65 - 100 mg/dL    BUN 8 8 - 23 MG/DL    Creatinine 0.51 (L) 0.6 - 1.0 MG/DL    GFR est AA >60 >60 ml/min/1.73m2    GFR est non-AA >60 >60 ml/min/1.73m2    Calcium 8.2 (L) 8.3 - 10.4 MG/DL    Bilirubin, total 0.5 0.2 - 1.1 MG/DL    ALT (SGPT) 69 (H) 12 - 65 U/L    AST (SGOT) 49 (H) 15 - 37 U/L    Alk.  phosphatase 105 50 - 130 U/L    Protein, total 6.7 6.3 - 8.2 g/dL    Albumin 2.6 (L) 3.2 - 4.6 g/dL    Globulin 4.1 (H) 2.3 - 3.5 g/dL    A-G Ratio 0.6 (L) 1.2 - 3.5     BNP    Collection Time: 09/27/19  9:15 AM   Result Value Ref Range    BNP 49 pg/mL   TROPONIN I    Collection Time: 09/27/19  9:15 AM   Result Value Ref Range    Troponin-I, Qt. <0.02 (L) 0.02 - 0.05 NG/ML   POC LACTIC ACID    Collection Time: 09/27/19  9:25 AM   Result Value Ref Range    Lactic Acid (POC) 1.18 0.5 - 1.9 mmol/L   POC G3    Collection Time: 09/27/19  9:52 AM   Result Value Ref Range    Device: NASAL CANNULA      pH (POC) 7.479 (H) 7.35 - 7.45      pCO2 (POC) 41.3 35 - 45 MMHG    pO2 (POC) 73 (L) 75 - 100 MMHG    HCO3 (POC) 30.7 (H) 22 - 26 MMOL/L    sO2 (POC) 95 95 - 98 %    Base excess (POC) 6 mmol/L    Allens test (POC) YES      Site RIGHT RADIAL      Patient temp. 98.6      Specimen type (POC) ARTERIAL      Performed by Bridget     CO2, POC 32 MMOL/L    Flow rate (POC) 4.000 L/min    Respiratory comment: 1      COLLECT TIME 949       Imaging /Procedures /Studies   XR CHEST PORT   Final Result   IMPRESSION:  COPD with a small focal infiltrate right midlung zone. .                   Assessment and Plan: Active Hospital Problems    Diagnosis Date Noted    COPD exacerbation ([facility] 09/27/2019       PLAN:  COPDe - still on steroid taper from exacerbation but no increased O2 requirement. No wheeze after solumedrol in ER. Taper steroids. Continue BDs.   PNA - small infiltrate on CXR, continue abx  JACQUELYN - home cpap  HTN - home meds      DVT PPx: lovenox  Code Status: full    Anticipated discharge: 2-3d    Signed By: Dianna Nguyễn MD     September 27, 2019

## 2019-09-27 NOTE — ED PROVIDER NOTES
60-year-old female with history of COPD with recent hospitalization discharged 2 days ago presents with worsening shortness of breath, productive cough with green sputum, generalized fatigue that has worsened over the past 1 to 2 days. Patient states that she is on 4 L O2 at baseline. Patient denies chest pain, leg swelling or pain, hemoptysis, headache, neck pain, abdominal pain, urinary symptoms. States that she is been using her nebulizer treatments at home as prescribed. Patient received DuoNeb in route in addition to 1 L normal saline IV fluid bolus and Rocephin 1 g IV. The history is provided by the patient. No  was used. Shortness of Breath   This is a chronic problem. The problem occurs continuously. The current episode started 12 to 24 hours ago. The problem has been gradually worsening. Associated symptoms include cough, sputum production and wheezing. Pertinent negatives include no fever, no headaches, no coryza, no rhinorrhea, no sore throat, no swollen glands, no hemoptysis, no PND, no chest pain, no syncope, no vomiting, no abdominal pain, no rash, no leg pain and no leg swelling. She has tried oral steroids and beta-agonist inhalers for the symptoms. The treatment provided no relief. Associated medical issues include COPD. Past Medical History:   Diagnosis Date    Acute respiratory failure ([address] 1/29/2015    Intubated 1/29/2015    Acute respiratory failure ([address] 1/29/2015    Intubated 1/29/2015     Cancer ([facility]     basal cell,squamous cell    Chest pain, unspecified 7/7/2016    Chiari I malformation (HCC)     history of    COPD exacerbation ([address] 9/14/12-9/17/12    COPD exacerbation ([address] 4/2013 to 5/2/13    hospitalization    Paroxysmal tachycardia/NOS 7/7/2016    Pneumonia 9/16/2012    Pneumothorax     HX.  of  2 on the left- required chest tube       Past Surgical History:   Procedure Laterality Date    HX CATARACT REMOVAL      HX HEART CATHETERIZATION 1/12    mild nonobstructive CAD    HX HEENT  2009    Bilateral cataracts and bleth    HX HYSTERECTOMY  1982    HX ORTHOPAEDIC  1998    right shoulder sx; left thumb    HX OTHER SURGICAL      left thumb sx,forehead resection of squamous cell    HX SEPTOPLASTY  04/2010    HX SHOULDER ARTHROSCOPY  1990         Family History:   Problem Relation Age of Onset    Cancer Mother         breast    Other Father         neurological degeneration    Cancer Sister         exophageal       Social History     Socioeconomic History    Marital status: SINGLE     Spouse name: Not on file    Number of children: Not on file    Years of education: Not on file    Highest education level: Not on file   Occupational History    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 beginning 11-5-12--LESS THAN 1 PK QD, CURRENT 0.50-1 ppd 6/14/16   Substance and Sexual Activity    Alcohol use:  Yes     Alcohol/week: 1.0 standard drinks     Types: 1 Glasses of wine per week     Comment: per month    Drug use: No    Sexual activity: Not on file   Lifestyle    Physical activity:     Days per week: Not on file     Minutes per session: Not on file    Stress: Not on file   Relationships    Social connections:     Talks on phone: Not on file     Gets together: Not on file     Attends Orthodox service: Not on file     Active member of club or organization: Not on file     Attends meetings of clubs or organizations: Not on file     Relationship status: Not on file    Intimate partner violence: Fear of current or ex partner: Not on file     Emotionally abused: Not on file     Physically abused: Not on file     Forced sexual activity: Not on file   Other Topics Concern    Not on file   Social History Narrative    Lives with granddaughter. Retired . ALLERGIES: Azithromycin and Sulfa (sulfonamide antibiotics)    Review of Systems   Constitutional: Negative for chills and fever. HENT: Negative for congestion, rhinorrhea and sore throat. Respiratory: Positive for cough, sputum production, shortness of breath and wheezing. Negative for hemoptysis. Cardiovascular: Negative for chest pain, palpitations, leg swelling, syncope and PND. Gastrointestinal: Negative for abdominal pain, diarrhea, nausea and vomiting. Genitourinary: Negative for dysuria and flank pain. Musculoskeletal: Negative for myalgias. Skin: Negative for rash and wound. Neurological: Negative for dizziness, syncope, weakness and headaches. Psychiatric/Behavioral: Negative for confusion. Vitals:    09/27/19 0915   BP: 130/62   Pulse: (!) 124   Resp: 28   Temp: 98.9 °F (37.2 °C)   SpO2: 95%   Weight: 68.5 kg (151 lb)   Height: 5' 4\" (1.626 m)            Physical Exam   Constitutional: She is oriented to person, place, and time. HENT:   Mouth/Throat: Oropharynx is clear and moist.   MMM. Eyes: Pupils are equal, round, and reactive to light. EOM are normal.   Cardiovascular: Regular rhythm, normal heart sounds, intact distal pulses and normal pulses. Tachycardia present. Pulmonary/Chest: She has wheezes. Diffuse wheezes noted bilaterally. Abdominal: Soft. There is no tenderness. Soft, NTND. Musculoskeletal:        Right lower leg: Normal. She exhibits no edema. Left lower leg: Normal.   No LE edema or calf TTP. Neurological: She is alert and oriented to person, place, and time. Skin: Skin is warm. Capillary refill takes less than 2 seconds. No rash noted. No erythema.    Nursing note and vitals reviewed. MDM  Number of Diagnoses or Management Options  COPD exacerbation ([address]): new and requires workup  Pneumonia of right lung due to infectious organism, unspecified part of lung: new and requires workup  Pneumonia of right middle lobe due to infectious organism [facility]): new and requires workup  Diagnosis management comments: EKG with sinus tachycardia. No acute ST changes noted. Chest x-ray with new right middle lobe infiltrate. WBC 29; at discharge 10.8. Given that patient received Rocephin 1 g IV in route, patient given Zithromax 500 mg IV. Patient given additional DuoNeb, Solumedrol 125 mg IV + 1 L NS IVF bolus. Hospitalist consulted for admission. Amount and/or Complexity of Data Reviewed  Clinical lab tests: ordered and reviewed  Tests in the radiology section of CPT®: ordered and reviewed  Tests in the medicine section of CPT®: ordered and reviewed  Review and summarize past medical records: yes  Discuss the patient with other providers: yes  Independent visualization of images, tracings, or specimens: yes    Risk of Complications, Morbidity, and/or Mortality  Presenting problems: moderate  Diagnostic procedures: moderate  Management options: moderate    Patient Progress  Patient progress: stable    ED Course as of Sep 27 1035   Fri Sep 27, 2019   1017 CXR IMPRESSION:  COPD with a small focal infiltrate right midlung zone. [DF]      ED Course User Index  [DF] Jd Ramírez MD       EKG  Date/Time: 9/27/2019 9:51 AM  Performed by: Jd Ramírez MD  Authorized by:  Jd Ramírez MD     ECG reviewed by ED Physician in the absence of a cardiologist: yes    Rate:     ECG rate:  125    ECG rate assessment: tachycardic    Rhythm:     Rhythm: sinus tachycardia    Ectopy:     Ectopy: none    QRS:     QRS axis:  Normal    QRS intervals:  Normal  Conduction:     Conduction: normal    ST segments:     ST segments:  Normal  T waves:     T waves: normal          Results Include:    Recent Results (from the past 24 hour(s))   CBC WITH AUTOMATED DIFF    Collection Time: 09/27/19  9:15 AM   Result Value Ref Range    WBC 29.0 (H) 4.3 - 11.1 K/uL    RBC 5.36 (H) 4.05 - 5.2 M/uL    HGB 16.3 (H) 11.7 - 15.4 g/dL    HCT 49.6 (H) 35.8 - 46.3 %    MCV 92.5 79.6 - 97.8 FL    MCH 30.4 26.1 - 32.9 PG    MCHC 32.9 31.4 - 35.0 g/dL    RDW 15.7 (H) 11.9 - 14.6 %    PLATELET 045 080 - 419 K/uL    MPV 10.2 9.4 - 12.3 FL    ABSOLUTE NRBC 0.00 0.0 - 0.2 K/uL    DF AUTOMATED      NEUTROPHILS 93 (H) 43 - 78 %    LYMPHOCYTES 4 (L) 13 - 44 %    MONOCYTES 2 (L) 4.0 - 12.0 %    EOSINOPHILS 0 (L) 0.5 - 7.8 %    BASOPHILS 0 0.0 - 2.0 %    IMMATURE GRANULOCYTES 1 0.0 - 5.0 %    ABS. NEUTROPHILS 26.9 (H) 1.7 - 8.2 K/UL    ABS. LYMPHOCYTES 1.1 0.5 - 4.6 K/UL    ABS. MONOCYTES 0.7 0.1 - 1.3 K/UL    ABS. EOSINOPHILS 0.0 0.0 - 0.8 K/UL    ABS. BASOPHILS 0.1 0.0 - 0.2 K/UL    ABS. IMM. GRANS. 0.3 0.0 - 0.5 K/UL   METABOLIC PANEL, COMPREHENSIVE    Collection Time: 09/27/19  9:15 AM   Result Value Ref Range    Sodium 142 136 - 145 mmol/L    Potassium 4.6 3.5 - 5.1 mmol/L    Chloride 104 98 - 107 mmol/L    CO2 29 21 - 32 mmol/L    Anion gap 9 7 - 16 mmol/L    Glucose 204 (H) 65 - 100 mg/dL    BUN 8 8 - 23 MG/DL    Creatinine 0.51 (L) 0.6 - 1.0 MG/DL    GFR est AA >60 >60 ml/min/1.73m2    GFR est non-AA >60 >60 ml/min/1.73m2    Calcium 8.2 (L) 8.3 - 10.4 MG/DL    Bilirubin, total 0.5 0.2 - 1.1 MG/DL    ALT (SGPT) 69 (H) 12 - 65 U/L    AST (SGOT) 49 (H) 15 - 37 U/L    Alk.  phosphatase 105 50 - 130 U/L    Protein, total 6.7 6.3 - 8.2 g/dL    Albumin 2.6 (L) 3.2 - 4.6 g/dL    Globulin 4.1 (H) 2.3 - 3.5 g/dL    A-G Ratio 0.6 (L) 1.2 - 3.5     BNP    Collection Time: 09/27/19  9:15 AM   Result Value Ref Range    BNP 49 pg/mL   TROPONIN I    Collection Time: 09/27/19  9:15 AM   Result Value Ref Range    Troponin-I, Qt. <0.02 (L) 0.02 - 0.05 NG/ML   POC LACTIC ACID    Collection Time: 09/27/19 9:25 AM   Result Value Ref Range    Lactic Acid (POC) 1.18 0.5 - 1.9 mmol/L   POC G3    Collection Time: 09/27/19  9:52 AM   Result Value Ref Range    Device: NASAL CANNULA      pH (POC) 7.479 (H) 7.35 - 7.45      pCO2 (POC) 41.3 35 - 45 MMHG    pO2 (POC) 73 (L) 75 - 100 MMHG    HCO3 (POC) 30.7 (H) 22 - 26 MMOL/L    sO2 (POC) 95 95 - 98 %    Base excess (POC) 6 mmol/L    Allens test (POC) YES      Site RIGHT RADIAL      Patient temp. 98.6      Specimen type (POC) ARTERIAL      Performed by Bridget     CO2, POC 32 MMOL/L    Flow rate (POC) 4.000 L/min    Respiratory comment: 1      COLLECT TIME 949                    Sean Gutierrez MD; 9/27/2019 @9:51 AM Voice dictation software was used during the making of this note. This software is not perfect and grammatical and other typographical errors may be present.   This note has not been proofread for errors.  ===================================================================

## 2019-09-27 NOTE — ED NOTES
TRANSFER - OUT REPORT:    Verbal report given to Gilbert Kimbrough on [employer]  being transferred to [phone] for routine progression of care       Report consisted of patients Situation, Background, Assessment and   Recommendations(SBAR). Information from the following report(s) SBAR and MAR was reviewed with the receiving nurse. Lines:       Opportunity for questions and clarification was provided.       Patient transported with:   O2 @ 4 liters

## 2019-09-27 NOTE — PROGRESS NOTES
Admission assessment complete. Pt resting in bed. A&Ox4. Respirations present, even, unlabored. Lung sounds diminished on auscultation. 3 L O2 NC in place. HR regular, S1&S2 auscultated. IVF infusing without difficulty. Skin appears to be intact. Pt denies pain at this time. Bed in lowest position, call light within reach, side rails x3. Encouraged to call for help when needed.

## 2019-09-27 NOTE — ED TRIAGE NOTES
Patient presents from home via EMS, she was recently discharged for a COPD exacerbation. She was SOB this am and called EMS. Patient is tachycardic, positive for cough, fever, chills. Negative for NVD, changes in bowel habits.

## 2019-09-27 NOTE — PROGRESS NOTES
Problem: Mobility Impaired (Adult and Pediatric)  Goal: *Acute Goals and Plan of Care (Insert Text)  Outcome: Resolved/Met     PHYSICAL THERAPY: Initial Assessment and Discharge 9/27/2019  INPATIENT: PT Visit Days : 1  [place] / [place] / Product Type: Medicare /       NAME/AGE/GENDER: Catherine Boyer is a 71 y.o. female   PRIMARY DIAGNOSIS: COPD exacerbation ([facility] [J44.1] Pneumonia of right upper lobe due to infectious organism ([facility]   Pneumonia of right upper lobe due to infectious organism [facility])          ICD-10: Treatment Diagnosis:    Difficulty in walking, Not elsewhere classified (R26.2)   Precaution/Allergies:  Azithromycin and Sulfa (sulfonamide antibiotics)      ASSESSMENT:     Ms. Rashmi Wells presents supine on contact and agreeable to therapy assessment although does not think she needs PT. She was just here in our hospital and was discharged on 9/25 and was seen by PT on that admission. States she was home one day and now back, was having difficulty breathing. She lives at home and is on home oxygen. On assessment she is independent with bed mobility and sit to stand. Was able to walk in room without assist and manage her oxygen tubing. She does not feel like she needs any therapy. Talked with her about the importance of getting up frequently and she agreed she would get up. Will discharge PT at this time as she is moving around safely without assist.     This section established at most recent assessment   PROBLEM LIST (Impairments causing functional limitations):  None    INTERVENTIONS PLANNED: (Benefits and precautions of physical therapy have been discussed with the patient.)  None      TREATMENT PLAN: Frequency/Duration: One time visit for assessment       REHAB RECOMMENDATIONS (at time of discharge pending progress):    Placement: It is my opinion, based on this patient's performance to date, that Ms. Rashmi Wells may benefit from being discharged with NO further skilled therapy due to a proven ability to function at baseline. Equipment:   None at this time              HISTORY:   History of Present Injury/Illness (Reason for Referral):  PER MD NOTE: Patient is a 69yo with hx COPD on 4L at home who was admitted 9/19 to 25 with COPD exacerbation. She was initially on bipap, weaned eventually down to 4L at rest. At time of discharge, still required 6L with ambulation and home CPAP hs. She began having coughing worsening one day after discharge with green sputum. She also woke up this morning feeling disoriented. She has been wearing her cpap at night and oxygen during the day. Oxygen requirement is not increased. Received rocephin and azithro in the ER for RML infiltrate on CXR. Received 5d levaquin while inpatient for last copd exacerbation and was still on prednisone 40 as part of prolonged taper. Past Medical History/Comorbidities:   Ms. Rivera Barragan  has a past medical history of Acute respiratory failure ([address] (1/29/2015), Acute respiratory failure ([address] (1/29/2015), Cancer [facility]), Chest pain, unspecified (7/7/2016), Chiari I malformation ([address] COPD exacerbation ([address] (9/14/12-9/17/12), COPD exacerbation ([address] (4/2013 to 5/2/13), Paroxysmal tachycardia/NOS (7/7/2016), Pneumonia (9/16/2012), and Pneumothorax. She also has no past medical history of Aneurysm ([address] Difficult intubation, GERD (gastroesophageal reflux disease), Heart failure ([address] Liver disease, Malignant hyperthermia due to anesthesia, Morbid obesity ([address] Nausea & vomiting, Pseudocholinesterase deficiency, Seizures ([address] Thromboembolus ([address] or Unspecified adverse effect of anesthesia. Ms. Rivera Barragan  has a past surgical history that includes hx heent (2009); hx other surgical; hx heart catheterization (1/12); hx orthopaedic (1998); hx cataract removal; hx septoplasty (04/2010); hx shoulder arthroscopy (1990); and hx hysterectomy (1982).   Social History/Living Environment:   Home Environment: Private residence  # Steps to Enter: 2  One/Two Story Residence: One story  Living Alone: No  Support Systems: Family member(s)  Patient Expects to be Discharged to[de-identified] Private residence  Current DME Used/Available at Home: Cane, straight, CPAP, Oxygen, portable  Tub or Shower Type: Shower  Prior Level of Function/Work/Activity:  Pt was independent at home with a cane prn     Number of Personal Factors/Comorbidities that affect the Plan of Care: 0: LOW COMPLEXITY   EXAMINATION:   Most Recent Physical Functioning:   Gross Assessment:  AROM: Within functional limits  Strength: Generally decreased, functional               Posture:  Posture (WDL): Within defined limits  Balance:  Sitting: Intact  Standing: Intact Bed Mobility:  Supine to Sit: Independent  Sit to Supine: Independent  Wheelchair Mobility:     Transfers:  Sit to Stand: Independent;Supervision  Stand to Sit: Independent;Supervision  Gait:     Distance (ft): 25 Feet (ft)(in room)  Ambulation - Level of Assistance: Independent      Body Structures Involved:  Lungs Body Functions Affected:  Respiratory Activities and Participation Affected: Mobility   Number of elements that affect the Plan of Care: 3: MODERATE COMPLEXITY   CLINICAL PRESENTATION:   Presentation: Stable and uncomplicated: LOW COMPLEXITY   CLINICAL DECISION MAKIN [place] [id number] AM-PAC 6 Clicks   Basic Mobility Inpatient Short Form  How much difficulty does the patient currently have. .. Unable A Lot A Little None   1. Turning over in bed (including adjusting bedclothes, sheets and blankets)? ? 1   ? 2   ? 3   ? 4   2. Sitting down on and standing up from a chair with arms ( e.g., wheelchair, bedside commode, etc.)   ? 1   ? 2   ? 3   ? 4   3. Moving from lying on back to sitting on the side of the bed?   ? 1   ? 2   ? 3   ? 4   How much help from another person does the patient currently need. .. Total A Lot A Little None   4. Moving to and from a bed to a chair (including a wheelchair)? ? 1   ? 2   ? 3   ? 4   5. Need to walk in hospital room? ? 1   ? 2   ? 3   ? 4   6. Climbing 3-5 steps with a railing? ? 1   ? 2   ? 3   ? 4   © 2007, Trustees of [address] [place] [address] [address] [id number], under license to [employer]. All rights reserved      Score:  Initial: 23 Most Recent: X (Date: -- )    Interpretation of Tool:  Represents activities that are increasingly more difficult (i.e. Bed mobility, Transfers, Gait). Medical Necessity:     none. Reason for Services/Other Comments:  none. Use of outcome tool(s) and clinical judgement create a POC that gives a: Clear prediction of patient's progress: LOW COMPLEXITY            TREATMENT:   (In addition to Assessment/Re-Assessment sessions the following treatments were rendered)   Pre-treatment Symptoms/Complaints:  some chest and back pain  Pain: Initial:   Pain Intensity 1: (some chest pain and back pain)  Post Session:  same     Assessment/Reassessment only, no treatment provided today    Braces/Orthotics/Lines/Etc:   O2 Device: Nasal cannula  Treatment/Session Assessment:    Response to Treatment:  pt tolerated well  Interdisciplinary Collaboration:   Registered Nurse  After treatment position/precautions:   Supine in bed  Bed/Chair-wheels locked  Bed in low position  Call light within reach   Recommendations/Intent for next treatment session:  Will discharge Physical Therapy services at this time.   Total Treatment Duration:  PT Patient Time In/Time Out  Time In: 1550  Time Out: [address], PT

## 2019-09-27 NOTE — PROGRESS NOTES
09/27/19 1130   Dual Skin Pressure Injury Assessment   Dual Skin Pressure Injury Assessment WDL   Second Care Provider (Based on [address] [place] [address]) Brandon Frey RN

## 2019-09-27 NOTE — PROGRESS NOTES
OCCUPATIONAL THERAPY: Initial Assessment, Discharge and PM 9/27/2019  INPATIENT: OT Visit Days: 1  [place] / [place] / Product Type: Medicare /      NAME/AGE/GENDER: John Arceo is a 71 y.o. female   PRIMARY DIAGNOSIS:  COPD exacerbation ([facility] [J44.1] <principal problem not specified>   <principal problem not specified>          ICD-10: Treatment Diagnosis:    Generalized Muscle Weakness (M62.81)   Precautions/Allergies:     Azithromycin and Sulfa (sulfonamide antibiotics)      ASSESSMENT:     Ms. Elias Flores presents sitting up on EOB finished with her lunch tray. She came in due to SOB. She has a 15year old special needs daughter that lives with her. Ms. Elias Flores is supervision with seated and standing ADLS. She is supervision with ambulating in the her room and managed her O2 cord effectively. She plans to return home with her daughter and her mother is able to assist as well. No skilled OT indicated at this time. Will discharge OT. This section established at most recent assessment             REHAB RECOMMENDATIONS (at time of discharge pending progress):    Placement: It is my opinion, based on this patient's performance to date, that Ms. Elias Flores may benefit from being discharged with NO further skilled therapy due to a proven ability to function at baseline. Equipment:   None at this time              [address]:   History of Present Injury/Illness (Reason for Referral):  See H and P  Past Medical History/Comorbidities:   Ms. Elias Flores  has a past medical history of Acute respiratory failure ([address] (1/29/2015), Acute respiratory failure ([address] (1/29/2015), Cancer ([address] Chest pain, unspecified (7/7/2016), Chiari I malformation ([address] COPD exacerbation ([address] (9/14/12-9/17/12), COPD exacerbation ([address] (4/2013 to 5/2/13), Paroxysmal tachycardia/NOS (7/7/2016), Pneumonia (9/16/2012), and Pneumothorax.  She also has no past medical history of Aneurysm ([address] Difficult intubation, GERD (gastroesophageal reflux disease), Heart failure ([address] Liver disease, Malignant hyperthermia due to anesthesia, Morbid obesity ([address] Nausea & vomiting, Pseudocholinesterase deficiency, Seizures ([address] Thromboembolus ([address] or Unspecified adverse effect of anesthesia. Ms. Vignesh Szymanski  has a past surgical history that includes hx heent (2009); hx other surgical; hx heart catheterization (1/12); hx orthopaedic (1998); hx cataract removal; hx septoplasty (04/2010); hx shoulder arthroscopy (1990); and hx hysterectomy (1982). Social History/Living Environment:   Home Environment: Private residence  # Steps to Enter: 2  One/Two Story Residence: One story  Living Alone: No  Support Systems: Family member(s)  Patient Expects to be Discharged to[de-identified] Private residence  Current DME Used/Available at Home: [employer] [place], straight  Tub or Shower Type: Shower  Prior Level of Function/Work/Activity:  Independent and cares for her 15year old autistic daughter     Number of Personal Factors/Comorbidities that affect the Plan of Care: Brief history (0):  LOW COMPLEXITY   ASSESSMENT OF OCCUPATIONAL PERFORMANCE[de-identified]   Activities of Daily Living:   Basic ADLs (From Assessment) Complex ADLs (From Assessment)   Feeding: Supervision  Oral Facial Hygiene/Grooming: Supervision  Bathing: Supervision  Upper Body Dressing: Supervision  Lower Body Dressing: Supervision  Toileting: Supervision     Grooming/Bathing/Dressing Activities of Daily Living     Cognitive Retraining  Safety/Judgement: Awareness of environment; Fall prevention                       Bed/Mat Mobility  Sit to Supine: Supervision  Sit to Stand: Supervision(ambulated in room holding O2 cord no device no LOB)  Stand to Sit: Supervision  Scooting: Supervision     Most Recent Physical Functioning:   Gross Assessment:                  Posture:     Balance:  Sitting: Intact  Standing: Without support Bed Mobility:  Sit to Supine: Supervision  Scooting: Supervision  Wheelchair Mobility:     Transfers:  Sit to Stand: Supervision(ambulated in room holding O2 cord no device no LOB)  Stand to Sit: Supervision            Patient Vitals for the past 6 hrs:   BP BP Patient Position SpO2 O2 Flow Rate (L/min) Pulse   19 0915 130/62 Sitting 95 % 4 l/min (!) 124   19 0953 -- -- 96 % 4 l/min --   19 1040 117/60 -- 94 % -- (!) 121   19 1130 126/85 At rest 97 % 3 l/min (!) 112   19 1317 -- -- 94 % 3 l/min --       Mental Status  Neurologic State: Alert, Appropriate for age  Orientation Level: Appropriate for age  Cognition: Appropriate decision making, Appropriate for age attention/concentration, Appropriate safety awareness, Follows commands  Perception: Appears intact  Perseveration: No perseveration noted  Safety/Judgement: Awareness of environment, Fall prevention                          Physical Skills Involved: Activity Tolerance Cognitive Skills Affected (resulting in the inability to perform in a timely and safe manner):  [facility]  Psychosocial Skills Affected:  none    Number of elements that affect the Plan of Care: 1-3:  LOW COMPLEXITY   CLINICAL DECISION MAKIN[address] [place] [id number] AM-PAC 6 Clicks   Daily Activity Inpatient Short Form  How much help from another person does the patient currently need. .. Total A Lot A Little None   1. Putting on and taking off regular lower body clothing? ? 1   ? 2   ? 3   ? 4   2. Bathing (including washing, rinsing, drying)? ? 1   ? 2   ? 3   ? 4   3. Toileting, which includes using toilet, bedpan or urinal?   ? 1   ? 2   ? 3   ? 4   4. Putting on and taking off regular upper body clothing? ? 1   ? 2   ? 3   ? 4   5. Taking care of personal grooming such as brushing teeth? ? 1   ? 2   ? 3   ? 4   6. Eating meals? ? 1   ? 2   ? 3   ? 4   © 2007, Trustees of [address] [place] [address] [address] [address], under license to [employer].  All rights reserved      Score:  Initial: 21 Most Recent: 21 discharge 19 Interpretation of Tool:  Represents activities that are increasingly more difficult (i.e. Bed mobility, Transfers, Gait). Use of outcome tool(s) and clinical judgement create a POC that gives a: LOW COMPLEXITY         TREATMENT:   (In addition to Assessment/Re-Assessment sessions the following treatments were rendered)     Pre-treatment Symptoms/Complaints:    Pain: Initial:   Pain Intensity 1: 0  Post Session:  0/10     Assessment/Reassessment only, no treatment provided today    Braces/Orthotics/Lines/Etc:   O2 Device: Nasal cannula  Treatment/Session Assessment:    Response to Treatment:  tolerated well, no skilled OT needed at this time. Interdisciplinary Collaboration:   Occupational Therapist  Registered Nurse  After treatment position/precautions:   Supine in bed  Bed/Chair-wheels locked  Bed in low position  Call light within reach  RN notified  Side rails x 3   Compliance with Program/Exercises: Compliant all of the time.   Discharge OT  Total Treatment Duration:  OT Patient Time In/Time Out  Time In: 1330  Time Out: [address], OT

## 2019-09-27 NOTE — CONSULTS
CONSULT NOTE    Erinnlamar Carballo    9/27/2019    Date of Admission:  9/27/2019    The patient's chart is reviewed and the patient is discussed with the staff. Subjective:     Patient is a 71 y.o.  female with hx of copd recently hospitalized at [facility] with resp failure and copd exacerbation. She was discharged 2 days ago on 4L O2 and prednisone taper. Over the last 24 hours she developed a cough productive of yellow sputum. She has had substernal chest pain which is pleuritic. She has become increasingly sob and as a result came back to the er and was admitted with the dx of pneumonia. She has been a smoker but she says she did not smoke after discharge.       Review of Systems  Constitutional: negative for fevers and chills  Eyes: negative for visual disturbance  Ears, nose, mouth, throat, and face: negative for sore throat  Cardiovascular: positive for chest pain  Gastrointestinal: negative for nausea and vomiting  Genitourinary:negative for frequency and dysuria  Musculoskeletal:negative for arthralgias  Neurological: negative for headaches    Patient Active Problem List   Diagnosis Code    Chronic sinusitis J32.9    JACQUELYN (obstructive sleep apnea) G47.33    Tobacco use disorder F17.200    Diabetes mellitus ([facility]) E11.9    Fibromyalgia M79.7    Restless leg syndrome G25.81    Depression F32.9    Debility R53.81    Chronic respiratory failure ([facility]) J96.10    ADHD (attention deficit hyperactivity disorder) F90.9    Coronary artery disease I25.10    Right heart enlargement I51.7    Polycythemia D75.1    Moderate COPD (chronic obstructive pulmonary disease) ([facility]) J44.9    Essential hypertension with goal blood pressure less than 130/85 I10    Fatigue R53.83    Nocturnal hypoxia G47.34    Chest pain at rest R07.9    Chest discomfort R07.89    Sleep apnea G47.30    Paroxysmal tachycardia/NOS I47.9    Acute tracheobronchitis J20.9    COPD with acute exacerbation ([facility] J44.1    Acute on chronic respiratory failure with hypoxia and hypercapnia ([facility]) J96.21, J96.22    COPD exacerbation ([facility]) J44.1    Pneumonia of right upper lobe due to infectious organism ([facility] J18.1           Prior to Admission Medications   Prescriptions Last Dose Informant Patient Reported? Taking? DULoxetine (CYMBALTA) 30 mg capsule 2019 at Unknown time  Yes Yes   Sig: Take 30 mg by mouth daily. OXYGEN-AIR DELIVERY SYSTEMS 2019 at Unknown time  Yes Yes   Sig: by Nasal route. 6 lpm qhs   albuterol (PROAIR HFA) 90 mcg/actuation inhaler 2019 at Unknown time  No Yes   Sig: Take 2 Puffs by inhalation every four (4) hours as needed for Wheezing. albuterol (PROVENTIL VENTOLIN) 2.5 mg /3 mL (0.083 %) nebulizer solution 2019 at Unknown time  Yes Yes   Si.5 mg by Nebulization route two (2) times a day. aspirin delayed-release 81 mg tablet 2019 at Unknown time  Yes Yes   Sig: Take  by mouth daily. budesonide-formoterol (SYMBICORT) 160-4.5 mcg/actuation HFAA Not Taking at Unknown time  No No   Sig: Take 2 Puffs by inhalation two (2) times a day. cpap machine kit 2019 at Unknown time  Yes Yes   Sig: by Does Not Apply route. 15cm with 5L of oxygen bled in   dilTIAZem NGUYEN [facility]) 360 mg ER capsule 2019 at Unknown time  No Yes   Sig: Take 1 Cap by mouth daily. gabapentin (NEURONTIN) 300 mg capsule 2019 at Unknown time  Yes Yes   Sig: Take 300 mg by mouth two (2) times a day. 1 tab po qhs    methylphenidate (RITALIN) 5 mg tablet 2019 at Unknown time  Yes Yes   Sig: Take 5 mg by mouth two (2) times a day. mirtazapine (REMERON) 30 mg tablet 2019 at Unknown time  Yes Yes   Sig: Take 15 mg by mouth nightly. nitroglycerin (NITROSTAT) 0.4 mg SL tablet Unknown at Unknown time  Yes No   Sig: by SubLINGual route every five (5) minutes as needed.    olopatadine (PATADAY) 0.2 % drop ophthalmic solution 2019 at Unknown time  Yes Yes   Sig: Administer 1 drop to both eyes daily. predniSONE (DELTASONE) 10 mg tablet 9/27/2019 at Unknown time  No Yes   Sig: Take 40 mg by mouth daily for 3 days, THEN 30 mg daily for 3 days, THEN 20 mg daily for 3 days, THEN 10 mg daily for 3 days. prednisoLONE acetate (PRED FORTE) 1 % ophthalmic suspension 8/27/2019 at Unknown time  Yes Yes   Sig: Administer 1 drop to both eyes four (4) times daily. tiotropium bromide (SPIRIVA RESPIMAT) 2.5 mcg/actuation inhaler 9/27/2019 at Unknown time  No Yes   Sig: Take 2 Puffs by inhalation daily. traZODone (DESYREL) 100 mg tablet 9/26/2019 at Unknown time  Yes Yes   Sig: Take 100 mg by mouth nightly. Facility-Administered Medications: None       Past Medical History:   Diagnosis Date    Acute respiratory failure ([facility] 1/29/2015    Intubated 1/29/2015    Acute respiratory failure ([facility] 1/29/2015    Intubated 1/29/2015     Cancer ([facility]     basal cell,squamous cell    Chest pain, unspecified 7/7/2016    Chiari I malformation ([facility]     history of    COPD exacerbation ([facility] 9/14/12-9/17/12    COPD exacerbation ([facility] 4/2013 to 5/2/13    hospitalization    Paroxysmal tachycardia/NOS 7/7/2016    Pneumonia 9/16/2012    Pneumothorax     HX.  of  2 on the left- required chest tube     Past Surgical History:   Procedure Laterality Date    HX CATARACT REMOVAL      HX HEART CATHETERIZATION  1/12    mild nonobstructive CAD    HX HEENT  2009    Bilateral cataracts and bleth    HX HYSTERECTOMY  1982    HX ORTHOPAEDIC  1998    right shoulder sx; left thumb    HX OTHER SURGICAL      left thumb sx,forehead resection of squamous cell    HX SEPTOPLASTY  04/2010    HX SHOULDER ARTHROSCOPY  1990     Social History     Socioeconomic History    Marital status: SINGLE     Spouse name: Not on file    Number of children: Not on file    Years of education: Not on file    Highest education level: Not on file   Occupational History    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 beginning 11-5-12--LESS THAN 1 PK QD, CURRENT 0.50-1 ppd 6/14/16   Substance and Sexual Activity    Alcohol use: Yes     Alcohol/week: 1.0 standard drinks     Types: 1 Glasses of wine per week     Comment: per month    Drug use: No    Sexual activity: Not on file   Lifestyle    Physical activity:     Days per week: Not on file     Minutes per session: Not on file    Stress: Not on file   Relationships    Social connections:     Talks on phone: Not on file     Gets together: Not on file     Attends Sabianist service: Not on file     Active member of club or organization: Not on file     Attends meetings of clubs or organizations: Not on file     Relationship status: Not on file    Intimate partner violence:     Fear of current or ex partner: Not on file     Emotionally abused: Not on file     Physically abused: Not on file     Forced sexual activity: Not on file   Other Topics Concern    Not on file   Social History Narrative    Lives with granddaughter. Retired .      Family History   Problem Relation Age of Onset    Cancer Mother         breast    Other Father         neurological degeneration    Cancer Sister         exophageal     Allergies   Allergen Reactions    Azithromycin Diarrhea    Sulfa (Sulfonamide Antibiotics) Itching       Current Facility-Administered Medications   Medication Dose Route Frequency    albuterol-ipratropium (DUO-NEB) 2.5 MG-0.5 MG/3 ML  3 mL Nebulization Q4H RT    aspirin delayed-release tablet 81 mg  81 mg Oral DAILY    alcohol 62% (NOZIN) nasal  1 Ampule  1 Ampule Topical Q12H    budesonide (PULMICORT) 500 mcg/2 ml nebulizer suspension  500 mcg Nebulization BID RT    dilTIAZem CD (CARDIZEM CD) capsule 360 mg  360 mg Oral DAILY    DULoxetine (CYMBALTA) capsule 30 mg  30 mg Oral DAILY    docusate sodium (COLACE) capsule 100 mg  100 mg Oral DAILY    fluticasone propionate (FLONASE) 50 mcg/actuation nasal spray 2 Spray  2 Spray Both Nostrils DAILY    gabapentin (NEURONTIN) capsule 300 mg  300 mg Oral BID    guaiFENesin ER (MUCINEX) tablet 1,200 mg  1,200 mg Oral Q12H    insulin lispro (HUMALOG) injection   SubCUTAneous AC&HS    ketotifen (ZADITOR) 0.025 % (0.035 %) ophthalmic solution 1 Drop  1 Drop Both Eyes BID    mirtazapine (REMERON) tablet 15 mg  15 mg Oral QHS    nicotine (NICODERM CQ) 21 mg/24 hr patch 1 Patch  1 Patch TransDERmal Q24H    [START ON 9/28/2019] pantoprazole (PROTONIX) tablet 40 mg  40 mg Oral ACB    prednisoLONE acetate (PRED FORTE) 1 % ophthalmic suspension 1 Drop  1 Drop Both Eyes Q12H    sodium chloride (NS) flush 5-40 mL  5-40 mL IntraVENous Q8H    [START ON 9/28/2019] methylPREDNISolone (PF) (SOLU-MEDROL) injection 60 mg  60 mg IntraVENous DAILY    acetaminophen (TYLENOL) tablet 650 mg  650 mg Oral Q4H PRN    albuterol-ipratropium (DUO-NEB) 2.5 MG-0.5 MG/3 ML  3 mL Nebulization Q4H PRN    benzonatate (TESSALON) capsule 100 mg  100 mg Oral TID PRN    bisacodyl (DULCOLAX) tablet 5 mg  5 mg Oral DAILY PRN    sodium chloride (OCEAN) 0.65 % nasal squeeze bottle 2 Spray  2 Spray Both Nostrils Q2H PRN    sodium chloride (NS) flush 5-40 mL  5-40 mL IntraVENous PRN    ondansetron (ZOFRAN) injection 4 mg  4 mg IntraVENous Q4H PRN    nitroglycerin (NITROSTAT) tablet 0.4 mg  0.4 mg SubLINGual Q5MIN PRN    glucagon (GLUCAGEN) injection 1 mg  1 mg IntraMUSCular PRN    diphenhydrAMINE (BENADRYL) capsule 25 mg  25 mg Oral QHS PRN    dextrose 40% (GLUTOSE) oral gel 1 Tube  15 g Oral PRN    dextrose (D50W) injection syrg 12.5-25 g  25-50 mL IntraVENous PRN    enoxaparin (LOVENOX) injection 40 mg  40 mg SubCUTAneous Q24H         Objective:     Vitals:    09/27/19 0953 09/27/19 1040 09/27/19 1130 09/27/19 1317   BP:  117/60 126/85    Pulse:  (!) 121 (!) 112    Resp:  21 20    Temp:   98.2 °F (36.8 °C)    SpO2: 96% 94% 97% 94%   Weight:       Height:           PHYSICAL EXAM     Constitutional:  the patient is well developed and in no acute distress  EENMT:  Sclera clear, pupils equal, oral mucosa moist  Respiratory:  Rhonchi and wheezing  Cardiovascular:  RRR without M,G,R  Gastrointestinal: soft and non-tender; with positive bowel sounds. Musculoskeletal: warm without cyanosis. There is no lower extremity edema. Skin:  no jaundice or rashes, no wounds   Neurologic: no gross neuro deficits     Psychiatric:  alert and oriented x 3    CXR:        Recent Labs     09/27/19  0915 09/25/19  0413   WBC 29.0* 10.8   HGB 16.3* 15.1   HCT 49.6* 46.5*    248     Recent Labs     09/27/19  0915 09/25/19  0413    145   K 4.6 3.8    104   * 130*   CO2 29 37*   BUN 8 13   CREA 0.51* 0.50*   CA 8.2* 7.8*   TROIQ <0.02*  --    ALB 2.6*  --    TBILI 0.5  --    ALT 69*  --    SGOT 49*  --      Recent Labs     09/27/19  0952   PHI 7.479*   PCO2I 41.3   PO2I 73*   HCO3I 30.7*     No results for input(s): LCAD, LAC in the last 72 hours.     Assessment:  (Medical Decision Making)     Hospital Problems  Date Reviewed: 6/12/2019          Codes Class Noted POA    COPD exacerbation ([facility] ICD-10-CM: J44.1  ICD-9-CM: 491.21  9/27/2019 Unknown        * (Principal) Pneumonia of right upper lobe due to infectious organism [facility]) ICD-10-CM: J18.1  ICD-9-CM: 918  9/27/2019 Unknown        JACQUELYN (obstructive sleep apnea) (Chronic) ICD-10-CM: G47.33  ICD-9-CM: 327.23  7/26/2017 Yes    Overview Addendum 2/2/2015  9:38 AM by Cayden Loja NP     Uses home CPAP with 6L bleed in O2             Tobacco use disorder (Chronic) ICD-10-CM: F17.200  ICD-9-CM: 305.1  7/26/2017 Yes              Plan:  (Medical Decision Making)   1   Sputum culture  2   procal  3   Add rocephine  4   Chest ct  5   Bronchodilators  6   Iv steroids  --    More than 50% of the time documented was spent in face-to-face contact with the patient and in the care of the patient on the floor/unit where the patient is located. Thank you very much for this referral.  We appreciate the opportunity to participate in this patient's care. Will follow along with above stated plan.     Fernando Henderson MD

## 2019-09-27 NOTE — PROGRESS NOTES
Problem: Chronic Obstructive Pulmonary Disease (COPD)  Goal: *Oxygen saturation during activity within specified parameters  Outcome: Progressing Towards Goal  Goal: *Able to remain out of bed as prescribed  Outcome: Progressing Towards Goal  Goal: *Absence of hypoxia  Outcome: Progressing Towards Goal  Goal: *Optimize nutritional status  Outcome: Progressing Towards Goal     Problem: Falls - Risk of  Goal: *Absence of Falls  Description  Document Alondra Grider Fall Risk and appropriate interventions in the flowsheet. Outcome: Progressing Towards Goal  Note:   Fall Risk Interventions:            Medication Interventions: Bed/chair exit alarm    Elimination Interventions: Call light in reach              Problem: Pressure Injury - Risk of  Goal: *Prevention of pressure injury  Description  Document Augusto Scale and appropriate interventions in the flowsheet.   Outcome: Progressing Towards Goal  Note:   Pressure Injury Interventions:  Sensory Interventions: Assess changes in LOC    Moisture Interventions: Absorbent underpads, Apply protective barrier, creams and emollients    Activity Interventions: Increase time out of bed    Mobility Interventions: Pressure redistribution bed/mattress (bed type)    Nutrition Interventions: Offer support with meals,snacks and hydration

## 2019-09-28 LAB
BASOPHILS # BLD: 0 K/UL (ref 0–0.2)
BASOPHILS NFR BLD: 0 % (ref 0–2)
DIFFERENTIAL METHOD BLD: ABNORMAL
EOSINOPHIL # BLD: 0 K/UL (ref 0–0.8)
EOSINOPHIL NFR BLD: 0 % (ref 0.5–7.8)
ERYTHROCYTE [DISTWIDTH] IN BLOOD BY AUTOMATED COUNT: 15.5 % (ref 11.9–14.6)
GLUCOSE BLD STRIP.AUTO-MCNC: 111 MG/DL (ref 65–100)
GLUCOSE BLD STRIP.AUTO-MCNC: 205 MG/DL (ref 65–100)
GLUCOSE BLD STRIP.AUTO-MCNC: 248 MG/DL (ref 65–100)
GLUCOSE BLD STRIP.AUTO-MCNC: 338 MG/DL (ref 65–100)
HCT VFR BLD AUTO: 42.6 % (ref 35.8–46.3)
HGB BLD-MCNC: 13.8 G/DL (ref 11.7–15.4)
IMM GRANULOCYTES # BLD AUTO: 0.2 K/UL (ref 0–0.5)
IMM GRANULOCYTES NFR BLD AUTO: 1 % (ref 0–5)
LYMPHOCYTES # BLD: 1.4 K/UL (ref 0.5–4.6)
LYMPHOCYTES NFR BLD: 6 % (ref 13–44)
MCH RBC QN AUTO: 30.4 PG (ref 26.1–32.9)
MCHC RBC AUTO-ENTMCNC: 32.4 G/DL (ref 31.4–35)
MCV RBC AUTO: 93.8 FL (ref 79.6–97.8)
MONOCYTES # BLD: 0.9 K/UL (ref 0.1–1.3)
MONOCYTES NFR BLD: 4 % (ref 4–12)
NEUTS SEG # BLD: 20.7 K/UL (ref 1.7–8.2)
NEUTS SEG NFR BLD: 89 % (ref 43–78)
NRBC # BLD: 0 K/UL (ref 0–0.2)
PLATELET # BLD AUTO: 188 K/UL (ref 150–450)
PLATELET COMMENTS,PCOM: ADEQUATE
PMV BLD AUTO: 9.8 FL (ref 9.4–12.3)
RBC # BLD AUTO: 4.54 M/UL (ref 4.05–5.2)
RBC MORPH BLD: ABNORMAL
WBC # BLD AUTO: 23.2 K/UL (ref 4.3–11.1)
WBC MORPH BLD: ABNORMAL

## 2019-09-28 PROCEDURE — 94760 N-INVAS EAR/PLS OXIMETRY 1: CPT

## 2019-09-28 PROCEDURE — 82962 GLUCOSE BLOOD TEST: CPT

## 2019-09-28 PROCEDURE — 87633 RESP VIRUS 12-25 TARGETS: CPT

## 2019-09-28 PROCEDURE — 74011000250 HC RX REV CODE- 250: Performed by: FAMILY MEDICINE

## 2019-09-28 PROCEDURE — 87077 CULTURE AEROBIC IDENTIFY: CPT

## 2019-09-28 PROCEDURE — 36415 COLL VENOUS BLD VENIPUNCTURE: CPT

## 2019-09-28 PROCEDURE — 74011636637 HC RX REV CODE- 636/637: Performed by: FAMILY MEDICINE

## 2019-09-28 PROCEDURE — 87070 CULTURE OTHR SPECIMN AEROBIC: CPT

## 2019-09-28 PROCEDURE — 94640 AIRWAY INHALATION TREATMENT: CPT

## 2019-09-28 PROCEDURE — 74011250637 HC RX REV CODE- 250/637: Performed by: FAMILY MEDICINE

## 2019-09-28 PROCEDURE — 74011000258 HC RX REV CODE- 258: Performed by: FAMILY MEDICINE

## 2019-09-28 PROCEDURE — 74011250637 HC RX REV CODE- 250/637: Performed by: INTERNAL MEDICINE

## 2019-09-28 PROCEDURE — 87186 SC STD MICRODIL/AGAR DIL: CPT

## 2019-09-28 PROCEDURE — 77010033678 HC OXYGEN DAILY

## 2019-09-28 PROCEDURE — 99232 SBSQ HOSP IP/OBS MODERATE 35: CPT | Performed by: INTERNAL MEDICINE

## 2019-09-28 PROCEDURE — 74011000250 HC RX REV CODE- 250: Performed by: INTERNAL MEDICINE

## 2019-09-28 PROCEDURE — 74011250636 HC RX REV CODE- 250/636: Performed by: FAMILY MEDICINE

## 2019-09-28 PROCEDURE — 85025 COMPLETE CBC W/AUTO DIFF WBC: CPT

## 2019-09-28 PROCEDURE — 65270000029 HC RM PRIVATE

## 2019-09-28 RX ORDER — SODIUM CHLORIDE FOR INHALATION 3 %
4 VIAL, NEBULIZER (ML) INHALATION 2 TIMES DAILY
Status: DISCONTINUED | OUTPATIENT
Start: 2019-09-28 | End: 2019-10-02 | Stop reason: HOSPADM

## 2019-09-28 RX ORDER — HYDROCODONE BITARTRATE AND ACETAMINOPHEN 5; 325 MG/1; MG/1
1 TABLET ORAL
Status: DISCONTINUED | OUTPATIENT
Start: 2019-09-28 | End: 2019-10-02 | Stop reason: HOSPADM

## 2019-09-28 RX ADMIN — BUDESONIDE 500 MCG: 0.5 INHALANT RESPIRATORY (INHALATION) at 08:14

## 2019-09-28 RX ADMIN — GUAIFENESIN 1200 MG: 600 TABLET ORAL at 09:01

## 2019-09-28 RX ADMIN — Medication 1 AMPULE: at 09:02

## 2019-09-28 RX ADMIN — DILTIAZEM HYDROCHLORIDE 360 MG: 180 CAPSULE, COATED, EXTENDED RELEASE ORAL at 09:01

## 2019-09-28 RX ADMIN — MIRTAZAPINE 15 MG: 15 TABLET, FILM COATED ORAL at 22:13

## 2019-09-28 RX ADMIN — IPRATROPIUM BROMIDE AND ALBUTEROL SULFATE 3 ML: .5; 3 SOLUTION RESPIRATORY (INHALATION) at 05:13

## 2019-09-28 RX ADMIN — CEFTRIAXONE 1 G: 1 INJECTION, POWDER, FOR SOLUTION INTRAMUSCULAR; INTRAVENOUS at 15:42

## 2019-09-28 RX ADMIN — HYDROCODONE BITARTRATE AND ACETAMINOPHEN 1 TABLET: 5; 325 TABLET ORAL at 15:57

## 2019-09-28 RX ADMIN — METHYLPREDNISOLONE SODIUM SUCCINATE 60 MG: 125 INJECTION, POWDER, FOR SOLUTION INTRAMUSCULAR; INTRAVENOUS at 09:02

## 2019-09-28 RX ADMIN — INSULIN LISPRO 4 UNITS: 100 INJECTION, SOLUTION INTRAVENOUS; SUBCUTANEOUS at 09:05

## 2019-09-28 RX ADMIN — HYDROCODONE BITARTRATE AND ACETAMINOPHEN 1 TABLET: 5; 325 TABLET ORAL at 09:09

## 2019-09-28 RX ADMIN — KETOTIFEN FUMARATE 1 DROP: 0.35 SOLUTION/ DROPS OPHTHALMIC at 09:11

## 2019-09-28 RX ADMIN — PREDNISOLONE ACETATE 1 DROP: 10 SUSPENSION/ DROPS OPHTHALMIC at 09:11

## 2019-09-28 RX ADMIN — Medication 10 ML: at 15:45

## 2019-09-28 RX ADMIN — IPRATROPIUM BROMIDE AND ALBUTEROL SULFATE 3 ML: .5; 3 SOLUTION RESPIRATORY (INHALATION) at 15:51

## 2019-09-28 RX ADMIN — GUAIFENESIN 1200 MG: 600 TABLET ORAL at 22:13

## 2019-09-28 RX ADMIN — SODIUM CHLORIDE 30 MG/ML INHALATION SOLUTION 4 ML: 30 SOLUTION INHALANT at 12:14

## 2019-09-28 RX ADMIN — Medication 10 ML: at 22:14

## 2019-09-28 RX ADMIN — IPRATROPIUM BROMIDE AND ALBUTEROL SULFATE 3 ML: .5; 3 SOLUTION RESPIRATORY (INHALATION) at 20:42

## 2019-09-28 RX ADMIN — PREDNISOLONE ACETATE 1 DROP: 10 SUSPENSION/ DROPS OPHTHALMIC at 22:16

## 2019-09-28 RX ADMIN — ENOXAPARIN SODIUM 40 MG: 40 INJECTION SUBCUTANEOUS at 12:00

## 2019-09-28 RX ADMIN — INSULIN LISPRO 4 UNITS: 100 INJECTION, SOLUTION INTRAVENOUS; SUBCUTANEOUS at 22:13

## 2019-09-28 RX ADMIN — IPRATROPIUM BROMIDE AND ALBUTEROL SULFATE 3 ML: .5; 3 SOLUTION RESPIRATORY (INHALATION) at 12:14

## 2019-09-28 RX ADMIN — GABAPENTIN 300 MG: 300 CAPSULE ORAL at 22:13

## 2019-09-28 RX ADMIN — KETOTIFEN FUMARATE 1 DROP: 0.35 SOLUTION/ DROPS OPHTHALMIC at 22:16

## 2019-09-28 RX ADMIN — PANTOPRAZOLE SODIUM 40 MG: 40 TABLET, DELAYED RELEASE ORAL at 09:01

## 2019-09-28 RX ADMIN — IPRATROPIUM BROMIDE AND ALBUTEROL SULFATE 3 ML: .5; 3 SOLUTION RESPIRATORY (INHALATION) at 08:15

## 2019-09-28 RX ADMIN — FLUTICASONE PROPIONATE 2 SPRAY: 50 SPRAY, METERED NASAL at 09:04

## 2019-09-28 RX ADMIN — ASPIRIN 81 MG: 81 TABLET, COATED ORAL at 09:01

## 2019-09-28 RX ADMIN — DOCUSATE SODIUM 100 MG: 100 CAPSULE, LIQUID FILLED ORAL at 09:01

## 2019-09-28 RX ADMIN — SODIUM CHLORIDE 30 MG/ML INHALATION SOLUTION 4 ML: 30 SOLUTION INHALANT at 20:46

## 2019-09-28 RX ADMIN — Medication 1 AMPULE: at 22:13

## 2019-09-28 RX ADMIN — GABAPENTIN 300 MG: 300 CAPSULE ORAL at 09:01

## 2019-09-28 RX ADMIN — DULOXETINE HYDROCHLORIDE 30 MG: 30 CAPSULE, DELAYED RELEASE ORAL at 09:01

## 2019-09-28 RX ADMIN — Medication 10 ML: at 05:55

## 2019-09-28 RX ADMIN — INSULIN LISPRO 8 UNITS: 100 INJECTION, SOLUTION INTRAVENOUS; SUBCUTANEOUS at 15:57

## 2019-09-28 RX ADMIN — BUDESONIDE 500 MCG: 0.5 INHALANT RESPIRATORY (INHALATION) at 20:42

## 2019-09-28 RX ADMIN — IPRATROPIUM BROMIDE AND ALBUTEROL SULFATE 3 ML: .5; 3 SOLUTION RESPIRATORY (INHALATION) at 00:00

## 2019-09-28 NOTE — PROGRESS NOTES
Hospitalist Progress Note    2019  Admit Date: 2019  9:11 AM   NAME: John Arceo   :  1950   MRN:  [id number]   Attending: Blanca Hernández MD  PCP:  Geovany Meade MD    SUBJECTIVE:   Patient is a 76CM with hx COPD on 4L at home who was admitted  to  with COPD exacerbation. At time of discharge, still required 6L with ambulation and home CPAP hs. She was readmitted after having coughing worsening one day after discharge with green sputum. Signs RML infiltrate on CXR.    : breathing improving, still with cough and sputum. pulm following. Review of Systems negative with exception of pertinent positives noted above  PHYSICAL EXAM     Visit Vitals  /64 (BP 1 Location: Right arm, BP Patient Position: At rest;Sitting)   Pulse 94   Temp 97.4 °F (36.3 °C)   Resp 18   Ht 5' 4\" (1.626 m)   Wt 68.5 kg (151 lb)   SpO2 94%   Breastfeeding? No   BMI 25.92 kg/m²      Temp (24hrs), Av °F (36.7 °C), Min:97.4 °F (36.3 °C), Max:98.5 °F (36.9 °C)    Oxygen Therapy  O2 Sat (%): 94 % (19 0815)  Pulse via Oximetry: 103 beats per minute (19 0815)  O2 Device: Nasal cannula (19 0815)  O2 Flow Rate (L/min): 3 l/min (19 0815)  No intake or output data in the 24 hours ending 19 1130   General: No acute distress    Lungs:  Mild expiratory wheeze   Heart:  Regular rate and rhythm,  No murmur, rub, or gallop  Abdomen: Soft, Non distended, Non tender, Positive bowel sounds  Extremities: No cyanosis, clubbing or edema  Neurologic:  No focal deficits    CT CHEST W CONT   Final Result   IMPRESSION:   1. No acute pulmonary emboli. 2. Extensive, bilateral small airways type nodularity most in keeping with an   atypical/infectious bronchiolitis. Recommend CT imaging surveillance after   treatment. XR CHEST PORT   Final Result   IMPRESSION:  COPD with a small focal infiltrate right midlung zone. .                     [address] Problems Diagnosis Date Noted    COPD exacerbation ([facility] 09/27/2019    Pneumonia of right upper lobe due to infectious organism ([facility] 09/27/2019    Tobacco use disorder 07/26/2017    JACQUELYN (obstructive sleep apnea) 07/26/2017     Uses home CPAP with 6L bleed in O2       Plan:    COPDe - still on steroid taper from exacerbation but no increased O2 requirement. Taper steroids. Continue BDs. Pulm following.   PNA - small infiltrate on CXR, continue abx  JACQUELYN - home cpap  HTN - home meds    DVT Prophylaxis: lovenox  Dispo: home pending progress    Signed By: Maurice Carcamo MD     September 28, 2019

## 2019-09-28 NOTE — PROGRESS NOTES
Shift assessment complete. Pt alert and oriented x4. Respirations even and unlabored. Lung sounds coarse on supplemental 02 via NC. Pt with productive cough. HR regular. Abdomen soft with active bowel sounds heard in all four quadrants. Skin intact, no edema noted. IVs patent and capped. Pt complains of pain, will medicate. No other needs voiced at this time. Safety measures in place, call light within reach, door remaining open. Will continue to monitor.

## 2019-09-28 NOTE — PROGRESS NOTES
Shruti Gonzalez  Admission Date: 9/27/2019             Daily Progress Note: 9/28/2019    The patient's chart is reviewed and the patient is discussed with the staff. Patient is a 71 y.o.  female with hx of copd recently hospitalized at [facility] with resp failure and copd exacerbation. She was discharged 2 days ago on 4L O2 and prednisone taper. Over the last 24 hours she developed a cough productive of yellow sputum. She has had substernal chest pain which is pleuritic. She has become increasingly sob and as a result came back to the er and was admitted with the dx of pneumonia. She has been a smoker but she says she did not smoke after discharge. Subjective:     Feels slightly better. Still coughing green sputum. No fevers, aches everywhere.      Current Facility-Administered Medications   Medication Dose Route Frequency    sodium chloride 3% hypertonic nebulizer soln  4 mL Nebulization BID    albuterol-ipratropium (DUO-NEB) 2.5 MG-0.5 MG/3 ML  3 mL Nebulization Q4H RT    aspirin delayed-release tablet 81 mg  81 mg Oral DAILY    alcohol 62% (NOZIN) nasal  1 Ampule  1 Ampule Topical Q12H    budesonide (PULMICORT) 500 mcg/2 ml nebulizer suspension  500 mcg Nebulization BID RT    dilTIAZem CD (CARDIZEM CD) capsule 360 mg  360 mg Oral DAILY    DULoxetine (CYMBALTA) capsule 30 mg  30 mg Oral DAILY    docusate sodium (COLACE) capsule 100 mg  100 mg Oral DAILY    fluticasone propionate (FLONASE) 50 mcg/actuation nasal spray 2 Spray  2 Spray Both Nostrils DAILY    gabapentin (NEURONTIN) capsule 300 mg  300 mg Oral BID    guaiFENesin ER (MUCINEX) tablet 1,200 mg  1,200 mg Oral Q12H    insulin lispro (HUMALOG) injection   SubCUTAneous AC&HS    ketotifen (ZADITOR) 0.025 % (0.035 %) ophthalmic solution 1 Drop  1 Drop Both Eyes BID    mirtazapine (REMERON) tablet 15 mg  15 mg Oral QHS    nicotine (NICODERM CQ) 21 mg/24 hr patch 1 Patch  1 Patch TransDERmal Q24H    pantoprazole (PROTONIX) tablet 40 mg  40 mg Oral ACB    prednisoLONE acetate (PRED FORTE) 1 % ophthalmic suspension 1 Drop  1 Drop Both Eyes Q12H    sodium chloride (NS) flush 5-40 mL  5-40 mL IntraVENous Q8H    methylPREDNISolone (PF) (SOLU-MEDROL) injection 60 mg  60 mg IntraVENous DAILY    acetaminophen (TYLENOL) tablet 650 mg  650 mg Oral Q4H PRN    albuterol-ipratropium (DUO-NEB) 2.5 MG-0.5 MG/3 ML  3 mL Nebulization Q4H PRN    benzonatate (TESSALON) capsule 100 mg  100 mg Oral TID PRN    bisacodyl (DULCOLAX) tablet 5 mg  5 mg Oral DAILY PRN    sodium chloride (OCEAN) 0.65 % nasal squeeze bottle 2 Spray  2 Spray Both Nostrils Q2H PRN    sodium chloride (NS) flush 5-40 mL  5-40 mL IntraVENous PRN    ondansetron (ZOFRAN) injection 4 mg  4 mg IntraVENous Q4H PRN    nitroglycerin (NITROSTAT) tablet 0.4 mg  0.4 mg SubLINGual Q5MIN PRN    glucagon (GLUCAGEN) injection 1 mg  1 mg IntraMUSCular PRN    diphenhydrAMINE (BENADRYL) capsule 25 mg  25 mg Oral QHS PRN    dextrose 40% (GLUTOSE) oral gel 1 Tube  15 g Oral PRN    dextrose (D50W) injection syrg 12.5-25 g  25-50 mL IntraVENous PRN    enoxaparin (LOVENOX) injection 40 mg  40 mg SubCUTAneous Q24H    cefTRIAXone (ROCEPHIN) 1 g in 0.9% sodium chloride (MBP/ADV) 50 mL  1 g IntraVENous Q24H     Review of Systems  Constitutional: negative for fever, chills, sweats  Cardiovascular: negative for chest pain, palpitations, syncope, edema  Gastrointestinal:  negative for dysphagia, reflux, vomiting, diarrhea, abdominal pain, or melena  Neurologic:  negative for focal weakness, numbness, headache    Objective:     Vitals:    09/27/19 2010 09/27/19 2317 09/28/19 0330 09/28/19 0815   BP:  131/66 113/65    Pulse:  88 82    Resp:  18 17    Temp:  98.4 °F (36.9 °C) 98 °F (36.7 °C)    SpO2: 94% 93% 93% (P) 94%   Weight:       Height:         No intake or output data in the 24 hours ending 09/28/19 6614    Physical Exam:   Constitution:  the patient is well developed and in no acute distress  EENMT:  Sclera clear, pupils equal, oral mucosa moist  Respiratory: mild rhonchi, no wheezes  Cardiovascular:  RRR without M,G,R  Gastrointestinal: soft and non-tender; with positive bowel sounds. Musculoskeletal: warm without cyanosis. There is no lower extremity edema. Skin:  no jaundice or rashes, no wounds   Neurologic: no gross neuro deficits     Psychiatric:  alert and oriented x 4    CT Chest: personally viewed, scattered atypical infiltrates, were not present in November 2018, so probably more acute    LAB  Recent Labs     09/27/19  2128 09/27/19  1618 09/25/19  1132   GLUCPOC 147* 324* 182*      Recent Labs     09/27/19  0915   WBC 29.0*   HGB 16.3*   HCT 49.6*        Recent Labs     09/27/19  0915      K 4.6      CO2 29   *   BUN 8   CREA 0.51*   CA 8.2*   TROIQ <0.02*   ALB 2.6*   TBILI 0.5   ALT 69*   SGOT 49*     Recent Labs     09/27/19  0952   PHI 7.479*   PCO2I 41.3   PO2I 73*   HCO3I 30.7*     No results for input(s): LCAD, LAC in the last 72 hours. Assessment:  (Medical Decision Making)     Hospital Problems  Date Reviewed: 6/12/2019          Codes Class Noted POA    COPD exacerbation ([facility] [address] ICD-10-CM: J44.1  ICD-9-CM: 491.21  9/27/2019 Unknown        * (Principal) Pneumonia of right upper lobe due to infectious organism [facility]) ICD-10-CM: J18.1  ICD-9-CM: 643  9/27/2019 Unknown        JACQUELYN (obstructive sleep apnea) (Chronic) ICD-10-CM: G47.33  ICD-9-CM: 327.23  7/26/2017 Yes    Overview Addendum 2/2/2015  9:38 AM by Dianne Bahena NP     Uses home CPAP with 6L bleed in O2             Tobacco use disorder (Chronic) ICD-10-CM: F17.200  ICD-9-CM: 305.1  7/26/2017 Yes              Plan:  (Medical Decision Making)     --on cef/azith, completed 5 days of levaquin just a few days ago.   --if worsens clinically or fails to improve would broaden to HAP coverage with Vanc, Zosyn  --respiratory, RVP and afb cultures for possible atypical infections, PCT is negative  --continue steroids, nebs    Will follow    More than 50% of the time documented was spent in face-to-face contact with the patient and in the care of the patient on the floor/unit where the patient is located.     Cyndie Vazquez MD

## 2019-09-28 NOTE — PROGRESS NOTES
Problem: Chronic Obstructive Pulmonary Disease (COPD)  Goal: *Absence of hypoxia  Outcome: Progressing Towards Goal

## 2019-09-28 NOTE — PHYSICIAN ADVISORY
Letter of Status Determination: Current Status INPATIENT is Appropriate Pt Name:  Carmela Call MR#  [id number] [facility]#   [id number] Room and Hospital  355/01  @ [address] [place] [address] Hospitalization date  9/27/2019  9:11 AM  
Current Attending Physician  Billie Roche MD  
Principal diagnosis  Pneumonia of right upper lobe due to infectious organism [facility]) Clinicals  Patient is a 71yo with hx COPD on 4L at home who was admitted 9/19 to 25 with COPD exacerbation. She was initially on bipap, weaned eventually down to 4L at rest. At time of discharge, still required 6L with ambulation and home CPAP hs. She began having coughing worsening one day after discharge with green sputum. She also woke up this morning feeling disoriented. She has been wearing her cpap at night and oxygen during the day. [place] MCG criteria Does  NOT apply COPD exacerbation with new PNA STATUS DETERMINATION  On the basis of clinical data, available documentaion, we believe that the current status of this patient as INPATIENT is Appropriate Additional comments [place]  [place] / [place] / Product Type: Medicare / Insurance Information [facility] [address] [facility] MEDICARE PART A AND B Phone:   
 Subscriber: May Smith Subscriber#: [id number] Group#:  Precert#:   
  
 
 
The information in this document is a recommendation to be used for utilization review and utilization management purposes only. This recommendation is not an order. The recommendation is made based on the information reviewed at the time of the referral, is pursuant to the [place] [employer] [facility] Conditions of Participation (42 CFR Part 482), and is neither a judgment nor an assessment with regard to the appropriateness or quality of clinical care. For all Managed Care patients:  The Criteria are intended solely for use as screening guidelines with respect to the medical appropriateness of healthcare services and not for final clinical or payment determinations concerning the type or level of medical care provided, or proposed to be provided, to a patient. They help the reviewers determine whether a patient is appropriate for observation or inpatient admission at the time a decision to admit the patient is being made. All efforts are made to apply the pertinent payor criteria (MCG or InterQual) as well as the clinical judgements based on the information reviewed at the time of the referral.\" Nothing in this document may be used to limit, alter, or affect clinical services provided to the patient named below. No current facility-administered medications on file prior to encounter. Current Outpatient Medications on File Prior to Encounter Medication Sig Dispense Refill  predniSONE (DELTASONE) 10 mg tablet Take 40 mg by mouth daily for 3 days, THEN 30 mg daily for 3 days, THEN 20 mg daily for 3 days, THEN 10 mg daily for 3 days. 30 Tab 0  
 albuterol (PROAIR HFA) 90 mcg/actuation inhaler Take 2 Puffs by inhalation every four (4) hours as needed for Wheezing. 3 Inhaler 3  
 tiotropium bromide (SPIRIVA RESPIMAT) 2.5 mcg/actuation inhaler Take 2 Puffs by inhalation daily. 1 Inhaler 11  
 DULoxetine (CYMBALTA) 30 mg capsule Take 30 mg by mouth daily.  dilTIAZem (TIAZAC) 360 mg ER capsule Take 1 Cap by mouth daily. 30 Cap 11  
 mirtazapine (REMERON) 30 mg tablet Take 15 mg by mouth nightly.  albuterol (PROVENTIL VENTOLIN) 2.5 mg /3 mL (0.083 %) nebulizer solution 2.5 mg by Nebulization route two (2) times a day.  prednisoLONE acetate (PRED FORTE) 1 % ophthalmic suspension Administer 1 drop to both eyes four (4) times daily.  olopatadine (PATADAY) 0.2 % drop ophthalmic solution Administer 1 drop to both eyes daily.  OXYGEN-AIR DELIVERY SYSTEMS by Nasal route. 6 lpm qhs    
 aspirin delayed-release 81 mg tablet Take  by mouth daily.  gabapentin (NEURONTIN) 300 mg capsule Take 300 mg by mouth two (2) times a day. 1 tab po qhs     
 methylphenidate (RITALIN) 5 mg tablet Take 5 mg by mouth two (2) times a day.  cpap machine kit by Does Not Apply route. 15cm with 5L of oxygen bled in    
 traZODone (DESYREL) 100 mg tablet Take 100 mg by mouth nightly.  budesonide-formoterol (SYMBICORT) 160-4.5 mcg/actuation HFAA Take 2 Puffs by inhalation two (2) times a day. 1 Inhaler 11  
 nitroglycerin (NITROSTAT) 0.4 mg SL tablet by SubLINGual route every five (5) minutes as needed. 11:32 AM 9/28/2019

## 2019-09-28 NOTE — PROGRESS NOTES
Pt complains of radiating pain in right shoulder and arm. Pt also complains of tingling. Pt denies numbness and chest pain. VSS. Dr. Pola Timmons notified, no orders received at this time. Will continue to monitor.

## 2019-09-28 NOTE — PROGRESS NOTES
Pt complains of generalized pain 7/10. PRN Norco 5-325 mg one tab given PO per MD order. Safety measures in place, call light within reach. Will continue to monitor.

## 2019-09-28 NOTE — PROGRESS NOTES
Pt complains of generalized pain 5/10. PRN Norco 5-325 mg one tab given PO per MD order. Safety measures in place, call light within reach, door remaining open. Will continue to monitor.

## 2019-09-29 LAB
ANION GAP SERPL CALC-SCNC: 7 MMOL/L (ref 7–16)
BASOPHILS # BLD: 0 K/UL (ref 0–0.2)
BASOPHILS NFR BLD: 0 % (ref 0–2)
BUN SERPL-MCNC: 8 MG/DL (ref 8–23)
CALCIUM SERPL-MCNC: 8.5 MG/DL (ref 8.3–10.4)
CHLORIDE SERPL-SCNC: 106 MMOL/L (ref 98–107)
CO2 SERPL-SCNC: 31 MMOL/L (ref 21–32)
CREAT SERPL-MCNC: 0.46 MG/DL (ref 0.6–1)
DIFFERENTIAL METHOD BLD: ABNORMAL
EOSINOPHIL # BLD: 0 K/UL (ref 0–0.8)
EOSINOPHIL NFR BLD: 0 % (ref 0.5–7.8)
ERYTHROCYTE [DISTWIDTH] IN BLOOD BY AUTOMATED COUNT: 15.3 % (ref 11.9–14.6)
GLUCOSE BLD STRIP.AUTO-MCNC: 146 MG/DL (ref 65–100)
GLUCOSE BLD STRIP.AUTO-MCNC: 169 MG/DL (ref 65–100)
GLUCOSE BLD STRIP.AUTO-MCNC: 293 MG/DL (ref 65–100)
GLUCOSE BLD STRIP.AUTO-MCNC: 333 MG/DL (ref 65–100)
GLUCOSE SERPL-MCNC: 201 MG/DL (ref 65–100)
HCT VFR BLD AUTO: 40.5 % (ref 35.8–46.3)
HGB BLD-MCNC: 13.1 G/DL (ref 11.7–15.4)
IMM GRANULOCYTES # BLD AUTO: 0.3 K/UL (ref 0–0.5)
IMM GRANULOCYTES NFR BLD AUTO: 2 % (ref 0–5)
LYMPHOCYTES # BLD: 0.8 K/UL (ref 0.5–4.6)
LYMPHOCYTES NFR BLD: 5 % (ref 13–44)
MCH RBC QN AUTO: 30 PG (ref 26.1–32.9)
MCHC RBC AUTO-ENTMCNC: 32.3 G/DL (ref 31.4–35)
MCV RBC AUTO: 92.7 FL (ref 79.6–97.8)
MONOCYTES # BLD: 0.6 K/UL (ref 0.1–1.3)
MONOCYTES NFR BLD: 3 % (ref 4–12)
NEUTS SEG # BLD: 15.2 K/UL (ref 1.7–8.2)
NEUTS SEG NFR BLD: 90 % (ref 43–78)
NRBC # BLD: 0 K/UL (ref 0–0.2)
PLATELET # BLD AUTO: 184 K/UL (ref 150–450)
PMV BLD AUTO: 9.6 FL (ref 9.4–12.3)
POTASSIUM SERPL-SCNC: 4 MMOL/L (ref 3.5–5.1)
RBC # BLD AUTO: 4.37 M/UL (ref 4.05–5.2)
SODIUM SERPL-SCNC: 144 MMOL/L (ref 136–145)
WBC # BLD AUTO: 16.9 K/UL (ref 4.3–11.1)

## 2019-09-29 PROCEDURE — 74011250636 HC RX REV CODE- 250/636: Performed by: FAMILY MEDICINE

## 2019-09-29 PROCEDURE — 85025 COMPLETE CBC W/AUTO DIFF WBC: CPT

## 2019-09-29 PROCEDURE — 74011250637 HC RX REV CODE- 250/637: Performed by: INTERNAL MEDICINE

## 2019-09-29 PROCEDURE — 74011000250 HC RX REV CODE- 250: Performed by: FAMILY MEDICINE

## 2019-09-29 PROCEDURE — 74011000258 HC RX REV CODE- 258: Performed by: FAMILY MEDICINE

## 2019-09-29 PROCEDURE — 99232 SBSQ HOSP IP/OBS MODERATE 35: CPT | Performed by: INTERNAL MEDICINE

## 2019-09-29 PROCEDURE — 74011636637 HC RX REV CODE- 636/637: Performed by: FAMILY MEDICINE

## 2019-09-29 PROCEDURE — 74011250637 HC RX REV CODE- 250/637: Performed by: FAMILY MEDICINE

## 2019-09-29 PROCEDURE — 36415 COLL VENOUS BLD VENIPUNCTURE: CPT

## 2019-09-29 PROCEDURE — 74011000250 HC RX REV CODE- 250: Performed by: INTERNAL MEDICINE

## 2019-09-29 PROCEDURE — 77010033678 HC OXYGEN DAILY

## 2019-09-29 PROCEDURE — 94760 N-INVAS EAR/PLS OXIMETRY 1: CPT

## 2019-09-29 PROCEDURE — 82962 GLUCOSE BLOOD TEST: CPT

## 2019-09-29 PROCEDURE — 80048 BASIC METABOLIC PNL TOTAL CA: CPT

## 2019-09-29 PROCEDURE — 65270000029 HC RM PRIVATE

## 2019-09-29 PROCEDURE — 94640 AIRWAY INHALATION TREATMENT: CPT

## 2019-09-29 RX ADMIN — GABAPENTIN 300 MG: 300 CAPSULE ORAL at 23:43

## 2019-09-29 RX ADMIN — ASPIRIN 81 MG: 81 TABLET, COATED ORAL at 09:17

## 2019-09-29 RX ADMIN — SODIUM CHLORIDE 30 MG/ML INHALATION SOLUTION 4 ML: 30 SOLUTION INHALANT at 20:25

## 2019-09-29 RX ADMIN — INSULIN LISPRO 6 UNITS: 100 INJECTION, SOLUTION INTRAVENOUS; SUBCUTANEOUS at 23:44

## 2019-09-29 RX ADMIN — METHYLPREDNISOLONE SODIUM SUCCINATE 60 MG: 125 INJECTION, POWDER, FOR SOLUTION INTRAMUSCULAR; INTRAVENOUS at 09:15

## 2019-09-29 RX ADMIN — KETOTIFEN FUMARATE 1 DROP: 0.35 SOLUTION/ DROPS OPHTHALMIC at 09:15

## 2019-09-29 RX ADMIN — IPRATROPIUM BROMIDE AND ALBUTEROL SULFATE 3 ML: .5; 3 SOLUTION RESPIRATORY (INHALATION) at 20:25

## 2019-09-29 RX ADMIN — BUDESONIDE 500 MCG: 0.5 INHALANT RESPIRATORY (INHALATION) at 20:25

## 2019-09-29 RX ADMIN — DILTIAZEM HYDROCHLORIDE 360 MG: 180 CAPSULE, COATED, EXTENDED RELEASE ORAL at 09:16

## 2019-09-29 RX ADMIN — GUAIFENESIN 1200 MG: 600 TABLET ORAL at 23:43

## 2019-09-29 RX ADMIN — IPRATROPIUM BROMIDE AND ALBUTEROL SULFATE 3 ML: .5; 3 SOLUTION RESPIRATORY (INHALATION) at 08:52

## 2019-09-29 RX ADMIN — HYDROCODONE BITARTRATE AND ACETAMINOPHEN 1 TABLET: 5; 325 TABLET ORAL at 12:06

## 2019-09-29 RX ADMIN — IPRATROPIUM BROMIDE AND ALBUTEROL SULFATE 3 ML: .5; 3 SOLUTION RESPIRATORY (INHALATION) at 23:13

## 2019-09-29 RX ADMIN — INSULIN LISPRO 8 UNITS: 100 INJECTION, SOLUTION INTRAVENOUS; SUBCUTANEOUS at 17:13

## 2019-09-29 RX ADMIN — Medication 10 ML: at 17:07

## 2019-09-29 RX ADMIN — INSULIN LISPRO 2 UNITS: 100 INJECTION, SOLUTION INTRAVENOUS; SUBCUTANEOUS at 09:17

## 2019-09-29 RX ADMIN — GUAIFENESIN 1200 MG: 600 TABLET ORAL at 09:16

## 2019-09-29 RX ADMIN — HYDROCODONE BITARTRATE AND ACETAMINOPHEN 1 TABLET: 5; 325 TABLET ORAL at 23:43

## 2019-09-29 RX ADMIN — DOCUSATE SODIUM 100 MG: 100 CAPSULE, LIQUID FILLED ORAL at 09:16

## 2019-09-29 RX ADMIN — MIRTAZAPINE 15 MG: 15 TABLET, FILM COATED ORAL at 23:43

## 2019-09-29 RX ADMIN — Medication 1 AMPULE: at 09:15

## 2019-09-29 RX ADMIN — Medication 1 AMPULE: at 23:43

## 2019-09-29 RX ADMIN — FLUTICASONE PROPIONATE 2 SPRAY: 50 SPRAY, METERED NASAL at 09:15

## 2019-09-29 RX ADMIN — GABAPENTIN 300 MG: 300 CAPSULE ORAL at 09:17

## 2019-09-29 RX ADMIN — IPRATROPIUM BROMIDE AND ALBUTEROL SULFATE 3 ML: .5; 3 SOLUTION RESPIRATORY (INHALATION) at 15:01

## 2019-09-29 RX ADMIN — PANTOPRAZOLE SODIUM 40 MG: 40 TABLET, DELAYED RELEASE ORAL at 09:16

## 2019-09-29 RX ADMIN — BUDESONIDE 500 MCG: 0.5 INHALANT RESPIRATORY (INHALATION) at 08:52

## 2019-09-29 RX ADMIN — ENOXAPARIN SODIUM 40 MG: 40 INJECTION SUBCUTANEOUS at 12:06

## 2019-09-29 RX ADMIN — Medication 10 ML: at 06:00

## 2019-09-29 RX ADMIN — PREDNISOLONE ACETATE 1 DROP: 10 SUSPENSION/ DROPS OPHTHALMIC at 09:15

## 2019-09-29 RX ADMIN — SODIUM CHLORIDE 30 MG/ML INHALATION SOLUTION 4 ML: 30 SOLUTION INHALANT at 08:52

## 2019-09-29 RX ADMIN — DULOXETINE HYDROCHLORIDE 30 MG: 30 CAPSULE, DELAYED RELEASE ORAL at 09:17

## 2019-09-29 RX ADMIN — Medication 10 ML: at 23:44

## 2019-09-29 RX ADMIN — CEFTRIAXONE 1 G: 1 INJECTION, POWDER, FOR SOLUTION INTRAMUSCULAR; INTRAVENOUS at 17:08

## 2019-09-29 NOTE — PROGRESS NOTES
Pt complains of generalized pain. PRN Norco 5-325 mg one tab given PO per MD order. Safety measures in place, call light within reach. Will continue to monitor.

## 2019-09-29 NOTE — PROGRESS NOTES
Shift assessment complete. Pt alert and oriented x4 with flat affect. Respirations even and unlabored. Lung sounds coarse on supplemental 02 via NC. Pt with productive cough. HR regular. Abdomen soft with active bowel sounds heard in all four quadrants. Skin intact, trace edema noted to BLE. IVs patent and capped. Pt denies pain and nausea. All needs met at this time. Safety measures in place, call light within reach, door remaining open. Will continue to monitor.

## 2019-09-29 NOTE — PROGRESS NOTES
Shruti Garsia Child  Admission Date: 9/27/2019             Daily Progress Note: 9/29/2019    The patient's chart is reviewed and the patient is discussed with the staff. Patient is a 71 y.o.  female with hx of copd recently hospitalized at [facility] with resp failure and copd exacerbation. She was discharged 2 days ago on 4L O2 and prednisone taper. Over the last 24 hours she developed a cough productive of yellow sputum. She has had substernal chest pain which is pleuritic. She has become increasingly sob and as a result came back to the er and was admitted with the dx of pneumonia. She has been a smoker but she says she did not smoke after discharge. Subjective:     Feels better. Chest pain less. Less sputum, but was able to provide sputum yesterday.      Current Facility-Administered Medications   Medication Dose Route Frequency    sodium chloride 3% hypertonic nebulizer soln  4 mL Nebulization BID    HYDROcodone-acetaminophen (NORCO) 5-325 mg per tablet 1 Tab  1 Tab Oral Q4H PRN    albuterol-ipratropium (DUO-NEB) 2.5 MG-0.5 MG/3 ML  3 mL Nebulization Q4H RT    aspirin delayed-release tablet 81 mg  81 mg Oral DAILY    alcohol 62% (NOZIN) nasal  1 Ampule  1 Ampule Topical Q12H    budesonide (PULMICORT) 500 mcg/2 ml nebulizer suspension  500 mcg Nebulization BID RT    dilTIAZem CD (CARDIZEM CD) capsule 360 mg  360 mg Oral DAILY    DULoxetine (CYMBALTA) capsule 30 mg  30 mg Oral DAILY    docusate sodium (COLACE) capsule 100 mg  100 mg Oral DAILY    fluticasone propionate (FLONASE) 50 mcg/actuation nasal spray 2 Spray  2 Spray Both Nostrils DAILY    gabapentin (NEURONTIN) capsule 300 mg  300 mg Oral BID    guaiFENesin ER (MUCINEX) tablet 1,200 mg  1,200 mg Oral Q12H    insulin lispro (HUMALOG) injection   SubCUTAneous AC&HS    ketotifen (ZADITOR) 0.025 % (0.035 %) ophthalmic solution 1 Drop  1 Drop Both Eyes BID    mirtazapine (REMERON) tablet 15 mg  15 mg Oral QHS    nicotine (NICODERM CQ) 21 mg/24 hr patch 1 Patch  1 Patch TransDERmal Q24H    pantoprazole (PROTONIX) tablet 40 mg  40 mg Oral ACB    prednisoLONE acetate (PRED FORTE) 1 % ophthalmic suspension 1 Drop  1 Drop Both Eyes Q12H    sodium chloride (NS) flush 5-40 mL  5-40 mL IntraVENous Q8H    methylPREDNISolone (PF) (SOLU-MEDROL) injection 60 mg  60 mg IntraVENous DAILY    acetaminophen (TYLENOL) tablet 650 mg  650 mg Oral Q4H PRN    albuterol-ipratropium (DUO-NEB) 2.5 MG-0.5 MG/3 ML  3 mL Nebulization Q4H PRN    benzonatate (TESSALON) capsule 100 mg  100 mg Oral TID PRN    bisacodyl (DULCOLAX) tablet 5 mg  5 mg Oral DAILY PRN    sodium chloride (OCEAN) 0.65 % nasal squeeze bottle 2 Spray  2 Spray Both Nostrils Q2H PRN    sodium chloride (NS) flush 5-40 mL  5-40 mL IntraVENous PRN    ondansetron (ZOFRAN) injection 4 mg  4 mg IntraVENous Q4H PRN    nitroglycerin (NITROSTAT) tablet 0.4 mg  0.4 mg SubLINGual Q5MIN PRN    glucagon (GLUCAGEN) injection 1 mg  1 mg IntraMUSCular PRN    diphenhydrAMINE (BENADRYL) capsule 25 mg  25 mg Oral QHS PRN    dextrose 40% (GLUTOSE) oral gel 1 Tube  15 g Oral PRN    dextrose (D50W) injection syrg 12.5-25 g  25-50 mL IntraVENous PRN    enoxaparin (LOVENOX) injection 40 mg  40 mg SubCUTAneous Q24H    cefTRIAXone (ROCEPHIN) 1 g in 0.9% sodium chloride (MBP/ADV) 50 mL  1 g IntraVENous Q24H     Review of Systems  Constitutional: negative for fever, chills, sweats  Cardiovascular: negative for chest pain, palpitations, syncope, edema  Gastrointestinal:  negative for dysphagia, reflux, vomiting, diarrhea, abdominal pain, or melena  Neurologic:  negative for focal weakness, numbness, headache    Objective:     Vitals:    09/28/19 1946 09/28/19 2042 09/28/19 2322 09/29/19 0312   BP: 110/61  123/77 123/66   Pulse: 86  80 70   Resp: 16  18 16   Temp: 98.4 °F (36.9 °C)  98.1 °F (36.7 °C) 97.7 °F (36.5 °C)   SpO2: 94% 94% 95% 96%   Weight:       Height:         No intake or output data in the 24 hours ending 09/29/19 0815    Physical Exam:   Constitution:  the patient is well developed and in no acute distress  EENMT:  Sclera clear, pupils equal, oral mucosa moist  Respiratory: mild rhonchi, no wheezes, slightly improved, 4L NC  Cardiovascular:  RRR without M,G,R  Gastrointestinal: soft and non-tender; with positive bowel sounds. Musculoskeletal: warm without cyanosis. There is no lower extremity edema. Skin:  no jaundice or rashes, no wounds   Neurologic: no gross neuro deficits     Psychiatric:  alert and oriented x 4    CT Chest: personally viewed, scattered atypical infiltrates, were not present in November 2018, so probably more acute    LAB  Recent Labs     09/29/19  0728 09/28/19  2118 09/28/19  1544 09/28/19  1145 09/28/19  0828   GLUCPOC 169* 248* 338* 111* 205*      Recent Labs     09/29/19  0449 09/28/19  1010 09/27/19  0915   WBC 16.9* 23.2* 29.0*   HGB 13.1 13.8 16.3*   HCT 40.5 42.6 49.6*    188 236     Recent Labs     09/29/19  0449 09/27/19  0915    142   K 4.0 4.6    104   CO2 31 29   * 204*   BUN 8 8   CREA 0.46* 0.51*   CA 8.5 8.2*   TROIQ  --  <0.02*   ALB  --  2.6*   TBILI  --  0.5   ALT  --  69*   SGOT  --  49*     Recent Labs     09/27/19  0952   PHI 7.479*   PCO2I 41.3   PO2I 73*   HCO3I 30.7*     No results for input(s): LCAD, LAC in the last 72 hours.     Assessment:  (Medical Decision Making)     Hospital Problems  Date Reviewed: 6/12/2019          Codes Class Noted POA    COPD exacerbation ([facility] ICD-10-CM: J44.1  ICD-9-CM: 491.21  9/27/2019 Unknown        * (Principal) Pneumonia of right upper lobe due to infectious organism [facility]) ICD-10-CM: J18.1  ICD-9-CM: 563  9/27/2019 Unknown        JACQUELYN (obstructive sleep apnea) (Chronic) ICD-10-CM: G47.33  ICD-9-CM: 327.23  7/26/2017 Yes    Overview Addendum 2/2/2015  9:38 AM by Char Sher NP     Uses home CPAP with 6L bleed in O2             Tobacco use disorder (Chronic) ICD-10-CM: F17.200  ICD-9-CM: 305.1  7/26/2017 Yes              Plan:  (Medical Decision Making)     --on cef/azith, completed 5 days of levaquin just a few days ago. --clinically improving with improving WBC  --respiratory, RVP and afb cultures for possible atypical infections, PCT is negative  --continue steroids, nebs  --if improved more tomorrow could potentially change to oral and complete as outpatient pending walking and symptoms    Will follow    More than 50% of the time documented was spent in face-to-face contact with the patient and in the care of the patient on the floor/unit where the patient is located.     Micaela Abdul MD

## 2019-09-30 LAB
ANION GAP SERPL CALC-SCNC: ABNORMAL MMOL/L (ref 7–16)
BASOPHILS # BLD: 0 K/UL (ref 0–0.2)
BASOPHILS NFR BLD: 0 % (ref 0–2)
BUN SERPL-MCNC: 11 MG/DL (ref 8–23)
CALCIUM SERPL-MCNC: 8.4 MG/DL (ref 8.3–10.4)
CHLORIDE SERPL-SCNC: 106 MMOL/L (ref 98–107)
CO2 SERPL-SCNC: 33 MMOL/L (ref 21–32)
CREAT SERPL-MCNC: 0.47 MG/DL (ref 0.6–1)
DIFFERENTIAL METHOD BLD: ABNORMAL
EOSINOPHIL # BLD: 0 K/UL (ref 0–0.8)
EOSINOPHIL NFR BLD: 0 % (ref 0.5–7.8)
ERYTHROCYTE [DISTWIDTH] IN BLOOD BY AUTOMATED COUNT: 15 % (ref 11.9–14.6)
GLUCOSE BLD STRIP.AUTO-MCNC: 118 MG/DL (ref 65–100)
GLUCOSE BLD STRIP.AUTO-MCNC: 201 MG/DL (ref 65–100)
GLUCOSE BLD STRIP.AUTO-MCNC: 228 MG/DL (ref 65–100)
GLUCOSE BLD STRIP.AUTO-MCNC: 332 MG/DL (ref 65–100)
GLUCOSE SERPL-MCNC: 196 MG/DL (ref 65–100)
HCT VFR BLD AUTO: 38.8 % (ref 35.8–46.3)
HGB BLD-MCNC: 12.9 G/DL (ref 11.7–15.4)
IMM GRANULOCYTES # BLD AUTO: 0.2 K/UL (ref 0–0.5)
IMM GRANULOCYTES NFR BLD AUTO: 2 % (ref 0–5)
LYMPHOCYTES # BLD: 0.8 K/UL (ref 0.5–4.6)
LYMPHOCYTES NFR BLD: 7 % (ref 13–44)
MCH RBC QN AUTO: 30.4 PG (ref 26.1–32.9)
MCHC RBC AUTO-ENTMCNC: 33.2 G/DL (ref 31.4–35)
MCV RBC AUTO: 91.3 FL (ref 79.6–97.8)
MONOCYTES # BLD: 0.6 K/UL (ref 0.1–1.3)
MONOCYTES NFR BLD: 5 % (ref 4–12)
NEUTS SEG # BLD: 9.7 K/UL (ref 1.7–8.2)
NEUTS SEG NFR BLD: 86 % (ref 43–78)
NRBC # BLD: 0 K/UL (ref 0–0.2)
PLATELET # BLD AUTO: 193 K/UL (ref 150–450)
PLATELET COMMENTS,PCOM: ADEQUATE
PMV BLD AUTO: 9.6 FL (ref 9.4–12.3)
POTASSIUM SERPL-SCNC: 3.8 MMOL/L (ref 3.5–5.1)
RBC # BLD AUTO: 4.25 M/UL (ref 4.05–5.2)
RBC MORPH BLD: ABNORMAL
RBC MORPH BLD: ABNORMAL
SODIUM SERPL-SCNC: 135 MMOL/L (ref 136–145)
WBC # BLD AUTO: 11.3 K/UL (ref 4.3–11.1)
WBC MORPH BLD: ABNORMAL

## 2019-09-30 PROCEDURE — 74011250636 HC RX REV CODE- 250/636: Performed by: FAMILY MEDICINE

## 2019-09-30 PROCEDURE — 74011000250 HC RX REV CODE- 250: Performed by: FAMILY MEDICINE

## 2019-09-30 PROCEDURE — 36415 COLL VENOUS BLD VENIPUNCTURE: CPT

## 2019-09-30 PROCEDURE — 94640 AIRWAY INHALATION TREATMENT: CPT

## 2019-09-30 PROCEDURE — 74011250636 HC RX REV CODE- 250/636: Performed by: INTERNAL MEDICINE

## 2019-09-30 PROCEDURE — 74011636637 HC RX REV CODE- 636/637: Performed by: FAMILY MEDICINE

## 2019-09-30 PROCEDURE — 99232 SBSQ HOSP IP/OBS MODERATE 35: CPT | Performed by: INTERNAL MEDICINE

## 2019-09-30 PROCEDURE — 85025 COMPLETE CBC W/AUTO DIFF WBC: CPT

## 2019-09-30 PROCEDURE — 74011250637 HC RX REV CODE- 250/637: Performed by: FAMILY MEDICINE

## 2019-09-30 PROCEDURE — 77010033678 HC OXYGEN DAILY

## 2019-09-30 PROCEDURE — 74011250637 HC RX REV CODE- 250/637: Performed by: INTERNAL MEDICINE

## 2019-09-30 PROCEDURE — 74011000250 HC RX REV CODE- 250: Performed by: INTERNAL MEDICINE

## 2019-09-30 PROCEDURE — 94760 N-INVAS EAR/PLS OXIMETRY 1: CPT

## 2019-09-30 PROCEDURE — 65270000029 HC RM PRIVATE

## 2019-09-30 PROCEDURE — 82962 GLUCOSE BLOOD TEST: CPT

## 2019-09-30 PROCEDURE — 80048 BASIC METABOLIC PNL TOTAL CA: CPT

## 2019-09-30 RX ORDER — VANCOMYCIN 1.75 GRAM/500 ML IN 0.9 % SODIUM CHLORIDE INTRAVENOUS
1750 ONCE
Status: DISCONTINUED | OUTPATIENT
Start: 2019-09-30 | End: 2019-09-30 | Stop reason: CLARIF

## 2019-09-30 RX ORDER — PREDNISONE 10 MG/1
40 TABLET ORAL
Status: DISCONTINUED | OUTPATIENT
Start: 2019-10-01 | End: 2019-10-02

## 2019-09-30 RX ORDER — VANCOMYCIN 1.75 GRAM/500 ML IN 0.9 % SODIUM CHLORIDE INTRAVENOUS
1750 ONCE
Status: COMPLETED | OUTPATIENT
Start: 2019-09-30 | End: 2019-09-30

## 2019-09-30 RX ORDER — VANCOMYCIN HYDROCHLORIDE
1250 EVERY 24 HOURS
Status: DISCONTINUED | OUTPATIENT
Start: 2019-10-01 | End: 2019-10-01

## 2019-09-30 RX ORDER — VANCOMYCIN HYDROCHLORIDE
1250 EVERY 24 HOURS
Status: DISCONTINUED | OUTPATIENT
Start: 2019-10-01 | End: 2019-09-30 | Stop reason: CLARIF

## 2019-09-30 RX ADMIN — GABAPENTIN 300 MG: 300 CAPSULE ORAL at 21:22

## 2019-09-30 RX ADMIN — HYDROCODONE BITARTRATE AND ACETAMINOPHEN 1 TABLET: 5; 325 TABLET ORAL at 21:22

## 2019-09-30 RX ADMIN — Medication 1 AMPULE: at 08:36

## 2019-09-30 RX ADMIN — GUAIFENESIN 1200 MG: 600 TABLET ORAL at 08:35

## 2019-09-30 RX ADMIN — METHYLPREDNISOLONE SODIUM SUCCINATE 40 MG: 125 INJECTION, POWDER, FOR SOLUTION INTRAMUSCULAR; INTRAVENOUS at 08:36

## 2019-09-30 RX ADMIN — IPRATROPIUM BROMIDE AND ALBUTEROL SULFATE 3 ML: .5; 3 SOLUTION RESPIRATORY (INHALATION) at 22:09

## 2019-09-30 RX ADMIN — VANCOMYCIN HYDROCHLORIDE 1750 MG: 10 INJECTION, POWDER, LYOPHILIZED, FOR SOLUTION INTRAVENOUS at 11:50

## 2019-09-30 RX ADMIN — ASPIRIN 81 MG: 81 TABLET, COATED ORAL at 08:35

## 2019-09-30 RX ADMIN — SODIUM CHLORIDE 30 MG/ML INHALATION SOLUTION 4 ML: 30 SOLUTION INHALANT at 08:02

## 2019-09-30 RX ADMIN — KETOTIFEN FUMARATE 1 DROP: 0.35 SOLUTION/ DROPS OPHTHALMIC at 23:16

## 2019-09-30 RX ADMIN — GUAIFENESIN 1200 MG: 600 TABLET ORAL at 21:22

## 2019-09-30 RX ADMIN — Medication 10 ML: at 21:27

## 2019-09-30 RX ADMIN — Medication 1 AMPULE: at 21:22

## 2019-09-30 RX ADMIN — DILTIAZEM HYDROCHLORIDE 360 MG: 180 CAPSULE, COATED, EXTENDED RELEASE ORAL at 08:35

## 2019-09-30 RX ADMIN — DULOXETINE HYDROCHLORIDE 30 MG: 30 CAPSULE, DELAYED RELEASE ORAL at 08:35

## 2019-09-30 RX ADMIN — INSULIN LISPRO 4 UNITS: 100 INJECTION, SOLUTION INTRAVENOUS; SUBCUTANEOUS at 08:34

## 2019-09-30 RX ADMIN — DOCUSATE SODIUM 100 MG: 100 CAPSULE, LIQUID FILLED ORAL at 08:35

## 2019-09-30 RX ADMIN — HYDROCODONE BITARTRATE AND ACETAMINOPHEN 1 TABLET: 5; 325 TABLET ORAL at 11:11

## 2019-09-30 RX ADMIN — IPRATROPIUM BROMIDE AND ALBUTEROL SULFATE 3 ML: .5; 3 SOLUTION RESPIRATORY (INHALATION) at 11:29

## 2019-09-30 RX ADMIN — BUDESONIDE 500 MCG: 0.5 INHALANT RESPIRATORY (INHALATION) at 08:02

## 2019-09-30 RX ADMIN — ENOXAPARIN SODIUM 40 MG: 40 INJECTION SUBCUTANEOUS at 12:02

## 2019-09-30 RX ADMIN — GABAPENTIN 300 MG: 300 CAPSULE ORAL at 08:36

## 2019-09-30 RX ADMIN — Medication 10 ML: at 05:53

## 2019-09-30 RX ADMIN — INSULIN LISPRO 4 UNITS: 100 INJECTION, SOLUTION INTRAVENOUS; SUBCUTANEOUS at 21:23

## 2019-09-30 RX ADMIN — SODIUM CHLORIDE 30 MG/ML INHALATION SOLUTION 4 ML: 30 SOLUTION INHALANT at 22:10

## 2019-09-30 RX ADMIN — IPRATROPIUM BROMIDE AND ALBUTEROL SULFATE 3 ML: .5; 3 SOLUTION RESPIRATORY (INHALATION) at 08:02

## 2019-09-30 RX ADMIN — INSULIN LISPRO 8 UNITS: 100 INJECTION, SOLUTION INTRAVENOUS; SUBCUTANEOUS at 17:26

## 2019-09-30 RX ADMIN — BUDESONIDE 500 MCG: 0.5 INHALANT RESPIRATORY (INHALATION) at 22:10

## 2019-09-30 RX ADMIN — MIRTAZAPINE 15 MG: 15 TABLET, FILM COATED ORAL at 21:22

## 2019-09-30 RX ADMIN — PANTOPRAZOLE SODIUM 40 MG: 40 TABLET, DELAYED RELEASE ORAL at 08:36

## 2019-09-30 NOTE — PROGRESS NOTES
Care Management Interventions  Transition of Care Consult (CM Consult): Discharge Planning  Current Support Network: Own Home, Lives Alone  Confirm Follow Up Transport: Family  Discharge Location  Discharge Placement: Home        Chart reviewed. Pt was admitted for approx 5 days and discharged home without any needs. Pt readmitted with pneumonia. Per MD at rounds do not anticipate pt will have any d/c needs.

## 2019-09-30 NOTE — PROGRESS NOTES
Patient resting in bed. Respirations present, non-labored. Dyspnea at exertion, 3.5 LPM oxygen via NC. No signs of distress noted. Will continue to monitor.

## 2019-09-30 NOTE — PROGRESS NOTES
Spiritual Care Visit, initial visit. Visited with patient at bedside. Prayed for patient's healing and health. Visit by Natalia Landrum, Staff .  Eduardo., Th.B., B.A.

## 2019-09-30 NOTE — PROGRESS NOTES
Vancomycin Consult    MD ordering: Edmundo WHITE following? no  Indication: CAP  DOT:  7 days  Goal level(s): 15 - 20    Ht Readings from Last 1 Encounters:   19 5' 4\" (1.626 m)      Wt Readings from Last 1 Encounters:   19 68.5 kg (151 lb)         Temp (24hrs), Av.3 °F (36.8 °C), Min:97.9 °F (36.6 °C), Max:98.6 °F (37 °C)    Dosing weight: 69 kg  71 y.o. Date:  Dose/Freq Admin Times Level/Time:   19 Vanc 1750 mg IV x 1 dose 1150    10/1/19 Vanc 1250 mg IV q24h (1000)                        Recent Labs     19  0451 19  0449 19  1010   BUN 11 8  --    CREA 0.47* 0.46*  --    WBC 11.3* 16.9* 23.2*     Estimated Creatinine Clearance: 72.1 mL/min (A) (by C-G formula based on SCr of 0.47 mg/dL (L)). No results found for: Daniele Pittman    Day 1 Assessment and Plan:  Vancomycin initiated at 1750 mg IV x 1 dose. Vancomycin continued at 1250 mg IV every 24 hours. Pharmacy will continue to follow.       Thank you,    Henrik Allen, JosefaD

## 2019-09-30 NOTE — PROGRESS NOTES
Shruti Hadley  Admission Date: 9/27/2019             Daily Progress Note: 9/30/2019    The patient's chart is reviewed and the patient is discussed with the staff.      71 y.o.  female with hx of copd recently hospitalized at [facility] with resp failure and copd exacerbation.  She was discharged 2 days ago on 4L O2 and prednisone taper.  Over the last 24 hours she developed a cough productive of yellow sputum.  She has had substernal chest pain which is pleuritic. She has become increasingly sob and as a result came back to the er and was admitted with the dx of pneumonia. Kg Skinner has been a smoker but she says she did not smoke after discharge  Subjective:   Wants to go home but says she does not feel much better    Current Facility-Administered Medications   Medication Dose Route Frequency    methylPREDNISolone (PF) (Solu-MEDROL) injection 40 mg  40 mg IntraVENous DAILY    sodium chloride 3% hypertonic nebulizer soln  4 mL Nebulization BID    HYDROcodone-acetaminophen (NORCO) 5-325 mg per tablet 1 Tab  1 Tab Oral Q4H PRN    albuterol-ipratropium (DUO-NEB) 2.5 MG-0.5 MG/3 ML  3 mL Nebulization Q4H RT    aspirin delayed-release tablet 81 mg  81 mg Oral DAILY    alcohol 62% (NOZIN) nasal  1 Ampule  1 Ampule Topical Q12H    budesonide (PULMICORT) 500 mcg/2 ml nebulizer suspension  500 mcg Nebulization BID RT    dilTIAZem CD (CARDIZEM CD) capsule 360 mg  360 mg Oral DAILY    DULoxetine (CYMBALTA) capsule 30 mg  30 mg Oral DAILY    docusate sodium (COLACE) capsule 100 mg  100 mg Oral DAILY    fluticasone propionate (FLONASE) 50 mcg/actuation nasal spray 2 Spray  2 Spray Both Nostrils DAILY    gabapentin (NEURONTIN) capsule 300 mg  300 mg Oral BID    guaiFENesin ER (MUCINEX) tablet 1,200 mg  1,200 mg Oral Q12H    insulin lispro (HUMALOG) injection   SubCUTAneous AC&HS    ketotifen (ZADITOR) 0.025 % (0.035 %) ophthalmic solution 1 Drop  1 Drop Both Eyes BID    mirtazapine (REMERON) tablet 15 mg  15 mg Oral QHS    nicotine (NICODERM CQ) 21 mg/24 hr patch 1 Patch  1 Patch TransDERmal Q24H    pantoprazole (PROTONIX) tablet 40 mg  40 mg Oral ACB    prednisoLONE acetate (PRED FORTE) 1 % ophthalmic suspension 1 Drop  1 Drop Both Eyes Q12H    sodium chloride (NS) flush 5-40 mL  5-40 mL IntraVENous Q8H    acetaminophen (TYLENOL) tablet 650 mg  650 mg Oral Q4H PRN    albuterol-ipratropium (DUO-NEB) 2.5 MG-0.5 MG/3 ML  3 mL Nebulization Q4H PRN    benzonatate (TESSALON) capsule 100 mg  100 mg Oral TID PRN    bisacodyl (DULCOLAX) tablet 5 mg  5 mg Oral DAILY PRN    sodium chloride (OCEAN) 0.65 % nasal squeeze bottle 2 Spray  2 Spray Both Nostrils Q2H PRN    sodium chloride (NS) flush 5-40 mL  5-40 mL IntraVENous PRN    ondansetron (ZOFRAN) injection 4 mg  4 mg IntraVENous Q4H PRN    nitroglycerin (NITROSTAT) tablet 0.4 mg  0.4 mg SubLINGual Q5MIN PRN    glucagon (GLUCAGEN) injection 1 mg  1 mg IntraMUSCular PRN    diphenhydrAMINE (BENADRYL) capsule 25 mg  25 mg Oral QHS PRN    dextrose 40% (GLUTOSE) oral gel 1 Tube  15 g Oral PRN    dextrose (D50W) injection syrg 12.5-25 g  25-50 mL IntraVENous PRN    enoxaparin (LOVENOX) injection 40 mg  40 mg SubCUTAneous Q24H    cefTRIAXone (ROCEPHIN) 1 g in 0.9% sodium chloride (MBP/ADV) 50 mL  1 g IntraVENous Q24H       Review of Systems    Constitutional: negative for fever, chills, sweats  Cardiovascular: negative for chest pain, palpitations, syncope, edema  Gastrointestinal:  negative for dysphagia, reflux, vomiting, diarrhea, abdominal pain, or melena  Neurologic:  negative for focal weakness, numbness, headache    Objective:     Vitals:    09/29/19 2026 09/29/19 2329 09/30/19 0321 09/30/19 0804   BP:  122/60 117/59    Pulse:  100 79    Resp:  18 16    Temp:  98.6 °F (37 °C) 98.3 °F (36.8 °C)    SpO2: 96% 99% 100% 98%   Weight:       Height:           No intake or output data in the 24 hours ending 09/30/19 7812    Physical Exam:   Constitution:  the patient is well developed and in no acute distress  EENMT:  Sclera clear, pupils equal, oral mucosa moist  Respiratory: some rhonchi  Cardiovascular:  RRR without M,G,R  Gastrointestinal: soft and non-tender; with positive bowel sounds. Musculoskeletal: warm without cyanosis. There is no lower extremity edema. Skin:  no jaundice or rashes, no wounds   Neurologic: no gross neuro deficits     Psychiatric:  alert and oriented x 3    CXR:       LAB  Recent Labs     09/30/19  0719 09/29/19  2130 09/29/19  1521 09/29/19  1109 09/29/19  0728   GLUCPOC 201* 293* 333* 146* 169*      Recent Labs     09/30/19  0451 09/29/19  0449 09/28/19  1010 09/27/19  0915   WBC 11.3* 16.9* 23.2* 29.0*   HGB 12.9 13.1 13.8 16.3*   HCT 38.8 40.5 42.6 49.6*    184 188 236     Recent Labs     09/30/19  0451 09/29/19  0449 09/27/19  0915   * 144 142   K 3.8 4.0 4.6    106 104   CO2 33* 31 29   * 201* 204*   BUN 11 8 8   CREA 0.47* 0.46* 0.51*   CA 8.4 8.5 8.2*   TROIQ  --   --  <0.02*   ALB  --   --  2.6*   TBILI  --   --  0.5   ALT  --   --  69*   SGOT  --   --  49*     Recent Labs     09/27/19  0952   PHI 7.479*   PCO2I 41.3   PO2I 73*   HCO3I 30.7*     No results for input(s): LCAD, LAC in the last 72 hours.       Assessment:  (Medical Decision Making)     Hospital Problems  Date Reviewed: 6/12/2019          Codes Class Noted POA    COPD exacerbation ([facility] [address] ICD-10-CM: J44.1  ICD-9-CM: 491.21  9/27/2019 Unknown        * (Principal) Pneumonia of right upper lobe due to infectious organism [facility]) ICD-10-CM: J18.1  ICD-9-CM: 419  9/27/2019 Unknown    Staph aureus on culture- ? mrsa    JACQUELYN (obstructive sleep apnea) (Chronic) ICD-10-CM: G47.33  ICD-9-CM: 327.23  7/26/2017 Yes    Overview Addendum 2/2/2015  9:38 AM by Torrey Ma NP     Uses home CPAP with 6L bleed in O2             Tobacco use disorder (Chronic) ICD-10-CM: K69.383  ICD-9-CM: 305.1  7/26/2017 Yes Plan:  (Medical Decision Making)   1   Start vancomycin and stop rocephine until sens return  --    More than 50% of the time documented was spent in face-to-face contact with the patient and in the care of the patient on the floor/unit where the patient is located.     Ida Jay MD

## 2019-09-30 NOTE — PROGRESS NOTES
Hospitalist Progress Note    2019  Admit Date: 2019  9:11 AM   NAME: Vaibhav Welch   :  1950   MRN:  [id number]   Attending: Miguel Ángel Flanagan MD  PCP:  Olga Lidia Medel MD    SUBJECTIVE:   Patient is a 2NO with hx COPD on 4L at home who was admitted  to  with COPD exacerbation. At time of discharge, still required 6L with ambulation and home CPAP hs. She was readmitted after having coughing worsening one day after discharge with green sputum. Signs RML infiltrate on CXR.    : sputum culture with SA, sn pending. pulm following, changed to IV vanco.  Calm today, still with cough/SOB but improving    Review of Systems negative with exception of pertinent positives noted above  PHYSICAL EXAM     Visit Vitals  /62 (BP 1 Location: Left arm, BP Patient Position: At rest)   Pulse 83   Temp 98.3 °F (36.8 °C)   Resp 18   Ht 5' 4\" (1.626 m)   Wt 68.5 kg (151 lb)   SpO2 98%   Breastfeeding? No   BMI 25.92 kg/m²      Temp (24hrs), Av.5 °F (36.9 °C), Min:98.3 °F (36.8 °C), Max:98.8 °F (37.1 °C)    Oxygen Therapy  O2 Sat (%): 98 % (19 0804)  Pulse via Oximetry: 83 beats per minute (19 0804)  O2 Device: Nasal cannula (19 08)  O2 Flow Rate (L/min): 4 l/min (19 0804)  No intake or output data in the 24 hours ending 19 1002   General: No acute distress    Lungs:  Mild expiratory wheeze   Heart:  Regular rate and rhythm,  No murmur, rub, or gallop  Abdomen: Soft, Non distended, Non tender, Positive bowel sounds  Extremities: No cyanosis, clubbing or edema  Neurologic:  No focal deficits    CT CHEST W CONT   Final Result   IMPRESSION:   1. No acute pulmonary emboli. 2. Extensive, bilateral small airways type nodularity most in keeping with an   atypical/infectious bronchiolitis. Recommend CT imaging surveillance after   treatment. XR CHEST PORT   Final Result   IMPRESSION:  COPD with a small focal infiltrate right midlung zone. Mattie Pass ASSESSMENT      Active Hospital Problems    Diagnosis Date Noted    COPD exacerbation ([facility] [address] 09/27/2019    Pneumonia of right upper lobe due to infectious organism ([facility] [address] 09/27/2019    Tobacco use disorder 07/26/2017    JACQUELYN (obstructive sleep apnea) 07/26/2017     Uses home CPAP with 6L bleed in O2       Plan:    COPDe - still on steroid taper from exacerbation but no increased O2 requirement. Taper steroids. Continue BDs. Pulm following. SA PNA - small infiltrate on CXR, abx changed to vanco pending sensitivities from sputum culture. Has hx prior nasal swab with MRSA.      JACQUELYN - home cpap  HTN - home meds    DVT Prophylaxis: lovenox  Dispo: home pending progress    Signed By: Verdene Lefort, MD     September 30, 2019

## 2019-09-30 NOTE — PROGRESS NOTES
Interdisciplinary team rounds were held 9/30/2019 with the following team members:Care Management, Nursing, Nutrition, Pharmacy, Physical Therapy and Physician. Plan of care discussed. See clinical pathway and/or care plan for interventions and desired outcomes.

## 2019-10-01 PROBLEM — J21.9 BRONCHIOLITIS: Status: ACTIVE | Noted: 2019-10-01

## 2019-10-01 LAB
ANION GAP SERPL CALC-SCNC: 4 MMOL/L (ref 7–16)
BACTERIA SPEC CULT: ABNORMAL
BACTERIA SPEC CULT: ABNORMAL
BASOPHILS # BLD: 0 K/UL (ref 0–0.2)
BASOPHILS NFR BLD: 0 % (ref 0–2)
BUN SERPL-MCNC: 12 MG/DL (ref 8–23)
CALCIUM SERPL-MCNC: 8.3 MG/DL (ref 8.3–10.4)
CHLORIDE SERPL-SCNC: 102 MMOL/L (ref 98–107)
CO2 SERPL-SCNC: 33 MMOL/L (ref 21–32)
CREAT SERPL-MCNC: 0.58 MG/DL (ref 0.6–1)
DIFFERENTIAL METHOD BLD: ABNORMAL
EOSINOPHIL # BLD: 0 K/UL (ref 0–0.8)
EOSINOPHIL NFR BLD: 0 % (ref 0.5–7.8)
ERYTHROCYTE [DISTWIDTH] IN BLOOD BY AUTOMATED COUNT: 15.1 % (ref 11.9–14.6)
GLUCOSE BLD STRIP.AUTO-MCNC: 159 MG/DL (ref 65–100)
GLUCOSE BLD STRIP.AUTO-MCNC: 166 MG/DL (ref 65–100)
GLUCOSE BLD STRIP.AUTO-MCNC: 194 MG/DL (ref 65–100)
GLUCOSE BLD STRIP.AUTO-MCNC: 275 MG/DL (ref 65–100)
GLUCOSE SERPL-MCNC: 190 MG/DL (ref 65–100)
GRAM STN SPEC: ABNORMAL
HCT VFR BLD AUTO: 42.8 % (ref 35.8–46.3)
HGB BLD-MCNC: 13.8 G/DL (ref 11.7–15.4)
IMM GRANULOCYTES # BLD AUTO: 0.4 K/UL (ref 0–0.5)
IMM GRANULOCYTES NFR BLD AUTO: 3 % (ref 0–5)
LYMPHOCYTES # BLD: 1.2 K/UL (ref 0.5–4.6)
LYMPHOCYTES NFR BLD: 11 % (ref 13–44)
MCH RBC QN AUTO: 30.1 PG (ref 26.1–32.9)
MCHC RBC AUTO-ENTMCNC: 32.2 G/DL (ref 31.4–35)
MCV RBC AUTO: 93.4 FL (ref 79.6–97.8)
MONOCYTES # BLD: 0.7 K/UL (ref 0.1–1.3)
MONOCYTES NFR BLD: 6 % (ref 4–12)
NEUTS SEG # BLD: 8.4 K/UL (ref 1.7–8.2)
NEUTS SEG NFR BLD: 79 % (ref 43–78)
NRBC # BLD: 0 K/UL (ref 0–0.2)
PLATELET # BLD AUTO: 206 K/UL (ref 150–450)
PMV BLD AUTO: 9.3 FL (ref 9.4–12.3)
POTASSIUM SERPL-SCNC: 3.9 MMOL/L (ref 3.5–5.1)
RBC # BLD AUTO: 4.58 M/UL (ref 4.05–5.2)
SERVICE CMNT-IMP: ABNORMAL
SODIUM SERPL-SCNC: 139 MMOL/L (ref 136–145)
WBC # BLD AUTO: 10.7 K/UL (ref 4.3–11.1)

## 2019-10-01 PROCEDURE — 36415 COLL VENOUS BLD VENIPUNCTURE: CPT

## 2019-10-01 PROCEDURE — 74011250636 HC RX REV CODE- 250/636: Performed by: FAMILY MEDICINE

## 2019-10-01 PROCEDURE — 99232 SBSQ HOSP IP/OBS MODERATE 35: CPT | Performed by: INTERNAL MEDICINE

## 2019-10-01 PROCEDURE — 74011250637 HC RX REV CODE- 250/637: Performed by: FAMILY MEDICINE

## 2019-10-01 PROCEDURE — 85025 COMPLETE CBC W/AUTO DIFF WBC: CPT

## 2019-10-01 PROCEDURE — 94760 N-INVAS EAR/PLS OXIMETRY 1: CPT

## 2019-10-01 PROCEDURE — 74011000250 HC RX REV CODE- 250: Performed by: INTERNAL MEDICINE

## 2019-10-01 PROCEDURE — 65270000029 HC RM PRIVATE

## 2019-10-01 PROCEDURE — 74011000250 HC RX REV CODE- 250: Performed by: FAMILY MEDICINE

## 2019-10-01 PROCEDURE — 82962 GLUCOSE BLOOD TEST: CPT

## 2019-10-01 PROCEDURE — 94640 AIRWAY INHALATION TREATMENT: CPT

## 2019-10-01 PROCEDURE — 77010033678 HC OXYGEN DAILY

## 2019-10-01 PROCEDURE — 74011250637 HC RX REV CODE- 250/637: Performed by: INTERNAL MEDICINE

## 2019-10-01 PROCEDURE — 80048 BASIC METABOLIC PNL TOTAL CA: CPT

## 2019-10-01 PROCEDURE — 74011636637 HC RX REV CODE- 636/637: Performed by: FAMILY MEDICINE

## 2019-10-01 RX ORDER — IPRATROPIUM BROMIDE AND ALBUTEROL SULFATE 2.5; .5 MG/3ML; MG/3ML
3 SOLUTION RESPIRATORY (INHALATION)
Status: DISCONTINUED | OUTPATIENT
Start: 2019-10-01 | End: 2019-10-02 | Stop reason: HOSPADM

## 2019-10-01 RX ORDER — CEFPODOXIME PROXETIL 200 MG/1
200 TABLET, FILM COATED ORAL EVERY 12 HOURS
Status: DISCONTINUED | OUTPATIENT
Start: 2019-10-01 | End: 2019-10-02 | Stop reason: HOSPADM

## 2019-10-01 RX ADMIN — Medication 5 ML: at 14:00

## 2019-10-01 RX ADMIN — ASPIRIN 81 MG: 81 TABLET, COATED ORAL at 08:17

## 2019-10-01 RX ADMIN — SODIUM CHLORIDE 30 MG/ML INHALATION SOLUTION 4 ML: 30 SOLUTION INHALANT at 08:07

## 2019-10-01 RX ADMIN — Medication 5 ML: at 21:29

## 2019-10-01 RX ADMIN — Medication 5 ML: at 06:10

## 2019-10-01 RX ADMIN — DILTIAZEM HYDROCHLORIDE 360 MG: 180 CAPSULE, COATED, EXTENDED RELEASE ORAL at 08:17

## 2019-10-01 RX ADMIN — INSULIN LISPRO 2 UNITS: 100 INJECTION, SOLUTION INTRAVENOUS; SUBCUTANEOUS at 11:24

## 2019-10-01 RX ADMIN — DOCUSATE SODIUM 100 MG: 100 CAPSULE, LIQUID FILLED ORAL at 08:18

## 2019-10-01 RX ADMIN — HYDROCODONE BITARTRATE AND ACETAMINOPHEN 1 TABLET: 5; 325 TABLET ORAL at 13:20

## 2019-10-01 RX ADMIN — IPRATROPIUM BROMIDE AND ALBUTEROL SULFATE 3 ML: .5; 3 SOLUTION RESPIRATORY (INHALATION) at 11:45

## 2019-10-01 RX ADMIN — PANTOPRAZOLE SODIUM 40 MG: 40 TABLET, DELAYED RELEASE ORAL at 08:17

## 2019-10-01 RX ADMIN — IPRATROPIUM BROMIDE AND ALBUTEROL SULFATE 3 ML: .5; 3 SOLUTION RESPIRATORY (INHALATION) at 08:04

## 2019-10-01 RX ADMIN — INSULIN LISPRO 6 UNITS: 100 INJECTION, SOLUTION INTRAVENOUS; SUBCUTANEOUS at 17:14

## 2019-10-01 RX ADMIN — Medication 1 AMPULE: at 08:17

## 2019-10-01 RX ADMIN — CEFPODOXIME PROXETIL 200 MG: 200 TABLET, FILM COATED ORAL at 21:28

## 2019-10-01 RX ADMIN — IPRATROPIUM BROMIDE AND ALBUTEROL SULFATE 3 ML: .5; 3 SOLUTION RESPIRATORY (INHALATION) at 00:47

## 2019-10-01 RX ADMIN — GUAIFENESIN 1200 MG: 600 TABLET ORAL at 08:18

## 2019-10-01 RX ADMIN — PREDNISONE 40 MG: 10 TABLET ORAL at 08:18

## 2019-10-01 RX ADMIN — GUAIFENESIN 1200 MG: 600 TABLET ORAL at 21:28

## 2019-10-01 RX ADMIN — INSULIN LISPRO 2 UNITS: 100 INJECTION, SOLUTION INTRAVENOUS; SUBCUTANEOUS at 08:20

## 2019-10-01 RX ADMIN — DULOXETINE HYDROCHLORIDE 30 MG: 30 CAPSULE, DELAYED RELEASE ORAL at 08:18

## 2019-10-01 RX ADMIN — GABAPENTIN 300 MG: 300 CAPSULE ORAL at 21:28

## 2019-10-01 RX ADMIN — INSULIN LISPRO 2 UNITS: 100 INJECTION, SOLUTION INTRAVENOUS; SUBCUTANEOUS at 22:04

## 2019-10-01 RX ADMIN — GABAPENTIN 300 MG: 300 CAPSULE ORAL at 08:18

## 2019-10-01 RX ADMIN — MIRTAZAPINE 15 MG: 15 TABLET, FILM COATED ORAL at 21:29

## 2019-10-01 RX ADMIN — ENOXAPARIN SODIUM 40 MG: 40 INJECTION SUBCUTANEOUS at 11:20

## 2019-10-01 RX ADMIN — Medication 1 AMPULE: at 21:28

## 2019-10-01 RX ADMIN — BUDESONIDE 500 MCG: 0.5 INHALANT RESPIRATORY (INHALATION) at 08:04

## 2019-10-01 RX ADMIN — CEFPODOXIME PROXETIL 200 MG: 200 TABLET, FILM COATED ORAL at 11:18

## 2019-10-01 NOTE — PROGRESS NOTES
Interdisciplinary team rounds were held 10/1/2019 with the following team members:Care Management, Nursing, Nutrition, Pharmacy, Physical Therapy and Physician. Plan of care discussed. See clinical pathway and/or care plan for interventions and desired outcomes.

## 2019-10-01 NOTE — PROGRESS NOTES
Report received from [facility]. RN  Pt is resting quietly in bed with call light in reach. Bed is low and locked. Resp. Is even and unlabored. Pt in on 4L O2 via NC. Pt is oriented x 4. No needs voiced at this time.

## 2019-10-01 NOTE — PROGRESS NOTES
Shruti Paniagua  Admission Date: 9/27/2019             Daily Progress Note: 10/1/2019    The patient's chart is reviewed and the patient is discussed with the staff.      71 y.o.  female with hx of copd recently hospitalized at [facility] with resp failure and copd exacerbation.  She was discharged 2 days ago on 4L O2 and prednisone taper.  Over the last 24 hours she developed a cough productive of yellow sputum.  She has had substernal chest pain which is pleuritic. She has become increasingly sob and as a result came back to the er and was admitted with the dx of pneumonia. Luke Orozco has been a smoker but she says she did not smoke after discharge  Subjective:   States she is ready to go home, but feels weak. When discussed she could probably go home soon on oral antibiotics she asked to go home tomorrow.      Current Facility-Administered Medications   Medication Dose Route Frequency    cefpodoxime (VANTIN) tablet 200 mg  200 mg Oral Q12H    predniSONE (DELTASONE) tablet 40 mg  40 mg Oral DAILY WITH BREAKFAST    sodium chloride 3% hypertonic nebulizer soln  4 mL Nebulization BID    HYDROcodone-acetaminophen (NORCO) 5-325 mg per tablet 1 Tab  1 Tab Oral Q4H PRN    albuterol-ipratropium (DUO-NEB) 2.5 MG-0.5 MG/3 ML  3 mL Nebulization Q4H RT    aspirin delayed-release tablet 81 mg  81 mg Oral DAILY    alcohol 62% (NOZIN) nasal  1 Ampule  1 Ampule Topical Q12H    budesonide (PULMICORT) 500 mcg/2 ml nebulizer suspension  500 mcg Nebulization BID RT    dilTIAZem CD (CARDIZEM CD) capsule 360 mg  360 mg Oral DAILY    DULoxetine (CYMBALTA) capsule 30 mg  30 mg Oral DAILY    docusate sodium (COLACE) capsule 100 mg  100 mg Oral DAILY    fluticasone propionate (FLONASE) 50 mcg/actuation nasal spray 2 Spray  2 Spray Both Nostrils DAILY    gabapentin (NEURONTIN) capsule 300 mg  300 mg Oral BID    guaiFENesin ER (MUCINEX) tablet 1,200 mg  1,200 mg Oral Q12H    insulin lispro (HUMALOG) injection   SubCUTAneous AC&HS    ketotifen (ZADITOR) 0.025 % (0.035 %) ophthalmic solution 1 Drop  1 Drop Both Eyes BID    mirtazapine (REMERON) tablet 15 mg  15 mg Oral QHS    nicotine (NICODERM CQ) 21 mg/24 hr patch 1 Patch  1 Patch TransDERmal Q24H    pantoprazole (PROTONIX) tablet 40 mg  40 mg Oral ACB    prednisoLONE acetate (PRED FORTE) 1 % ophthalmic suspension 1 Drop  1 Drop Both Eyes Q12H    sodium chloride (NS) flush 5-40 mL  5-40 mL IntraVENous Q8H    acetaminophen (TYLENOL) tablet 650 mg  650 mg Oral Q4H PRN    albuterol-ipratropium (DUO-NEB) 2.5 MG-0.5 MG/3 ML  3 mL Nebulization Q4H PRN    benzonatate (TESSALON) capsule 100 mg  100 mg Oral TID PRN    bisacodyl (DULCOLAX) tablet 5 mg  5 mg Oral DAILY PRN    sodium chloride (OCEAN) 0.65 % nasal squeeze bottle 2 Spray  2 Spray Both Nostrils Q2H PRN    sodium chloride (NS) flush 5-40 mL  5-40 mL IntraVENous PRN    ondansetron (ZOFRAN) injection 4 mg  4 mg IntraVENous Q4H PRN    nitroglycerin (NITROSTAT) tablet 0.4 mg  0.4 mg SubLINGual Q5MIN PRN    glucagon (GLUCAGEN) injection 1 mg  1 mg IntraMUSCular PRN    diphenhydrAMINE (BENADRYL) capsule 25 mg  25 mg Oral QHS PRN    dextrose 40% (GLUTOSE) oral gel 1 Tube  15 g Oral PRN    dextrose (D50W) injection syrg 12.5-25 g  25-50 mL IntraVENous PRN    enoxaparin (LOVENOX) injection 40 mg  40 mg SubCUTAneous Q24H       Review of Systems    Constitutional: negative for fever, chills, sweats  Cardiovascular: negative for chest pain, palpitations, syncope, edema  Gastrointestinal:  negative for dysphagia, reflux, vomiting, diarrhea, abdominal pain, or melena  Neurologic:  negative for focal weakness, numbness, headache    Objective:     Vitals:    10/01/19 0047 10/01/19 0326 10/01/19 0808 10/01/19 0858   BP:  138/51  154/70   Pulse:  78  80   Resp:  16  16   Temp:  96.5 °F (35.8 °C)  98.8 °F (37.1 °C)   SpO2: 99% 96% 96% 97%   Weight:       Height:           Intake/Output Summary (Last 24 hours) at 10/1/2019 2142  Last data filed at 10/1/2019 0715  Gross per 24 hour   Intake 300 ml   Output    Net 300 ml     Physical Exam:   Constitution:  the patient is well developed and in no acute distress  EENMT:  Sclera clear, pupils equal, oral mucosa moist  Respiratory: clear bilaterally  Cardiovascular:  RRR without M,G,R  Gastrointestinal: soft and non-tender; with positive bowel sounds. Musculoskeletal: warm without cyanosis. There is no lower extremity edema. Skin:  no jaundice or rashes, no wounds   Neurologic: no gross neuro deficits     Psychiatric:  alert and oriented x 3    CXR:       LAB  Recent Labs     10/01/19  0714 09/30/19  2115 09/30/19  1700 09/30/19  1101 09/30/19  0719   GLUCPOC 159* 228* 332* 118* 201*      Recent Labs     10/01/19  0530 09/30/19  0451 09/29/19  0449 09/28/19  1010   WBC 10.7 11.3* 16.9* 23.2*   HGB 13.8 12.9 13.1 13.8   HCT 42.8 38.8 40.5 42.6    193 184 188     Recent Labs     10/01/19  0530 09/30/19  0451 09/29/19  0449    135* 144   K 3.9 3.8 4.0    106 106   CO2 33* 33* 31   * 196* 201*   BUN 12 11 8   CREA 0.58* 0.47* 0.46*   CA 8.3 8.4 8.5     No results for input(s): PH, PCO2, PO2, HCO3, PHI, PCO2I, PO2I, HCO3I in the last 72 hours. No results for input(s): LCAD, LAC in the last 72 hours.       Assessment:  (Medical Decision Making)     Hospital Problems  Date Reviewed: 6/12/2019          Codes Class Noted POA    [facility] ICD-10-CM: J44.1  ICD-9-CM: 491.21  9/27/2019 Unknown        * (Principal) Pneumonia of right upper lobe due to infectious organism [facility]) ICD-10-CM: J18.1  ICD-9-CM: 892  9/27/2019 Unknown    Staph aureus on culture- ? mrsa    JACQUELYN (obstructive sleep apnea) (Chronic) ICD-10-CM: G47.33  ICD-9-CM: 327.23  7/26/2017 Yes    Overview Addendum 2/2/2015  9:38 AM by Grant Klein NP     Uses home CPAP with 6L bleed in O2             Tobacco use disorder (Chronic) ICD-10-CM: M81.555  ICD-9-CM: 305.1 7/26/2017 Yes              Plan:  (Medical Decision Making)   1   Change to Vantin 200mg BID x 5 more days for MSSA and ease of antibiotics completion  2   Needs to ambulate today  3   Probably home tomorrow  --    More than 50% of the time documented was spent in face-to-face contact with the patient and in the care of the patient on the floor/unit where the patient is located.     Ashley Auguste MD

## 2019-10-01 NOTE — PROGRESS NOTES
Respiratory Care Services Policy Number: -[id number]    Title: Aerosolized Medication Protocol    Effective Date: 10/1998    Revised Date: 06/13, 03/16, 11/17, 07/19     Reviewed Date: 05/14/ 03/15 , 06/17, 5/18   I. Policy: The Aerosolized Medication Protocol shall by implemented by Respiratory Care Practitioners (RCP) for patients with orders to receive aerosol therapy with medication. II. Purpose: To open and maintain obstructed airways, the RCP, will utilize the following   protocol to select the indicated aerosolized medication(s) and determine the most effective method of delivery to the patient. III. Patient Type: All patients who are determined to meet aerosolized medication criteria as          outlined in this protocol. IV. Responsibility: Director, [address], registered Respiratory Care Practitioners (RCP's) with documented competency in the performance of respiratory therapeutic techniques. V. Equipment needed:  A. Stethoscope  B. Pulse oximeter  C. AeroEclipse nebulizer  D. Dry Powder Inhaler (DPI)     VI. Protocol:   A. The following conditions are accepted indications for aerosolized medication therapy. 1. Bronchospasm/wheezing  2. Impaired mucociliary clearance  3. Tracheobronchial mucosal congestion/and laryngeal stridor  4. Diseases which commonly require aerosolized medication therapy include, but are not limited to:  a. Asthma/reactive airway disease  b. Bronchitis/emphysema (COPD)  c. Cystic fibrosis  d. Severe laryngitis/tracheitis  e. Bronchiectasis  f. Smoke inhalation or chemical trauma to the lung or upper airway  g. Physical trauma to the upper airway  h. Laryngotracheobronchitis  i. Bronchiolitis  j. Non-specific wheezing              B. Indications for bronchodilator medications will include:  a. Bronchospasm/ wheezing  b. Asthma/reactive airway disease  c. Chronic obstructive pulmonary disease  d.  Obstructive defect on pulmonary function testing  C. Administration of medications  1. If a bronchodilator or any other type of respiratory medication is needed, a physician order must be indicated in the medication section in the patient's EMR. 2. When the physician specifies the medication and dosage at the time of request, the ordered medication will be used as part of the care plan. D. The following guidelines will be utilized in the evaluation and selection of the appropriate delivery device for indicated medication(s):  1. Unassisted aerosol (UA) is the preferred method of aerosol delivery and indicated if  a. Ventilation is inadequate  b. Patient demonstrates wheezing   c. Routine treatments shall be given via the AeroEclipse nebulizer. d. The Aerogen nebulizer shall be used in the following circumstances:  i. ER patients and they will continue with this nebulizer if admitted to 8th floor or ICU.  ii. Patients in ICU   iii. Patients on 8th floor with severe wheezing (at the RCP's discretion)  2. Dry PowderInhaler (DPI)   a. Patient should be alert/cooperative  b. Able to perform 3 second breath hold. c. Patient has used DPI therapy previously, either at home or in the hospital.  d. Note: The only approved inhaler on formulary is Spiriva. VII. Guidelines:   Monitor patient's vital signs and evaluate patient's clinical status. The need to change medication and/or modality may be indicated by:  1. A pulse greater than 120 bpm, or if a pulse increase of 20 bpm occurs with bronchodilator medications. 2. Significant worsening of dyspnea or wheezing occurring during or within 30 minutes of discontinuing therapy. 3. Worsening of patient's sensorium (e.g. patient becomes confused or obtunded, and unable to follow directions). 4. Worsening of patient's chest x-ray. 5. Change in sputum (e.g. increased pulmonary infiltrate, which might indicate need for volume expansion therapy).   6. Patient has difficulty coughing up secretions, which might indicate need for acetylcysteine and/or bronchial hygiene therapy. 7. Call physician immediately if dyspnea worsens and is not responsive to modifications allowed by protocol. VIII. Clinical Responsibility:  1. The therapy assessment guidelines will be used to evaluate all patients receiving aerosolized medications with the exception of critical care areas. 1. RCP's will perform changes in therapy per protocol. 2. It will be the responsibility of RCP to provide instruction regarding respiratory medications, possible side effects, aerosol therapy and proper DPI technique, as well as, spacer usage to patients ordered DPI therapy. 3. Current therapy that is part of a patient's home regimen will not be discontinued. a. Provide spacer and educate patient on proper inhaler technique if needed. IX. Documentation  A. Document assessment findings in the respiratory assessment section of the patient's EMR. B. Document changes in therapy per protocol in the respiratory orders section and in the care plan section of the patient's EMR. C. Document patient education in the patient education section of the patient's EMR. X. Outcome Criteria:  A. Relief of wheezes and obstruction  B. Improved cough and sputum color and consistency  C. Improved chest x-ray  D. Improved arterial oxygen tension and or SaO2  E. Improved Peak Flow on asthmatic patients        XI. Related Protocols:  A. Respiratory Patient Care Protocols  B. Bronchial Hygiene Therapy  C. Oxygen Protocol    Reference:  L - Respiratory Care Department Policy, Procedure and Protocol Guideline Manual, 1995, JOSÉ MIGUEL Kulkarni. L -  Therapist Driven Respiratory Care Protocols  A Practitioner's Guide for Criteria-Based Respiratory Care by Emily Pineda M.D., and JOSÉ MIGUEL Logan, KALANI. L - The rationale for therapist-driven protocols: an update. Respiratory Care 1998; [id number]. N -[facility] Clinical Practice Guidelines.

## 2019-10-01 NOTE — PROGRESS NOTES
Hospitalist Progress Note     Admit Date:  2019  9:11 AM   Name:  Odette Dias   Age:  71 y.o.  :  1950   MRN:  [id number]   PCP:  Woody Becker MD  Treatment Team: Attending Provider: Amirah Magaña MD; Consulting Provider: Remedios Mejia MD; Utilization Review: Shelley Aragon    Subjective:   Patient is a 71yo with hx COPD on 4L at home who was admitted  to  with COPD exacerbation.  At time of discharge, still required 6L with ambulation and home CPAP hs.    She was readmitted after having coughing worsening one day after discharge with green sputum. Signs RML infiltrate on CXR. Sputum culture positive for MSSA on iv vancomycin. Pulmonary following. 10/1/19  Says just came from walking - feels tired. Says feels better when compared to yesterday        Objective:     Patient Vitals for the past 24 hrs:   Temp Pulse Resp BP SpO2   10/01/19 1644 98.5 °F (36.9 °C) 60 20 135/68 95 %   10/01/19 1212 98.9 °F (37.2 °C) 89 18 117/68 94 %   10/01/19 1146     92 %   10/01/19 0858 98.8 °F (37.1 °C) 80 16 154/70 97 %   10/01/19 0808     96 %   10/01/19 0326 96.5 °F (35.8 °C) 78 16 138/51 96 %   10/01/19 0047     99 %   10/01/19 0002 97.4 °F (36.3 °C) 88 18 120/69 97 %   19 2211     98 %   19 2012 98.7 °F (37.1 °C) 89 18 126/69 96 %     Oxygen Therapy  O2 Sat (%): 95 % (10/01/19 1644)  Pulse via Oximetry: 91 beats per minute (10/01/19 1146)  O2 Device: Nasal cannula (10/01/19 114)  O2 Flow Rate (L/min): 4 l/min (10/01/19 114)  FIO2 (%): 36 % (10/01/19 114)    Intake/Output Summary (Last 24 hours) at 10/1/2019 1843  Last data filed at 10/1/2019 0715  Gross per 24 hour   Intake 300 ml   Output    Net 300 ml         General:    Well nourished. Alert. on 4 lit/min  HEENT- normal    CV:   RRR. No murmur, rub, or gallop. Lungs:   Coarse breath sounds  Abdomen:   Soft, nontender, nondistended. Cns- no focal neurological deficits  Extremities: Warm and dry.   No cyanosis or edema. Skin:     No rashes or jaundice. Data Review:  I have reviewed all labs, meds, telemetry events, and studies from the last 24 hours. Recent Results (from the past 24 hour(s))   GLUCOSE, POC    Collection Time: 09/30/19  9:15 PM   Result Value Ref Range    Glucose (POC) 228 (H) 65 - 100 mg/dL   CBC WITH AUTOMATED DIFF    Collection Time: 10/01/19  5:30 AM   Result Value Ref Range    WBC 10.7 4.3 - 11.1 K/uL    RBC 4.58 4.05 - 5.2 M/uL    HGB 13.8 11.7 - 15.4 g/dL    HCT 42.8 35.8 - 46.3 %    MCV 93.4 79.6 - 97.8 FL    MCH 30.1 26.1 - 32.9 PG    MCHC 32.2 31.4 - 35.0 g/dL    RDW 15.1 (H) 11.9 - 14.6 %    PLATELET 796 835 - 475 K/uL    MPV 9.3 (L) 9.4 - 12.3 FL    ABSOLUTE NRBC 0.00 0.0 - 0.2 K/uL    DF AUTOMATED      NEUTROPHILS 79 (H) 43 - 78 %    LYMPHOCYTES 11 (L) 13 - 44 %    MONOCYTES 6 4.0 - 12.0 %    EOSINOPHILS 0 (L) 0.5 - 7.8 %    BASOPHILS 0 0.0 - 2.0 %    IMMATURE GRANULOCYTES 3 0.0 - 5.0 %    ABS. NEUTROPHILS 8.4 (H) 1.7 - 8.2 K/UL    ABS. LYMPHOCYTES 1.2 0.5 - 4.6 K/UL    ABS. MONOCYTES 0.7 0.1 - 1.3 K/UL    ABS. EOSINOPHILS 0.0 0.0 - 0.8 K/UL    ABS. BASOPHILS 0.0 0.0 - 0.2 K/UL    ABS. IMM. GRANS.  0.4 0.0 - 0.5 K/UL   METABOLIC PANEL, BASIC    Collection Time: 10/01/19  5:30 AM   Result Value Ref Range    Sodium 139 136 - 145 mmol/L    Potassium 3.9 3.5 - 5.1 mmol/L    Chloride 102 98 - 107 mmol/L    CO2 33 (H) 21 - 32 mmol/L    Anion gap 4 (L) 7 - 16 mmol/L    Glucose 190 (H) 65 - 100 mg/dL    BUN 12 8 - 23 MG/DL    Creatinine 0.58 (L) 0.6 - 1.0 MG/DL    GFR est AA >60 >60 ml/min/1.73m2    GFR est non-AA >60 >60 ml/min/1.73m2    Calcium 8.3 8.3 - 10.4 MG/DL   GLUCOSE, POC    Collection Time: 10/01/19  7:14 AM   Result Value Ref Range    Glucose (POC) 159 (H) 65 - 100 mg/dL   GLUCOSE, POC    Collection Time: 10/01/19 11:22 AM   Result Value Ref Range    Glucose (POC) 166 (H) 65 - 100 mg/dL   GLUCOSE, POC    Collection Time: 10/01/19  4:46 PM   Result Value Ref Range Glucose (POC) 275 (H) 65 - 100 mg/dL        All Micro Results     Procedure Component Value Units Date/Time    CULTURE, BLOOD [[id number]] Collected:  09/27/19 0915    Order Status:  Completed Specimen:  Blood Updated:  10/01/19 1411     Special Requests: RT WRIST     Culture result: NO GROWTH 4 DAYS       CULTURE, BLOOD [[id number]] Collected:  09/27/19 0914    Order Status:  Completed Specimen:  Blood Updated:  10/01/19 1411     Special Requests: LFT FOREARM     Culture result: NO GROWTH 4 DAYS       RESPIRATORY VIRAL PANEL, PCR [[id number]] Collected:  10/01/19 1112    Order Status:  No result     CULTURE, RESPIRATORY/SPUTUM/BRONCH Teresa Knock STAIN [[id number]]  (Abnormal)  (Susceptibility) Collected:  09/28/19 0850    Order Status:  Completed Specimen:  Sputum Updated:  10/01/19 0659     Special Requests: NO SPECIAL REQUESTS        GRAM STAIN       WBCS SEEN TOO NUMEROUS TO COUNT            NO EPITHELIAL CELLS SEEN               MODERATE GRAM POSITIVE COCCI           Culture result:       HEAVY STAPHYLOCOCCUS AUREUS                  LIGHT NORMAL RESPIRATORY ZHANNA          AFB CULTURE + SMEAR W/RFLX ID FROM CULTURE [[id number]]     Order Status:  Sent     AFB CULTURE + SMEAR W/RFLX ID FROM CULTURE [[id number]]     Order Status:  Sent     AFB CULTURE + SMEAR W/RFLX ID FROM CULTURE [[id number]]     Order Status:  Sent     RESPIRATORY VIRAL PANEL, PCR [[id number]] Collected:  09/28/19 0850    Order Status:  Canceled Specimen:  Sputum     AFB CULTURE + SMEAR W/RFLX ID FROM CULTURE [[id number]]     Order Status:  Canceled           Current Meds:  Current Facility-Administered Medications   Medication Dose Route Frequency    cefpodoxime (VANTIN) tablet 200 mg  200 mg Oral Q12H    albuterol-ipratropium (DUO-NEB) 2.5 MG-0.5 MG/3 ML  3 mL Nebulization Q6HWA RT    predniSONE (DELTASONE) tablet 40 mg  40 mg Oral DAILY WITH BREAKFAST    sodium chloride 3% hypertonic nebulizer soln  4 mL Nebulization BID    HYDROcodone-acetaminophen (NORCO) 5-325 mg per tablet 1 Tab  1 Tab Oral Q4H PRN    aspirin delayed-release tablet 81 mg  81 mg Oral DAILY    alcohol 62% (NOZIN) nasal  1 Ampule  1 Ampule Topical Q12H    budesonide (PULMICORT) 500 mcg/2 ml nebulizer suspension  500 mcg Nebulization BID RT    dilTIAZem CD (CARDIZEM CD) capsule 360 mg  360 mg Oral DAILY    DULoxetine (CYMBALTA) capsule 30 mg  30 mg Oral DAILY    docusate sodium (COLACE) capsule 100 mg  100 mg Oral DAILY    fluticasone propionate (FLONASE) 50 mcg/actuation nasal spray 2 Spray  2 Spray Both Nostrils DAILY    gabapentin (NEURONTIN) capsule 300 mg  300 mg Oral BID    guaiFENesin ER (MUCINEX) tablet 1,200 mg  1,200 mg Oral Q12H    insulin lispro (HUMALOG) injection   SubCUTAneous AC&HS    ketotifen (ZADITOR) 0.025 % (0.035 %) ophthalmic solution 1 Drop  1 Drop Both Eyes BID    mirtazapine (REMERON) tablet 15 mg  15 mg Oral QHS    nicotine (NICODERM CQ) 21 mg/24 hr patch 1 Patch  1 Patch TransDERmal Q24H    pantoprazole (PROTONIX) tablet 40 mg  40 mg Oral ACB    prednisoLONE acetate (PRED FORTE) 1 % ophthalmic suspension 1 Drop  1 Drop Both Eyes Q12H    sodium chloride (NS) flush 5-40 mL  5-40 mL IntraVENous Q8H    acetaminophen (TYLENOL) tablet 650 mg  650 mg Oral Q4H PRN    albuterol-ipratropium (DUO-NEB) 2.5 MG-0.5 MG/3 ML  3 mL Nebulization Q4H PRN    benzonatate (TESSALON) capsule 100 mg  100 mg Oral TID PRN    bisacodyl (DULCOLAX) tablet 5 mg  5 mg Oral DAILY PRN    sodium chloride (OCEAN) 0.65 % nasal squeeze bottle 2 Spray  2 Spray Both Nostrils Q2H PRN    sodium chloride (NS) flush 5-40 mL  5-40 mL IntraVENous PRN    ondansetron (ZOFRAN) injection 4 mg  4 mg IntraVENous Q4H PRN    nitroglycerin (NITROSTAT) tablet 0.4 mg  0.4 mg SubLINGual Q5MIN PRN    glucagon (GLUCAGEN) injection 1 mg  1 mg IntraMUSCular PRN    diphenhydrAMINE (BENADRYL) capsule 25 mg  25 mg Oral QHS PRN    dextrose 40% (GLUTOSE) oral gel 1 Tube  15 g Oral PRN    dextrose (D50W) injection syrg 12.5-25 g  25-50 mL IntraVENous PRN    enoxaparin (LOVENOX) injection 40 mg  40 mg SubCUTAneous Q24H       Other Studies (last 24 hours):  No results found. Assessment and Plan:     Hospital Problems as of 10/1/2019 Date Reviewed: 6/12/2019          Codes Class Noted - Resolved POA    Bronchiolitis ICD-10-CM: J21.9  ICD-9-CM: 466.19  10/1/2019 - Present Unknown        COPD exacerbation ([facility] [address] ICD-10-CM: J44.1  ICD-9-CM: 491.21  9/27/2019 - Present Unknown        * (Principal) Pneumonia of right upper lobe due to infectious organism [facility]) ICD-10-CM: J18.1  ICD-9-CM: 715  9/27/2019 - Present Unknown        JACQUELYN (obstructive sleep apnea) (Chronic) ICD-10-CM: G47.33  ICD-9-CM: 327.23  7/26/2017 - Present Yes    Overview Addendum 2/2/2015  9:38 AM by Devi Maria NP     Uses home CPAP with 6L bleed in O2             Tobacco use disorder (Chronic) ICD-10-CM: R01.719  ICD-9-CM: 305.1  7/26/2017 - Present Yes              PLAN:    - pna- MSSA sputum culture- on vancomycin- Pulmonary following  - chronic hypoxia- 4 lit at rest and 6 lit on ambulation and on cpap at night  -codp exa- improving, cont nebs  -h/o paroxysmal tachycardia- on cardizem.   - cpap at night    DC planning/Dispo:    DVT ppx:  lovenox    Signed:  Luisito Chatman MD

## 2019-10-02 VITALS
SYSTOLIC BLOOD PRESSURE: 128 MMHG | TEMPERATURE: 98.8 F | WEIGHT: 151 LBS | RESPIRATION RATE: 18 BRPM | HEIGHT: 64 IN | HEART RATE: 95 BPM | OXYGEN SATURATION: 96 % | DIASTOLIC BLOOD PRESSURE: 69 MMHG | BODY MASS INDEX: 25.78 KG/M2

## 2019-10-02 LAB
BACTERIA SPEC CULT: NORMAL
BACTERIA SPEC CULT: NORMAL
GLUCOSE BLD STRIP.AUTO-MCNC: 102 MG/DL (ref 65–100)
GLUCOSE BLD STRIP.AUTO-MCNC: 282 MG/DL (ref 65–100)
SERVICE CMNT-IMP: NORMAL
SERVICE CMNT-IMP: NORMAL

## 2019-10-02 PROCEDURE — 94760 N-INVAS EAR/PLS OXIMETRY 1: CPT

## 2019-10-02 PROCEDURE — 94761 N-INVAS EAR/PLS OXIMETRY MLT: CPT

## 2019-10-02 PROCEDURE — 94640 AIRWAY INHALATION TREATMENT: CPT

## 2019-10-02 PROCEDURE — 74011250637 HC RX REV CODE- 250/637: Performed by: INTERNAL MEDICINE

## 2019-10-02 PROCEDURE — 74011000250 HC RX REV CODE- 250: Performed by: FAMILY MEDICINE

## 2019-10-02 PROCEDURE — 74011000250 HC RX REV CODE- 250: Performed by: INTERNAL MEDICINE

## 2019-10-02 PROCEDURE — 82962 GLUCOSE BLOOD TEST: CPT

## 2019-10-02 PROCEDURE — 77010033678 HC OXYGEN DAILY

## 2019-10-02 PROCEDURE — 74011250637 HC RX REV CODE- 250/637: Performed by: FAMILY MEDICINE

## 2019-10-02 PROCEDURE — 74011636637 HC RX REV CODE- 636/637: Performed by: FAMILY MEDICINE

## 2019-10-02 PROCEDURE — 99232 SBSQ HOSP IP/OBS MODERATE 35: CPT | Performed by: INTERNAL MEDICINE

## 2019-10-02 RX ORDER — CHLORPHENIRAMINE MALEATE 4 MG
4 TABLET ORAL ONCE
Status: COMPLETED | OUTPATIENT
Start: 2019-10-02 | End: 2019-10-02

## 2019-10-02 RX ORDER — PREDNISONE 10 MG/1
20 TABLET ORAL
Status: DISCONTINUED | OUTPATIENT
Start: 2019-10-03 | End: 2019-10-02 | Stop reason: HOSPADM

## 2019-10-02 RX ORDER — CEFPODOXIME PROXETIL 200 MG/1
200 TABLET, FILM COATED ORAL 2 TIMES DAILY
Qty: 10 TAB | Refills: 0 | Status: SHIPPED | OUTPATIENT
Start: 2019-10-02 | End: 2019-10-14

## 2019-10-02 RX ORDER — PSEUDOEPHEDRINE HYDROCHLORIDE 60 MG/1
30 TABLET ORAL
Status: DISCONTINUED | OUTPATIENT
Start: 2019-10-02 | End: 2019-10-02 | Stop reason: HOSPADM

## 2019-10-02 RX ORDER — CHLORPHENIRAMINE MALEATE 4 MG
4 TABLET ORAL
Status: DISCONTINUED | OUTPATIENT
Start: 2019-10-02 | End: 2019-10-02 | Stop reason: HOSPADM

## 2019-10-02 RX ADMIN — CEFPODOXIME PROXETIL 200 MG: 200 TABLET, FILM COATED ORAL at 08:09

## 2019-10-02 RX ADMIN — ASPIRIN 81 MG: 81 TABLET, COATED ORAL at 08:10

## 2019-10-02 RX ADMIN — GUAIFENESIN 1200 MG: 600 TABLET ORAL at 08:09

## 2019-10-02 RX ADMIN — CHLORPHENIRAMINE MALEATE 4 MG: 4 TABLET ORAL at 10:44

## 2019-10-02 RX ADMIN — SODIUM CHLORIDE 30 MG/ML INHALATION SOLUTION 4 ML: 30 SOLUTION INHALANT at 08:31

## 2019-10-02 RX ADMIN — DULOXETINE HYDROCHLORIDE 30 MG: 30 CAPSULE, DELAYED RELEASE ORAL at 08:10

## 2019-10-02 RX ADMIN — PREDNISONE 40 MG: 10 TABLET ORAL at 08:09

## 2019-10-02 RX ADMIN — DOCUSATE SODIUM 100 MG: 100 CAPSULE, LIQUID FILLED ORAL at 08:10

## 2019-10-02 RX ADMIN — DILTIAZEM HYDROCHLORIDE 360 MG: 180 CAPSULE, COATED, EXTENDED RELEASE ORAL at 08:10

## 2019-10-02 RX ADMIN — Medication 1 AMPULE: at 08:12

## 2019-10-02 RX ADMIN — HYDROCODONE BITARTRATE AND ACETAMINOPHEN 1 TABLET: 5; 325 TABLET ORAL at 08:10

## 2019-10-02 RX ADMIN — IPRATROPIUM BROMIDE AND ALBUTEROL SULFATE 3 ML: .5; 3 SOLUTION RESPIRATORY (INHALATION) at 08:28

## 2019-10-02 RX ADMIN — BUDESONIDE 500 MCG: 0.5 INHALANT RESPIRATORY (INHALATION) at 08:28

## 2019-10-02 RX ADMIN — Medication 10 ML: at 05:27

## 2019-10-02 RX ADMIN — GABAPENTIN 300 MG: 300 CAPSULE ORAL at 08:10

## 2019-10-02 RX ADMIN — PANTOPRAZOLE SODIUM 40 MG: 40 TABLET, DELAYED RELEASE ORAL at 08:09

## 2019-10-02 RX ADMIN — INSULIN LISPRO 6 UNITS: 100 INJECTION, SOLUTION INTRAVENOUS; SUBCUTANEOUS at 12:50

## 2019-10-02 NOTE — DISCHARGE INSTRUCTIONS
DISCHARGE SUMMARY from Nurse    PATIENT INSTRUCTIONS:    After general anesthesia or intravenous sedation, for 24 hours or while taking prescription Narcotics:  · Limit your activities  · Do not drive and operate hazardous machinery  · Do not make important personal or business decisions  · Do  not drink alcoholic beverages  · If you have not urinated within 8 hours after discharge, please contact your surgeon on call. Report the following to your surgeon:  · Excessive pain, swelling, redness or odor of or around the surgical area  · Temperature over 100.5  · Nausea and vomiting lasting longer than 4 hours or if unable to take medications  · Any signs of decreased circulation or nerve impairment to extremity: change in color, persistent  numbness, tingling, coldness or increase pain  · Any questions    What to do at Home:  Recommended activity:     F/u with pcp in a week. Keep appointment with pulmonary as per there recommendations. Come back to ER if any increase in shortness of breath. *  Please give a list of your current medications to your Primary Care Provider. *  Please update this list whenever your medications are discontinued, doses are      changed, or new medications (including over-the-counter products) are added. *  Please carry medication information at all times in case of emergency situations. These are general instructions for a healthy lifestyle:    No smoking/ No tobacco products/ Avoid exposure to second hand smoke  Surgeon General's Warning:  Quitting smoking now greatly reduces serious risk to your health.     Obesity, smoking, and sedentary lifestyle greatly increases your risk for illness    A healthy diet, regular physical exercise & weight monitoring are important for maintaining a healthy lifestyle    You may be retaining fluid if you have a history of heart failure or if you experience any of the following symptoms:  Weight gain of 3 pounds or more overnight or 5 pounds in a week, increased swelling in our hands or feet or shortness of breath while lying flat in bed. Please call your doctor as soon as you notice any of these symptoms; do not wait until your next office visit. The discharge information has been reviewed with the patient. The patient verbalized understanding. Discharge medications reviewed with the patient and appropriate educational materials and side effects teaching were provided. ___________________________________________________________________________________________________________________________________    Patient Education        Chronic Obstructive Pulmonary Disease (COPD): Care Instructions  Your Care Instructions    Chronic obstructive pulmonary disease (COPD) is a general term for a group of lung diseases, including emphysema and chronic bronchitis. People with COPD have decreased airflow in and out of the lungs, which makes it hard to breathe. The airways also can get clogged with thick mucus. Cigarette smoking is a major cause of COPD. Although there is no cure for COPD, you can slow its progress. Following your treatment plan and taking care of yourself can help you feel better and live longer. Follow-up care is a key part of your treatment and safety. Be sure to make and go to all appointments, and call your doctor if you are having problems. It's also a good idea to know your test results and keep a list of the medicines you take. How can you care for yourself at home?   Staying healthy    · Do not smoke. This is the most important step you can take to prevent more damage to your lungs. If you need help quitting, talk to your doctor about stop-smoking programs and medicines. These can increase your chances of quitting for good.     · Avoid colds and flu. Get a pneumococcal vaccine shot. If you have had one before, ask your doctor whether you need a second dose. Get the flu vaccine every fall.  If you must be around people with colds or the flu, wash your hands often.     · Avoid secondhand smoke, air pollution, and high altitudes. Also avoid cold, dry air and hot, humid air. Stay at home with your windows closed when air pollution is bad.    Medicines and oxygen therapy    · Take your medicines exactly as prescribed. Call your doctor if you think you are having a problem with your medicine.     · You may be taking medicines such as:  ? Bronchodilators. These help open your airways and make breathing easier. Bronchodilators are either short-acting (work for 6 to 9 hours) or long-acting (work for 24 hours). You inhale most bronchodilators, so they start to act quickly. Always carry your quick-relief inhaler with you in case you need it while you are away from home. ? Corticosteroids (prednisone, budesonide). These reduce airway inflammation. They come in pill or inhaled form. You must take these medicines every day for them to work well.     · A spacer may help you get more inhaled medicine to your lungs. Ask your doctor or pharmacist if a spacer is right for you. If it is, ask how to use it properly.     · Do not take any vitamins, over-the-counter medicine, or herbal products without talking to your doctor first.     · If your doctor prescribed antibiotics, take them as directed. Do not stop taking them just because you feel better. You need to take the full course of antibiotics.     · Oxygen therapy boosts the amount of oxygen in your blood and helps you breathe easier. Use the flow rate your doctor has recommended, and do not change it without talking to your doctor first.   Activity    · Get regular exercise. Walking is an easy way to get exercise. Start out slowly, and walk a little more each day.     · Pay attention to your breathing.  You are exercising too hard if you cannot talk while you are exercising.     · Take short rest breaks when doing household chores and other activities.     · Learn breathing methods--such as breathing through pursed lips--to help you become less short of breath.     · If your doctor has not set you up with a pulmonary rehabilitation program, talk to him or her about whether rehab is right for you. Rehab includes exercise programs, education about your disease and how to manage it, help with diet and other changes, and emotional support. Diet    · Eat regular, healthy meals. Use bronchodilators about 1 hour before you eat to make it easier to eat. Eat several small meals instead of three large ones. Drink beverages at the end of the meal. Avoid foods that are hard to chew.     · Eat foods that contain protein so that you do not lose muscle mass.     · Talk with your doctor if you gain too much weight or if you lose weight without trying.    Mental health    · Talk to your family, friends, or a therapist about your feelings. It is normal to feel frightened, angry, hopeless, helpless, and even guilty. Talking openly about bad feelings can help you cope. If these feelings last, talk to your doctor. When should you call for help? Call 911 anytime you think you may need emergency care. For example, call if:    · You have severe trouble breathing.    Call your doctor now or seek immediate medical care if:    · You have new or worse trouble breathing.     · You cough up blood.     · You have a fever.    Watch closely for changes in your health, and be sure to contact your doctor if:    · You cough more deeply or more often, especially if you notice more mucus or a change in the color of your mucus.     · You have new or worse swelling in your legs or belly.     · You are not getting better as expected. Where can you learn more? Go to http://kathy-karen.info/. Marko Hall in the search box to learn more about \"Chronic Obstructive Pulmonary Disease (COPD): Care Instructions. \"  Current as of: June 9, 2019  Content Version: 12.2  © 7656-2051 [employer].  Care instructions adapted under license by [employer] (which disclaims liability or warranty for this information). If you have questions about a medical condition or this instruction, always ask your healthcare professional. [address] any warranty or liability for your use of this information.

## 2019-10-02 NOTE — PROGRESS NOTES
Pt resting in bed and is alert and oriented x 4. She  is on 2 L NC. RR even and unlabored. Call light in reach and pt instructed to call for assistance if needed. Will monitor.

## 2019-10-02 NOTE — PROGRESS NOTES
Oxygen Qualifier       Room air: SpO2 with O2 and liter flow   Resting SpO2  94%    Ambulating SpO2  86% 90% on 2L     Resting SAT post ambulation=93% on 2L.     Completed by:    Bear Parra RT

## 2019-10-02 NOTE — PROGRESS NOTES
Care Management Interventions  Transition of Care Consult (CM Consult): Discharge Planning  Current Support Network: Own Home, Lives Alone  Confirm Follow Up Transport: Family  Discharge Location  Discharge Placement: Home    Sw spoke with pt about anticipated discharge. She informed she did not want or need home health care. She states she is indep and has family close by. She states there is always a lot to catch up on after being away from home. After two hospitalizations she states it will take awhile to get back to her baseline. Pt has 02 and Cpap at home. No new needs iden.  Lizbeth Byrd

## 2019-10-02 NOTE — DISCHARGE SUMMARY
Hospitalist Discharge Summary     Admit Date:  2019  9:11 AM   Name:  Bobbi Smith   Age:  71 y.o.  :  1950   MRN:  [id number]   PCP:  Vanda Ellis MD  Treatment Team: Attending Provider: Sree Linda MD; Consulting Provider: Martha Finney MD; Utilization Review: Rosi Rogersamelie    Problem List for this Hospitalization:  Hospital Problems as of 10/2/2019 Date Reviewed: 2019          Codes Class Noted - Resolved POA    Bronchiolitis ICD-10-CM: J21.9  ICD-9-CM: 466.19  10/1/2019 - Present Unknown        COPD exacerbation ([facility] [address] ICD-10-CM: J44.1  ICD-9-CM: 491.21  2019 - Present Unknown        * (Principal) Pneumonia of right upper lobe due to infectious organism [facility]) ICD-10-CM: J18.1  ICD-9-CM: 241  2019 - Present Unknown        JACQUELYN (obstructive sleep apnea) (Chronic) ICD-10-CM: G47.33  ICD-9-CM: 327.23  2017 - Present Yes    Overview Addendum 2015  9:38 AM by Andi Leslie NP     Uses home CPAP with 6L bleed in O2             Tobacco use disorder (Chronic) ICD-10-CM: Q58.785  ICD-9-CM: 305.1  2017 - Present Yes                Admission HPI from 2019:    Patient is a 69yo with hx COPD on 4L at home who was admitted  to  with COPD exacerbation. She was initially on bipap, weaned eventually down to 4L at rest. At time of discharge, still required 6L with ambulation and home CPAP hs. She began having coughing worsening one day after discharge with green sputum. She also woke up this morning feeling disoriented. She has been wearing her cpap at night and oxygen during the day. Oxygen requirement is not increased. Received rocephin and azithro in the ER for RML infiltrate on CXR. Received 5d levaquin while inpatient for last copd exacerbation and was still on prednisone 40 as part of prolonged taper. Hospital Course:  Pt was continued on antibiotics, nebs and steroids. Pulmonary following. Treated for acute on chronic respiratory failure with hypoxia and MSSA Pneumonia. Pt sputum culture positive for MSSA. Changed to po vantin prior to discharge. Pt marked improved wheezing and crepitations. Pt is stable for discharge.    Room air: SpO2 with O2 and liter flow   Resting SpO2  94%     Ambulating SpO2  86% 90% on 2L      Resting SAT post ambulation=93% on 2L. Follow up instructions below. Plan was discussed with patient. All questions answered. Patient was stable at time of discharge and was instructed to call or return if there are any concerns or recurrence of symptoms. Diagnostic Imaging/Tests:   Ct Chest W Cont    Result Date: 9/27/2019  CT OF THE CHEST WITH CONTRAST, PULMONARY EMBOLUS PROTOCOL. CLINICAL INDICATION: Shortness of breath, PROCEDURE: Serial thin section axial images are obtained from the thoracic inlet through the upper abdomen following the administration of intravenous contrast per a dedicated, institutional pulmonary embolus protocol. Coronal MIP reformatted images are generated. Radiation dose reduction techniques were used for this study. Our CT scanners use one or all of the following: Automated exposure control, adjusted of the mA and/or kV according to patient size, iterative reconstruction COMPARISON: Similar chest CT dated 11/29/2018 FINDINGS: The aorta is unremarkable. There is adequate opacification of the central pulmonary arterial tree. No central intraluminal filling defect to indicate acute pulmonary embolus. No pleural or pericardial effusion. Clustered small airways type irregular nodularity noted in the right upper and right middle and right lower lobes with lesser involvement of the lingula. No consolidated, dense airspace opacity appreciated. There is no pleural effusion or pneumothorax. Limited evaluation the upper abdomen is unremarkable. No aggressive osseous lesions identified. IMPRESSION: 1. No acute pulmonary emboli.  2. Extensive, bilateral small airways type nodularity most in keeping with an atypical/infectious bronchiolitis. Recommend CT imaging surveillance after treatment. Xr Chest Port    Result Date: 9/27/2019  CHEST X-RAY, one view. HISTORY:  Chest pain  Shortness of breath TECHNIQUE:  AP upright portable view. COMPARISON: 23 September 2019 FINDINGS:   Small patchy infiltrate right midlung. Lungs are hyperinflated suggesting COPD. Heart and pulmonary vasculature is unremarkable. Aortic arch calcifications are present. IMPRESSION:  COPD with a small focal infiltrate right midlung zone. .       Echocardiogram results:  No results found for this visit on 09/27/19.       All Micro Results     Procedure Component Value Units Date/Time    CULTURE, BLOOD [[id number]] Collected:  09/27/19 0914    Order Status:  Completed Specimen:  Blood Updated:  10/02/19 0720     Special Requests: LFT FOREARM     Culture result: NO GROWTH 5 DAYS       CULTURE, BLOOD [[id number]] Collected:  09/27/19 0915    Order Status:  Completed Specimen:  Blood Updated:  10/02/19 0720     Special Requests: RT WRIST     Culture result: NO GROWTH 5 DAYS       RESPIRATORY VIRAL PANEL, PCR [[id number]] Collected:  10/01/19 1112    Order Status:  No result     CULTURE, RESPIRATORY/SPUTUM/BRONCH [place] STAIN [[id number]]  (Abnormal)  (Susceptibility) Collected:  09/28/19 0850    Order Status:  Completed Specimen:  Sputum Updated:  10/01/19 0659     Special Requests: NO SPECIAL REQUESTS        GRAM STAIN       WBCS SEEN TOO NUMEROUS TO COUNT            NO EPITHELIAL CELLS SEEN               MODERATE GRAM POSITIVE COCCI           Culture result:       HEAVY STAPHYLOCOCCUS AUREUS                  LIGHT NORMAL RESPIRATORY ZHANNA          AFB CULTURE + SMEAR W/RFLX ID FROM CULTURE [[id number]]     Order Status:  Sent     AFB CULTURE + SMEAR W/RFLX ID FROM CULTURE [[id number]]     Order Status:  Sent     AFB CULTURE + SMEAR W/RFLX ID FROM CULTURE [[id number]] Collected:  09/28/19 2015    Order Status:  Canceled     RESPIRATORY VIRAL PANEL, PCR [[id number]] Collected:  09/28/19 0850    Order Status:  Canceled Specimen:  Sputum     AFB CULTURE + SMEAR W/RFLX ID FROM CULTURE [[id number]]     Order Status:  Canceled           Labs: Results:       BMP, Mg, Phos Recent Labs     10/01/19  0530 09/30/19  0451    135*   K 3.9 3.8    106   CO2 33* 33*   AGAP 4* Cannot be calculated   BUN 12 11   CREA 0.58* 0.47*   CA 8.3 8.4   * 196*      CBC Recent Labs     10/01/19  0530 09/30/19  0451   WBC 10.7 11.3*   RBC 4.58 4.25   HGB 13.8 12.9   HCT 42.8 38.8    193   GRANS 79* 86*   LYMPH 11* 7*   EOS 0* 0*   MONOS 6 5   BASOS 0 0   IG 3 2   ANEU 8.4* 9.7*   ABL 1.2 0.8   RAMONE 0.0 0.0   ABM 0.7 0.6   ABB 0.0 0.0   AIG 0.4 0.2      LFT No results for input(s): SGOT, ALT, TBIL, AP, TP, ALB, GLOB, AGRAT, GPT in the last 72 hours.    Cardiac Testing Lab Results   Component Value Date/Time    BNP 49 09/27/2019 09:15 AM    BNP 34 02/28/2018 11:36 AM    BNP 21 07/25/2017 11:25 AM    BNP 67 01/29/2015 12:45 PM    BNP 7 10/09/2012 12:20 PM    Troponin-I, Qt. <0.02 (L) 09/27/2019 09:15 AM    Troponin-I, Qt. <0.02 (L) 02/28/2018 11:36 AM    Troponin-I, Qt. <0.02 (L) 07/25/2017 11:25 AM      Coagulation Tests Lab Results   Component Value Date/Time    Prothrombin time 11.5 01/06/2012 04:28 PM    Prothrombin time 9.5 04/07/2010 10:10 AM    INR 1.0 01/06/2012 04:28 PM    INR 0.9 04/07/2010 10:10 AM    aPTT 26.9 04/07/2010 10:10 AM      A1c No results found for: HBA1C, HGBE8, SMV0EOGE   Lipid Panel Lab Results   Component Value Date/Time    Cholesterol, total 209 (H) 03/16/2017 09:15 AM    HDL Cholesterol 88 (H) 03/16/2017 09:15 AM    LDL, calculated 96.8 03/16/2017 09:15 AM    VLDL, calculated 24.2 03/16/2017 09:15 AM    Triglyceride 121 03/16/2017 09:15 AM    CHOL/HDL Ratio 2.4 03/16/2017 09:15 AM      Thyroid Panel Lab Results   Component Value Date/Time    T4, Total 7.7 01/02/2014 12:59 PM    T4, Total 7.9 10/09/2012 12:19 PM    TSH 1.350 01/02/2014 12:59 PM    TSH 0.815 10/09/2012 12:19 PM        Most Recent UA Lab Results   Component Value Date/Time    Color YELLOW 09/21/2019 05:10 AM    Appearance CLEAR 09/21/2019 05:10 AM    Specific gravity 1.014 09/21/2019 05:10 AM    pH (UA) 6.0 09/21/2019 05:10 AM    Protein NEGATIVE  09/21/2019 05:10 AM    Glucose NEGATIVE  09/21/2019 05:10 AM    Ketone NEGATIVE  09/21/2019 05:10 AM    Bilirubin NEGATIVE  09/21/2019 05:10 AM    Blood NEGATIVE  09/21/2019 05:10 AM    Urobilinogen 0.2 09/21/2019 05:10 AM    Nitrites NEGATIVE  09/21/2019 05:10 AM    Leukocyte Esterase NEGATIVE  09/21/2019 05:10 AM        Allergies   Allergen Reactions    Azithromycin Diarrhea    Sulfa (Sulfonamide Antibiotics) Itching     Immunization History   Administered Date(s) Administered    (RETIRED) Pneumococcal Vaccine (Unspecified Type) 09/15/2010    Influenza Vaccine 09/01/2014, 12/08/2015, 10/01/2016, 11/01/2017    Influenza Vaccine (Quad) PF 09/25/2019    Pneumococcal Vaccine (Unspecified Type) 01/01/2010, 10/01/2016       All Labs from Last 24 Hrs:  Recent Results (from the past 24 hour(s))   GLUCOSE, POC    Collection Time: 10/01/19  4:46 PM   Result Value Ref Range    Glucose (POC) 275 (H) 65 - 100 mg/dL   GLUCOSE, POC    Collection Time: 10/01/19  9:35 PM   Result Value Ref Range    Glucose (POC) 194 (H) 65 - 100 mg/dL   GLUCOSE, POC    Collection Time: 10/02/19  7:04 AM   Result Value Ref Range    Glucose (POC) 102 (H) 65 - 100 mg/dL       Discharge Exam:  Patient Vitals for the past 24 hrs:   Temp Pulse Resp BP SpO2   10/02/19 0832     95 %   10/02/19 0752 98.8 °F (37.1 °C) 84 18 138/72 95 %   10/02/19 0340 97.7 °F (36.5 °C) 75 18 128/70 93 %   10/02/19 0024 97.7 °F (36.5 °C) 70 18 135/73 95 %   10/01/19 2024     99 %   10/1950 98 °F (36.7 °C) 84 18 128/70 94 %   10/01/19 1644 98.5 °F (36.9 °C) 60 20 135/68 95 %   10/01/19 1212 98.9 °F (37.2 °C) 89 18 117/68 94 %     Oxygen Therapy  O2 Sat (%): 95 % (10/02/19 0832)  Pulse via Oximetry: 90 beats per minute (10/02/19 0832)  O2 Device: Nasal cannula (10/02/19 0832)  O2 Flow Rate (L/min): 2 l/min (10/02/19 0832)  FIO2 (%): 36 % (10/01/19 1146)  No intake or output data in the 24 hours ending 10/02/19 1146    General:    Well nourished. Alert. No distress. Eyes:   Normal sclera. Extraocular movements intact. ENT:  Normocephalic, atraumatic. Moist mucous membranes  CV:   Regular rate and rhythm. No murmur, rub, or gallop. Lungs:  Minimal wheezing and minimal coarse breath sounds  Abdomen: Soft, nontender, nondistended. Bowel sounds normal.   Extremities: Warm and dry. No cyanosis or edema. Neurologic: CN II-XII grossly intact. Sensation intact. Skin:     No rashes or jaundice. Psych:  Normal mood and affect. Discharge Info:   Current Discharge Medication List      START taking these medications    Details   cefpodoxime (VANTIN) 200 mg tablet Take 1 Tab by mouth two (2) times a day. Qty: 10 Tab, Refills: 0      guaiFENesin ER (MUCINEX) 1,200 mg Ta12 ER tablet Take 1 Tab by mouth every twelve (12) hours. Qty: 10 Tab, Refills: 0         CONTINUE these medications which have NOT CHANGED    Details   albuterol (PROAIR HFA) 90 mcg/actuation inhaler Take 2 Puffs by inhalation every four (4) hours as needed for Wheezing. Qty: 3 Inhaler, Refills: 3    Associated Diagnoses: Moderate COPD (chronic obstructive pulmonary disease) ([facility])      tiotropium bromide (SPIRIVA RESPIMAT) 2.5 mcg/actuation inhaler Take 2 Puffs by inhalation daily. Qty: 1 Inhaler, Refills: 11      DULoxetine (CYMBALTA) 30 mg capsule Take 30 mg by mouth daily. dilTIAZem (TIAZAC) 360 mg ER capsule Take 1 Cap by mouth daily. Qty: 30 Cap, Refills: 11      mirtazapine (REMERON) 30 mg tablet Take 15 mg by mouth nightly. albuterol (PROVENTIL VENTOLIN) 2.5 mg /3 mL (0.083 %) nebulizer solution 2.5 mg by Nebulization route two (2) times a day.       prednisoLONE acetate (PRED FORTE) 1 % ophthalmic suspension Administer 1 drop to both eyes four (4) times daily. Associated Diagnoses: Chronic sinusitis; JACQUELYN (obstructive sleep apnea); Hypoxia; COPD (chronic obstructive pulmonary disease) ([address] Tobacco use disorder      olopatadine (PATADAY) 0.2 % drop ophthalmic solution Administer 1 drop to both eyes daily. Associated Diagnoses: Chronic sinusitis; JACQUELYN (obstructive sleep apnea); Hypoxia; COPD (chronic obstructive pulmonary disease) ([address] Tobacco use disorder      OXYGEN-AIR DELIVERY SYSTEMS by Nasal route. 6 lpm qhs      aspirin delayed-release 81 mg tablet Take  by mouth daily. gabapentin (NEURONTIN) 300 mg capsule Take 300 mg by mouth two (2) times a day. 1 tab po qhs       methylphenidate (RITALIN) 5 mg tablet Take 5 mg by mouth two (2) times a day. cpap machine kit by Does Not Apply route. 15cm with 5L of oxygen bled in      traZODone (DESYREL) 100 mg tablet Take 100 mg by mouth nightly. budesonide-formoterol (SYMBICORT) 160-4.5 mcg/actuation HFAA Take 2 Puffs by inhalation two (2) times a day. Qty: 1 Inhaler, Refills: 11      nitroglycerin (NITROSTAT) 0.4 mg SL tablet by SubLINGual route every five (5) minutes as needed. STOP taking these medications       predniSONE (DELTASONE) 10 mg tablet Comments:   Reason for Stopping:             Gave prescription for medrol dose pack. Disposition: Home  Activity: As tolerated. Diet: DIET DIABETIC CONSISTENT CARB Regular    Follow-up Appointments   Procedures    FOLLOW UP VISIT Appointment in: One Week F/u with pcp in a week. Keep appointment with pulmonary as per there recommendations. Come back to ER if any increase in shortness of breath. F/u with pcp in a week. Keep appointment with pulmonary as per there recommendations. Come back to ER if any increase in shortness of breath. Standing Status:   Standing     Number of Occurrences:   1     Order Specific Question:   Appointment in     Answer:    One Week Follow-up Information     Follow up With Specialties Details Why Contact Info    Vanda Ellis MD Internal Medicine   [address] [place] [id number] [phone]            Time spent in patient discharge planning and coordination 35 minutes.     Signed:  Carmelina Dias MD

## 2019-10-02 NOTE — PROGRESS NOTES
Discharge instructions were reviewed with the patient. Opportunity for questions given. Patient verbalized understanding of discharge and follow up instructions, as well as S/S to report to MD or return to ER for. PIV was removed. Prescriptions provided. Patient will D/C to home. States ride will not be here until later in afternoon.

## 2019-10-02 NOTE — PROGRESS NOTES
Shruti Montilla  Admission Date: 9/27/2019             Daily Progress Note: 10/2/2019    The patient's chart is reviewed and the patient is discussed with the staff.      71 y.o.  female with hx of copd recently hospitalized at [facility] with resp failure and copd exacerbation.  She was discharged 2 days ago on 4L O2 and prednisone taper.  Over the last 24 hours she developed a cough productive of yellow sputum.  She has had substernal chest pain which is pleuritic. She has become increasingly sob and as a result came back to the er and was admitted with the dx of pneumonia. Oliver Gomez has been a smoker but she says she did not smoke after discharge  Subjective:   Reports her eyes are itching and her allergies are flared up this morning but otherwise feels well.     Current Facility-Administered Medications   Medication Dose Route Frequency    pseudoephedrine (SUDAFED) tablet 30 mg  30 mg Oral Q4H PRN    cefpodoxime (VANTIN) tablet 200 mg  200 mg Oral Q12H    albuterol-ipratropium (DUO-NEB) 2.5 MG-0.5 MG/3 ML  3 mL Nebulization Q6HWA RT    predniSONE (DELTASONE) tablet 40 mg  40 mg Oral DAILY WITH BREAKFAST    sodium chloride 3% hypertonic nebulizer soln  4 mL Nebulization BID    HYDROcodone-acetaminophen (NORCO) 5-325 mg per tablet 1 Tab  1 Tab Oral Q4H PRN    aspirin delayed-release tablet 81 mg  81 mg Oral DAILY    alcohol 62% (NOZIN) nasal  1 Ampule  1 Ampule Topical Q12H    budesonide (PULMICORT) 500 mcg/2 ml nebulizer suspension  500 mcg Nebulization BID RT    dilTIAZem CD (CARDIZEM CD) capsule 360 mg  360 mg Oral DAILY    DULoxetine (CYMBALTA) capsule 30 mg  30 mg Oral DAILY    docusate sodium (COLACE) capsule 100 mg  100 mg Oral DAILY    fluticasone propionate (FLONASE) 50 mcg/actuation nasal spray 2 Spray  2 Spray Both Nostrils DAILY    gabapentin (NEURONTIN) capsule 300 mg  300 mg Oral BID    guaiFENesin ER (MUCINEX) tablet 1,200 mg  1,200 mg Oral Q12H  insulin lispro (HUMALOG) injection   SubCUTAneous AC&HS    ketotifen (ZADITOR) 0.025 % (0.035 %) ophthalmic solution 1 Drop  1 Drop Both Eyes BID    mirtazapine (REMERON) tablet 15 mg  15 mg Oral QHS    nicotine (NICODERM CQ) 21 mg/24 hr patch 1 Patch  1 Patch TransDERmal Q24H    pantoprazole (PROTONIX) tablet 40 mg  40 mg Oral ACB    prednisoLONE acetate (PRED FORTE) 1 % ophthalmic suspension 1 Drop  1 Drop Both Eyes Q12H    sodium chloride (NS) flush 5-40 mL  5-40 mL IntraVENous Q8H    acetaminophen (TYLENOL) tablet 650 mg  650 mg Oral Q4H PRN    albuterol-ipratropium (DUO-NEB) 2.5 MG-0.5 MG/3 ML  3 mL Nebulization Q4H PRN    benzonatate (TESSALON) capsule 100 mg  100 mg Oral TID PRN    bisacodyl (DULCOLAX) tablet 5 mg  5 mg Oral DAILY PRN    sodium chloride (OCEAN) 0.65 % nasal squeeze bottle 2 Spray  2 Spray Both Nostrils Q2H PRN    sodium chloride (NS) flush 5-40 mL  5-40 mL IntraVENous PRN    ondansetron (ZOFRAN) injection 4 mg  4 mg IntraVENous Q4H PRN    nitroglycerin (NITROSTAT) tablet 0.4 mg  0.4 mg SubLINGual Q5MIN PRN    glucagon (GLUCAGEN) injection 1 mg  1 mg IntraMUSCular PRN    diphenhydrAMINE (BENADRYL) capsule 25 mg  25 mg Oral QHS PRN    dextrose 40% (GLUTOSE) oral gel 1 Tube  15 g Oral PRN    dextrose (D50W) injection syrg 12.5-25 g  25-50 mL IntraVENous PRN    enoxaparin (LOVENOX) injection 40 mg  40 mg SubCUTAneous Q24H       Review of Systems    Constitutional: negative for fever, chills, sweats  Cardiovascular: negative for chest pain, palpitations, syncope, edema  Gastrointestinal:  negative for dysphagia, reflux, vomiting, diarrhea, abdominal pain, or melena  Neurologic:  negative for focal weakness, numbness, headache    Objective:     Vitals:    10/02/19 0024 10/02/19 0340 10/02/19 0752 10/02/19 0832   BP: 135/73 128/70 138/72    Pulse: 70 75 84    Resp: 18 18 18    Temp: 97.7 °F (36.5 °C) 97.7 °F (36.5 °C) 98.8 °F (37.1 °C)    SpO2: 95% 93% 95% 95%   Weight: Height:         No intake or output data in the 24 hours ending 10/02/19 0413  Physical Exam:   Constitution:  the patient is well developed and in no acute distress  EENMT:  Sclera clear, pupils equal, oral mucosa moist  Respiratory: clear bilaterally  Cardiovascular:  RRR without M,G,R  Gastrointestinal: soft and non-tender; with positive bowel sounds. Musculoskeletal: warm without cyanosis. There is no lower extremity edema. Skin:  no jaundice or rashes, no wounds   Neurologic: no gross neuro deficits     Psychiatric:  alert and oriented x 3    CXR:       LAB  Recent Labs     10/02/19  0704 10/01/19  2135 10/01/19  1646 10/01/19  1122 10/01/19  0714   GLUCPOC 102* 194* 275* 166* 159*      Recent Labs     10/01/19  0530 09/30/19  0451   WBC 10.7 11.3*   HGB 13.8 12.9   HCT 42.8 38.8    193     Recent Labs     10/01/19  0530 09/30/19  0451    135*   K 3.9 3.8    106   CO2 33* 33*   * 196*   BUN 12 11   CREA 0.58* 0.47*   CA 8.3 8.4     No results for input(s): PH, PCO2, PO2, HCO3, PHI, PCO2I, PO2I, HCO3I in the last 72 hours. No results for input(s): LCAD, LAC in the last 72 hours. Assessment:  (Medical Decision Making)     Hospital Problems  Date Reviewed: 6/12/2019          Codes Class Noted POA    [facility] ICD-10-CM: J44.1  ICD-9-CM: 491.21  9/27/2019 Unknown    Steroid taper    * (Principal) Pneumonia of right upper lobe due to infectious organism [facility]) ICD-10-CM: J18.1  ICD-9-CM: 486  9/27/2019 Unknown    Staph aureus on culture    JACQUELYN (obstructive sleep apnea) (Chronic) ICD-10-CM: G47.33  ICD-9-CM: 327.23  7/26/2017 Yes    Overview Addendum 2/2/2015  9:38 AM by Char Sher, NP     Uses home CPAP with 6L bleed in O2             Tobacco use disorder (Chronic) ICD-10-CM: F17.200  ICD-9-CM: 305.1  7/26/2017 Yes              Plan:  (Medical Decision Making)   1  Agree with  Vantin 200mg BID x 4 more days for MSSA and ease of antibiotics completion.   Taper steroids off over the next week. 2  Antihistamine added for allergy symptoms  3  Stable for discharge today. Can resume bronchodilators as per last discharge. 4  Appointment on 10/9 at [facility]. More than 50% of the time documented was spent in face-to-face contact with the patient and in the care of the patient on the floor/unit where the patient is located.     Cliff Pritchard MD

## 2019-10-03 ENCOUNTER — PATIENT OUTREACH (OUTPATIENT)
Dept: CASE MANAGEMENT | Age: 69
End: 2019-10-03

## 2019-10-03 NOTE — PROGRESS NOTES
This note will not be viewable in [address] Transition of Care Discharge Follow-up Questionnaire   Date/Time of Call:   10/3/2019   10:25 am    What was the patient hospitalized for? Pneumonia of RUL due to infectious organism    Does the patient understand his/her diagnosis and/or treatment and what happened during the hospitalization? Yes, spoke with patient and she is doing well today. Patient states she has an understanding of recent hospitalization, diagnosis and treatment. Did the patient receive discharge instructions? Yes    CM Assessed Risk for Readmission:       Patient stated Risk for Readmission:      Low risk for readmission     Patient did not voice any concerns regarding readmission. Review any discharge instructions (see discharge instructions/AVS in [facility]). Ask patient if they understand these. Do they have any questions? Discharge instructions reviewed with patient and she verbalized understanding of discharge instructions. Were home services ordered (nursing, PT, OT, ST, etc.)? No      If so, has the first visit occurred? If not, why? (Assist with coordination of services if necessary.)   N/A   Was any DME ordered? No     If so, has it been received? If not, why?  (Assist patient in obtaining DME orders &/or equipment if necessary.) N/A       Complete a review of all medications (new, continued and discontinued meds per the D/C instructions and medication tab in [facility]). START taking:  cefpodoxime 200 mg tablet (VANTIN)  guaiFENesin 1,200 mg Ta12 ER tablet (MUCINEX)  STOP taking:  predniSONE 10 mg tablet (DELTASONE)  Patient will continue with Prednisone 40 mg at a tapering dose    Were all new prescriptions filled?   If not, why?  (Assist patient in obtaining medications if necessary  escalate for CCM &/or SW if ongoing issues are verbalized by pt or anticipated)   Yes      Does the patient understand the purpose and dosing instructions for all medications? (If patient has questions, provide explanation and education.)   Medications reviewed with patient and she verbalized understanding of purpose and dosing of medications. Does the patient have any problems in performing ADLs? (If patient is unable to perform ADLs  what is the limiting factor(s)? Do they have a support system that can assist? If no support system is present, discuss possible assistance that they may be able to obtain. Escalate for CCM/SW if ongoing issues are verbalized by pt or anticipated)   Patient lives alone and is independent with ADLs. Patient states she has family and friends available for assistance as needed. Does the patient have all follow-up appointments scheduled? 7 day f/up with PCP?   (f/up with PCP may be w/in 14 days if patient has a f/up with their specialist w/in 7 days)    7-14 day f/up with specialist?   (or per discharge instructions)    If f/up has not been made  what actions has the care coordinator made to accomplish this? Has transportation been arranged? FU with José Ellis NP 10/9/2019 [facility])     FU with Kellee Chester to call with appt. Discussed importance of FU appointments. Reviewed all upcoming appointments. Patient verbalized understanding of all FU appointment dates and times. No transportation needs at this time. Any other questions or concerns expressed by the patient? No other needs or concerns identified at this time. Schedule next appointment with APRIL Stanley or refer to RN Case Manager/ per the workflow guidelines. When is care coordinators next follow-up call scheduled? If referred for CCM  what RN care manager was the referral assigned? Contact information for Care Coordinator given and instructions to call with any questions or concerns.          7-14 days          MATT Call Completed By: Rosemarie Chow LPN Care Coordinator

## 2019-10-18 ENCOUNTER — PATIENT OUTREACH (OUTPATIENT)
Dept: CASE MANAGEMENT | Age: 69
End: 2019-10-18

## 2019-10-18 NOTE — PROGRESS NOTES
This note will not be viewable in [address] Attempted MATT follow up call. No answer on home number;message left on V/M requesting a return call.

## 2019-10-25 ENCOUNTER — PATIENT OUTREACH (OUTPATIENT)
Dept: CASE MANAGEMENT | Age: 69
End: 2019-10-25

## 2019-10-25 NOTE — PROGRESS NOTES
This note will not be viewable in [address] Transitions of Care  Follow up Outreach Note   Outreach type Phone call: Spoke with patient   Home visit:   Date/Time of Outreach: 10/25/2019 12:35 pm      Has patient attended PCP or specialist follow-up appointments since last contact? What was outcome of appointment? When is next follow-up scheduled? Patient had FU with [facility] and has an appointment set up to see Artis Kaur with the [facility] in November. .   Review medications. Any medication changes since last outreach? Does patient have any questions or issues related to their medications? Medication reviewed with no changes in medication regimen. Home health active? If yes  any issue? Progress? No     Referrals needed?  (CM, SW, HH, etc. )   No    Other issues/Miscellaneous? (Transportation, access to meals, ability to perform ADLs, adequate caregiver support, etc.) Patient independent with ADLs. Next Outreach Scheduled? Graduation from program?   15-30 days       Next Steps/Goals (if applicable):          Outreach completed by:   Leonie Rodas LPN Care Coordinator

## 2019-11-05 ENCOUNTER — PATIENT OUTREACH (OUTPATIENT)
Dept: CASE MANAGEMENT | Age: 69
End: 2019-11-05

## 2019-11-05 NOTE — PROGRESS NOTES
This note will not be viewable in [address] Transitions of Care  Follow up Outreach Note   Outreach type Phone call: Spoke with patient   Home visit:   Date/Time of Outreach: 11/05/2019   12:10 pm       Has patient attended PCP or specialist follow-up appointments since last contact? What was outcome of appointment? When is next follow-up scheduled? FU appointments completed and future appointments scheduled appropriately    Review medications. Any medication changes since last outreach? Does patient have any questions or issues related to their medications? Medications reviewed and no changes in medications. Home health active? If yes  any issue? Progress? No     Referrals needed?  (CM, SW, HH, etc. )   No   Other issues/Miscellaneous? (Transportation, access to meals, ability to perform ADLs, adequate caregiver support, etc.) No transportation needs. Next Outreach Scheduled? Graduation from program?   Yes      Next Steps/Goals (if applicable):          Outreach completed by:   Laurence Suarez LPN Care Coordinator

## 2019-11-23 ENCOUNTER — HOSPITAL ENCOUNTER (EMERGENCY)
Age: 69
Discharge: HOME | End: 2019-11-23
Attending: EMERGENCY MEDICINE
Payer: MEDICARE

## 2019-11-23 VITALS
BODY MASS INDEX: 25.16 KG/M2 | WEIGHT: 151 LBS | RESPIRATION RATE: 18 BRPM | HEART RATE: 100 BPM | DIASTOLIC BLOOD PRESSURE: 51 MMHG | OXYGEN SATURATION: 95 % | TEMPERATURE: 98.1 F | HEIGHT: 65 IN | SYSTOLIC BLOOD PRESSURE: 105 MMHG

## 2019-11-23 DIAGNOSIS — L03.311 CELLULITIS OF ABDOMINAL WALL: Primary | ICD-10-CM

## 2019-11-23 PROCEDURE — 99283 EMERGENCY DEPT VISIT LOW MDM: CPT | Performed by: EMERGENCY MEDICINE

## 2019-11-23 NOTE — ED TRIAGE NOTES
Pt was sent by doctors care for an abscess on her abdomen that is not healing. She has had the asbscess for 10 days and has been on bactrim with no affect.

## 2019-11-24 NOTE — DISCHARGE INSTRUCTIONS

## 2019-11-24 NOTE — ED PROVIDER NOTES
Pablo Villatoro is a 71 y.o. female who presents to the ED with a chief complaint of abscess. Patient was diagnosed with a abscess 3 days ago and states she went to an urgent care and had an I&D done it was packed this time. And the significant cellulitis surrounding it. She has been taking Bactrim for the last 3 days states the cellulitis has improved they did remove packing and there was some increased drainage and they were unsure if it went deeper it and advised her to come here for further evaluation. Patient denies any fever but states she has been running 99. She has had no vomiting. Past Medical History:   Diagnosis Date    Acute respiratory failure ([facility] 1/29/2015    Intubated 1/29/2015    Acute respiratory failure ([address] 1/29/2015    Intubated 1/29/2015     Cancer ([facility]     basal cell,squamous cell    Chest pain, unspecified 7/7/2016    Chiari I malformation (HCC)     history of    COPD exacerbation ([facility] 9/14/12-9/17/12    COPD exacerbation ([facility] 4/2013 to 5/2/13    hospitalization    Paroxysmal tachycardia/NOS 7/7/2016    Pneumonia 9/16/2012    Pneumothorax     HX.  of  2 on the left- required chest tube       Past Surgical History:   Procedure Laterality Date    HX CATARACT REMOVAL      HX HEART CATHETERIZATION  1/12    mild nonobstructive CAD    HX HEENT  2009    Bilateral cataracts and bleth    HX HYSTERECTOMY  1982    HX ORTHOPAEDIC  1998    right shoulder sx; left thumb    HX OTHER SURGICAL      left thumb sx,forehead resection of squamous cell    HX SEPTOPLASTY  04/2010    HX SHOULDER ARTHROSCOPY  1990         Family History:   Problem Relation Age of Onset    Cancer Mother         breast    Other Father         neurological degeneration    Cancer Sister         exophageal       Social History     Socioeconomic History    Marital status: SINGLE     Spouse name: Not on file    Number of children: Not on file    Years of education: Not on file    Highest education level: Not on file   Occupational History    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     Employer: RETIRED   Social Needs    Financial resource strain: Not on file    Food insecurity:     Worry: Not on file     Inability: Not on file    Transportation needs:     Medical: Not on file     Non-medical: Not on file   Tobacco Use    Smoking status: Former Smoker     Packs/day: 1.00     Years: 45.00     Pack years: 45.00     Types: Cigarettes     Last attempt to quit: 2019     Years since quittin.1    Smokeless tobacco: Current User   Substance and Sexual Activity    Alcohol use: Yes     Alcohol/week: 1.0 standard drinks     Types: 1 Glasses of wine per week     Comment: per month    Drug use: No    Sexual activity: Not on file   Lifestyle    Physical activity:     Days per week: Not on file     Minutes per session: Not on file    Stress: Not on file   Relationships    Social connections:     Talks on phone: Not on file     Gets together: Not on file     Attends Islam service: Not on file     Active member of club or organization: Not on file     Attends meetings of clubs or organizations: Not on file     Relationship status: Not on file    Intimate partner violence:     Fear of current or ex partner: Not on file     Emotionally abused: Not on file     Physically abused: Not on file     Forced sexual activity: Not on file   Other Topics Concern    Not on file   Social History Narrative    Lives with granddaughter. Retired . ALLERGIES: Azithromycin and Sulfa (sulfonamide antibiotics)    Review of Systems   Constitutional: Negative for chills, fatigue and fever. HENT: Negative for congestion, rhinorrhea and sore throat. Respiratory: Negative for chest tightness, shortness of breath, wheezing and stridor. Cardiovascular: Negative for chest pain and palpitations. Gastrointestinal: Negative for abdominal pain, diarrhea, nausea and vomiting.    Genitourinary: Negative for difficulty urinating. Musculoskeletal: Negative for arthralgias, neck pain and neck stiffness. Skin: Positive for color change and wound. Negative for pallor and rash. Neurological: Negative for dizziness. All other systems reviewed and are negative. Vitals:    11/23/19 1737 11/23/19 1938   BP: 116/63 105/51   Pulse: 99 100   Resp: 16 18   Temp: 98.1 °F (36.7 °C) 98.1 °F (36.7 °C)   SpO2: 94% 95%   Weight: 68.5 kg (151 lb)    Height: 5' 4.5\" (1.638 m)             Physical Exam  Vitals signs and nursing note reviewed. Constitutional:       General: She is not in acute distress. Appearance: Normal appearance. She is well-developed and normal weight. She is not ill-appearing, toxic-appearing or diaphoretic. HENT:      Head: Normocephalic and atraumatic. Eyes:      General: No scleral icterus. Conjunctiva/sclera: Conjunctivae normal.   Neck:      Musculoskeletal: Normal range of motion. Pulmonary:      Effort: Pulmonary effort is normal. No respiratory distress. Breath sounds: No stridor. No wheezing, rhonchi or rales. Chest:      Chest wall: No tenderness. Abdominal:      General: Abdomen is flat. There is no distension. Tenderness: There is no guarding. Skin:     Comments: There is a 2 cm x 1 cm area of open tissue that does have some purulent material present. There is another 2 cm area of inflammation and erythema surrounding this. Neurological:      Mental Status: She is alert. Psychiatric:         Behavior: Behavior normal.          MDM  Number of Diagnoses or Management Options  Cellulitis of abdominal wall:   Diagnosis management comments: Patient has no large abscess seen on ultrasound it appears to be more consistent with cellulitis. I offered her IV treatment but at this point she prefers to go home and continue antibiotics she is taking. She states to me that they have made a difference and is only been 3 days not 10 days as reported in the nursing note. She was given return precautions and wound will likely have to heal secondary given its open nature. Rahul Smyth MD; 11/23/2019 @11:47 PM Voice dictation software was used during the making of this note. This software is not perfect and grammatical and other typographical errors may be present. This note has not been proofread for errors.  ===================================================================            Bedside US  Date/Time: 11/23/2019 11:45 PM  Performed by: Dimitri Silva MD  Authorized by: Dimitri Silva MD     Verbal consent obtained: Yes    Performed by: Attending  Type of procedure: Focused soft tissue  Left leg:      Indications:  Swelling, pain and redness    Skin and subcutaneous tissue:  Adequate    Cobblestoning:  Increased    Subcutaneous Collection:  Absent    Interpretation:  Cellulitis

## 2019-11-24 NOTE — ED NOTES
I have reviewed discharge instructions with the patient. The patient verbalized understanding. Patient left ED via Discharge Method: ambulatory to Home. Opportunity for questions and clarification provided. Patient given 0 scripts. To continue your aftercare when you leave the hospital, you may receive an automated call from our care team to check in on how you are doing. This is a free service and part of our promise to provide the best care and service to meet your aftercare needs.  If you have questions, or wish to unsubscribe from this service please call [phone]. Thank you for Choosing our [facility].

## 2019-12-19 PROBLEM — J44.1 CHRONIC OBSTRUCTIVE PULMONARY DISEASE WITH ACUTE EXACERBATION (HCC): Status: ACTIVE | Noted: 2019-12-19

## 2019-12-19 PROBLEM — E78.2 MIXED HYPERLIPIDEMIA: Status: ACTIVE | Noted: 2019-12-19

## 2020-01-10 ENCOUNTER — HOSPITAL ENCOUNTER (OUTPATIENT)
Dept: CT IMAGING | Age: 70
Discharge: HOME | End: 2020-01-10
Attending: NURSE PRACTITIONER

## 2020-01-10 DIAGNOSIS — R91.8 PULMONARY INFILTRATE: ICD-10-CM

## 2020-01-12 NOTE — PROGRESS NOTES
Please let patient know the good news that the pneumonia has resolved. Has some scarring, but this is unchanged. Thanks!

## 2020-01-14 PROBLEM — J44.1 COPD WITH ACUTE EXACERBATION (HCC): Status: RESOLVED | Noted: 2019-09-19 | Resolved: 2020-01-14

## 2020-01-14 PROBLEM — J96.22 ACUTE ON CHRONIC RESPIRATORY FAILURE WITH HYPOXIA AND HYPERCAPNIA (HCC): Status: RESOLVED | Noted: 2019-09-22 | Resolved: 2020-01-14

## 2020-01-14 PROBLEM — J21.9 BRONCHIOLITIS: Status: RESOLVED | Noted: 2019-10-01 | Resolved: 2020-01-14

## 2020-01-14 PROBLEM — J18.9 PNEUMONIA OF RIGHT UPPER LOBE DUE TO INFECTIOUS ORGANISM: Status: RESOLVED | Noted: 2019-09-27 | Resolved: 2020-01-14

## 2020-01-14 PROBLEM — J20.9 ACUTE TRACHEOBRONCHITIS: Status: RESOLVED | Noted: 2019-09-19 | Resolved: 2020-01-14

## 2020-01-14 PROBLEM — J96.21 ACUTE ON CHRONIC RESPIRATORY FAILURE WITH HYPOXIA AND HYPERCAPNIA (HCC): Status: RESOLVED | Noted: 2019-09-22 | Resolved: 2020-01-14

## 2020-01-14 PROBLEM — J44.1 CHRONIC OBSTRUCTIVE PULMONARY DISEASE WITH ACUTE EXACERBATION (HCC): Status: RESOLVED | Noted: 2019-12-19 | Resolved: 2020-01-14

## 2020-01-14 PROBLEM — J44.1 COPD EXACERBATION (HCC): Status: RESOLVED | Noted: 2019-09-27 | Resolved: 2020-01-14

## 2020-01-14 NOTE — PROGRESS NOTES
Pt came into the office for her follow up visit. Results will be reviewed. This encounter will be closed.

## 2020-03-09 ENCOUNTER — APPOINTMENT (OUTPATIENT)
Dept: GENERAL RADIOLOGY | Age: 70
DRG: 190 | End: 2020-03-09
Attending: EMERGENCY MEDICINE
Payer: MEDICARE

## 2020-03-09 ENCOUNTER — HOSPITAL ENCOUNTER (INPATIENT)
Age: 70
LOS: 3 days | Discharge: HOME | DRG: 190 | End: 2020-03-12
Attending: EMERGENCY MEDICINE | Admitting: INTERNAL MEDICINE
Payer: MEDICARE

## 2020-03-09 DIAGNOSIS — Z71.89 COUNSELING AND COORDINATION OF CARE: ICD-10-CM

## 2020-03-09 DIAGNOSIS — R06.00 DYSPNEA, UNSPECIFIED TYPE: ICD-10-CM

## 2020-03-09 DIAGNOSIS — J20.9 ACUTE BRONCHITIS, UNSPECIFIED ORGANISM: ICD-10-CM

## 2020-03-09 DIAGNOSIS — R53.81 DEBILITY: Chronic | ICD-10-CM

## 2020-03-09 DIAGNOSIS — J44.1 ACUTE EXACERBATION OF CHRONIC OBSTRUCTIVE PULMONARY DISEASE (COPD) (HCC): Primary | ICD-10-CM

## 2020-03-09 DIAGNOSIS — G47.34 NOCTURNAL HYPOXIA: Chronic | ICD-10-CM

## 2020-03-09 DIAGNOSIS — G47.33 OSA (OBSTRUCTIVE SLEEP APNEA): Chronic | ICD-10-CM

## 2020-03-09 DIAGNOSIS — M79.7 FIBROMYALGIA: Chronic | ICD-10-CM

## 2020-03-09 DIAGNOSIS — R53.83 FATIGUE, UNSPECIFIED TYPE: Chronic | ICD-10-CM

## 2020-03-09 DIAGNOSIS — Z71.89 ACP (ADVANCE CARE PLANNING): ICD-10-CM

## 2020-03-09 DIAGNOSIS — J18.9 PNEUMONIA OF LEFT LOWER LOBE DUE TO INFECTIOUS ORGANISM: ICD-10-CM

## 2020-03-09 DIAGNOSIS — Z51.5 ENCOUNTER FOR PALLIATIVE CARE: ICD-10-CM

## 2020-03-09 DIAGNOSIS — J44.1 COPD EXACERBATION (HCC): ICD-10-CM

## 2020-03-09 DIAGNOSIS — F32.A DEPRESSION, UNSPECIFIED DEPRESSION TYPE: Chronic | ICD-10-CM

## 2020-03-09 DIAGNOSIS — J44.9 MODERATE COPD (CHRONIC OBSTRUCTIVE PULMONARY DISEASE) (HCC): Chronic | ICD-10-CM

## 2020-03-09 DIAGNOSIS — J96.11 CHRONIC RESPIRATORY FAILURE WITH HYPOXIA (HCC): Chronic | ICD-10-CM

## 2020-03-09 LAB
ALBUMIN SERPL-MCNC: 3.1 G/DL (ref 3.2–4.6)
ALBUMIN/GLOB SERPL: 0.8 {RATIO} (ref 1.2–3.5)
ALP SERPL-CCNC: 119 U/L (ref 50–136)
ALT SERPL-CCNC: 25 U/L (ref 12–65)
ANION GAP SERPL CALC-SCNC: 6 MMOL/L (ref 7–16)
AST SERPL-CCNC: 22 U/L (ref 15–37)
BASOPHILS # BLD: 0 K/UL (ref 0–0.2)
BASOPHILS NFR BLD: 0 % (ref 0–2)
BILIRUB SERPL-MCNC: 0.2 MG/DL (ref 0.2–1.1)
BUN SERPL-MCNC: 8 MG/DL (ref 8–23)
CALCIUM SERPL-MCNC: 8.2 MG/DL (ref 8.3–10.4)
CHLORIDE SERPL-SCNC: 102 MMOL/L (ref 98–107)
CO2 SERPL-SCNC: 30 MMOL/L (ref 21–32)
CREAT SERPL-MCNC: 0.7 MG/DL (ref 0.6–1)
DIFFERENTIAL METHOD BLD: ABNORMAL
EOSINOPHIL # BLD: 0 K/UL (ref 0–0.8)
EOSINOPHIL NFR BLD: 0 % (ref 0.5–7.8)
ERYTHROCYTE [DISTWIDTH] IN BLOOD BY AUTOMATED COUNT: 14.6 % (ref 11.9–14.6)
FLUAV AG NPH QL IA: NEGATIVE
FLUBV AG NPH QL IA: NEGATIVE
GLOBULIN SER CALC-MCNC: 4 G/DL (ref 2.3–3.5)
GLUCOSE SERPL-MCNC: 246 MG/DL (ref 65–100)
HCT VFR BLD AUTO: 46.8 % (ref 35.8–46.3)
HGB BLD-MCNC: 15.3 G/DL (ref 11.7–15.4)
IMM GRANULOCYTES # BLD AUTO: 0 K/UL (ref 0–0.5)
IMM GRANULOCYTES NFR BLD AUTO: 0 % (ref 0–5)
LACTATE SERPL-SCNC: 1.5 MMOL/L (ref 0.4–2)
LYMPHOCYTES # BLD: 1.2 K/UL (ref 0.5–4.6)
LYMPHOCYTES NFR BLD: 11 % (ref 13–44)
MCH RBC QN AUTO: 30.2 PG (ref 26.1–32.9)
MCHC RBC AUTO-ENTMCNC: 32.7 G/DL (ref 31.4–35)
MCV RBC AUTO: 92.5 FL (ref 79.6–97.8)
MONOCYTES # BLD: 0.2 K/UL (ref 0.1–1.3)
MONOCYTES NFR BLD: 1 % (ref 4–12)
NEUTS SEG # BLD: 9.7 K/UL (ref 1.7–8.2)
NEUTS SEG NFR BLD: 88 % (ref 43–78)
NRBC # BLD: 0 K/UL (ref 0–0.2)
PLATELET # BLD AUTO: 264 K/UL (ref 150–450)
PMV BLD AUTO: 9.8 FL (ref 9.4–12.3)
POTASSIUM SERPL-SCNC: 4.3 MMOL/L (ref 3.5–5.1)
PROT SERPL-MCNC: 7.1 G/DL (ref 6.3–8.2)
RBC # BLD AUTO: 5.06 M/UL (ref 4.05–5.2)
SODIUM SERPL-SCNC: 138 MMOL/L (ref 136–145)
SPECIMEN SOURCE: NORMAL
WBC # BLD AUTO: 11.1 K/UL (ref 4.3–11.1)

## 2020-03-09 PROCEDURE — 80053 COMPREHEN METABOLIC PANEL: CPT

## 2020-03-09 PROCEDURE — 74011250637 HC RX REV CODE- 250/637: Performed by: EMERGENCY MEDICINE

## 2020-03-09 PROCEDURE — 74011250636 HC RX REV CODE- 250/636: Performed by: INTERNAL MEDICINE

## 2020-03-09 PROCEDURE — 74011250637 HC RX REV CODE- 250/637: Performed by: INTERNAL MEDICINE

## 2020-03-09 PROCEDURE — 94640 AIRWAY INHALATION TREATMENT: CPT

## 2020-03-09 PROCEDURE — 74011000258 HC RX REV CODE- 258: Performed by: INTERNAL MEDICINE

## 2020-03-09 PROCEDURE — 99285 EMERGENCY DEPT VISIT HI MDM: CPT

## 2020-03-09 PROCEDURE — 83605 ASSAY OF LACTIC ACID: CPT

## 2020-03-09 PROCEDURE — 85025 COMPLETE CBC W/AUTO DIFF WBC: CPT

## 2020-03-09 PROCEDURE — 65270000029 HC RM PRIVATE

## 2020-03-09 PROCEDURE — 74011000250 HC RX REV CODE- 250: Performed by: EMERGENCY MEDICINE

## 2020-03-09 PROCEDURE — 94760 N-INVAS EAR/PLS OXIMETRY 1: CPT

## 2020-03-09 PROCEDURE — 94664 DEMO&/EVAL PT USE INHALER: CPT

## 2020-03-09 PROCEDURE — 71046 X-RAY EXAM CHEST 2 VIEWS: CPT

## 2020-03-09 PROCEDURE — 74011000250 HC RX REV CODE- 250: Performed by: INTERNAL MEDICINE

## 2020-03-09 PROCEDURE — 96374 THER/PROPH/DIAG INJ IV PUSH: CPT

## 2020-03-09 PROCEDURE — 74011250636 HC RX REV CODE- 250/636: Performed by: EMERGENCY MEDICINE

## 2020-03-09 PROCEDURE — 87804 INFLUENZA ASSAY W/OPTIC: CPT

## 2020-03-09 RX ORDER — AMOXICILLIN 250 MG
2 CAPSULE ORAL
Status: DISCONTINUED | OUTPATIENT
Start: 2020-03-09 | End: 2020-03-12 | Stop reason: HOSPADM

## 2020-03-09 RX ORDER — ACETAMINOPHEN 325 MG/1
650 TABLET ORAL
Status: DISCONTINUED | OUTPATIENT
Start: 2020-03-09 | End: 2020-03-12 | Stop reason: HOSPADM

## 2020-03-09 RX ORDER — DILTIAZEM HYDROCHLORIDE 180 MG/1
360 CAPSULE, COATED, EXTENDED RELEASE ORAL DAILY
Status: DISCONTINUED | OUTPATIENT
Start: 2020-03-10 | End: 2020-03-12 | Stop reason: HOSPADM

## 2020-03-09 RX ORDER — DIPHENHYDRAMINE HCL 25 MG
25 CAPSULE ORAL
Status: DISCONTINUED | OUTPATIENT
Start: 2020-03-09 | End: 2020-03-12 | Stop reason: HOSPADM

## 2020-03-09 RX ORDER — METHYLPHENIDATE HYDROCHLORIDE 5 MG/1
5 TABLET ORAL 2 TIMES DAILY
Status: DISCONTINUED | OUTPATIENT
Start: 2020-03-10 | End: 2020-03-12 | Stop reason: HOSPADM

## 2020-03-09 RX ORDER — NALOXONE HYDROCHLORIDE 0.4 MG/ML
0.4 INJECTION, SOLUTION INTRAMUSCULAR; INTRAVENOUS; SUBCUTANEOUS AS NEEDED
Status: DISCONTINUED | OUTPATIENT
Start: 2020-03-09 | End: 2020-03-12 | Stop reason: HOSPADM

## 2020-03-09 RX ORDER — HYDROCODONE BITARTRATE AND ACETAMINOPHEN 5; 325 MG/1; MG/1
1 TABLET ORAL ONCE
Status: COMPLETED | OUTPATIENT
Start: 2020-03-09 | End: 2020-03-09

## 2020-03-09 RX ORDER — ONDANSETRON 2 MG/ML
4 INJECTION INTRAMUSCULAR; INTRAVENOUS
Status: DISCONTINUED | OUTPATIENT
Start: 2020-03-09 | End: 2020-03-12 | Stop reason: HOSPADM

## 2020-03-09 RX ORDER — LORAZEPAM 0.5 MG/1
0.5 TABLET ORAL
Status: DISCONTINUED | OUTPATIENT
Start: 2020-03-09 | End: 2020-03-12 | Stop reason: HOSPADM

## 2020-03-09 RX ORDER — SODIUM CHLORIDE 0.9 % (FLUSH) 0.9 %
5-40 SYRINGE (ML) INJECTION AS NEEDED
Status: DISCONTINUED | OUTPATIENT
Start: 2020-03-09 | End: 2020-03-12 | Stop reason: HOSPADM

## 2020-03-09 RX ORDER — MIRTAZAPINE 15 MG/1
15 TABLET, FILM COATED ORAL
Status: DISCONTINUED | OUTPATIENT
Start: 2020-03-09 | End: 2020-03-12 | Stop reason: HOSPADM

## 2020-03-09 RX ORDER — NITROGLYCERIN 0.4 MG/1
0.4 TABLET SUBLINGUAL AS NEEDED
Status: DISCONTINUED | OUTPATIENT
Start: 2020-03-09 | End: 2020-03-12 | Stop reason: HOSPADM

## 2020-03-09 RX ORDER — SODIUM CHLORIDE FOR INHALATION 3 %
4 VIAL, NEBULIZER (ML) INHALATION
Status: DISCONTINUED | OUTPATIENT
Start: 2020-03-09 | End: 2020-03-12 | Stop reason: HOSPADM

## 2020-03-09 RX ORDER — SODIUM CHLORIDE 0.9 % (FLUSH) 0.9 %
5-40 SYRINGE (ML) INJECTION EVERY 8 HOURS
Status: DISCONTINUED | OUTPATIENT
Start: 2020-03-09 | End: 2020-03-12 | Stop reason: HOSPADM

## 2020-03-09 RX ORDER — HYDROCODONE BITARTRATE AND ACETAMINOPHEN 5; 325 MG/1; MG/1
1 TABLET ORAL
Status: DISCONTINUED | OUTPATIENT
Start: 2020-03-09 | End: 2020-03-12 | Stop reason: HOSPADM

## 2020-03-09 RX ORDER — BUDESONIDE 0.5 MG/2ML
500 INHALANT ORAL
Status: DISCONTINUED | OUTPATIENT
Start: 2020-03-09 | End: 2020-03-12 | Stop reason: HOSPADM

## 2020-03-09 RX ORDER — CARBOXYMETHYLCELLULOSE SODIUM 10 MG/ML
1 GEL OPHTHALMIC AS NEEDED
Status: DISCONTINUED | OUTPATIENT
Start: 2020-03-09 | End: 2020-03-12 | Stop reason: HOSPADM

## 2020-03-09 RX ORDER — ZOLPIDEM TARTRATE 5 MG/1
5 TABLET ORAL
Status: DISCONTINUED | OUTPATIENT
Start: 2020-03-09 | End: 2020-03-12 | Stop reason: HOSPADM

## 2020-03-09 RX ORDER — IPRATROPIUM BROMIDE AND ALBUTEROL SULFATE 2.5; .5 MG/3ML; MG/3ML
3 SOLUTION RESPIRATORY (INHALATION)
Status: COMPLETED | OUTPATIENT
Start: 2020-03-09 | End: 2020-03-09

## 2020-03-09 RX ORDER — ENOXAPARIN SODIUM 100 MG/ML
40 INJECTION SUBCUTANEOUS EVERY 24 HOURS
Status: DISCONTINUED | OUTPATIENT
Start: 2020-03-09 | End: 2020-03-12 | Stop reason: HOSPADM

## 2020-03-09 RX ORDER — LEVALBUTEROL INHALATION SOLUTION 0.63 MG/3ML
0.63 SOLUTION RESPIRATORY (INHALATION)
Status: DISCONTINUED | OUTPATIENT
Start: 2020-03-09 | End: 2020-03-11

## 2020-03-09 RX ORDER — TRAZODONE HYDROCHLORIDE 50 MG/1
100 TABLET ORAL
Status: DISCONTINUED | OUTPATIENT
Start: 2020-03-09 | End: 2020-03-12 | Stop reason: HOSPADM

## 2020-03-09 RX ORDER — DULOXETIN HYDROCHLORIDE 30 MG/1
30 CAPSULE, DELAYED RELEASE ORAL DAILY
Status: DISCONTINUED | OUTPATIENT
Start: 2020-03-10 | End: 2020-03-12 | Stop reason: HOSPADM

## 2020-03-09 RX ORDER — GUAIFENESIN 600 MG/1
1200 TABLET, EXTENDED RELEASE ORAL EVERY 12 HOURS
Status: DISCONTINUED | OUTPATIENT
Start: 2020-03-09 | End: 2020-03-12 | Stop reason: HOSPADM

## 2020-03-09 RX ORDER — GABAPENTIN 300 MG/1
300 CAPSULE ORAL 2 TIMES DAILY
Status: DISCONTINUED | OUTPATIENT
Start: 2020-03-09 | End: 2020-03-12 | Stop reason: HOSPADM

## 2020-03-09 RX ORDER — ASPIRIN 81 MG/1
81 TABLET ORAL DAILY
Status: DISCONTINUED | OUTPATIENT
Start: 2020-03-10 | End: 2020-03-12 | Stop reason: HOSPADM

## 2020-03-09 RX ADMIN — HYDROCODONE BITARTRATE AND ACETAMINOPHEN 1 TABLET: 5; 325 TABLET ORAL at 21:35

## 2020-03-09 RX ADMIN — IPRATROPIUM BROMIDE AND ALBUTEROL SULFATE 3 ML: .5; 3 SOLUTION RESPIRATORY (INHALATION) at 17:13

## 2020-03-09 RX ADMIN — TRAZODONE HYDROCHLORIDE 100 MG: 50 TABLET ORAL at 21:35

## 2020-03-09 RX ADMIN — Medication 5 ML: at 21:37

## 2020-03-09 RX ADMIN — LEVALBUTEROL HYDROCHLORIDE 0.63 MG: 0.63 SOLUTION RESPIRATORY (INHALATION) at 23:56

## 2020-03-09 RX ADMIN — SODIUM CHLORIDE 500 ML: 900 INJECTION, SOLUTION INTRAVENOUS at 15:22

## 2020-03-09 RX ADMIN — HYDROCODONE BITARTRATE AND ACETAMINOPHEN 1 TABLET: 5; 325 TABLET ORAL at 15:22

## 2020-03-09 RX ADMIN — DOXYCYCLINE 100 MG: 100 INJECTION, POWDER, LYOPHILIZED, FOR SOLUTION INTRAVENOUS at 21:29

## 2020-03-09 RX ADMIN — SODIUM CHLORIDE 30 MG/ML INHALATION SOLUTION 4 ML: 30 SOLUTION INHALANT at 20:01

## 2020-03-09 RX ADMIN — BUDESONIDE 500 MCG: 0.5 INHALANT RESPIRATORY (INHALATION) at 20:01

## 2020-03-09 RX ADMIN — GUAIFENESIN 1200 MG: 600 TABLET, EXTENDED RELEASE ORAL at 21:35

## 2020-03-09 RX ADMIN — METHYLPREDNISOLONE SODIUM SUCCINATE 125 MG: 125 INJECTION, POWDER, FOR SOLUTION INTRAMUSCULAR; INTRAVENOUS at 15:22

## 2020-03-09 RX ADMIN — ENOXAPARIN SODIUM 40 MG: 40 INJECTION SUBCUTANEOUS at 21:34

## 2020-03-09 RX ADMIN — GABAPENTIN 300 MG: 300 CAPSULE ORAL at 21:35

## 2020-03-09 RX ADMIN — IPRATROPIUM BROMIDE AND ALBUTEROL SULFATE 3 ML: .5; 3 SOLUTION RESPIRATORY (INHALATION) at 15:34

## 2020-03-09 RX ADMIN — MIRTAZAPINE 15 MG: 15 TABLET, FILM COATED ORAL at 21:35

## 2020-03-09 RX ADMIN — LEVALBUTEROL HYDROCHLORIDE 0.63 MG: 0.63 SOLUTION RESPIRATORY (INHALATION) at 20:01

## 2020-03-09 NOTE — ED PROVIDER NOTES
Patient complains of SOB, COPE,  wheezing, fatigue, cough productive of green phlegm. Headache, body aches, nausea without vomiting. Decreased appetite. Loose stools. History of severe COPD. Last hospitalization in September. Seen at Urgent care and started on Doxycycline and Prednisone taper. No improvement. Using Oxygen at home, CPAP at night, all of her inhalers and nebulizers. Past Medical History:   Diagnosis Date    Acute respiratory failure ([facility] 1/29/2015    Intubated 1/29/2015    Acute [facility] failure ([facility] 1/29/2015    Intubated 1/29/2015     Cancer ([facility]     basal cell,squamous cell    Chest pain, unspecified 7/7/2016    Chiari I malformation (HCC)     history of    COPD exacerbation ([facility] 9/14/12-9/17/12    COPD exacerbation ([facility] 4/2013 to 5/2/13    hospitalization    Paroxysmal tachycardia/NOS 7/7/2016    Pneumonia 9/16/2012    Pneumothorax     HX.  of  2 on the left- required chest tube       Past Surgical History:   Procedure Laterality Date    HX CATARACT REMOVAL      HX HEART CATHETERIZATION  1/12    mild nonobstructive CAD    HX HEENT  2009    Bilateral cataracts and bleth    HX HYSTERECTOMY  1982    HX ORTHOPAEDIC  1998    right shoulder sx; left thumb    HX OTHER SURGICAL      left thumb sx,forehead resection of squamous cell    HX SEPTOPLASTY  04/2010    HX SHOULDER ARTHROSCOPY  1990         Family History:   Problem Relation Age of Onset    Cancer Mother         breast    Other Father         neurological degeneration    Cancer Sister         exophageal       Social History     Socioeconomic History    Marital status: SINGLE     Spouse name: Not on file    Number of children: Not on file    Years of education: Not on file    Highest education level: Not on file   Occupational History    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     Employer: RETIRED   Social Needs    Financial resource strain: Not on file    Food insecurity:     Worry: Not on file Inability: Not on file    Transportation needs:     Medical: Not on file     Non-medical: Not on file   Tobacco Use    Smoking status: Former Smoker     Packs/day: 1.00     Years: 45.00     Pack years: 45.00     Types: Cigarettes     Last attempt to quit: 2019     Years since quittin.4    Smokeless tobacco: Current User   Substance and Sexual Activity    Alcohol use: Yes     Alcohol/week: 1.0 standard drinks     Types: 1 Glasses of wine per week     Comment: per month    Drug use: No    Sexual activity: Not on file   Lifestyle    Physical activity:     Days per week: Not on file     Minutes per session: Not on file    Stress: Not on file   Relationships    Social connections:     Talks on phone: Not on file     Gets together: Not on file     Attends [employer] service: Not on file     Active member of club or organization: Not on file     Attends meetings of clubs or organizations: Not on file     Relationship status: Not on file    Intimate partner violence:     Fear of current or ex partner: Not on file     Emotionally abused: Not on file     Physically abused: Not on file     Forced sexual activity: Not on file   Other Topics Concern    Not on file   Social History Narrative    Lives with granddaughter. Retired . ALLERGIES: Azithromycin and Sulfa (sulfonamide antibiotics)    Review of Systems   Constitutional: Positive for fatigue. Negative for chills and fever. HENT: Positive for congestion, rhinorrhea and sore throat. Eyes: Negative. Respiratory: Positive for cough, chest tightness, shortness of breath and wheezing. Cardiovascular: Negative for chest pain, palpitations and leg swelling. Gastrointestinal: Positive for diarrhea and nausea. Negative for abdominal pain and vomiting. Genitourinary: Negative. Musculoskeletal: Positive for myalgias. Skin: Negative. Neurological: Positive for headaches. Negative for weakness. Hematological: Negative. Psychiatric/Behavioral: Negative. Vitals:    03/09/20 1300 03/09/20 1537   BP: 151/67    Pulse: (!) 118    Resp: 20    Temp: 98.1 °F (36.7 °C)    SpO2: 93% 99%   Weight: 68.9 kg (152 lb)    Height: 5' 4\" (1.626 m)             Physical Exam  Vitals signs and nursing note reviewed. Constitutional:       Appearance: She is well-developed. HENT:      Head: Normocephalic and atraumatic. Right Ear: Tympanic membrane and external ear normal.      Left Ear: Tympanic membrane and external ear normal.      Nose: Congestion present. Mouth/Throat:      Pharynx: Oropharynx is clear. Eyes:      General: No scleral icterus. Conjunctiva/sclera: Conjunctivae normal.      Pupils: Pupils are equal, round, and reactive to light. Neck:      Musculoskeletal: Normal range of motion and neck supple. Vascular: No JVD. Cardiovascular:      Rate and Rhythm: Normal rate and regular rhythm. Heart sounds: Normal heart sounds. Pulmonary:      Breath sounds: Wheezing present. Comments: Increased effort  Abdominal:      General: Bowel sounds are normal.      Palpations: Abdomen is soft. There is no mass. Tenderness: There is no abdominal tenderness. Musculoskeletal: Normal range of motion. General: No tenderness. Skin:     General: Skin is warm and dry. Neurological:      Mental Status: She is alert and oriented to person, place, and time.    Psychiatric:         Behavior: Behavior normal.          MDM  Number of Diagnoses or Management Options  Diagnosis management comments: Acute exacerbation COPD with respiratory infection  CXR clear  Labs reviewed  Duoneb, Solumedrol 125 mg IV, Norco 5 for headache,  cc IV  reexam - still significant wheezing  Consult hospitalist       Amount and/or Complexity of Data Reviewed  Clinical lab tests: ordered and reviewed  Tests in the radiology section of CPT®: ordered and reviewed  Review and summarize past medical records: yes  Discuss the patient with other providers: yes  Independent visualization of images, tracings, or specimens: yes    Critical Care  Total time providing critical care: 30-74 minutes (Critical care time 35 minutes)         Procedures

## 2020-03-09 NOTE — ED NOTES
TRANSFER - OUT REPORT:    Verbal report given to Amadorna  RN(name) on Gary Oneal  being transferred to 364(unit) for routine progression of care       Report consisted of patients Situation, Background, Assessment and   Recommendations(SBAR). Information from the following report(s) ED Summary was reviewed with the receiving nurse. Lines:   Peripheral IV 03/09/20 Left Wrist (Active)   Site Assessment Clean, dry, & intact 3/9/2020  1:28 PM   Phlebitis Assessment 0 3/9/2020  1:28 PM   Infiltration Assessment 0 3/9/2020  1:28 PM   Dressing Status Clean, dry, & intact 3/9/2020  1:28 PM   Dressing Type Transparent 3/9/2020  1:28 PM   Hub Color/Line Status Pink 3/9/2020  1:28 PM   Action Taken Blood drawn 3/9/2020  1:28 PM        Opportunity for questions and clarification was provided.       Patient transported with:   O2 @ 4 liters

## 2020-03-09 NOTE — ED TRIAGE NOTES
Pt in via EMS from home. C\o increasing SOB x1 week. Hx of COPD. Seen by PCP 1 week prior and reports meds are not helping. Albuterol treatment via EMS. Pt is on 4L. States she uses 6L at night with CPAP and PRN during the day.

## 2020-03-09 NOTE — H&P
Hospitalist H&P Note     Admit Date:  3/9/2020 12:59 PM   Name:  Zhanna Maravilla   Age:  71 y.o.  :  1950   MRN:  [id number]   PCP:  Elisabeth Antonio MD  Treatment Team: Attending Provider: Rasheeda Hunt MD; Primary Nurse: Sandy Carson RN; Consulting Provider: Caleb Vázquez MD    HPI:   40-year-old female from home presents with shortness of breath. Information taken from patient seems agreeable source. She was in her usual state of health until last week when she got sick. She was unable to get her appointment with pulmonology so she went to a doc in the box. She was prescribed prednisone and doxycycline without much improvement. She did have one sick contact who is her grandchild, who lives with her, who had a cold/flu. She denies any fevers but states she has had chills. She states she is having increasing yellow sputum production. 10 systems reviewed and negative except as noted in HPI. Past Medical History:   Diagnosis Date    Acute respiratory failure ([address] 2015    Intubated 2015    Acute respiratory failure ([address] 2015    Intubated 2015     Cancer ([facility] [address]     basal cell,squamous cell    Chest pain, unspecified 2016    Chiari I malformation (HCC)     history of    COPD exacerbation ([address] 12-12    COPD exacerbation ([address] 2013 to 13    hospitalization    Paroxysmal tachycardia/NOS 2016    Pneumonia 2012    Pneumothorax     HX.  of  2 on the left- required chest tube      Past Surgical History:   Procedure Laterality Date    HX CATARACT REMOVAL      HX HEART CATHETERIZATION      mild nonobstructive CAD    HX HEENT      Bilateral cataracts and bleth    HX HYSTERECTOMY  1982    HX ORTHOPAEDIC  1998    right shoulder sx; left thumb    HX OTHER SURGICAL      left thumb sx,forehead resection of squamous cell    HX SEPTOPLASTY  2010    HX SHOULDER ARTHROSCOPY        Allergies   Allergen Reactions  Azithromycin Diarrhea    Sulfa (Sulfonamide Antibiotics) Itching     NOT ALLERGIC      Social History     Tobacco Use    Smoking status: Former Smoker     Packs/day: 1.00     Years: 45.00     Pack years: 45.00     Types: Cigarettes     Last attempt to quit: 2019     Years since quittin.4    Smokeless tobacco: Current User   Substance Use Topics    Alcohol use: Yes     Alcohol/week: 1.0 standard drinks     Types: 1 Glasses of wine per week     Comment: per month      Family History   Problem Relation Age of Onset    Cancer Mother         breast    Other Father         neurological degeneration    Cancer Sister         exophageal      Immunization History   Administered Date(s) Administered    (RETIRED) Pneumococcal Vaccine (Unspecified Type) 09/15/2010    Influenza Vaccine 2014, 2015, 10/01/2016, 2017    Influenza Vaccine (Quad) PF 2019    Pneumococcal Conjugate (PCV-13) 10/14/2019    Pneumococcal Vaccine (Unspecified Type) 2010, 10/01/2016     PTA Medications:  Prior to Admission Medications   Prescriptions Last Dose Informant Patient Reported? Taking? DULoxetine (CYMBALTA) 30 mg capsule   Yes No   Sig: Take 30 mg by mouth daily. OXYGEN-AIR DELIVERY SYSTEMS   Yes No   Sig: by Nasal route. 6 lpm qhs   albuterol (PROAIR HFA) 90 mcg/actuation inhaler   No No   Sig: Take 2 Puffs by inhalation every four (4) hours as needed for Wheezing. albuterol (PROVENTIL VENTOLIN) 2.5 mg /3 mL (0.083 %) nebulizer solution   Yes No   Si.5 mg by Nebulization route two (2) times a day. aspirin delayed-release 81 mg tablet   Yes No   Sig: Take  by mouth daily. budesonide-formoterol (SYMBICORT) 160-4.5 mcg/actuation HFAA   No No   Sig: Take 2 Puffs by inhalation two (2) times a day. cpap machine kit   Yes No   Sig: by Does Not Apply route. 15cm with 5L of oxygen bled in   dilTIAZem (TIAZAC) 360 mg ER capsule   No No   Sig: Take 1 Cap by mouth daily.    doxycycline (MONODOX) 100 mg capsule   No No   Sig: Take 1 Cap by mouth two (2) times a day.   gabapentin (NEURONTIN) 300 mg capsule   Yes No   Sig: Take 300 mg by mouth two (2) times a day. 1 tab po qhs    guaiFENesin ER (MUCINEX) 1,200 mg Ta12 ER tablet   No No   Sig: Take 1 Tab by mouth every twelve (12) hours. methylphenidate (RITALIN) 5 mg tablet   Yes No   Sig: Take 5 mg by mouth two (2) times a day. mirtazapine (REMERON) 30 mg tablet   Yes No   Sig: Take 15 mg by mouth nightly. nitroglycerin (NITROSTAT) 0.4 mg SL tablet   Yes No   Sig: by SubLINGual route every five (5) minutes as needed. olopatadine (PATADAY) 0.2 % drop ophthalmic solution   Yes No   Sig: Administer 1 drop to both eyes daily. predniSONE (DELTASONE) 20 mg tablet   No No   Si tabs po qd x 3 days, 1 and 1/2 tabs po qd x 3 days, 1 tab po qd x 3 days, 1/2 tab po qd x 3 days, or as directed. prednisoLONE acetate (PRED FORTE) 1 % ophthalmic suspension   Yes No   Sig: Administer 1 drop to both eyes four (4) times daily. tiotropium bromide (SPIRIVA RESPIMAT) 2.5 mcg/actuation inhaler   No No   Sig: Take 2 Puffs by inhalation daily. traZODone (DESYREL) 100 mg tablet   Yes No   Sig: Take 100 mg by mouth nightly. Facility-Administered Medications: None       Objective:     Patient Vitals for the past 24 hrs:   Temp Pulse Resp BP SpO2   20 1537     99 %   20 1300 98.1 °F (36.7 °C) (!) 118 20 151/67 93 %     Oxygen Therapy  O2 Sat (%): 99 % (20)  Pulse via Oximetry: 105 beats per minute (20)  O2 Device: Nasal cannula (20)  O2 Flow Rate (L/min): 4 l/min (20)  No intake or output data in the 24 hours ending 20 9938    Physical Exam:  General:    Well nourished. Alert. On nasal canula oxygen  Eyes:   Normal sclera. Extraocular movements intact. ENT:  Normocephalic, atraumatic. Moist mucous membranes  CV:   tachycardic. No m/r/g. Peripheral pulses present.  Capillary refill normal  Lungs:  Diffusely rhonchi with end expiratory wheezing all over. Abdomen: Soft, nontender, nondistended. Bowel sounds normal.   Extremities: Warm and dry. No cyanosis or edema. Neurologic: Sensation intact. Skin:     No rashes or jaundice. Normal coloration  Psych:  Normal mood and affect. I reviewed the labs, imaging, EKGs, telemetry, and other studies done this admission. Data Review:   Recent Results (from the past 24 hour(s))   CBC WITH AUTOMATED DIFF    Collection Time: 03/09/20  1:25 PM   Result Value Ref Range    WBC 11.1 4.3 - 11.1 K/uL    RBC 5.06 4.05 - 5.2 M/uL    HGB 15.3 11.7 - 15.4 g/dL    HCT 46.8 (H) 35.8 - 46.3 %    MCV 92.5 79.6 - 97.8 FL    MCH 30.2 26.1 - 32.9 PG    MCHC 32.7 31.4 - 35.0 g/dL    RDW 14.6 11.9 - 14.6 %    PLATELET 122 764 - 706 K/uL    MPV 9.8 9.4 - 12.3 FL    ABSOLUTE NRBC 0.00 0.0 - 0.2 K/uL    DF AUTOMATED      NEUTROPHILS 88 (H) 43 - 78 %    LYMPHOCYTES 11 (L) 13 - 44 %    MONOCYTES 1 (L) 4.0 - 12.0 %    EOSINOPHILS 0 (L) 0.5 - 7.8 %    BASOPHILS 0 0.0 - 2.0 %    IMMATURE GRANULOCYTES 0 0.0 - 5.0 %    ABS. NEUTROPHILS 9.7 (H) 1.7 - 8.2 K/UL    ABS. LYMPHOCYTES 1.2 0.5 - 4.6 K/UL    ABS. MONOCYTES 0.2 0.1 - 1.3 K/UL    ABS. EOSINOPHILS 0.0 0.0 - 0.8 K/UL    ABS. BASOPHILS 0.0 0.0 - 0.2 K/UL    ABS. IMM. GRANS. 0.0 0.0 - 0.5 K/UL   METABOLIC PANEL, COMPREHENSIVE    Collection Time: 03/09/20  1:25 PM   Result Value Ref Range    Sodium 138 136 - 145 mmol/L    Potassium 4.3 3.5 - 5.1 mmol/L    Chloride 102 98 - 107 mmol/L    CO2 30 21 - 32 mmol/L    Anion gap 6 (L) 7 - 16 mmol/L    Glucose 246 (H) 65 - 100 mg/dL    BUN 8 8 - 23 MG/DL    Creatinine 0.70 0.6 - 1.0 MG/DL    GFR est AA >60 >60 ml/min/1.73m2    GFR est non-AA >60 >60 ml/min/1.73m2    Calcium 8.2 (L) 8.3 - 10.4 MG/DL    Bilirubin, total 0.2 0.2 - 1.1 MG/DL    ALT (SGPT) 25 12 - 65 U/L    AST (SGOT) 22 15 - 37 U/L    Alk.  phosphatase 119 50 - 136 U/L    Protein, total 7.1 6.3 - 8.2 g/dL    Albumin 3.1 (L) 3.2 - 4.6 g/dL    Globulin 4.0 (H) 2.3 - 3.5 g/dL    A-G Ratio 0.8 (L) 1.2 - 3.5     LACTIC ACID    Collection Time: 03/09/20  1:25 PM   Result Value Ref Range    Lactic acid 1.5 0.4 - 2.0 MMOL/L   INFLUENZA A & B AG (RAPID TEST)    Collection Time: 03/09/20  3:24 PM   Result Value Ref Range    Influenza A Ag NEGATIVE  NEG      Influenza B Ag NEGATIVE  NEG      Source Nasopharyngeal         All Micro Results     Procedure Component Value Units Date/Time    INFLUENZA A & B AG (RAPID TEST) [[id number]] Collected:  03/09/20 1524    Order Status:  Completed Specimen:  Nasopharyngeal from Nasal washing Updated:  03/09/20 1542     Influenza A Ag NEGATIVE         Comment: NEGATIVE FOR THE PRESENCE OF INFLUENZA A ANTIGEN  INFECTION DUE TO INFLUENZA A CANNOT BE RULED OUT. BECAUSE THE ANTIGEN PRESENT IN THE SAMPLE MAY BE BELOW  THE DETECTION LIMIT OF THE TEST. A NEGATIVE TEST IS PRESUMPTIVE AND IT IS RECOMMENDED THAT THESE RESULTS BE CONFIRMED BY VIRAL CULTURE OR AN FDA-CLEARED INFLUENZA A AND B MOLECULAR ASSAY. Influenza B Ag NEGATIVE         Comment: NEGATIVE FOR THE PRESENCE OF INFLUENZA B ANTIGEN  INFECTION DUE TO INFLUENZA B CANNOT BE RULED OUT. BECAUSE THE ANTIGEN PRESENT IN THE SAMPLE MAY BE BELOW  THE DETECTION LIMIT OF THE TEST. A NEGATIVE TEST IS PRESUMPTIVE AND IT IS RECOMMENDED THAT THESE RESULTS BE CONFIRMED BY VIRAL CULTURE OR AN FDA-CLEARED INFLUENZA A AND B MOLECULAR ASSAY. Source Nasopharyngeal             Other Studies:  Xr Chest Pa Lat    Result Date: 3/9/2020  CHEST X-RAY, 2 views. HISTORY:  Shortness of breath and COPD TECHNIQUE: PA and lateral views. COMPARISON: September 2019. FINDINGS: -Lungs: Are hyperinflated and clear. -Heart size: is normal. -Costophrenic angles: Minimally blunted. -Pulmonary vasculature: is unremarkable. -Included portion of the upper abdomen: is unremarkable. -Bones: No gross bony lesions. -Other: Aortic calcifications.      IMPRESSION: Hyperinflated and clear lungs. .       Assessment and Plan:     Hospital Problems as of 3/9/2020 Date Reviewed: 1/14/2020          Codes Class Noted - Resolved POA    * (Principal) COPD exacerbation ([facility] [address] ICD-10-CM: J44.1  ICD-9-CM: 491.21  9/27/2019 - Present Yes        JACQUELYN (obstructive sleep apnea) (Chronic) ICD-10-CM: G47.33  ICD-9-CM: 327.23  7/26/2017 - Present Yes    Overview Addendum 2/2/2015  9:38 AM by Christine Krishnan NP     Uses home CPAP with 6L bleed in O2             Nocturnal hypoxia (Chronic) ICD-10-CM: G47.34  ICD-9-CM: 327.24  7/25/2017 - Present Yes        Essential hypertension with goal blood pressure less than 130/85 (Chronic) ICD-10-CM: I10  ICD-9-CM: 401.9  3/23/2017 - Present Yes        Chronic respiratory failure (HCC) (Chronic) ICD-10-CM: J96.10  ICD-9-CM: 518.83  6/16/2015 - Present Yes    Overview Signed 2/2/2015  9:37 AM by Christine Krishnan NP     Wears 6L continuously              Coronary artery disease (Chronic) ICD-10-CM: I25.10  ICD-9-CM: 414.00  3/3/2015 - Present Yes        Fibromyalgia (Chronic) ICD-10-CM: M79.7  ICD-9-CM: 729.1  4/30/2013 - Present Yes        Depression (Chronic) ICD-10-CM: F32.9  ICD-9-CM: 720  4/30/2013 - Present Yes              PLAN:  COPD exacerbation  - failed outpatient treatment  - admit to general medical floor  - azithromycin, 3% neb treatments, xopenex low dose due to tachycardia, budesonide, mucinex, chest physiotherapy, pulmonary toilet, solumedrol q8h  - oxygen 6L nasal canula PRN during the day (baseline at home), 6L with CPAP/APAP at night  - respiratory culture  - pulmonology consult due to recurrent exacerbations    Chronic pain  -continue home medications    CAD   -continue home medications    Depression  - continue home medications    JACQUELYN on CPAP  -continue CPAP at night with 6L oxygen    DVT ppx:  lovenox  Anticipated DC needs:  Home when able  Code status:  Full  Estimated LOS:  Greater than 2 midnights  Risk:  high    Signed:  Lovely Gaines MD

## 2020-03-10 ENCOUNTER — APPOINTMENT (OUTPATIENT)
Dept: GENERAL RADIOLOGY | Age: 70
DRG: 190 | End: 2020-03-10
Attending: INTERNAL MEDICINE
Payer: MEDICARE

## 2020-03-10 PROBLEM — J18.9 PNEUMONIA DUE TO INFECTIOUS ORGANISM: Status: ACTIVE | Noted: 2020-03-10

## 2020-03-10 LAB
ANION GAP SERPL CALC-SCNC: 4 MMOL/L (ref 7–16)
BASOPHILS # BLD: 0 K/UL (ref 0–0.2)
BASOPHILS NFR BLD: 0 % (ref 0–2)
BUN SERPL-MCNC: 7 MG/DL (ref 8–23)
CALCIUM SERPL-MCNC: 8.6 MG/DL (ref 8.3–10.4)
CHLORIDE SERPL-SCNC: 105 MMOL/L (ref 98–107)
CO2 SERPL-SCNC: 33 MMOL/L (ref 21–32)
CREAT SERPL-MCNC: 0.68 MG/DL (ref 0.6–1)
DIFFERENTIAL METHOD BLD: ABNORMAL
EOSINOPHIL # BLD: 0.1 K/UL (ref 0–0.8)
EOSINOPHIL NFR BLD: 0 % (ref 0.5–7.8)
ERYTHROCYTE [DISTWIDTH] IN BLOOD BY AUTOMATED COUNT: 14.3 % (ref 11.9–14.6)
GLUCOSE BLD STRIP.AUTO-MCNC: 163 MG/DL (ref 65–100)
GLUCOSE BLD STRIP.AUTO-MCNC: 283 MG/DL (ref 65–100)
GLUCOSE SERPL-MCNC: 196 MG/DL (ref 65–100)
HCT VFR BLD AUTO: 52.3 % (ref 35.8–46.3)
HGB BLD-MCNC: 16.5 G/DL (ref 11.7–15.4)
IMM GRANULOCYTES # BLD AUTO: 0.1 K/UL (ref 0–0.5)
IMM GRANULOCYTES NFR BLD AUTO: 1 % (ref 0–5)
LYMPHOCYTES # BLD: 1 K/UL (ref 0.5–4.6)
LYMPHOCYTES NFR BLD: 9 % (ref 13–44)
MCH RBC QN AUTO: 29.9 PG (ref 26.1–32.9)
MCHC RBC AUTO-ENTMCNC: 31.5 G/DL (ref 31.4–35)
MCV RBC AUTO: 94.9 FL (ref 79.6–97.8)
MONOCYTES # BLD: 0.2 K/UL (ref 0.1–1.3)
MONOCYTES NFR BLD: 2 % (ref 4–12)
NEUTS SEG # BLD: 9.8 K/UL (ref 1.7–8.2)
NEUTS SEG NFR BLD: 88 % (ref 43–78)
NRBC # BLD: 0 K/UL (ref 0–0.2)
PLATELET # BLD AUTO: 276 K/UL (ref 150–450)
PMV BLD AUTO: 9.4 FL (ref 9.4–12.3)
POTASSIUM SERPL-SCNC: 4.7 MMOL/L (ref 3.5–5.1)
RBC # BLD AUTO: 5.51 M/UL (ref 4.05–5.2)
SODIUM SERPL-SCNC: 142 MMOL/L (ref 136–145)
WBC # BLD AUTO: 11.2 K/UL (ref 4.3–11.1)

## 2020-03-10 PROCEDURE — 94669 MECHANICAL CHEST WALL OSCILL: CPT

## 2020-03-10 PROCEDURE — 77010033678 HC OXYGEN DAILY

## 2020-03-10 PROCEDURE — 74011636637 HC RX REV CODE- 636/637: Performed by: HOSPITALIST

## 2020-03-10 PROCEDURE — 94640 AIRWAY INHALATION TREATMENT: CPT

## 2020-03-10 PROCEDURE — 82962 GLUCOSE BLOOD TEST: CPT

## 2020-03-10 PROCEDURE — 74011250637 HC RX REV CODE- 250/637: Performed by: INTERNAL MEDICINE

## 2020-03-10 PROCEDURE — 80048 BASIC METABOLIC PNL TOTAL CA: CPT

## 2020-03-10 PROCEDURE — 36415 COLL VENOUS BLD VENIPUNCTURE: CPT

## 2020-03-10 PROCEDURE — 94760 N-INVAS EAR/PLS OXIMETRY 1: CPT

## 2020-03-10 PROCEDURE — 65270000029 HC RM PRIVATE

## 2020-03-10 PROCEDURE — 71045 X-RAY EXAM CHEST 1 VIEW: CPT

## 2020-03-10 PROCEDURE — 77030020120 HC VLV RESP PEP HI -B

## 2020-03-10 PROCEDURE — 74011000258 HC RX REV CODE- 258: Performed by: INTERNAL MEDICINE

## 2020-03-10 PROCEDURE — 74011000250 HC RX REV CODE- 250: Performed by: INTERNAL MEDICINE

## 2020-03-10 PROCEDURE — 85025 COMPLETE CBC W/AUTO DIFF WBC: CPT

## 2020-03-10 PROCEDURE — 99223 1ST HOSP IP/OBS HIGH 75: CPT | Performed by: INTERNAL MEDICINE

## 2020-03-10 PROCEDURE — 74011250636 HC RX REV CODE- 250/636: Performed by: INTERNAL MEDICINE

## 2020-03-10 RX ORDER — INSULIN LISPRO 100 [IU]/ML
INJECTION, SOLUTION INTRAVENOUS; SUBCUTANEOUS
Status: DISCONTINUED | OUTPATIENT
Start: 2020-03-10 | End: 2020-03-12 | Stop reason: HOSPADM

## 2020-03-10 RX ORDER — INSULIN GLARGINE 100 [IU]/ML
10 INJECTION, SOLUTION SUBCUTANEOUS
Status: DISCONTINUED | OUTPATIENT
Start: 2020-03-10 | End: 2020-03-11

## 2020-03-10 RX ADMIN — GUAIFENESIN 1200 MG: 600 TABLET, EXTENDED RELEASE ORAL at 08:17

## 2020-03-10 RX ADMIN — LEVALBUTEROL HYDROCHLORIDE 0.63 MG: 0.63 SOLUTION RESPIRATORY (INHALATION) at 08:28

## 2020-03-10 RX ADMIN — SODIUM CHLORIDE 30 MG/ML INHALATION SOLUTION: 30 SOLUTION INHALANT at 20:18

## 2020-03-10 RX ADMIN — METHYLPHENIDATE HYDROCHLORIDE 5 MG: 5 TABLET ORAL at 16:35

## 2020-03-10 RX ADMIN — Medication 10 ML: at 21:23

## 2020-03-10 RX ADMIN — METHYLPREDNISOLONE SODIUM SUCCINATE 40 MG: 40 INJECTION, POWDER, FOR SOLUTION INTRAMUSCULAR; INTRAVENOUS at 16:34

## 2020-03-10 RX ADMIN — DULOXETINE HYDROCHLORIDE 30 MG: 30 CAPSULE, DELAYED RELEASE ORAL at 08:17

## 2020-03-10 RX ADMIN — DOXYCYCLINE 100 MG: 100 INJECTION, POWDER, LYOPHILIZED, FOR SOLUTION INTRAVENOUS at 08:17

## 2020-03-10 RX ADMIN — TRAZODONE HYDROCHLORIDE 100 MG: 50 TABLET ORAL at 21:22

## 2020-03-10 RX ADMIN — METHYLPREDNISOLONE SODIUM SUCCINATE 40 MG: 40 INJECTION, POWDER, FOR SOLUTION INTRAMUSCULAR; INTRAVENOUS at 00:02

## 2020-03-10 RX ADMIN — LEVALBUTEROL HYDROCHLORIDE 0.63 MG: 0.63 SOLUTION RESPIRATORY (INHALATION) at 12:44

## 2020-03-10 RX ADMIN — INSULIN LISPRO 6 UNITS: 100 INJECTION, SOLUTION INTRAVENOUS; SUBCUTANEOUS at 21:20

## 2020-03-10 RX ADMIN — SODIUM CHLORIDE 30 MG/ML INHALATION SOLUTION 4 ML: 30 SOLUTION INHALANT at 08:28

## 2020-03-10 RX ADMIN — BUDESONIDE 500 MCG: 0.5 INHALANT RESPIRATORY (INHALATION) at 08:27

## 2020-03-10 RX ADMIN — INSULIN GLARGINE 10 UNITS: 100 INJECTION, SOLUTION SUBCUTANEOUS at 21:20

## 2020-03-10 RX ADMIN — INSULIN LISPRO 2 UNITS: 100 INJECTION, SOLUTION INTRAVENOUS; SUBCUTANEOUS at 16:35

## 2020-03-10 RX ADMIN — GABAPENTIN 300 MG: 300 CAPSULE ORAL at 08:17

## 2020-03-10 RX ADMIN — GUAIFENESIN 1200 MG: 600 TABLET, EXTENDED RELEASE ORAL at 21:23

## 2020-03-10 RX ADMIN — METHYLPREDNISOLONE SODIUM SUCCINATE 40 MG: 40 INJECTION, POWDER, FOR SOLUTION INTRAMUSCULAR; INTRAVENOUS at 08:17

## 2020-03-10 RX ADMIN — MIRTAZAPINE 15 MG: 15 TABLET, FILM COATED ORAL at 21:23

## 2020-03-10 RX ADMIN — LEVALBUTEROL HYDROCHLORIDE 0.63 MG: 0.63 SOLUTION RESPIRATORY (INHALATION) at 16:35

## 2020-03-10 RX ADMIN — LEVALBUTEROL HYDROCHLORIDE 0.63 MG: 0.63 SOLUTION RESPIRATORY (INHALATION) at 20:18

## 2020-03-10 RX ADMIN — BUDESONIDE 500 MCG: 0.5 INHALANT RESPIRATORY (INHALATION) at 20:18

## 2020-03-10 RX ADMIN — GABAPENTIN 300 MG: 300 CAPSULE ORAL at 16:35

## 2020-03-10 RX ADMIN — Medication 5 ML: at 05:57

## 2020-03-10 RX ADMIN — ENOXAPARIN SODIUM 40 MG: 40 INJECTION SUBCUTANEOUS at 21:22

## 2020-03-10 RX ADMIN — ASPIRIN 81 MG: 81 TABLET ORAL at 08:17

## 2020-03-10 RX ADMIN — Medication 10 ML: at 14:00

## 2020-03-10 RX ADMIN — DILTIAZEM HYDROCHLORIDE 360 MG: 180 CAPSULE, COATED, EXTENDED RELEASE ORAL at 08:17

## 2020-03-10 RX ADMIN — DOXYCYCLINE 100 MG: 100 INJECTION, POWDER, LYOPHILIZED, FOR SOLUTION INTRAVENOUS at 21:24

## 2020-03-10 NOTE — PROGRESS NOTES
Pt arrived to room and assessment completed at this time. Oriented pt to room/unit. VS done and are stable. Pt c/o no pain. Call light in reach.

## 2020-03-10 NOTE — PROGRESS NOTES
Interdisciplinary team rounds were held 3/10/2020 with the following team members:Care Management, Nursing, Nutrition, Pharmacy, Physical Therapy and Physician. Plan of care discussed. See clinical pathway and/or care plan for interventions and desired outcomes.

## 2020-03-10 NOTE — CONSULTS
CONSULT NOTE    Shruti Hunt    3/10/2020    Date of Admission:  3/9/2020    The patient's chart is reviewed and the patient is discussed with the staff. Subjective:     Patient is a 71 y.o.  female seen and evaluated at the request of Dr. Dimitri Kim. She was in her usual state of health until last week when she got sick. She was unable to get her appointment with pulmonology so she went to her local urgent care she has seen multiple times. She was prescribed prednisone and doxycycline without much improvement. She did have one sick contact who is her grandchild, who lives with her, who had a cold/flu. She denies any fevers but states she has had chills. She states she is having increasing yellow sputum production. She has been wheezing with yellow/green sputum. She feels better since admission. She normally uses CPAP with 6L NC at night and O2 at 6L only PRN during the day. Review of Systems  A comprehensive review of systems was negative except for that written in the HPI.     Patient Active Problem List   Diagnosis Code    Chronic sinusitis J32.9    JACQUELYN (obstructive sleep apnea) G47.33    Tobacco use disorder F17.200    Diabetes mellitus ([facility]) E11.9    Fibromyalgia M79.7    Restless leg syndrome G25.81    Depression F32.9    Debility R53.81    Chronic respiratory failure ([facility]) J96.10    ADHD (attention deficit hyperactivity disorder) F90.9    Coronary artery disease I25.10    Right heart enlargement I51.7    Polycythemia D75.1    Moderate COPD (chronic obstructive pulmonary disease) ([facility]) J44.9    Essential hypertension with goal blood pressure less than 130/85 I10    Fatigue R53.83    Nocturnal hypoxia G47.34    Chest pain at rest R07.9    Chest discomfort R07.89    Sleep apnea G47.30    Paroxysmal tachycardia/NOS I47.9    COPD exacerbation ([facility]) J44.1    Mixed hyperlipidemia E78.2     Prior to Admission Medications   Prescriptions Last Dose Informant Patient Reported? Taking? DULoxetine (CYMBALTA) 30 mg capsule   Yes No   Sig: Take 30 mg by mouth daily. OXYGEN-AIR DELIVERY SYSTEMS   Yes No   Sig: by Nasal route. 6 lpm qhs   albuterol (PROAIR HFA) 90 mcg/actuation inhaler   No No   Sig: Take 2 Puffs by inhalation every four (4) hours as needed for Wheezing. albuterol (PROVENTIL VENTOLIN) 2.5 mg /3 mL (0.083 %) nebulizer solution   Yes No   Si.5 mg by Nebulization route two (2) times a day. aspirin delayed-release 81 mg tablet   Yes No   Sig: Take  by mouth daily. budesonide-formoterol (SYMBICORT) 160-4.5 mcg/actuation HFAA   No No   Sig: Take 2 Puffs by inhalation two (2) times a day. cpap machine kit   Yes No   Sig: by Does Not Apply route. 15cm with 5L of oxygen bled in   dilTIAZem (TIAZAC) 360 mg ER capsule   No No   Sig: Take 1 Cap by mouth daily. doxycycline (MONODOX) 100 mg capsule   No No   Sig: Take 1 Cap by mouth two (2) times a day.   gabapentin (NEURONTIN) 300 mg capsule   Yes No   Sig: Take 300 mg by mouth two (2) times a day. 1 tab po qhs    guaiFENesin ER (MUCINEX) 1,200 mg Ta12 ER tablet   No No   Sig: Take 1 Tab by mouth every twelve (12) hours. methylphenidate (RITALIN) 5 mg tablet   Yes No   Sig: Take 5 mg by mouth two (2) times a day. mirtazapine (REMERON) 30 mg tablet   Yes No   Sig: Take 15 mg by mouth nightly. nitroglycerin (NITROSTAT) 0.4 mg SL tablet   Yes No   Sig: by SubLINGual route every five (5) minutes as needed. olopatadine (PATADAY) 0.2 % drop ophthalmic solution   Yes No   Sig: Administer 1 drop to both eyes daily. predniSONE (DELTASONE) 20 mg tablet   No No   Si tabs po qd x 3 days, 1 and 1/2 tabs po qd x 3 days, 1 tab po qd x 3 days, 1/2 tab po qd x 3 days, or as directed. prednisoLONE acetate (PRED FORTE) 1 % ophthalmic suspension   Yes No   Sig: Administer 1 drop to both eyes four (4) times daily.    tiotropium bromide (SPIRIVA RESPIMAT) 2.5 mcg/actuation inhaler   No No   Sig: Take 2 Puffs by inhalation daily. traZODone (DESYREL) 100 mg tablet   Yes No   Sig: Take 100 mg by mouth nightly. Facility-Administered Medications: None     Past Medical History:   Diagnosis Date    Acute respiratory failure ([facility] 2015    Intubated 2015    [facility] 2015    Intubated 2015     Cancer ([facility]     basal cell,squamous cell    Chest pain, unspecified 2016    Chiari I malformation ([facility]     history of    COPD exacerbation ([facility] 12-12    COPD exacerbation ([facility] 2013 to 13    hospitalization    Paroxysmal tachycardia/NOS 2016    Pneumonia 2012    Pneumothorax     HX. of  2 on the left- required chest tube     Past Surgical History:   Procedure Laterality Date    HX CATARACT REMOVAL      HX HEART CATHETERIZATION      mild nonobstructive CAD    HX HEENT      Bilateral cataracts and bleth    HX HYSTERECTOMY      HX ORTHOPAEDIC      right shoulder sx; left thumb    HX OTHER SURGICAL      left thumb sx,forehead resection of squamous cell    HX SEPTOPLASTY  2010    HX SHOULDER ARTHROSCOPY       Social History     Socioeconomic History    Marital status: SINGLE     Spouse name: Not on file    Number of children: Not on file    Years of education: Not on file    Highest education level: Not on file   Occupational History    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     Employer: RETIRED   Social Needs    Financial resource strain: Not on file    Food insecurity:     Worry: Not on file     Inability: Not on file    Transportation needs:     Medical: Not on file     Non-medical: Not on file   Tobacco Use    Smoking status: Former Smoker     Packs/day: 1.00     Years: 45.00     Pack years: 45.00     Types: Cigarettes     Last attempt to quit: 2019     Years since quittin.4    Smokeless tobacco: Current User   Substance and Sexual Activity    Alcohol use:  Yes     Alcohol/week: 1.0 standard drinks     Types: 1 Glasses of wine per week     Comment: per month    Drug use: No    Sexual activity: Not on file   Lifestyle    Physical activity:     Days per week: Not on file     Minutes per session: Not on file    Stress: Not on file   Relationships    Social connections:     Talks on phone: Not on file     Gets together: Not on file     Attends Confucianism service: Not on file     Active member of club or organization: Not on file     Attends meetings of clubs or organizations: Not on file     Relationship status: Not on file    Intimate partner violence:     Fear of current or ex partner: Not on file     Emotionally abused: Not on file     Physically abused: Not on file     Forced sexual activity: Not on file   Other Topics Concern    Not on file   Social History Narrative    Lives with granddaughter. Retired .      Family History   Problem Relation Age of Onset    Cancer Mother         breast    Other Father         neurological degeneration    Cancer Sister         exophageal     Allergies   Allergen Reactions    Azithromycin Diarrhea    Sulfa (Sulfonamide Antibiotics) Itching     NOT ALLERGIC     Current Facility-Administered Medications   Medication Dose Route Frequency    aspirin delayed-release tablet 81 mg  81 mg Oral DAILY    dilTIAZem CD (CARDIZEM CD) capsule 360 mg  360 mg Oral DAILY    DULoxetine (CYMBALTA) capsule 30 mg  30 mg Oral DAILY    gabapentin (NEURONTIN) capsule 300 mg  300 mg Oral BID    guaiFENesin ER (MUCINEX) tablet 1,200 mg  1,200 mg Oral Q12H    methylphenidate HCl (RITALIN) tablet 5 mg  5 mg Oral BID    mirtazapine (REMERON) tablet 15 mg  15 mg Oral QHS    nitroglycerin (NITROSTAT) tablet 0.4 mg  0.4 mg SubLINGual PRN    carboxymethylcellulose sodium (REFRESH LIQUIGEL) 1 % ophthalmic solution 1 Drop  1 Drop Both Eyes PRN    traZODone (DESYREL) tablet 100 mg  100 mg Oral QHS    levalbuterol (XOPENEX) nebulizer soln 0.63 mg/3 mL  0.63 mg Nebulization Q4H RT    budesonide (PULMICORT) 500 mcg/2 ml nebulizer suspension  500 mcg Nebulization BID RT    sodium chloride 3% hypertonic nebulizer soln  4 mL Nebulization BID RT    methylPREDNISolone (PF) (SOLU-MEDROL) injection 40 mg  40 mg IntraVENous Q8H    doxycycline (VIBRAMYCIN) 100 mg in 0.9% sodium chloride (MBP/ADV) 100 mL  100 mg IntraVENous Q12H    sodium chloride (NS) flush 5-40 mL  5-40 mL IntraVENous Q8H    sodium chloride (NS) flush 5-40 mL  5-40 mL IntraVENous PRN    acetaminophen (TYLENOL) tablet 650 mg  650 mg Oral Q4H PRN    HYDROcodone-acetaminophen (NORCO) 5-325 mg per tablet 1 Tab  1 Tab Oral Q4H PRN    naloxone (NARCAN) injection 0.4 mg  0.4 mg IntraVENous PRN    diphenhydrAMINE (BENADRYL) capsule 25 mg  25 mg Oral Q4H PRN    ondansetron (ZOFRAN) injection 4 mg  4 mg IntraVENous Q4H PRN    senna-docusate (PERICOLACE) 8.6-50 mg per tablet 2 Tab  2 Tab Oral DAILY PRN    LORazepam (ATIVAN) tablet 0.5 mg  0.5 mg Oral Q4H PRN    zolpidem (AMBIEN) tablet 5 mg  5 mg Oral QHS PRN    enoxaparin (LOVENOX) injection 40 mg  40 mg SubCUTAneous Q24H     Objective:     Vitals:    03/10/20 0106 03/10/20 0428 03/10/20 0720 03/10/20 0828   BP: 122/79 150/81 145/84    Pulse: 100 100 (!) 115    Resp: 18 18 19    Temp: 97.8 °F (36.6 °C) 97.9 °F (36.6 °C) 97.5 °F (36.4 °C)    SpO2: 97% 97% 95% 93%   Weight:       Height:         PHYSICAL EXAM     Constitutional:  the patient is well developed and in no acute distress  EENMT:  Sclera clear, pupils equal, oral mucosa moist  Respiratory: diffuse wheezing bilaterally, no rhonchi  Cardiovascular:  RRR without M,G,R  Gastrointestinal: soft and non-tender; with positive bowel sounds. Musculoskeletal: warm without cyanosis. There is no lower extremity edema.   Skin:  no jaundice or rashes, no wounds   Neurologic: no gross neuro deficits     Psychiatric:  alert and oriented x 4    CXR:  3/10: \"new left lung infiltrate\"-personally viewed and agree this appears to be a significant infiltrate. Recent Labs     03/10/20  0537 03/09/20  1325   WBC 11.2* 11.1   HGB 16.5* 15.3   HCT 52.3* 46.8*    264     Recent Labs     03/10/20  0537 03/09/20  1325    138   K 4.7 4.3    102   * 246*   CO2 33* 30   BUN 7* 8   CREA 0.68 0.70   CA 8.6 8.2*   ALB  --  3.1*   TBILI  --  0.2   ALT  --  25   SGOT  --  22     No results for input(s): PH, PCO2, PO2, HCO3, PHI, PCO2I, PO2I, HCO3I in the last 72 hours. Recent Labs     03/09/20  1325   LAC 1.5     Assessment:  (Medical Decision Making)     Hospital Problems  Date Reviewed: 1/14/2020          Codes Class Noted POA    * (Principal) COPD exacerbation ([facility] ICD-10-CM: J44.1  ICD-9-CM: 491.21  9/27/2019 Yes        JACQUELYN (obstructive sleep apnea) (Chronic) ICD-10-CM: G47.33  ICD-9-CM: 327.23  7/26/2017 Yes    Overview Addendum 2/2/2015  9:38 AM by Stevenson Kimball NP     Uses home CPAP with 6L bleed in O2             Nocturnal hypoxia (Chronic) ICD-10-CM: G47.34  ICD-9-CM: 327.24  7/25/2017 Yes        Essential hypertension with goal blood pressure less than 130/85 (Chronic) ICD-10-CM: I10  ICD-9-CM: 401.9  3/23/2017 Yes        Chronic respiratory failure ([facility]) (Chronic) ICD-10-CM: J96.10  ICD-9-CM: 518.83  6/16/2015 Yes    Overview Signed 2/2/2015  9:37 AM by Stevenson Kimball NP     Wears 6L continuously              Coronary artery disease (Chronic) ICD-10-CM: I25.10  ICD-9-CM: 414.00  3/3/2015 Yes        Fibromyalgia (Chronic) ICD-10-CM: M79.7  ICD-9-CM: 729.1  4/30/2013 Yes        Depression (Chronic) ICD-10-CM: F32.9  ICD-9-CM: 781  4/30/2013 Yes            70 yo female with severe COPD, JACQUELYN on CPAP, 6L O2 at night admitted with recurrent COPD exacerbation failed outpatient therapy with prednisone/doxy. Complicated by possible CAP per CXR.   Plan:  (Medical Decision Making)     --continue IV steroids  --continue xopenex, pulmicort, 3% scheduled  --get respiratory culture  --continue doxy for now (history of MSSA), but broaden if worsening or depending on respiratory culture  --would consider adding daliresp and or TIW Azithro at discharge for recurrent exacerbations    More than 50% of the time documented was spent in face-to-face contact with the patient and in the care of the patient on the floor/unit where the patient is located. Thank you very much for this referral.  We appreciate the opportunity to participate in this patient's care. Will follow along with above stated plan.     Ceferino Gregorio MD

## 2020-03-10 NOTE — PROGRESS NOTES
Progress Note      Patient: Manjinder Dumont               Sex: female          MRN: [id number]           YOB: 1950      Age:  71 y.o. PCP:  Clara Shields MD  Treatment Team: Attending Provider: Yann Cunningham MD; Consulting Provider: Yann Cunningham MD; Consulting Provider: Nael Arriaza MD; Utilization Review: Chepe Aguiar RN; Care Manager: Consuelo Hagen RN  Subjective:     New patient for me today. Patient complains of moderate shortness of breath with coughing and breathing is worse with minimal exertion. Complains of yellowish sputum. No fevers overnight. Still smokes. Objective:   Physical Exam:   Visit Vitals  /63 (BP 1 Location: Left arm, BP Patient Position: At rest)   Pulse (!) 113   Temp 97.6 °F (36.4 °C)   Resp 18   Ht 5' 4\" (1.626 m)   Wt 68.9 kg (152 lb)   SpO2 93%   BMI 26.09 kg/m²      Temp (24hrs), Av °F (36.7 °C), Min:97.5 °F (36.4 °C), Max:98.8 °F (37.1 °C)    Oxygen Therapy  O2 Sat (%): 93 % (03/10/20 1244)  Pulse via Oximetry: 117 beats per minute (03/10/20 1244)  O2 Device: Nasal cannula (03/10/20 1244)  O2 Flow Rate (L/min): 6 l/min (03/10/20 1244)  No intake or output data in the 24 hours ending 03/10/20 1508     General: Conscious, No acute distress at rest.  Able to speak in short sentences. Eyes:  RUBI, No pallor/icterus    HENT:             Oral Mucosa is Moist, No sinus tenderness  Neck:               Supple, No JVD  Lungs:  Diminished air entry with bilateral expiratory wheezing. Heart:  S1 S2 regular  Abdomen: Soft, normal bowel sounds, NTND, No guarding/rigidity/rebound tend.   Extremities: No pedal edema  Neurologic:  AAOX3, No acute FND  Skin:                No acute rashes  Musculoskeletal: No Acute findings  Psych:             Appropriate mood, Thought process seems to be normal.    LAB  Recent Results (from the past 24 hour(s))   INFLUENZA A & B AG (RAPID TEST)    Collection Time: 20  3:24 PM Result Value Ref Range    Influenza A Ag NEGATIVE  NEG      Influenza B Ag NEGATIVE  NEG      Source Nasopharyngeal     METABOLIC PANEL, BASIC    Collection Time: 03/10/20  5:37 AM   Result Value Ref Range    Sodium 142 136 - 145 mmol/L    Potassium 4.7 3.5 - 5.1 mmol/L    Chloride 105 98 - 107 mmol/L    CO2 33 (H) 21 - 32 mmol/L    Anion gap 4 (L) 7 - 16 mmol/L    Glucose 196 (H) 65 - 100 mg/dL    BUN 7 (L) 8 - 23 MG/DL    Creatinine 0.68 0.6 - 1.0 MG/DL    GFR est AA >60 >60 ml/min/1.73m2    GFR est non-AA >60 >60 ml/min/1.73m2    Calcium 8.6 8.3 - 10.4 MG/DL   CBC WITH AUTOMATED DIFF    Collection Time: 03/10/20  5:37 AM   Result Value Ref Range    WBC 11.2 (H) 4.3 - 11.1 K/uL    RBC 5.51 (H) 4.05 - 5.2 M/uL    HGB 16.5 (H) 11.7 - 15.4 g/dL    HCT 52.3 (H) 35.8 - 46.3 %    MCV 94.9 79.6 - 97.8 FL    MCH 29.9 26.1 - 32.9 PG    MCHC 31.5 31.4 - 35.0 g/dL    RDW 14.3 11.9 - 14.6 %    PLATELET 005 771 - 464 K/uL    MPV 9.4 9.4 - 12.3 FL    ABSOLUTE NRBC 0.00 0.0 - 0.2 K/uL    DF AUTOMATED      NEUTROPHILS 88 (H) 43 - 78 %    LYMPHOCYTES 9 (L) 13 - 44 %    MONOCYTES 2 (L) 4.0 - 12.0 %    EOSINOPHILS 0 (L) 0.5 - 7.8 %    BASOPHILS 0 0.0 - 2.0 %    IMMATURE GRANULOCYTES 1 0.0 - 5.0 %    ABS. NEUTROPHILS 9.8 (H) 1.7 - 8.2 K/UL    ABS. LYMPHOCYTES 1.0 0.5 - 4.6 K/UL    ABS. MONOCYTES 0.2 0.1 - 1.3 K/UL    ABS. EOSINOPHILS 0.1 0.0 - 0.8 K/UL    ABS. BASOPHILS 0.0 0.0 - 0.2 K/UL    ABS. IMM. GRANS. 0.1 0.0 - 0.5 K/UL       Xr Chest Sngl V    Result Date: 3/10/2020  EXAM: Chest x-ray. INDICATION: Dyspnea. COMPARISON: Yesterday's chest x-ray. TECHNIQUE: Frontal view chest x-ray. FINDINGS: There is new mild left lung base infiltrate and probable tiny pleural effusion. The right lung remains clear. The lungs are hyperexpanded, consistent with the reported history of COPD. The cardiac size and pulmonary vasculature are within normal limits. No pneumothorax is identified. IMPRESSION: New left lung base infiltrate.       David Vu Chest Sngl V    Result Date: 3/10/2020  IMPRESSION: New left lung base infiltrate. All Micro Results     Procedure Component Value Units Date/Time    CULTURE, RESPIRATORY/SPUTUM/BRONCH Carlene Cuellar [[id number]]     Order Status:  Sent Specimen:  Sputum     INFLUENZA A & B AG (RAPID TEST) [[id number]] Collected:  03/09/20 1524    Order Status:  Completed Specimen:  Nasopharyngeal from Nasal washing Updated:  03/09/20 154     Influenza A Ag NEGATIVE         Comment: NEGATIVE FOR THE PRESENCE OF INFLUENZA A ANTIGEN  INFECTION DUE TO INFLUENZA A CANNOT BE RULED OUT. BECAUSE THE ANTIGEN PRESENT IN THE SAMPLE MAY BE BELOW  THE DETECTION LIMIT OF THE TEST. A NEGATIVE TEST IS PRESUMPTIVE AND IT IS RECOMMENDED THAT THESE RESULTS BE CONFIRMED BY VIRAL CULTURE OR AN FDA-CLEARED INFLUENZA A AND B MOLECULAR ASSAY. Influenza B Ag NEGATIVE         Comment: NEGATIVE FOR THE PRESENCE OF INFLUENZA B ANTIGEN  INFECTION DUE TO INFLUENZA B CANNOT BE RULED OUT. BECAUSE THE ANTIGEN PRESENT IN THE SAMPLE MAY BE BELOW  THE DETECTION LIMIT OF THE TEST. A NEGATIVE TEST IS PRESUMPTIVE AND IT IS RECOMMENDED THAT THESE RESULTS BE CONFIRMED BY VIRAL CULTURE OR AN FDA-CLEARED INFLUENZA A AND B MOLECULAR ASSAY. Source Nasopharyngeal             Current Medications Reviewed      Assessment/Plan     Principal Problem:    COPD exacerbation ([facility] (9/27/2019)    Active Problems:    JACQUELYN (obstructive sleep apnea) (7/26/2017)      Overview: Uses home CPAP with 6L bleed in O2      Fibromyalgia (4/30/2013)      Depression (4/30/2013)      Ch[facility] ([facility] (6/16/2015)      Overview: Wears 6L continuously       Coronary artery disease (3/3/2015)      Essential hypertension with goal blood pressure less than 130/85 (3/23/2017)      Nocturnal hypoxia (7/25/2017)      Pneumonia due to infectious organism (3/10/2020)        1.   Acute COPD exacerbation: Could be secondary to ongoing tobacco use and could be from left lower lobe pneumonia. 2.  Left lower lobe pneumonia, clinically unable to determine whether is a gram-negative or gram-positive pneumonia. 3.  Chronic hypoxemic respiratory failure on home oxygen. 4.  Obstructive sleep apnea on CPAP  5. Secondary polycythemia  6. Type 2 diabetes with hyperglycemia: Secondary to steroids. Keep her on Lantus, insulin sliding scale and check A1c.  7.  Hypertension: Continue home blood pressure medications and monitor. 8.  Chronic pain syndrome  9. Depression: Continue home medications. IV steroids, bronchodilators, guaifenesin, doxycycline. Appreciate pulmonology recommendations. DVT Prophylaxis: Subcu Lovenox. High risk patient, at risk for acute respiratory failure.     Fredis Mohr MD  March 10, 2020

## 2020-03-10 NOTE — PROGRESS NOTES
TRANSFER - IN REPORT:    Verbal report received from Rasheeda House, RN(name) on Elton London  being received from ED(unit) for routine progression of care      Report consisted of patients Situation, Background, Assessment and   Recommendations(SBAR). Information from the following report(s) SBAR, Kardex, ED Summary, Intake/Output, MAR and Recent Results was reviewed with the receiving nurse. Opportunity for questions and clarification was provided. Assessment completed upon patients arrival to unit and care assumed.

## 2020-03-10 NOTE — PROGRESS NOTES
Care Management Interventions  PCP Verified by CM: Yes  Palliative Care Criteria Met (RRAT>21 & CHF Dx)?: No(Risk 21% Dx COPD)  Transition of Care Consult (CM Consult): Discharge Planning  Discharge Durable Medical Equipment: No(has O2 with [facility] and CPAP and Nebulizer)  Physical Therapy Consult: No  Occupational Therapy Consult: No  Speech Therapy Consult: No  Current Support Network: Lives Alone  Confirm Follow Up Transport: Self  Discharge Location  Discharge Placement: Home   Met with patient for d/c planning. Patient alert and oriented x 3, independent of ADL's and lives alone. DME includes CPAP, Nebulizer and O2 with [facility] [facility]. She has transportation as needed. She goes to the [place] 1-2 a year for medications/physicals otherwise follows up with [facility] Danilo Lazaro NP. She voices no concerns or needs for d/c. Current d/c plan is home when medically stable. She does not think she needs home health at this time. CM following.

## 2020-03-10 NOTE — PROGRESS NOTES
Assessment done via doc flow sheet. Pt in bed, eating breakfast.  Alert & oriented x4, Respiration unlabored at rest, lung sounds diminished with scattered wheezing noted, O2 @ 6L per nasal cannula. Pt instructed to call for assistance as needed.

## 2020-03-11 ENCOUNTER — APPOINTMENT (OUTPATIENT)
Dept: GENERAL RADIOLOGY | Age: 70
DRG: 190 | End: 2020-03-11
Attending: INTERNAL MEDICINE
Payer: MEDICARE

## 2020-03-11 LAB
ANION GAP SERPL CALC-SCNC: 4 MMOL/L (ref 7–16)
BASOPHILS # BLD: 0 K/UL (ref 0–0.2)
BASOPHILS NFR BLD: 0 % (ref 0–2)
BUN SERPL-MCNC: 10 MG/DL (ref 8–23)
CALCIUM SERPL-MCNC: 8 MG/DL (ref 8.3–10.4)
CHLORIDE SERPL-SCNC: 106 MMOL/L (ref 98–107)
CO2 SERPL-SCNC: 33 MMOL/L (ref 21–32)
CREAT SERPL-MCNC: 0.54 MG/DL (ref 0.6–1)
DIFFERENTIAL METHOD BLD: ABNORMAL
EOSINOPHIL # BLD: 0 K/UL (ref 0–0.8)
EOSINOPHIL NFR BLD: 0 % (ref 0.5–7.8)
ERYTHROCYTE [DISTWIDTH] IN BLOOD BY AUTOMATED COUNT: 14.2 % (ref 11.9–14.6)
EST. AVERAGE GLUCOSE BLD GHB EST-MCNC: 163 MG/DL
GLUCOSE BLD STRIP.AUTO-MCNC: 135 MG/DL (ref 65–100)
GLUCOSE BLD STRIP.AUTO-MCNC: 161 MG/DL (ref 65–100)
GLUCOSE BLD STRIP.AUTO-MCNC: 178 MG/DL (ref 65–100)
GLUCOSE BLD STRIP.AUTO-MCNC: 182 MG/DL (ref 65–100)
GLUCOSE SERPL-MCNC: 205 MG/DL (ref 65–100)
HBA1C MFR BLD: 7.3 %
HCT VFR BLD AUTO: 47 % (ref 35.8–46.3)
HGB BLD-MCNC: 14.9 G/DL (ref 11.7–15.4)
IMM GRANULOCYTES # BLD AUTO: 0.1 K/UL (ref 0–0.5)
IMM GRANULOCYTES NFR BLD AUTO: 1 % (ref 0–5)
LYMPHOCYTES # BLD: 0.8 K/UL (ref 0.5–4.6)
LYMPHOCYTES NFR BLD: 5 % (ref 13–44)
MAGNESIUM SERPL-MCNC: 2.2 MG/DL (ref 1.8–2.4)
MCH RBC QN AUTO: 29.9 PG (ref 26.1–32.9)
MCHC RBC AUTO-ENTMCNC: 31.7 G/DL (ref 31.4–35)
MCV RBC AUTO: 94.2 FL (ref 79.6–97.8)
MONOCYTES # BLD: 0.2 K/UL (ref 0.1–1.3)
MONOCYTES NFR BLD: 1 % (ref 4–12)
NEUTS SEG # BLD: 14.2 K/UL (ref 1.7–8.2)
NEUTS SEG NFR BLD: 93 % (ref 43–78)
NRBC # BLD: 0 K/UL (ref 0–0.2)
PHOSPHATE SERPL-MCNC: 4.4 MG/DL (ref 2.3–3.7)
PLATELET # BLD AUTO: 266 K/UL (ref 150–450)
PMV BLD AUTO: 9.3 FL (ref 9.4–12.3)
POTASSIUM SERPL-SCNC: 4.2 MMOL/L (ref 3.5–5.1)
RBC # BLD AUTO: 4.99 M/UL (ref 4.05–5.2)
SODIUM SERPL-SCNC: 143 MMOL/L (ref 136–145)
WBC # BLD AUTO: 15.2 K/UL (ref 4.3–11.1)

## 2020-03-11 PROCEDURE — 99232 SBSQ HOSP IP/OBS MODERATE 35: CPT | Performed by: INTERNAL MEDICINE

## 2020-03-11 PROCEDURE — 83036 HEMOGLOBIN GLYCOSYLATED A1C: CPT

## 2020-03-11 PROCEDURE — 83735 ASSAY OF MAGNESIUM: CPT

## 2020-03-11 PROCEDURE — 71046 X-RAY EXAM CHEST 2 VIEWS: CPT

## 2020-03-11 PROCEDURE — 77010033678 HC OXYGEN DAILY

## 2020-03-11 PROCEDURE — 76450000000

## 2020-03-11 PROCEDURE — 84100 ASSAY OF PHOSPHORUS: CPT

## 2020-03-11 PROCEDURE — 85025 COMPLETE CBC W/AUTO DIFF WBC: CPT

## 2020-03-11 PROCEDURE — 74011000250 HC RX REV CODE- 250: Performed by: INTERNAL MEDICINE

## 2020-03-11 PROCEDURE — 94640 AIRWAY INHALATION TREATMENT: CPT

## 2020-03-11 PROCEDURE — 74011250637 HC RX REV CODE- 250/637: Performed by: HOSPITALIST

## 2020-03-11 PROCEDURE — 74011636637 HC RX REV CODE- 636/637: Performed by: HOSPITALIST

## 2020-03-11 PROCEDURE — 74011250637 HC RX REV CODE- 250/637: Performed by: INTERNAL MEDICINE

## 2020-03-11 PROCEDURE — 74011250636 HC RX REV CODE- 250/636: Performed by: INTERNAL MEDICINE

## 2020-03-11 PROCEDURE — 82962 GLUCOSE BLOOD TEST: CPT

## 2020-03-11 PROCEDURE — 74011000258 HC RX REV CODE- 258: Performed by: INTERNAL MEDICINE

## 2020-03-11 PROCEDURE — 87070 CULTURE OTHR SPECIMN AEROBIC: CPT

## 2020-03-11 PROCEDURE — 94760 N-INVAS EAR/PLS OXIMETRY 1: CPT

## 2020-03-11 PROCEDURE — 65270000029 HC RM PRIVATE

## 2020-03-11 PROCEDURE — 36415 COLL VENOUS BLD VENIPUNCTURE: CPT

## 2020-03-11 PROCEDURE — 99223 1ST HOSP IP/OBS HIGH 75: CPT | Performed by: NURSE PRACTITIONER

## 2020-03-11 PROCEDURE — 80048 BASIC METABOLIC PNL TOTAL CA: CPT

## 2020-03-11 RX ORDER — DOXYCYCLINE 100 MG/1
100 CAPSULE ORAL EVERY 12 HOURS
Status: DISCONTINUED | OUTPATIENT
Start: 2020-03-11 | End: 2020-03-12 | Stop reason: HOSPADM

## 2020-03-11 RX ORDER — INSULIN GLARGINE 100 [IU]/ML
12 INJECTION, SOLUTION SUBCUTANEOUS
Status: DISCONTINUED | OUTPATIENT
Start: 2020-03-11 | End: 2020-03-12 | Stop reason: HOSPADM

## 2020-03-11 RX ORDER — LEVALBUTEROL INHALATION SOLUTION 0.63 MG/3ML
0.63 SOLUTION RESPIRATORY (INHALATION)
Status: DISCONTINUED | OUTPATIENT
Start: 2020-03-11 | End: 2020-03-12 | Stop reason: HOSPADM

## 2020-03-11 RX ORDER — INSULIN LISPRO 100 [IU]/ML
4 INJECTION, SOLUTION INTRAVENOUS; SUBCUTANEOUS
Status: DISCONTINUED | OUTPATIENT
Start: 2020-03-11 | End: 2020-03-12 | Stop reason: HOSPADM

## 2020-03-11 RX ORDER — LEVALBUTEROL INHALATION SOLUTION 0.63 MG/3ML
0.63 SOLUTION RESPIRATORY (INHALATION)
Status: DISCONTINUED | OUTPATIENT
Start: 2020-03-11 | End: 2020-03-11

## 2020-03-11 RX ADMIN — INSULIN GLARGINE 12 UNITS: 100 INJECTION, SOLUTION SUBCUTANEOUS at 22:10

## 2020-03-11 RX ADMIN — LEVALBUTEROL HYDROCHLORIDE 0.63 MG: 0.63 SOLUTION RESPIRATORY (INHALATION) at 09:08

## 2020-03-11 RX ADMIN — DULOXETINE HYDROCHLORIDE 30 MG: 30 CAPSULE, DELAYED RELEASE ORAL at 08:36

## 2020-03-11 RX ADMIN — LEVALBUTEROL HYDROCHLORIDE 0.63 MG: 0.63 SOLUTION RESPIRATORY (INHALATION) at 20:06

## 2020-03-11 RX ADMIN — GABAPENTIN 300 MG: 300 CAPSULE ORAL at 17:13

## 2020-03-11 RX ADMIN — METHYLPHENIDATE HYDROCHLORIDE 5 MG: 5 TABLET ORAL at 08:36

## 2020-03-11 RX ADMIN — Medication 10 ML: at 22:13

## 2020-03-11 RX ADMIN — ENOXAPARIN SODIUM 40 MG: 40 INJECTION SUBCUTANEOUS at 19:27

## 2020-03-11 RX ADMIN — INSULIN LISPRO 2 UNITS: 100 INJECTION, SOLUTION INTRAVENOUS; SUBCUTANEOUS at 22:10

## 2020-03-11 RX ADMIN — DILTIAZEM HYDROCHLORIDE 360 MG: 180 CAPSULE, COATED, EXTENDED RELEASE ORAL at 08:36

## 2020-03-11 RX ADMIN — GUAIFENESIN 1200 MG: 600 TABLET, EXTENDED RELEASE ORAL at 08:36

## 2020-03-11 RX ADMIN — INSULIN LISPRO 4 UNITS: 100 INJECTION, SOLUTION INTRAVENOUS; SUBCUTANEOUS at 13:16

## 2020-03-11 RX ADMIN — DOXYCYCLINE 100 MG: 100 INJECTION, POWDER, LYOPHILIZED, FOR SOLUTION INTRAVENOUS at 08:38

## 2020-03-11 RX ADMIN — DOXYCYCLINE HYCLATE 100 MG: 100 CAPSULE ORAL at 22:09

## 2020-03-11 RX ADMIN — METHYLPREDNISOLONE SODIUM SUCCINATE 40 MG: 40 INJECTION, POWDER, FOR SOLUTION INTRAMUSCULAR; INTRAVENOUS at 08:40

## 2020-03-11 RX ADMIN — LEVALBUTEROL HYDROCHLORIDE 0.63 MG: 0.63 SOLUTION RESPIRATORY (INHALATION) at 00:31

## 2020-03-11 RX ADMIN — Medication 10 ML: at 05:54

## 2020-03-11 RX ADMIN — INSULIN LISPRO 2 UNITS: 100 INJECTION, SOLUTION INTRAVENOUS; SUBCUTANEOUS at 13:15

## 2020-03-11 RX ADMIN — GUAIFENESIN 1200 MG: 600 TABLET, EXTENDED RELEASE ORAL at 22:09

## 2020-03-11 RX ADMIN — METHYLPREDNISOLONE SODIUM SUCCINATE 40 MG: 40 INJECTION, POWDER, FOR SOLUTION INTRAMUSCULAR; INTRAVENOUS at 22:14

## 2020-03-11 RX ADMIN — ASPIRIN 81 MG: 81 TABLET ORAL at 08:36

## 2020-03-11 RX ADMIN — HYDROCODONE BITARTRATE AND ACETAMINOPHEN 1 TABLET: 5; 325 TABLET ORAL at 19:26

## 2020-03-11 RX ADMIN — METHYLPREDNISOLONE SODIUM SUCCINATE 40 MG: 40 INJECTION, POWDER, FOR SOLUTION INTRAMUSCULAR; INTRAVENOUS at 00:27

## 2020-03-11 RX ADMIN — METHYLPHENIDATE HYDROCHLORIDE 5 MG: 5 TABLET ORAL at 17:14

## 2020-03-11 RX ADMIN — MIRTAZAPINE 15 MG: 15 TABLET, FILM COATED ORAL at 22:09

## 2020-03-11 RX ADMIN — TRAZODONE HYDROCHLORIDE 100 MG: 50 TABLET ORAL at 22:09

## 2020-03-11 RX ADMIN — BUDESONIDE 500 MCG: 0.5 INHALANT RESPIRATORY (INHALATION) at 20:06

## 2020-03-11 RX ADMIN — SODIUM CHLORIDE 30 MG/ML INHALATION SOLUTION 4 ML: 30 SOLUTION INHALANT at 09:09

## 2020-03-11 RX ADMIN — INSULIN LISPRO 4 UNITS: 100 INJECTION, SOLUTION INTRAVENOUS; SUBCUTANEOUS at 17:14

## 2020-03-11 RX ADMIN — BUDESONIDE 500 MCG: 0.5 INHALANT RESPIRATORY (INHALATION) at 09:08

## 2020-03-11 RX ADMIN — GABAPENTIN 300 MG: 300 CAPSULE ORAL at 08:36

## 2020-03-11 RX ADMIN — Medication 10 ML: at 14:00

## 2020-03-11 RX ADMIN — INSULIN LISPRO 2 UNITS: 100 INJECTION, SOLUTION INTRAVENOUS; SUBCUTANEOUS at 07:30

## 2020-03-11 RX ADMIN — SODIUM CHLORIDE 30 MG/ML INHALATION SOLUTION 4 ML: 30 SOLUTION INHALANT at 20:06

## 2020-03-11 NOTE — PROGRESS NOTES
Problem: Falls - Risk of  Goal: *Absence of Falls  Description  Document Shan Yousifjesse Fall Risk and appropriate interventions in the flowsheet.   Outcome: Progressing Towards Goal  Note: Fall Risk Interventions:            Medication Interventions: Assess postural VS orthostatic hypotension, Patient to call before getting OOB, Teach patient to arise slowly    Elimination Interventions: Call light in reach, Patient to call for help with toileting needs, Toilet paper/wipes in reach              Problem: Patient Education: Go to Patient Education Activity  Goal: Patient/Family Education  Outcome: Progressing Towards Goal     Problem: Breathing Pattern - Ineffective  Goal: *Absence of hypoxia  Outcome: Progressing Towards Goal  Goal: *Use of effective breathing techniques  Outcome: Progressing Towards Goal  Goal: *PALLIATIVE CARE:  Alleviation of Dyspnea  Outcome: Progressing Towards Goal     Problem: Breathing Pattern - Ineffective  Goal: *Absence of hypoxia  Outcome: Progressing Towards Goal  Goal: *Use of effective breathing techniques  Outcome: Progressing Towards Goal  Goal: *PALLIATIVE CARE:  Alleviation of Dyspnea  Outcome: Progressing Towards Goal     Problem: Patient Education: Go to Patient Education Activity  Goal: Patient/Family Education  Outcome: Progressing Towards Goal

## 2020-03-11 NOTE — PROGRESS NOTES
Progress Note      Patient: Meg Peralta               Sex: female          MRN: [id number]           YOB: 1950      Age:  71 y.o. PCP:  Cynthia Milner MD  Treatment Team: Attending Provider: Tor Fox MD; Consulting Provider: Low Snell MD; Consulting Provider: Chevy Herrera MD; Utilization Review: Mitch Harding, SERGO; Care Manager: Maria Elena Mckeon RN; Primary Nurse: Catherine Camacho  Subjective:     Breathing is slowly improving. Complains of moderate shortness of breath with exertion, better with breathing treatments and at rest.  Complains of chronic back pain. Objective:   Physical Exam:   Visit Vitals  /62 (BP 1 Location: Right arm, BP Patient Position: At rest)   Pulse 89   Temp 97 °F (36.1 °C)   Resp 16   Ht 5' 4\" (1.626 m)   Wt 68.9 kg (152 lb)   SpO2 95%   BMI 26.09 kg/m²      Temp (24hrs), Av.8 °F (36.6 °C), Min:97 °F (36.1 °C), Max:98.6 °F (37 °C)    Oxygen Therapy  O2 Sat (%): 95 % (20 1148)  Pulse via Oximetry: 97 beats per minute (20 0911)  O2 Device: Nasal cannula (20 09)  O2 Flow Rate (L/min): 6 l/min (20 0911)  No intake or output data in the 24 hours ending 20 1543     General: Conscious, No acute distress at rest.  Able to speak in full sentences. Eyes:  RUBI, No pallor/icterus    HENT:             Oral Mucosa is Moist, No sinus tenderness  Neck:               Supple, No JVD  Lungs:  Improved air entry with mild expiratory wheezing. Heart:  S1 S2 regular  Abdomen: Soft, normal bowel sounds, NTND, No guarding/rigidity/rebound tend. Extremities: No pedal edema  Neurologic:  AAOX3, No acute FND  Skin:                No acute rashes  Musculoskeletal: No Acute findings  Psych:             Depressed mood.     LAB  Recent Results (from the past 24 hour(s))   GLUCOSE, POC    Collection Time: 03/10/20  4:29 PM   Result Value Ref Range    Glucose (POC) 163 (H) 65 - 100 mg/dL   GLUCOSE, POC Collection Time: 03/10/20  9:20 PM   Result Value Ref Range    Glucose (POC) 283 (H) 65 - 045 mg/dL   METABOLIC PANEL, BASIC    Collection Time: 03/11/20  4:43 AM   Result Value Ref Range    Sodium 143 136 - 145 mmol/L    Potassium 4.2 3.5 - 5.1 mmol/L    Chloride 106 98 - 107 mmol/L    CO2 33 (H) 21 - 32 mmol/L    Anion gap 4 (L) 7 - 16 mmol/L    Glucose 205 (H) 65 - 100 mg/dL    BUN 10 8 - 23 MG/DL    Creatinine 0.54 (L) 0.6 - 1.0 MG/DL    GFR est AA >60 >60 ml/min/1.73m2    GFR est non-AA >60 >60 ml/min/1.73m2    Calcium 8.0 (L) 8.3 - 10.4 MG/DL   CBC WITH AUTOMATED DIFF    Collection Time: 03/11/20  4:43 AM   Result Value Ref Range    WBC 15.2 (H) 4.3 - 11.1 K/uL    RBC 4.99 4.05 - 5.2 M/uL    HGB 14.9 11.7 - 15.4 g/dL    HCT 47.0 (H) 35.8 - 46.3 %    MCV 94.2 79.6 - 97.8 FL    MCH 29.9 26.1 - 32.9 PG    MCHC 31.7 31.4 - 35.0 g/dL    RDW 14.2 11.9 - 14.6 %    PLATELET 884 917 - 958 K/uL    MPV 9.3 (L) 9.4 - 12.3 FL    ABSOLUTE NRBC 0.00 0.0 - 0.2 K/uL    DF AUTOMATED      NEUTROPHILS 93 (H) 43 - 78 %    LYMPHOCYTES 5 (L) 13 - 44 %    MONOCYTES 1 (L) 4.0 - 12.0 %    EOSINOPHILS 0 (L) 0.5 - 7.8 %    BASOPHILS 0 0.0 - 2.0 %    IMMATURE GRANULOCYTES 1 0.0 - 5.0 %    ABS. NEUTROPHILS 14.2 (H) 1.7 - 8.2 K/UL    ABS. LYMPHOCYTES 0.8 0.5 - 4.6 K/UL    ABS. MONOCYTES 0.2 0.1 - 1.3 K/UL    ABS. EOSINOPHILS 0.0 0.0 - 0.8 K/UL    ABS. BASOPHILS 0.0 0.0 - 0.2 K/UL    ABS. IMM.  GRANS. 0.1 0.0 - 0.5 K/UL   HEMOGLOBIN A1C WITH EAG    Collection Time: 03/11/20  4:43 AM   Result Value Ref Range    Hemoglobin A1c 7.3 %    Est. average glucose 163 mg/dL   MAGNESIUM    Collection Time: 03/11/20  4:43 AM   Result Value Ref Range    Magnesium 2.2 1.8 - 2.4 mg/dL   PHOSPHORUS    Collection Time: 03/11/20  4:43 AM   Result Value Ref Range    Phosphorus 4.4 (H) 2.3 - 3.7 MG/DL   GLUCOSE, POC    Collection Time: 03/11/20  7:35 AM   Result Value Ref Range    Glucose (POC) 182 (H) 65 - 100 mg/dL   GLUCOSE, POC    Collection Time: 03/11/20 11:44 AM   Result Value Ref Range    Glucose (POC) 161 (H) 65 - 100 mg/dL       Xr Chest Pa Lat    Result Date: 3/11/2020  PA LATERAL CHEST  3/11/2020 10:32 AM HISTORY:  LLL infiltrate COMPARISON: March 10, 2020 FINDINGS:  The heart size is within normal limits. Aeration of the left lower lobe has improved. There is no lobar consolidation. Pleural spaces are clear. Midthoracic degenerative spondylosis is present. IMPRESSION: No consolidation. Xr Chest Pa Lat    Result Date: 3/11/2020  IMPRESSION: No consolidation. All Micro Results     Procedure Component Value Units Date/Time    CULTURE, RESPIRATORY/SPUTUM/BRONCH Meseret Kitzmiller [[id number]]     Order Status:  Sent Specimen:  Sputum     INFLUENZA A & B AG (RAPID TEST) [[id number]] Collected:  03/09/20 1524    Order Status:  Completed Specimen:  Nasopharyngeal from Nasal washing Updated:  03/09/20 1542     Influenza A Ag NEGATIVE         Comment: NEGATIVE FOR THE PRESENCE OF INFLUENZA A ANTIGEN  INFECTION DUE TO INFLUENZA A CANNOT BE RULED OUT. BECAUSE THE ANTIGEN PRESENT IN THE SAMPLE MAY BE BELOW  THE DETECTION LIMIT OF THE TEST. A NEGATIVE TEST IS PRESUMPTIVE AND IT IS RECOMMENDED THAT THESE RESULTS BE CONFIRMED BY VIRAL CULTURE OR AN FDA-CLEARED INFLUENZA A AND B MOLECULAR ASSAY. Influenza B Ag NEGATIVE         Comment: NEGATIVE FOR THE PRESENCE OF INFLUENZA B ANTIGEN  INFECTION DUE TO INFLUENZA B CANNOT BE RULED OUT. BECAUSE THE ANTIGEN PRESENT IN THE SAMPLE MAY BE BELOW  THE DETECTION LIMIT OF THE TEST. A NEGATIVE TEST IS PRESUMPTIVE AND IT IS RECOMMENDED THAT THESE RESULTS BE CONFIRMED BY VIRAL CULTURE OR AN FDA-CLEARED INFLUENZA A AND B MOLECULAR ASSAY.           Source Nasopharyngeal             Current Medications Reviewed      Assessment/Plan     Principal Problem:    COPD exacerbation ([facility] (9/27/2019)    Active Problems:    JACQUELYN (obstructive sleep apnea) (7/26/2017)      Overview: Uses home CPAP with 6L bleed in O2 Fibromyalgia (4/30/2013)      Depression (4/30/2013)      Chronic respiratory failure ([facility] [address] (6/16/2015)      Overview: Wears 6L continuously       Coronary artery disease (3/3/2015)      Essential hypertension with goal blood pressure less than 130/85 (3/23/2017)      Nocturnal hypoxia (7/25/2017)      Pneumonia due to infectious organism (3/10/2020)        1. Acute COPD exacerbation: Likely secondary to ongoing tobacco use and could be from left lower lobe pneumonia. 2.  Left lower lobe pneumonia, clinically unable to determine whether is a gram-negative or gram-positive pneumonia. 3.  Chronic hypoxemic respiratory failure on home oxygen. 4.  Obstructive sleep apnea on CPAP  5. Secondary polycythemia secondary to obstructive sleep apnea/COPD/hypoxemia  6. Type 2 diabetes with hyperglycemia: Secondary to steroids. A1c is 7.3. Keep her on insulin regimen and monitor 7. Hypertension: Continue home blood pressure medications and monitor. 8.  Chronic pain syndrome   9. Depression: Continue home medications. Patient does not want to see his psychiatrist now and will follow up with her primary psychiatrist as outpatient. Slowly wean IV steroids, continue bronchodilators, guaifenesin, doxycycline. Appreciate pulmonology recommendations. Considering recurrent admissions and chronic pain, severe COPD, palliative care was consulted for goals of care. Patient is going to follow-up with palliative care as outpatient. DVT Prophylaxis: Subcu Lovenox. High risk patient, at risk for acute respiratory failure.     Darin Bruce MD  March 11, 2020

## 2020-03-11 NOTE — CONSULTS
Palliative Care    Patient: Artur Montelongo MRN: [id number]  SSN: [id number]    YOB: 1950  Age: 71 y.o. Sex: female       Date of Request: 3/11/2020  Date of Consult:  3/11/2020  Reason for Consult:  goals of care  Requesting Physician: Dr Yuliet Mullen      Assessment/Plan:     Principal Diagnosis:    Dyspnea  R06.00    Additional Diagnoses:   · Advance Care Planning Counseling Z71.89  · Cough  R05  · Debility, Unspecified  R53.81  · Fatigue, Lethargy  R53.83  · Pain, back  M54.9  · Respiratory Failure, Acute on Chronic  J96.20  · Counseling, Encounter for Medical Advice  Z71.9  · Encounter for Palliative Care  Z51.5    Palliative Performance Scale (PPS):  PPS: 60    Medical Decision Making:   Reviewed and summarized notes from admission to present   Discussed case with appropriate providers-  Dr Mary Larsen; Amelia Belle NP  Reviewed laboratory and x-ray data from admission to present     Pt resting in bed, no distress noted. No visitors at bedside. Introduced role of PC and reviewed events of hospitalization. Pt tearful throughout the visit. She is a retired , and has very good understanding of her condition. Pt endorses significant back/neck pain from an old injury, as well as debilitating shortness of breath. She used to see a pain management doctor for her back pain, but the practice has closed in recent years. Pt is very frustrated with the difficulty she has had finding someone to help her with her pain, and feels as though she has been treated like a drug addict. Pt states her quality of life is so bad, she is ready to die. She denied suicidal ideations, but simply states she is ready for the suffering to end. We talked about the hospice philosophy and levels of care. While she probably could qualify for hospice, she likely would graduate once her symptoms are better managed.   To complicate matters more, she is raising her 15year old granddaughter who is autistic (she has legal custody). Pt's relatives include a daughter and son who live out of town, a sister, and a mother. Pt is working on making arrangements for the 15 yo in the event that pt dies. Pt also has a HCPOA, naming her sister as agent. We discussed code status, and pt is clear that she does not want resuscitative efforts or life support. We also discussed seeing Freeman Day NP, who has a PC clinic at [facility]. I discussed pt's case with Sung Cunningham, and she is agreeable to seeing pt as soon as possible, and assisting with symptom management. Pt agreeable, as well. If pt still desires hospice in the future, Sung Cunningham can send a referral.  Pt very appreciative of visit. Pt has [place] available as needed currently- would recommend discharge home with an rx to bridge until she can be seen in clinic. We will not plan to follow along further. Please re-consult if needs arise. Will discuss findings with members of the interdisciplinary team.      Thank you for this referral.          .    Subjective:     History obtained from:  Patient and Chart    Chief Complaint: COPD  History of Present Illness:  Ms Stefany Vasquez is a 72 yo  female with PMH of COPD, and depression, who presented to the ER from home on 3/9/2020 with c/o increasing dyspnea for several days. She was prescribed prednisone and doxycycline, but failed to improve. Pt denied fevers, n/v/d, chest pain. Pt reports increasing yellow sputum. Pt admitted for further treatment of a COPD exacerbation. Pulmonology has evaluated her. Pt has indicated that she is tired. Advance Directive: Yes       Code Status:  Full [address] of : Yes - Patient states she has a [address]; family member to bring copy.     Past Medical History:   Diagnosis Date    Acute respiratory failure ([address] 1/29/2015    Intubated 1/29/2015    Acute respiratory failure ([address] 1/29/2015    Intubated 1/29/2015     Cancer ([address] basal cell,squamous cell    Chest pain, unspecified 2016    Chiari I malformation (HCC)     history of    COPD exacerbation ([facility] [address] 12-12    COPD exacerbation ([facility] [address] 2013 to 13    hospitalization    Paroxysmal tachycardia/NOS 2016    Pneumonia 2012    Pneumothorax     HX. of  2 on the left- required chest tube      Past Surgical History:   Procedure Laterality Date    HX CATARACT REMOVAL      HX HEART CATHETERIZATION      mild nonobstructive CAD    HX HEENT      Bilateral cataracts and bleth    HX HYSTERECTOMY      HX ORTHOPAEDIC      right shoulder sx; left thumb    HX OTHER SURGICAL      left thumb sx,forehead resection of squamous cell    HX SEPTOPLASTY  2010    HX SHOULDER ARTHROSCOPY       Family History   Problem Relation Age of Onset    Cancer Mother         breast    Other Father         neurological degeneration    Cancer Sister         exophageal      Social History     Tobacco Use    Smoking status: Former Smoker     Packs/day: 1.00     Years: 45.00     Pack years: 45.00     Types: Cigarettes     Last attempt to quit: 2019     Years since quittin.4    Smokeless tobacco: Current User   Substance Use Topics    Alcohol use: Yes     Alcohol/week: 1.0 standard drinks     Types: 1 Glasses of wine per week     Comment: per month     Prior to Admission medications    Medication Sig Start Date End Date Taking? Authorizing Provider   doxycycline (MONODOX) 100 mg capsule Take 1 Cap by mouth two (2) times a day. 20   Issac Dandy, NP   predniSONE (DELTASONE) 20 mg tablet 2 tabs po qd x 3 days, 1 and 1/2 tabs po qd x 3 days, 1 tab po qd x 3 days, 1/2 tab po qd x 3 days, or as directed. 20   Issac Dandy, NP   budesonide-formoterol (SYMBICORT) 160-4.5 mcg/actuation HFAA Take 2 Puffs by inhalation two (2) times a day.  10/14/19   Issac Dandy, NP   guaiFENesin ER (MUCINEX) 1,200 mg Ta12 ER tablet Take 1 Tab by mouth every twelve (12) hours. 10/2/19   Erin Brown MD   albuterol (PROAIR HFA) 90 mcg/actuation inhaler Take 2 Puffs by inhalation every four (4) hours as needed for Wheezing. 10/24/18   Beth Dye NP   tiotropium bromide (SPIRIVA RESPIMAT) 2.5 mcg/actuation inhaler Take 2 Puffs by inhalation daily. 10/24/18   Beth Dye NP   DULoxetine (CYMBALTA) 30 mg capsule Take 30 mg by mouth daily. Provider, Historical   dilTIAZem (TIAZAC) 360 mg ER capsule Take 1 Cap by mouth daily. 1/3/17   Jb Gamez MD   mirtazapine (REMERON) 30 mg tablet Take 15 mg by mouth nightly. Provider, Historical   albuterol (PROVENTIL VENTOLIN) 2.5 mg /3 mL (0.083 %) nebulizer solution 2.5 mg by Nebulization route two (2) times a day. Provider, Historical   prednisoLONE acetate (PRED FORTE) 1 % ophthalmic suspension Administer 1 drop to both eyes four (4) times daily. Provider, Historical   olopatadine (PATADAY) 0.2 % drop ophthalmic solution Administer 1 drop to both eyes daily. Provider, Historical   OXYGEN-AIR DELIVERY SYSTEMS by Nasal route. 6 lpm qhs    Provider, Historical   aspirin delayed-release 81 mg tablet Take  by mouth daily. Provider, Historical   gabapentin (NEURONTIN) 300 mg capsule Take 300 mg by mouth two (2) times a day. 1 tab po qhs     Provider, Historical   methylphenidate (RITALIN) 5 mg tablet Take 5 mg by mouth two (2) times a day. Provider, Historical   cpap machine kit by Does Not Apply route. 15cm with 5L of oxygen bled in    Other, MD [place]   traZODone (DESYREL) 100 mg tablet Take 100 mg by mouth nightly. Provider, Historical   nitroglycerin (NITROSTAT) 0.4 mg SL tablet by SubLINGual route every five (5) minutes as needed.     Provider, Historical       Allergies   Allergen Reactions    Azithromycin Diarrhea    Sulfa (Sulfonamide Antibiotics) Itching     NOT ALLERGIC        Review of Systems:  A comprehensive review of systems was negative except for:   Constitutional: Positive for fatigue. Respiratory: Positive for dyspnea, cough  Musculoskeletal: Positive for back and neck pain      Objective:     Visit Vitals  /62 (BP 1 Location: Right arm, BP Patient Position: At rest)   Pulse 89   Temp 97 °F (36.1 °C)   Resp 16   Ht 5' 4\" (1.626 m)   Wt 152 lb (68.9 kg)   SpO2 95%   BMI 26.09 kg/m²        Physical Exam:    General:  Cooperative. Debilitated. No acute distress. Eyes:  Conjunctivae/corneas clear    Nose: Nares normal. Septum midline. O2 via NC    Neck: Supple, symmetrical, trachea midline   Lungs:   Decreased bilaterally, unlabored   Heart:  Regular rate and rhythm    Abdomen:   Soft, non-tender, non-distended   Extremities: Normal, atraumatic, no cyanosis or edema   Skin: Skin color, texture, turgor normal.   Neurologic: Nonfocal   Psych: Alert and oriented.  Tearful       Assessment:     Hospital Problems  Date Reviewed: 1/14/2020          Codes Class Noted POA    Pneumonia due to infectious organism ICD-10-CM: J18.9  ICD-9-CM: 136.9, 484.8  3/10/2020 Unknown        * (Principal) COPD exacerbation ([facility] ICD-10-CM: J44.1  ICD-9-CM: 491.21  9/27/2019 Yes        JACQUELYN (obstructive sleep apnea) (Chronic) ICD-10-CM: G47.33  ICD-9-CM: 327.23  7/26/2017 Yes    Overview Addendum 2/2/2015  9:38 AM by Saint Rivers, NP     Uses home CPAP with 6L bleed in O2             Nocturnal hypoxia (Chronic) ICD-10-CM: G47.34  ICD-9-CM: 327.24  7/25/2017 Yes        Essential hypertension with goal blood pressure less than 130/85 (Chronic) ICD-10-CM: I10  ICD-9-CM: 401.9  3/23/2017 Yes        Chronic respiratory failure (HCC) (Chronic) ICD-10-CM: J96.10  ICD-9-CM: 518.83  6/16/2015 Yes    Overview Signed 2/2/2015  9:37 AM by Saint Rivers, NP     Wears 6L continuously              Coronary artery disease (Chronic) ICD-10-CM: I25.10  ICD-9-CM: 414.00  3/3/2015 Yes        Fibromyalgia (Chronic) ICD-10-CM: M79.7  ICD-9-CM: 729.1  4/30/2013 Yes        Depression (Chronic) ICD-10-CM: F32.9  ICD-9-CM: 010  4/30/2013 Yes              Signed By: Delmer Martinez NP     March 11, 2020

## 2020-03-11 NOTE — PROGRESS NOTES
Shruti Casey [place]  Admission Date: 3/9/2020             Daily Progress Note: 3/11/2020    The patient's chart is reviewed and the patient is discussed with the staff. Patient is a 71 y.o.  female seen and evaluated at the request of Dr. Nate Yates. She was in her usual state of health until last week when she got sick. Hayden Diaz was unable to get her appointment with pulmonology so she went to her local urgent care she has seen multiple times. Hayden Diaz was prescribed prednisone and doxycycline without much improvement.  She did have one sick contact who is her grandchild, who lives with her, who had a cold/flu.  She denies any fevers but states she has had chills. She states she is having increasing yellow sputum production. She has been wheezing with yellow/green sputum. She feels better since admission. She normally uses CPAP with 6L NC at night and O2 at 6L only PRN during the day. Subjective:     Still with chest tightness, no focal chest pain or pleurisy. No fevers overnight. Still coughing yellow sputum, but respiratory culture not collected. States she \"is tired, and doesn't want to do this anymore. \" States she has spoken with palliative care in past and is \"just tired. \" States she is interested in hospice. States she doesn't care about quitting smoking.     Current Facility-Administered Medications   Medication Dose Route Frequency    insulin lispro (HUMALOG) injection 4 Units  4 Units SubCUTAneous TID WITH MEALS    insulin glargine (LANTUS) injection 12 Units  12 Units SubCUTAneous QHS    insulin lispro (HUMALOG) injection   SubCUTAneous AC&HS    aspirin delayed-release tablet 81 mg  81 mg Oral DAILY    dilTIAZem CD (CARDIZEM CD) capsule 360 mg  360 mg Oral DAILY    DULoxetine (CYMBALTA) capsule 30 mg  30 mg Oral DAILY    gabapentin (NEURONTIN) capsule 300 mg  300 mg Oral BID    guaiFENesin ER (MUCINEX) tablet 1,200 mg  1,200 mg Oral Q12H    methylphenidate HCl (RITALIN) tablet 5 mg  5 mg Oral BID    mirtazapine (REMERON) tablet 15 mg  15 mg Oral QHS    nitroglycerin (NITROSTAT) tablet 0.4 mg  0.4 mg SubLINGual PRN    carboxymethylcellulose sodium (REFRESH LIQUIGEL) 1 % ophthalmic solution 1 Drop  1 Drop Both Eyes PRN    traZODone (DESYREL) tablet 100 mg  100 mg Oral QHS    levalbuterol (XOPENEX) nebulizer soln 0.63 mg/3 mL  0.63 mg Nebulization Q4H RT    budesonide (PULMICORT) 500 mcg/2 ml nebulizer suspension  500 mcg Nebulization BID RT    sodium chloride 3% hypertonic nebulizer soln  4 mL Nebulization BID RT    methylPREDNISolone (PF) (SOLU-MEDROL) injection 40 mg  40 mg IntraVENous Q8H    doxycycline (VIBRAMYCIN) 100 mg in 0.9% sodium chloride (MBP/ADV) 100 mL  100 mg IntraVENous Q12H    sodium chloride (NS) flush 5-40 mL  5-40 mL IntraVENous Q8H    sodium chloride (NS) flush 5-40 mL  5-40 mL IntraVENous PRN    acetaminophen (TYLENOL) tablet 650 mg  650 mg Oral Q4H PRN    HYDROcodone-acetaminophen (NORCO) 5-325 mg per tablet 1 Tab  1 Tab Oral Q4H PRN    naloxone (NARCAN) injection 0.4 mg  0.4 mg IntraVENous PRN    diphenhydrAMINE (BENADRYL) capsule 25 mg  25 mg Oral Q4H PRN    ondansetron (ZOFRAN) injection 4 mg  4 mg IntraVENous Q4H PRN    senna-docusate (PERICOLACE) 8.6-50 mg per tablet 2 Tab  2 Tab Oral DAILY PRN    LORazepam (ATIVAN) tablet 0.5 mg  0.5 mg Oral Q4H PRN    zolpidem (AMBIEN) tablet 5 mg  5 mg Oral QHS PRN    enoxaparin (LOVENOX) injection 40 mg  40 mg SubCUTAneous Q24H     Review of Systems  Constitutional: negative for fever, chills, sweats  Cardiovascular: negative for chest pain, palpitations, syncope, edema  Gastrointestinal:  negative for dysphagia, reflux, vomiting, diarrhea, abdominal pain, or melena  Neurologic:  negative for focal weakness, numbness, headache    Objective:     Vitals:    03/11/20 0031 03/11/20 0309 03/11/20 0736 03/11/20 0911   BP:  106/52 151/76    Pulse:  87 94    Resp:  18 18    Temp:  97.9 °F (36.6 °C) 97.1 °F (36.2 °C)    SpO2: 96% 97% 96% 93%   Weight:       Height:         No intake or output data in the 24 hours ending 03/11/20 0933    Physical Exam:   Constitution:  the patient is well developed and in no acute distress  EENMT:  Sclera clear, pupils equal, oral mucosa moist  Respiratory: minimal wheezing, much better  Cardiovascular:  RRR without M,G,R  Gastrointestinal: soft and non-tender; with positive bowel sounds. Musculoskeletal: warm without cyanosis. There is no lower extremity edema. Skin:  no jaundice or rashes, no wounds   Neurologic: no gross neuro deficits     Psychiatric:  alert and oriented x 4, tearful    CXR: today's CXR is ordered, but not yet complete  3/10: LLL infiltrate      LAB  Recent Labs     03/11/20  0735 03/10/20  2120 03/10/20  1629   GLUCPOC 182* 283* 163*      Recent Labs     03/11/20  0443 03/10/20  0537 03/09/20  1325   WBC 15.2* 11.2* 11.1   HGB 14.9 16.5* 15.3   HCT 47.0* 52.3* 46.8*    276 264     Recent Labs     03/11/20  0443 03/10/20  0537 03/09/20  1325    142 138   K 4.2 4.7 4.3    105 102   CO2 33* 33* 30   * 196* 246*   BUN 10 7* 8   CREA 0.54* 0.68 0.70   MG 2.2  --   --    CA 8.0* 8.6 8.2*   PHOS 4.4*  --   --    ALB  --   --  3.1*   TBILI  --   --  0.2   ALT  --   --  25   SGOT  --   --  22     No results for input(s): PH, PCO2, PO2, HCO3, PHI, PCO2I, PO2I, HCO3I in the last 72 hours.   Recent Labs     03/09/20  1325   LAC 1.5     Assessment:  (Medical Decision Making)     Hospital Problems  Date Reviewed: 1/14/2020          Codes Class Noted POA    Pneumonia due to infectious organism ICD-10-CM: J18.9  ICD-9-CM: 136.9, 484.8  3/10/2020 Unknown        * (Principal) [facility] ICD-10-CM: J44.1  ICD-9-CM: 491.21  9/27/2019 Yes        JACQUELYN (obstructive sleep apnea) (Chronic) ICD-10-CM: G47.33  ICD-9-CM: 327.23  7/26/2017 Yes    Overview Addendum 2/2/2015  9:38 AM by Marco A Calix NP     Uses home CPAP with 6L bleed in O2 Nocturnal hypoxia (Chronic) ICD-10-CM: G47.34  ICD-9-CM: 327.24  7/25/2017 Yes        Essential hypertension with goal blood pressure less than 130/85 (Chronic) ICD-10-CM: I10  ICD-9-CM: 401.9  3/23/2017 Yes        Chronic respiratory failure (HCC) (Chronic) ICD-10-CM: J96.10  ICD-9-CM: 518.83  6/16/2015 Yes    Overview Signed 2/2/2015  9:37 AM by Dimple Keys NP     Wears 6L continuously              Coronary artery disease (Chronic) ICD-10-CM: I25.10  ICD-9-CM: 414.00  3/3/2015 Yes        Fibromyalgia (Chronic) ICD-10-CM: M79.7  ICD-9-CM: 729.1  4/30/2013 Yes        Depression (Chronic) ICD-10-CM: F32.9  ICD-9-CM: 852  4/30/2013 Yes            70 yo female with severe COPD, JACQUELYN on CPAP, 6L O2 at night admitted with recurrent COPD exacerbation failed outpatient therapy with prednisone/doxy. Complicated by possible CAP per CXR. Plan:  (Medical Decision Making)     --discussed with RT getting sputum culture submitted today  --wean IV steroids to Q12 today  --continue nebs  --continue doxy  --CXR today to assure stability of LLL pneumonia on doxy alone  --broaden abx if fevers or decompensates  --last culture in 9/2019 was MSSA  --would consider adding daliresp and or TIW Azithro at discharge for recurrent exacerbations  --consulted palliative care to discuss wishes, she may just be depressed and asked her to give it a few days before making any major decisions    More than 50% of the time documented was spent in face-to-face contact with the patient and in the care of the patient on the floor/unit where the patient is located.     Shakira Naranjo MD

## 2020-03-11 NOTE — PROGRESS NOTES
Interdisciplinary team rounds were held 3/11/2020 with the following team members:Care Management, Nursing, Nutrition, Pharmacy, Physical Therapy and Physician. Plan of care discussed. See clinical pathway and/or care plan for interventions and desired outcomes.

## 2020-03-11 NOTE — PROGRESS NOTES
Care Management Interventions  PCP Verified by CM: Yes  Palliative Care Criteria Met (RRAT>21 & CHF Dx)?: No(Risk 21% Dx COPD)  Transition of Care Consult (CM Consult): Discharge Planning  Discharge Durable Medical Equipment: No(has O2 with [employer] [facility] and CPAP and Nebulizer)  Physical Therapy Consult: No  Occupational Therapy Consult: No  Speech Therapy Consult: No  Current Support Network: Lives Alone  Confirm Follow Up Transport: Self  Discharge Location  Discharge Placement: Home    Saw pt in interdisciplinarily rounds with the following disciplines: Physician, Case Management, Nursing, Dietary,Therapy and Pharmacy. The plan of care was discussed along with discharge date/ location, pt may home in 2-3 more days. Palliative Care saw pt, pt is going to f/u with [facility]iavte pt's. . She is going to manage the pt's back pain and dyspnea.  PT

## 2020-03-12 VITALS
TEMPERATURE: 97.9 F | OXYGEN SATURATION: 96 % | BODY MASS INDEX: 25.95 KG/M2 | WEIGHT: 152 LBS | DIASTOLIC BLOOD PRESSURE: 70 MMHG | HEIGHT: 64 IN | SYSTOLIC BLOOD PRESSURE: 128 MMHG | HEART RATE: 104 BPM | RESPIRATION RATE: 20 BRPM

## 2020-03-12 LAB
GLUCOSE BLD STRIP.AUTO-MCNC: 183 MG/DL (ref 65–100)
GLUCOSE BLD STRIP.AUTO-MCNC: 219 MG/DL (ref 65–100)

## 2020-03-12 PROCEDURE — 94669 MECHANICAL CHEST WALL OSCILL: CPT

## 2020-03-12 PROCEDURE — 94760 N-INVAS EAR/PLS OXIMETRY 1: CPT

## 2020-03-12 PROCEDURE — 74011250637 HC RX REV CODE- 250/637: Performed by: INTERNAL MEDICINE

## 2020-03-12 PROCEDURE — 74011000250 HC RX REV CODE- 250: Performed by: INTERNAL MEDICINE

## 2020-03-12 PROCEDURE — 94640 AIRWAY INHALATION TREATMENT: CPT

## 2020-03-12 PROCEDURE — 74011250637 HC RX REV CODE- 250/637: Performed by: HOSPITALIST

## 2020-03-12 PROCEDURE — 74011636637 HC RX REV CODE- 636/637: Performed by: HOSPITALIST

## 2020-03-12 PROCEDURE — 82962 GLUCOSE BLOOD TEST: CPT

## 2020-03-12 PROCEDURE — 77010033678 HC OXYGEN DAILY

## 2020-03-12 RX ORDER — PREDNISONE 10 MG/1
TABLET ORAL
Qty: 32 TAB | Refills: 0 | Status: SHIPPED | OUTPATIENT
Start: 2020-03-12 | End: 2020-08-11 | Stop reason: ALTCHOICE

## 2020-03-12 RX ORDER — ALBUTEROL SULFATE 90 UG/1
2 AEROSOL, METERED RESPIRATORY (INHALATION)
Qty: 3 INHALER | Refills: 3 | Status: SHIPPED | OUTPATIENT
Start: 2020-03-12 | End: 2020-03-31 | Stop reason: SDUPTHER

## 2020-03-12 RX ORDER — AMOXICILLIN 250 MG
2 CAPSULE ORAL
Qty: 30 TAB | Refills: 0 | Status: SHIPPED | OUTPATIENT
Start: 2020-03-12 | End: 2021-10-29

## 2020-03-12 RX ORDER — HYDROCODONE BITARTRATE AND ACETAMINOPHEN 5; 325 MG/1; MG/1
1 TABLET ORAL
Qty: 15 TAB | Refills: 0 | Status: SHIPPED | OUTPATIENT
Start: 2020-03-12 | End: 2020-03-17

## 2020-03-12 RX ORDER — BUDESONIDE AND FORMOTEROL FUMARATE DIHYDRATE 160; 4.5 UG/1; UG/1
2 AEROSOL RESPIRATORY (INHALATION) 2 TIMES DAILY
Qty: 1 INHALER | Refills: 11 | Status: SHIPPED | OUTPATIENT
Start: 2020-03-12

## 2020-03-12 RX ORDER — DOXYCYCLINE 100 MG/1
100 CAPSULE ORAL 2 TIMES DAILY
Qty: 14 CAP | Refills: 0 | Status: SHIPPED | OUTPATIENT
Start: 2020-03-12 | End: 2020-03-16

## 2020-03-12 RX ORDER — IPRATROPIUM BROMIDE AND ALBUTEROL SULFATE 2.5; .5 MG/3ML; MG/3ML
3 SOLUTION RESPIRATORY (INHALATION)
Qty: 120 NEBULE | Refills: 0 | Status: SHIPPED | OUTPATIENT
Start: 2020-03-12 | End: 2020-03-31 | Stop reason: SDUPTHER

## 2020-03-12 RX ORDER — METFORMIN HYDROCHLORIDE 500 MG/1
500 TABLET ORAL 2 TIMES DAILY WITH MEALS
Qty: 60 TAB | Refills: 0 | Status: SHIPPED | OUTPATIENT
Start: 2020-03-12 | End: 2020-04-11

## 2020-03-12 RX ADMIN — INSULIN LISPRO 2 UNITS: 100 INJECTION, SOLUTION INTRAVENOUS; SUBCUTANEOUS at 07:48

## 2020-03-12 RX ADMIN — LEVALBUTEROL HYDROCHLORIDE 0.63 MG: 0.63 SOLUTION RESPIRATORY (INHALATION) at 01:14

## 2020-03-12 RX ADMIN — Medication 10 ML: at 05:44

## 2020-03-12 RX ADMIN — METHYLPHENIDATE HYDROCHLORIDE 5 MG: 5 TABLET ORAL at 12:53

## 2020-03-12 RX ADMIN — DULOXETINE HYDROCHLORIDE 30 MG: 30 CAPSULE, DELAYED RELEASE ORAL at 12:54

## 2020-03-12 RX ADMIN — BUDESONIDE 500 MCG: 0.5 INHALANT RESPIRATORY (INHALATION) at 08:17

## 2020-03-12 RX ADMIN — SODIUM CHLORIDE 30 MG/ML INHALATION SOLUTION 4 ML: 30 SOLUTION INHALANT at 08:17

## 2020-03-12 RX ADMIN — GUAIFENESIN 1200 MG: 600 TABLET, EXTENDED RELEASE ORAL at 12:53

## 2020-03-12 RX ADMIN — DOXYCYCLINE HYCLATE 100 MG: 100 CAPSULE ORAL at 12:53

## 2020-03-12 RX ADMIN — DILTIAZEM HYDROCHLORIDE 360 MG: 180 CAPSULE, COATED, EXTENDED RELEASE ORAL at 12:53

## 2020-03-12 RX ADMIN — LEVALBUTEROL HYDROCHLORIDE 0.63 MG: 0.63 SOLUTION RESPIRATORY (INHALATION) at 14:06

## 2020-03-12 RX ADMIN — LEVALBUTEROL HYDROCHLORIDE 0.63 MG: 0.63 SOLUTION RESPIRATORY (INHALATION) at 08:17

## 2020-03-12 RX ADMIN — ASPIRIN 81 MG: 81 TABLET ORAL at 12:53

## 2020-03-12 RX ADMIN — GABAPENTIN 300 MG: 300 CAPSULE ORAL at 12:54

## 2020-03-12 NOTE — PROGRESS NOTES
Shift assessment done. Pt in bed, alert and oriented. Respirations even and unlabored. No acute signs of distress noted. Call light in reach. Safety measures provided. Will continue to monitor.

## 2020-03-12 NOTE — PROGRESS NOTES
Problem: Falls - Risk of  Goal: *Absence of Falls  Description: Document [facility] Fall Risk and appropriate interventions in the flowsheet. Outcome: Progressing Towards Goal  Note: Fall Risk Interventions:            Medication Interventions: Assess postural VS orthostatic hypotension    Elimination Interventions: Call light in reach              Problem: Patient Education: Go to Patient Education Activity  Goal: Patient/Family Education  Outcome: Progressing Towards Goal     Problem: Breathing Pattern - Ineffective  Goal: *Absence of hypoxia  Outcome: Progressing Towards Goal  Goal: *Use of effective breathing techniques  Outcome: Progressing Towards Goal  Goal: *PALLIATIVE CARE:  Alleviation of Dyspnea  Outcome: Progressing Towards Goal     Problem: Patient Education: Go to Patient Education Activity  Goal: Patient/Family Education  Outcome: Progressing Towards Goal     Problem: Diabetes Self-Management  Goal: *Disease process and treatment process  Description: Define diabetes and identify own type of diabetes; list 3 options for treating diabetes. Outcome: Progressing Towards Goal  Goal: *Incorporating nutritional management into lifestyle  Description: Describe effect of type, amount and timing of food on blood glucose; list 3 methods for planning meals. Outcome: Progressing Towards Goal  Goal: *Incorporating physical activity into lifestyle  Description: State effect of exercise on blood glucose levels. Outcome: Progressing Towards Goal  Goal: *Developing strategies to promote health/change behavior  Description: Define the ABC's of diabetes; identify appropriate screenings, schedule and personal plan for screenings. Outcome: Progressing Towards Goal  Goal: *Using medications safely  Description: State effect of diabetes medications on diabetes; name diabetes medication taking, action and side effects.   Outcome: Progressing Towards Goal  Goal: *Monitoring blood glucose, interpreting and using results  Description: Identify recommended blood glucose targets  and personal targets. Outcome: Progressing Towards Goal  Goal: *Prevention, detection, treatment of acute complications  Description: List symptoms of hyper- and hypoglycemia; describe how to treat low blood sugar and actions for lowering  high blood glucose level.   Outcome: Progressing Towards Goal

## 2020-03-12 NOTE — DISCHARGE INSTRUCTIONS
Patient Education        COPD Exacerbation Plan: Care Instructions  Your Care Instructions    If you have chronic obstructive pulmonary disease (COPD), your usual shortness of breath could suddenly get worse. You may start coughing more and have more mucus. This flare-up is called a COPD exacerbation (say \"ao-[id number]-kelli\"). A lung infection or air pollution could set off an exacerbation. Sometimes it can happen after a quick change in temperature or being around chemicals. Work with your doctor to make a plan for dealing with an exacerbation. You can better manage it if you plan ahead. Follow-up care is a key part of your treatment and safety. Be sure to make and go to all appointments, and call your doctor if you are having problems. It's also a good idea to know your test results and keep a list of the medicines you take. How can you care for yourself at home? During an exacerbation  · Do not panic if you start to have one. Quick treatment at home may help you prevent serious breathing problems. If you have a COPD exacerbation plan that you developed with your doctor, follow it. · Take your medicines exactly as your doctor tells you.  ? Use your inhaler as directed by your doctor. If your symptoms do not get better after you use your medicine, have someone take you to the emergency room. Call an ambulance if necessary. ? With inhaled medicines, a spacer or a nebulizer may help you get more medicine to your lungs. Ask your doctor or pharmacist how to use them properly. Practice using the spacer in front of a mirror before you have an exacerbation. This may help you get the medicine into your lungs quickly. ? If your doctor has given you steroid pills, take them as directed. ? Your doctor may have given you a prescription for antibiotics, which you can fill if you need it. ? Talk to your doctor if you have any problems with your medicine.  And call your doctor if you have to use your antibiotic or steroid pills. Preventing an exacerbation  · Do not smoke. This is the most important step you can take to prevent more damage to your lungs and prevent problems. If you already smoke, it is never too late to stop. If you need help quitting, talk to your doctor about stop-smoking programs and medicines. These can increase your chances of quitting for good. · Take your daily medicines as prescribed. · Avoid colds and flu. ? Get a pneumococcal vaccine. ? Get a flu vaccine each year, as soon as it is available. Ask those you live or work with to do the same, so they will not get the flu and infect you. ? Try to stay away from people with colds or the flu. ? Wash your hands often. · Avoid secondhand smoke; air pollution; cold, dry air; hot, humid air; and high altitudes. Stay at home with your windows closed when air pollution is bad. · Learn breathing techniques for COPD, such as breathing through pursed lips. These techniques can help you breathe easier during an exacerbation. When should you call for help? Call 911 anytime you think you may need emergency care. For example, call if:    · You have severe trouble breathing.     · You have severe chest pain.    Call your doctor now or seek immediate medical care if:    · You have new or worse shortness of breath.     · You develop new chest pain.     · You are coughing more deeply or more often, especially if you notice more mucus or a change in the color of your mucus.     · You cough up blood.     · You have new or increased swelling in your legs or belly.     · You have a fever.    Watch closely for changes in your health, and be sure to contact your doctor if:    · You need to use your antibiotic or steroid pills.     · Your symptoms are getting worse. Where can you learn more? Go to http://kathy-karne.info/  Enter U536 in the search box to learn more about \"COPD Exacerbation Plan: Care Instructions. \"  Current as of: June 9, 2019Content Version: 12.4  © 5747-0747 Healthwise, Incorporated. Care instructions adapted under license by [employer] (which disclaims liability or warranty for this information). If you have questions about a medical condition or this instruction, always ask your healthcare professional. [address] any warranty or liability for your use of this information. Patient Education        Shortness of Breath: Care Instructions  Your Care Instructions  Shortness of breath has many causes. Sometimes conditions such as anxiety can lead to shortness of breath. Some people get mild shortness of breath when they exercise. Trouble breathing also can be a symptom of a serious problem, such as asthma, lung disease, emphysema, heart problems, and pneumonia. If your shortness of breath continues, you may need tests and treatment. Watch for any changes in your breathing and other symptoms. Follow-up care is a key part of your treatment and safety. Be sure to make and go to all appointments, and call your doctor if you are having problems. It's also a good idea to know your test results and keep a list of the medicines you take. How can you care for yourself at home? · Do not smoke or allow others to smoke around you. If you need help quitting, talk to your doctor about stop-smoking programs and medicines. These can increase your chances of quitting for good. · Get plenty of rest and sleep. · Take your medicines exactly as prescribed. Call your doctor if you think you are having a problem with your medicine. · Find healthy ways to deal with stress. ? Exercise daily. ? Get plenty of sleep. ? Eat regularly and well. When should you call for help? Call 911 anytime you think you may need emergency care. For example, call if:    · You have severe shortness of breath.     · You have symptoms of a heart attack. These may include:  ?  Chest pain or pressure, or a strange feeling in the chest.  ? Sweating. ? Shortness of breath. ? Nausea or vomiting. ? Pain, pressure, or a strange feeling in the back, neck, jaw, or upper belly or in one or both shoulders or arms. ? Lightheadedness or sudden weakness. ? A fast or irregular heartbeat. After you call  911, the  may tell you to chew 1 adult-strength or 2 to 4 low-dose aspirin. Wait for an ambulance. Do not try to drive yourself.    Call your doctor now or seek immediate medical care if:    · Your shortness of breath gets worse or you start to wheeze. Wheezing is a high-pitched sound when you breathe.     · You wake up at night out of breath or have to prop your head up on several pillows to breathe.     · You are short of breath after only light activity or while at rest.    Watch closely for changes in your health, and be sure to contact your doctor if:    · You do not get better over the next 1 to 2 days. Where can you learn more? Go to http://kathy-karen.info/  Enter S780 in the search box to learn more about \"Shortness of Breath: Care Instructions. \"  Current as of: June 9, 2019Content Version: 12.4  © 2062-4744 [employer]. Care instructions adapted under license by [employer] (which disclaims liability or warranty for this information). If you have questions about a medical condition or this instruction, always ask your healthcare professional. [place] [address] any warranty or liability for your use of this information. Patient Education        Pneumonia: Care Instructions  Your Care Instructions    Pneumonia is an infection of the lungs. Most cases are caused by infections from bacteria or viruses. Pneumonia may be mild or very severe. If it is caused by bacteria, you will be treated with antibiotics. It may take a few weeks to a few months to recover fully from pneumonia, depending on how sick you were and whether your overall health is good.   Follow-up care is a key part of your treatment and safety. Be sure to make and go to all appointments, and call your doctor if you are having problems. It's also a good idea to know your test results and keep a list of the medicines you take. How can you care for yourself at home? · Take your antibiotics exactly as directed. Do not stop taking the medicine just because you are feeling better. You need to take the full course of antibiotics. · Take your medicines exactly as prescribed. Call your doctor if you think you are having a problem with your medicine. · Get plenty of rest and sleep. You may feel weak and tired for a while, but your energy level will improve with time. · To prevent dehydration, drink plenty of fluids, enough so that your urine is light yellow or clear like water. Choose water and other caffeine-free clear liquids until you feel better. If you have kidney, heart, or liver disease and have to limit fluids, talk with your doctor before you increase the amount of fluids you drink. · Take care of your cough so you can rest. A cough that brings up mucus from your lungs is common with pneumonia. It is one way your body gets rid of the infection. But if coughing keeps you from resting or causes severe fatigue and chest-wall pain, talk to your doctor. He or she may suggest that you take a medicine to reduce the cough. · Use a vaporizer or humidifier to add moisture to your bedroom. Follow the directions for cleaning the machine. · Do not smoke or allow others to smoke around you. Smoke will make your cough last longer. If you need help quitting, talk to your doctor about stop-smoking programs and medicines. These can increase your chances of quitting for good. · Take an over-the-counter pain medicine, such as acetaminophen (Tylenol), ibuprofen (Advil, Motrin), or naproxen (Aleve). Read and follow all instructions on the label.   · Do not take two or more pain medicines at the same time unless the doctor told you to. Many pain medicines have acetaminophen, which is Tylenol. Too much acetaminophen (Tylenol) can be harmful. · If you were given a spirometer to measure how well your lungs are working, use it as instructed. This can help your doctor tell how your recovery is going. · To prevent pneumonia in the future, talk to your doctor about getting a flu vaccine (once a year) and a pneumococcal vaccine (one time only for most people). When should you call for help? Call 911 anytime you think you may need emergency care. For example, call if:    · You have severe trouble breathing.    Call your doctor now or seek immediate medical care if:    · You cough up dark brown or bloody mucus (sputum).     · You have new or worse trouble breathing.     · You are dizzy or lightheaded, or you feel like you may faint.    Watch closely for changes in your health, and be sure to contact your doctor if:    · You have a new or higher fever.     · You are coughing more deeply or more often.     · You are not getting better after 2 days (48 hours).     · You do not get better as expected. Where can you learn more? Go to http://kathy-karen.info/  Enter D336 in the search box to learn more about \"Pneumonia: Care Instructions. \"  Current as of: June 9, 2019Content Version: 12.4  © 0645-5473 Healthwise, Incorporated. Care instructions adapted under license by [employer] (which disclaims liability or warranty for this information). If you have questions about a medical condition or this instruction, always ask your healthcare professional. [place] [address] any warranty or liability for your use of this information.

## 2020-03-12 NOTE — PROGRESS NOTES
Care Management Interventions  PCP Verified by CM:  Yes  Palliative Care Criteria Met (RRAT>21 & CHF Dx)?: No(Risk 21% Dx COPD)  Transition of Care Consult (CM Consult): Discharge Planning  Discharge Durable Medical Equipment: No(has O2 with [employer] medical and CPAP and Nebulizer)  Physical Therapy Consult: No  Occupational Therapy Consult: No  Speech Therapy Consult: No  Current Support Network: Lives Alone  Confirm Follow Up Transport: Self  Discharge Location  Discharge Placement: Home    Pt ready to d/c home with family, no further needs at this time, CM signing off

## 2020-03-12 NOTE — DISCHARGE SUMMARY
Hospitalist Discharge Summary     Patient ID:  Jeff Bunch  [id number]  44 y.o.  1950  Admit date: 3/9/2020  Discharge date: 3/12/2020  Attending: No att. providers found  PCP:  Chalino Pineda MD    Admission Diagnosis: COPD exacerbation [facility])    Discharge Diagnosis: COPD exacerbation ([facility] [address]   Principal Problem:    COPD exacerbation ([facility] [address] (9/27/2019)    Active Problems:    JACQUELYN (obstructive sleep apnea) (7/26/2017)      Overview: Uses home CPAP with 6L bleed in O2      Fibromyalgia (4/30/2013)      Depression (4/30/2013)      Chronic respiratory failure ([facility] [address] (6/16/2015)      Overview: Wears 6L continuously       Coronary artery disease (3/3/2015)      Essential hypertension with goal blood pressure less than 130/85 (3/23/2017)      Nocturnal hypoxia (7/25/2017)      Pneumonia due to infectious organism (3/10/2020)         Hospital Course:  Please refer to the admission H&P for details of presentation. In summary, This is a 61-year-old lady with history of severe COPD, chronic hypoxemic respiratory failure on home oxygen, ongoing tobacco abuse, hypertension, type 2 diabetes which is a new diagnosis with A1c of 7.3, chronic pain syndrome, depression, admitted to the hospital for acute COPD exacerbation likely secondary to ongoing tobacco use and possible left lower lobe pneumonia. She was started on doxycycline for pneumonia and placed on steroids and bronchodilators. Seen by pulmonology in consultation during the hospital stay. She was also seen by palliative care in consultation considering her severe COPD, chronic pain. Palliative care recommended short supply of pain medications until she is seen by palliative care as outpatient. Patient respiratory status slowly improved and patient was feeling better this morning and wanted to go home. She was cleared by pulmonology. As patient is clinically stable, symptomatically improving, she is being discharged home today.   Patient was advised to follow-up with primary care physician in 1 week, pulmonologist and palliative care clinic in 2 weeks. Patient was again counseled regarding tobacco cessation at the time of discharge. Lab/Data Reviewed    Discharge Exam:  Visit Vitals  /70 (BP 1 Location: Right arm, BP Patient Position: At rest)   Pulse (!) 104   Temp 97.9 °F (36.6 °C)   Resp 20   Ht 5' 4\" (1.626 m)   Wt 68.9 kg (152 lb)   SpO2 96%   BMI 26.09 kg/m²     General: Conscious, comfortable at rest on nasal cannula. Able to speak in full sentences. Lungs:  Improving bilateral air entry with mild expiratory wheezing. Heart:  S1 S2 regular  Abdomen: Soft, Positive bowel sounds, NTND, No guarding/rigidity/rebound tend. Neurologic:  AAOX3, No gross FND  Psych:             Appropriate mood    Disposition: home  Discharge Condition: Stable  Patient Instructions:   Discharge Medication List as of 3/12/2020  2:12 PM      START taking these medications    Details   HYDROcodone-acetaminophen (NORCO) 5-325 mg per tablet Take 1 Tab by mouth every six (6) hours as needed for Pain for up to 5 days. Max Daily Amount: 4 Tabs., Normal, Disp-15 Tab, R-0      senna-docusate (PERICOLACE) 8.6-50 mg per tablet Take 2 Tabs by mouth daily as needed for Constipation. Hold for diarrhea, Normal, Disp-30 Tab, R-0      albuterol-ipratropium (DUO-NEB) 2.5 mg-0.5 mg/3 ml nebu 3 mL by Nebulization route every four (4) hours as needed for Wheezing for up to 30 days. , Normal, Disp-120 Nebule, R-0      metFORMIN (GLUCOPHAGE) 500 mg tablet Take 1 Tab by mouth two (2) times daily (with meals) for 30 days. , Normal, Disp-60 Tab, R-0         CONTINUE these medications which have CHANGED    Details   albuterol (ProAir HFA) 90 mcg/actuation inhaler Take 2 Puffs by inhalation every four (4) hours as needed for Wheezing or Shortness of Breath., Normal, Disp-3 Inhaler, R-3      budesonide-formoteroL (Symbicort) 160-4.5 mcg/actuation HFAA Take 2 Puffs by inhalation two (2) times a day., Normal, Disp-1 Inhaler, R-11      doxycycline (MONODOX) 100 mg capsule Take 1 Cap by mouth two (2) times a day for 4 days. , Normal, Disp-14 Cap, R-0      tiotropium bromide (Spiriva Respimat) 2.5 mcg/actuation inhaler Take 2 Puffs by inhalation daily. , Normal, Disp-1 Inhaler, R-1      guaiFENesin (MUCINEX) 1,200 mg Ta12 ER tablet Take 1 Tab by mouth every twelve (12) hours for 5 days. , Normal, Disp-10 Tab, R-0      predniSONE (DELTASONE) 10 mg tablet 60 mg daily for 2days, then 40mg daily for 2days, then 30mg daily for 2days, 20mg daily for 2 days, 10mg daily for 2days. , Normal, Disp-32 Tab, R-0         CONTINUE these medications which have NOT CHANGED    Details   DULoxetine (CYMBALTA) 30 mg capsule Take 30 mg by mouth daily. , Historical Med      dilTIAZem (TIAZAC) 360 mg ER capsule Take 1 Cap by mouth daily. , Normal, Disp-30 Cap, R-11      mirtazapine (REMERON) 30 mg tablet Take 15 mg by mouth nightly., Historical Med      prednisoLONE acetate (PRED FORTE) 1 % ophthalmic suspension Administer 1 drop to both eyes four (4) times daily. , Historical Med      olopatadine (PATADAY) 0.2 % drop ophthalmic solution Administer 1 drop to both eyes daily. , Historical Med      OXYGEN-AIR DELIVERY SYSTEMS by Nasal route. 6 lpm qhs, Historical Med      aspirin delayed-release 81 mg tablet Take  by mouth daily. , Historical Med      gabapentin (NEURONTIN) 300 mg capsule Take 300 mg by mouth two (2) times a day. 1 tab po qhs , Historical Med      methylphenidate (RITALIN) 5 mg tablet Take 5 mg by mouth two (2) times a day.  , Historical Med      cpap machine kit by Does Not Apply route.  15cm with 5L of oxygen bled in, Historical Med      traZODone (DESYREL) 100 mg tablet Take 100 mg by mouth nightly., Historical Med      nitroglycerin (NITROSTAT) 0.4 mg SL tablet by SubLINGual route every five (5) minutes as needed., Historical Med         STOP taking these medications       albuterol (PROVENTIL VENTOLIN) 2.5 mg /3 mL (0.083 %) nebulizer solution Comments:   Reason for Stopping: Follow-up  Pt was advised to follow up with PCP in one week. Follow-up with pulmonology and palliative care in 2 weeks.     Total time spent in discharge day management: 35 minutes    Signed:  Ruth Ann Dawson MD

## 2020-03-13 ENCOUNTER — PATIENT OUTREACH (OUTPATIENT)
Dept: CASE MANAGEMENT | Age: 70
End: 2020-03-13

## 2020-03-13 NOTE — PROGRESS NOTES
This note will not be viewable in [address] Transition of Care Discharge Follow-up Questionnaire   Date/Time of Call:   3/13/2020   1129am   What was the patient hospitalized for? COPD exacerbation     Does the patient understand his/her diagnosis and/or treatment and what happened during the hospitalization? Yes, spoke with patient, she states understanding of diagnosis and treatment; and is agreeable to call. Patient states she is doing well   Did the patient receive discharge instructions? Yes    CM Assessed Risk for Readmission:       Patient stated Risk for Readmission:      moderate  r/t diagnosis and/or comorbidities       None stated   Review any discharge instructions (see discharge instructions/AVS in [facility]). Ask patient if they understand these. Do they have any questions? Reviewed, understanding is stated, no questions at this time       Were home services ordered (nursing, PT, OT, ST, etc.)? No   If so, has the first visit occurred? If not, why? (Assist with coordination of services if necessary.)   N/A   Was any DME ordered? No   Patient has cpap, neb and O2 (breathe med)   If so, has it been received? If not, why?  (Assist patient in obtaining DME orders &/or equipment if necessary.) N/A   Complete a review of all medications (new, continued and discontinued meds per the D/C instructions and medication tab in [facility]). Completed  START taking:  albuterol-ipratropium (DUO-NEB)  HYDROcodone-acetaminophen (NORCO)  metFORMIN (GLUCOPHAGE)  senna-docusate (Joelyn Muster)  CHANGE how you take:  albuterol (ProAir HFA)  predniSONE (Mary Alice Mons)   Were all new prescriptions filled? If not, why?  (Assist patient in obtaining medications if necessary  escalate for CCM &/or SW if ongoing issues are verbalized by pt or anticipated)   Yes    Does the patient understand the purpose and dosing instructions for all medications?   (If patient has questions, provide explanation and education.) Yes    Does the patient have any problems in performing ADLs? (If patient is unable to perform ADLs  what is the limiting factor(s)? Do they have a support system that can assist? If no support system is present, discuss possible assistance that they may be able to obtain. Escalate for CCM/SW if ongoing issues are verbalized by pt or anticipated)   Independent with ADLs     Does the patient have all follow-up appointments scheduled? 7 day f/up with PCP?   (f/up with PCP may be w/in 14 days if patient has a f/up with their specialist w/in 7 days)    7-14 day f/up with specialist?   (or per discharge instructions)    If f/up has not been made  what actions has the care coordinator made to accomplish this? Has transportation been arranged? Yes      Dr. Venkat Armstrong 3/18/20    JERMAN Calderon 4/1/20                  Yes, no transportation needs are identified at this time   Any other questions or concerns expressed by the patient? No other needs or concerns identified. Patient states her gratitude for follow up. Contact information for Care Coordinator was given, instructed to call with new questions or concerns. Schedule next appointment with APRIL Stanley or refer to RN Case Manager/ per the workflow guidelines. When is care coordinators next follow-up call scheduled? If referred for CCM  what RN care manager was the referral assigned?    Care Coordinator will follow per workflow guidelines          Within 14 days   MATT Call Completed By: Manju Loza LPN  Care Coordinator

## 2020-03-16 LAB
BACTERIA SPEC CULT: NORMAL
GRAM STN SPEC: NORMAL
SERVICE CMNT-IMP: NORMAL

## 2020-03-27 ENCOUNTER — PATIENT OUTREACH (OUTPATIENT)
Dept: CASE MANAGEMENT | Age: 70
End: 2020-03-27

## 2020-03-27 NOTE — PROGRESS NOTES
Attempt to reach patient for outreach follow up at home/cell number without success. Left message for return call. Will attempt second outreach follow up with in 1 to 2 business days.

## 2020-03-30 ENCOUNTER — PATIENT OUTREACH (OUTPATIENT)
Dept: CASE MANAGEMENT | Age: 70
End: 2020-03-30

## 2020-03-30 NOTE — PROGRESS NOTES
Attempted outreach follow up without success. Per [facility] patient is scheduled for telemed follow up with pulmonology 3/31/20. No further needs are noted on chart review. Patient will be graduated from [employer] program.  Will reopen if call is returned.

## 2020-05-05 PROBLEM — Z51.5 PALLIATIVE CARE PATIENT: Status: ACTIVE | Noted: 2020-05-05

## 2020-09-08 PROBLEM — I47.9 PAROXYSMAL TACHYCARDIA (HCC): Status: ACTIVE | Noted: 2020-09-08

## 2020-09-08 PROBLEM — R00.2 PALPITATIONS: Status: ACTIVE | Noted: 2020-09-08

## 2020-09-08 PROBLEM — I47.9 PAROXYSMAL TACHYCARDIA (HCC): Status: RESOLVED | Noted: 2018-03-22 | Resolved: 2020-09-08

## 2020-09-14 ENCOUNTER — APPOINTMENT (RX ONLY)
Dept: URBAN - METROPOLITAN AREA CLINIC 349 | Facility: CLINIC | Age: 70
Setting detail: DERMATOLOGY
End: 2020-09-14

## 2020-09-14 ENCOUNTER — RX ONLY (OUTPATIENT)
Age: 70
Setting detail: RX ONLY
End: 2020-09-14

## 2020-09-14 DIAGNOSIS — L82.0 INFLAMED SEBORRHEIC KERATOSIS: ICD-10-CM

## 2020-09-14 DIAGNOSIS — L72.8 OTHER FOLLICULAR CYSTS OF THE SKIN AND SUBCUTANEOUS TISSUE: ICD-10-CM

## 2020-09-14 DIAGNOSIS — L57.0 ACTINIC KERATOSIS: ICD-10-CM

## 2020-09-14 DIAGNOSIS — D69.2 OTHER NONTHROMBOCYTOPENIC PURPURA: ICD-10-CM | Status: INADEQUATELY CONTROLLED

## 2020-09-14 DIAGNOSIS — L30.9 DERMATITIS, UNSPECIFIED: ICD-10-CM | Status: INADEQUATELY CONTROLLED

## 2020-09-14 PROBLEM — L20.84 INTRINSIC (ALLERGIC) ECZEMA: Status: ACTIVE | Noted: 2020-09-14

## 2020-09-14 PROBLEM — L29.8 OTHER PRURITUS: Status: ACTIVE | Noted: 2020-09-14

## 2020-09-14 PROBLEM — J45.909 UNSPECIFIED ASTHMA, UNCOMPLICATED: Status: ACTIVE | Noted: 2020-09-14

## 2020-09-14 PROBLEM — J44.9 CHRONIC OBSTRUCTIVE PULMONARY DISEASE, UNSPECIFIED: Status: ACTIVE | Noted: 2020-09-14

## 2020-09-14 PROBLEM — E13.9 OTHER SPECIFIED DIABETES MELLITUS WITHOUT COMPLICATIONS: Status: ACTIVE | Noted: 2020-09-14

## 2020-09-14 PROCEDURE — ? COUNSELING

## 2020-09-14 PROCEDURE — 17110 DESTRUCTION B9 LES UP TO 14: CPT | Mod: 59

## 2020-09-14 PROCEDURE — ? PATHOLOGY BILLING

## 2020-09-14 PROCEDURE — 11311 SHAVE SKIN LESION 0.6-1.0 CM: CPT

## 2020-09-14 PROCEDURE — ? SHAVE REMOVAL

## 2020-09-14 PROCEDURE — A4550 SURGICAL TRAYS: HCPCS

## 2020-09-14 PROCEDURE — 99213 OFFICE O/P EST LOW 20 MIN: CPT | Mod: 25

## 2020-09-14 PROCEDURE — ? LIQUID NITROGEN

## 2020-09-14 PROCEDURE — 17003 DESTRUCT PREMALG LES 2-14: CPT | Mod: 59

## 2020-09-14 PROCEDURE — 17000 DESTRUCT PREMALG LESION: CPT | Mod: 59

## 2020-09-14 PROCEDURE — 11311 SHAVE SKIN LESION 0.6-1.0 CM: CPT | Mod: 76

## 2020-09-14 PROCEDURE — 88304 TISSUE EXAM BY PATHOLOGIST: CPT

## 2020-09-14 RX ORDER — TRIAMCINOLONE ACETONIDE 1 MG/G
CREAM TOPICAL
Qty: 1 | Refills: 3 | Status: ERX

## 2020-09-14 ASSESSMENT — LOCATION DETAILED DESCRIPTION DERM
LOCATION DETAILED: LEFT MEDIAL SUPERIOR CHEST
LOCATION DETAILED: LEFT PROXIMAL DORSAL FOREARM
LOCATION DETAILED: RIGHT CENTRAL MANDIBULAR CHEEK
LOCATION DETAILED: RIGHT CENTRAL MANDIBULAR CHEEK
LOCATION DETAILED: LEFT LATERAL EYEBROW
LOCATION DETAILED: LEFT CENTRAL MALAR CHEEK
LOCATION DETAILED: LEFT LATERAL MALAR CHEEK
LOCATION DETAILED: RIGHT SUPERIOR HELIX
LOCATION DETAILED: LEFT INFERIOR LATERAL MALAR CHEEK
LOCATION DETAILED: RIGHT SUPERIOR HELIX
LOCATION DETAILED: LEFT CENTRAL TEMPLE
LOCATION DETAILED: RIGHT ELBOW
LOCATION DETAILED: RIGHT PROXIMAL DORSAL FOREARM

## 2020-09-14 ASSESSMENT — LOCATION SIMPLE DESCRIPTION DERM
LOCATION SIMPLE: RIGHT EAR
LOCATION SIMPLE: RIGHT CHEEK
LOCATION SIMPLE: CHEST
LOCATION SIMPLE: LEFT CHEEK
LOCATION SIMPLE: RIGHT FOREARM
LOCATION SIMPLE: LEFT EYEBROW
LOCATION SIMPLE: RIGHT ELBOW
LOCATION SIMPLE: LEFT FOREARM
LOCATION SIMPLE: LEFT TEMPLE
LOCATION SIMPLE: RIGHT EAR
LOCATION SIMPLE: RIGHT CHEEK

## 2020-09-14 ASSESSMENT — LOCATION ZONE DERM
LOCATION ZONE: EAR
LOCATION ZONE: ARM
LOCATION ZONE: FACE
LOCATION ZONE: FACE
LOCATION ZONE: EAR
LOCATION ZONE: TRUNK

## 2020-09-14 NOTE — PROCEDURE: SHAVE REMOVAL
Add Variable For Additional Medical Justification: No
Render Post-Care Instructions In Note?: yes
Size Of Lesion In Cm (Required): 0.8
Anesthesia Type: 2% lidocaine with epinephrine
Medical Necessity Information: It is in your best interest to select a reason for this procedure from the list below. All of these items fulfill various CMS LCD requirements except the new and changing color options.
Consent was obtained from the patient. The risks and benefits to therapy were discussed in detail. Specifically, the risks of infection, scarring, bleeding, prolonged wound healing, incomplete removal, allergy to anesthesia, nerve injury and recurrence were addressed. Prior to the procedure, the treatment site was clearly identified and confirmed by the patient. All components of Universal Protocol/PAUSE Rule completed.
Hemostasis: Electrocautery
Wound Care: Vaseline
Notification Instructions: Patient will be notified of biopsy results. However, patient instructed to call the office if not contacted within 2 weeks.
Post-Care Instructions: I reviewed with the patient in detail post-care instructions. Patient is to keep the biopsy site dry overnight, and then apply vaseline twice daily until healed. Patient may apply hydrogen peroxide soaks to remove any crusting. After the procedure, the patient was observed for 5-10 minutes and was oriented to person, place and time and demied feeling dizzy, queasy, and stated that they did not feel that they were going to faint.
Detail Level: Detailed
Billing Type: Third-Party Bill
Biopsy Method: Dermablade
Accession #: [id number] read
Medical Necessity Clause: This procedure was medically necessary because the lesion that was treated was: changing
X Size Of Lesion In Cm (Optional): 0
Anesthesia Volume In Cc: 0.5
Size Of Lesion In Cm (Required): 0.6

## 2020-09-14 NOTE — PROCEDURE: PATHOLOGY BILLING
Immunohistochemistry ([id number] and [id number]) billing is not performed here. Please use the Immunohistochemistry Stain Billing plan to accomplish this.

## 2020-09-14 NOTE — PROCEDURE: LIQUID NITROGEN
Consent: The patient's consent was obtained including but not limited to risks of crusting, scabbing, blistering, scarring, darker or lighter pigmentary change, recurrence, incomplete removal and infection.
Post-Care Instructions: I reviewed with the patient in detail post-care instructions. Patient is to wear sunprotection, and avoid picking at any of the treated lesions. Pt may apply Vaseline to crusted or scabbing areas.
Render Note In Bullet Format When Appropriate: No
Medical Necessity Information: It is in your best interest to select a reason for this procedure from the list below. All of these items fulfill various CMS LCD requirements except the new and changing color options.
Detail Level: Detailed
Duration Of Freeze Thaw-Cycle (Seconds): 2
Include Z78.9 (Other Specified Conditions Influencing Health Status) As An Associated Diagnosis?: Yes
Medical Necessity Clause: This procedure was medically necessary because the lesions that were treated were:
Number Of Freeze-Thaw Cycles: 2 freeze-thaw cycles
Number Of Freeze-Thaw Cycles: 3 freeze-thaw cycles
Duration Of Freeze Thaw-Cycle (Seconds): 3

## 2020-09-14 NOTE — PROCEDURE: PATHOLOGY BILLING
Immunohistochemistry ([id number] and [id number]) billing is not performed here. Please use the Immunohistochemistry Stain Billing plan to accomplish this. Immunohistochemistry ([id number] and [id number]) billing is not performed here. Please use the Immunohistochemistry Stain Billing plan to accomplish this.

## 2021-07-29 NOTE — PROGRESS NOTES
Problem: Chronic Obstructive Pulmonary Disease (COPD)  Goal: *Absence of hypoxia  Outcome: Progressing Towards Goal  Goal: *Optimize nutritional status  Outcome: Progressing Towards Goal     Problem: Falls - Risk of  Goal: *Absence of Falls  Description  Document Inna Vasquez Fall Risk and appropriate interventions in the flowsheet. Outcome: Progressing Towards Goal  Note:   Fall Risk Interventions:            Medication Interventions: Patient to call before getting OOB, Teach patient to arise slowly    Elimination Interventions: Patient to call for help with toileting needs, Call light in reach              Problem: Pressure Injury - Risk of  Goal: *Prevention of pressure injury  Description  Document Augusto Scale and appropriate interventions in the flowsheet.   Outcome: Progressing Towards Goal  Note:   Pressure Injury Interventions:  Sensory Interventions: Assess changes in LOC    Moisture Interventions: Absorbent underpads    Activity Interventions: Increase time out of bed    Mobility Interventions: Pressure redistribution bed/mattress (bed type)    Nutrition Interventions: Offer support with meals,snacks and hydration Statement Selected

## 2021-10-29 PROBLEM — I20.0 UNSTABLE ANGINA (HCC): Status: ACTIVE | Noted: 2021-10-29

## 2021-11-22 ENCOUNTER — APPOINTMENT (OUTPATIENT)
Dept: NON INVASIVE DIAGNOSTICS | Age: 71
End: 2021-11-22
Attending: INTERNAL MEDICINE
Payer: MEDICARE

## 2021-11-22 ENCOUNTER — HOSPITAL ENCOUNTER (OUTPATIENT)
Age: 71
Setting detail: OUTPATIENT SURGERY
Discharge: HOME | End: 2021-11-22
Attending: INTERNAL MEDICINE | Admitting: INTERNAL MEDICINE
Payer: MEDICARE

## 2021-11-22 VITALS
OXYGEN SATURATION: 96 % | RESPIRATION RATE: 16 BRPM | WEIGHT: 176 LBS | HEIGHT: 64 IN | DIASTOLIC BLOOD PRESSURE: 49 MMHG | BODY MASS INDEX: 30.05 KG/M2 | SYSTOLIC BLOOD PRESSURE: 116 MMHG | TEMPERATURE: 98.2 F | HEART RATE: 83 BPM

## 2021-11-22 DIAGNOSIS — I20.0 UNSTABLE ANGINA (HCC): ICD-10-CM

## 2021-11-22 DIAGNOSIS — R07.89 CHEST DISCOMFORT: ICD-10-CM

## 2021-11-22 LAB
ANION GAP SERPL CALC-SCNC: 5 MMOL/L (ref 7–16)
ATRIAL RATE: 81 BPM
BUN SERPL-MCNC: 5 MG/DL (ref 8–23)
CALCIUM SERPL-MCNC: 8.8 MG/DL (ref 8.3–10.4)
CALCULATED P AXIS, ECG09: 74 DEGREES
CALCULATED R AXIS, ECG10: 77 DEGREES
CALCULATED T AXIS, ECG11: 80 DEGREES
CHLORIDE SERPL-SCNC: 109 MMOL/L (ref 98–107)
CO2 SERPL-SCNC: 26 MMOL/L (ref 21–32)
CREAT SERPL-MCNC: 0.67 MG/DL (ref 0.6–1)
DIAGNOSIS, 93000: NORMAL
ECHO AO ROOT DIAM: 2.4 CM
ECHO AV AREA PEAK VELOCITY: 1.91 CM2
ECHO AV AREA VTI: 1.81 CM2
ECHO AV AREA/BSA PEAK VELOCITY: 1 CM2/M2
ECHO AV AREA/BSA VTI: 1 CM2/M2
ECHO AV MEAN GRADIENT: 5 MMHG
ECHO AV PEAK GRADIENT: 9 MMHG
ECHO AV PEAK VELOCITY: 153 CM/S
ECHO AV VTI: 35 CM
ECHO EST RA PRESSURE: 3 MMHG
ECHO LA AREA 2C: 18.8 CM2
ECHO LA AREA 4C: 11.7 CM2
ECHO LA MAJOR AXIS: 4.74 CM
ECHO LA MINOR AXIS: 2.56 CM
ECHO LV E' LATERAL VELOCITY: 9.67 CM/S
ECHO LV E' SEPTAL VELOCITY: 6 CM/S
ECHO LV EDV A2C: 69.3 CM3
ECHO LV EDV A4C: 54.7 CM3
ECHO LV ESV A2C: 20.8 CM3
ECHO LV ESV A4C: 21.1 CM3
ECHO LV INTERNAL DIMENSION DIASTOLIC: 5.04 CM (ref 3.9–5.3)
ECHO LV INTERNAL DIMENSION SYSTOLIC: 3.02 CM
ECHO LV IVSD: 0.43 CM (ref 0.6–0.9)
ECHO LV MASS 2D: 87.6 G (ref 67–162)
ECHO LV MASS INDEX 2D: 47.4 G/M2 (ref 43–95)
ECHO LV POSTERIOR WALL DIASTOLIC: 0.68 CM (ref 0.6–0.9)
ECHO LVOT DIAM: 1.8 CM
ECHO LVOT PEAK GRADIENT: 5 MMHG
ECHO LVOT VTI: 25 CM
ECHO MV A VELOCITY: 96.5 CM/S
ECHO MV E DECELERATION TIME (DT): 217 MS
ECHO MV E VELOCITY: 96 CM/S
ECHO MV E/A RATIO: 0.99
ECHO MV E/E' LATERAL: 9.93
ECHO MV E/E' RATIO (AVERAGED): 12.96
ECHO MV E/E' SEPTAL: 16
ECHO PV ACCELERATION TIME (AT): 111 MS
ECHO PV PEAK INSTANTANEOUS GRADIENT SYSTOLIC: 4 MMHG
ECHO RV INTERNAL DIMENSION: 2.27 CM
ECHO RV TAPSE: 1.85 CM (ref 1.5–2)
ERYTHROCYTE [DISTWIDTH] IN BLOOD BY AUTOMATED COUNT: 13.2 % (ref 11.9–14.6)
GLUCOSE SERPL-MCNC: 126 MG/DL (ref 65–100)
HCT VFR BLD AUTO: 44.7 % (ref 35.8–46.3)
HGB BLD-MCNC: 14.3 G/DL (ref 11.7–15.4)
MAGNESIUM SERPL-MCNC: 2.1 MG/DL (ref 1.8–2.4)
MCH RBC QN AUTO: 31.6 PG (ref 26.1–32.9)
MCHC RBC AUTO-ENTMCNC: 32 G/DL (ref 31.4–35)
MCV RBC AUTO: 98.7 FL (ref 79.6–97.8)
NRBC # BLD: 0 K/UL (ref 0–0.2)
P-R INTERVAL, ECG05: 140 MS
PLATELET # BLD AUTO: 249 K/UL (ref 150–450)
PMV BLD AUTO: 9.5 FL (ref 9.4–12.3)
POTASSIUM SERPL-SCNC: 4.3 MMOL/L (ref 3.5–5.1)
Q-T INTERVAL, ECG07: 378 MS
QRS DURATION, ECG06: 100 MS
QTC CALCULATION (BEZET), ECG08: 439 MS
RBC # BLD AUTO: 4.53 M/UL (ref 4.05–5.2)
SODIUM SERPL-SCNC: 140 MMOL/L (ref 136–145)
VENTRICULAR RATE, ECG03: 81 BPM
WBC # BLD AUTO: 7.3 K/UL (ref 4.3–11.1)

## 2021-11-22 PROCEDURE — 74011250636 HC RX REV CODE- 250/636: Performed by: INTERNAL MEDICINE

## 2021-11-22 PROCEDURE — 85027 COMPLETE CBC AUTOMATED: CPT

## 2021-11-22 PROCEDURE — C1769 GUIDE WIRE: HCPCS | Performed by: INTERNAL MEDICINE

## 2021-11-22 PROCEDURE — 77030016699 HC CATH ANGI DX INFN1 CARD -A: Performed by: INTERNAL MEDICINE

## 2021-11-22 PROCEDURE — 77030042317 HC BND COMPR HEMSTAT -B: Performed by: INTERNAL MEDICINE

## 2021-11-22 PROCEDURE — 93458 L HRT ARTERY/VENTRICLE ANGIO: CPT | Performed by: INTERNAL MEDICINE

## 2021-11-22 PROCEDURE — 93571 IV DOP VEL&/PRESS C FLO 1ST: CPT | Performed by: INTERNAL MEDICINE

## 2021-11-22 PROCEDURE — 74011000258 HC RX REV CODE- 258: Performed by: INTERNAL MEDICINE

## 2021-11-22 PROCEDURE — 99152 MOD SED SAME PHYS/QHP 5/>YRS: CPT | Performed by: INTERNAL MEDICINE

## 2021-11-22 PROCEDURE — C1894 INTRO/SHEATH, NON-LASER: HCPCS | Performed by: INTERNAL MEDICINE

## 2021-11-22 PROCEDURE — 80048 BASIC METABOLIC PNL TOTAL CA: CPT

## 2021-11-22 PROCEDURE — 93306 TTE W/DOPPLER COMPLETE: CPT

## 2021-11-22 PROCEDURE — 74011000250 HC RX REV CODE- 250: Performed by: INTERNAL MEDICINE

## 2021-11-22 PROCEDURE — 74011000636 HC RX REV CODE- 636: Performed by: INTERNAL MEDICINE

## 2021-11-22 PROCEDURE — 99153 MOD SED SAME PHYS/QHP EA: CPT | Performed by: INTERNAL MEDICINE

## 2021-11-22 PROCEDURE — 77030015766: Performed by: INTERNAL MEDICINE

## 2021-11-22 PROCEDURE — C1887 CATHETER, GUIDING: HCPCS | Performed by: INTERNAL MEDICINE

## 2021-11-22 PROCEDURE — 77030012468 HC VLV BLEEDBK CNTRL ABBT -B: Performed by: INTERNAL MEDICINE

## 2021-11-22 PROCEDURE — 83735 ASSAY OF MAGNESIUM: CPT

## 2021-11-22 PROCEDURE — 93005 ELECTROCARDIOGRAM TRACING: CPT | Performed by: INTERNAL MEDICINE

## 2021-11-22 RX ORDER — HEPARIN SODIUM 200 [USP'U]/100ML
INJECTION, SOLUTION INTRAVENOUS
Status: COMPLETED | OUTPATIENT
Start: 2021-11-22 | End: 2021-11-22

## 2021-11-22 RX ORDER — SODIUM CHLORIDE 9 MG/ML
75 INJECTION, SOLUTION INTRAVENOUS CONTINUOUS
Status: DISCONTINUED | OUTPATIENT
Start: 2021-11-22 | End: 2021-11-22 | Stop reason: HOSPADM

## 2021-11-22 RX ORDER — GUAIFENESIN 100 MG/5ML
324 LIQUID (ML) ORAL ONCE
Status: DISCONTINUED | OUTPATIENT
Start: 2021-11-22 | End: 2021-11-22 | Stop reason: HOSPADM

## 2021-11-22 RX ORDER — LIDOCAINE HYDROCHLORIDE 10 MG/ML
INJECTION INFILTRATION; PERINEURAL AS NEEDED
Status: DISCONTINUED | OUTPATIENT
Start: 2021-11-22 | End: 2021-11-22 | Stop reason: HOSPADM

## 2021-11-22 RX ORDER — MIDAZOLAM HYDROCHLORIDE 1 MG/ML
INJECTION, SOLUTION INTRAMUSCULAR; INTRAVENOUS AS NEEDED
Status: DISCONTINUED | OUTPATIENT
Start: 2021-11-22 | End: 2021-11-22 | Stop reason: HOSPADM

## 2021-11-22 NOTE — H&P
Princess Llamas MD   Physician   Specialty:  Cardiology   Progress Notes       Signed   Encounter Date:  10/29/2021                 Expand All Collapse All          []Hide copied text    []Carla for details           [facility] CARDIOLOGY  [address], [address]  PHONE: [phone]              10/29/21     NAME:  Blanca Guardado  : 1950  MRN: [id number]            SUBJECTIVE:   Blanca Guardado is a 70 y.o. female seen for a visit regarding the following:      Chief Complaint   Patient presents with    Annual Exam       New to Provider- previous Dr. Nirav Boland pt - c/o fatigue & occasional chest pains         HPI:    Cardiology problem list:  1. Coronary artery disease  -Coronary angiography on 2012 showed a normal left main, LAD with no disease, circumflex with mid 30% disease, dominant RCA with no disease, EF of 60%. 2.  Inappropriate sinus tachycardia/paroxysmal tachycardia  -echocardiogram from 2017 showed an EF of 65 to 70% with no regional wall motion abnormalities, grade 1 diastolic dysfunction, normal atrial sizes, mild TR3. COPD on oxygen. 4.  Former smoker  5. Hypertension  6. Hyperlipidemia-diet controlled   7. Obstructive sleep apnea  8. COPD        -Former patient of Dr. Nirav Boland     I saw Ms. Guilherme Lugo who is a pleasant 30-year-old woman in cardiovascular follow-up on 10/29/2021 for follow-up of paroxysmal tachycardia/inappropriate sinus tachycardia, hypertension, nonobstructive coronary artery disease, hyperlipidemia. She has COPD and is on home oxygen. She has sleep apnea and follows with pulmonary.     She last saw Dr. Nirav Boland in 2020 at which time he added atenolol 25 mg once daily for better control of her heart rates as there was still not efficiently controlled despite being on long-acting diltiazem 360 mg once daily.   An EKG at that visit had showed a heart rate of close to 110 bpm.     Coronary artery disease: She has had episodes of chest pain off-and-on over the past year but over the past couple months, these have been progressive and even happen at rest.  The at least moderate in intensity, central, substernal, sometimes associated with dyspnea and lightheadedness. She has taken sublingual nitroglycerin with good results intermittently. Denies any associated syncope or presyncope.     Tobacco abuse: She has gone back to smoking and even up to a pack a day. She has severe COPD and understands the need to avoid cigarettes but also says that it is highly addictive.     Hyperlipidemia: Has not been on statin therapy and is willing to get her back on it. She said at one time she could have potentially been on it.     Hypertension: On both atenolol and diltiazem and denies headaches or blurry vision. Blood pressures are reasonable. Denies lower extremity edema orthopnea PND.     COPD: Has advanced COPD and uses a CPAP machine at night with 6 L of oxygen. She says her resting oxygen levels during the day unless she is not exerting as above 90%.          Past Medical History, Past Surgical History, Family history, Social History, and Medications were all reviewed with the patient today and updated as necessary.            Allergies   Allergen Reactions    Azithromycin Diarrhea    Sulfa (Sulfonamide Antibiotics) Itching       NOT ALLERGIC           Patient Active Problem List   Diagnosis Code    Chronic sinusitis J32.9    JACQUELYN (obstructive sleep apnea) G47.33    Tobacco use disorder F17.200    Diabetes mellitus ([facility]) E11.9    Fibromyalgia M79.7    Restless leg syndrome G25.81    Depression F32. A    Debility R53.81    Chronic respiratory failure ([facility] J96.10    ADHD (attention deficit hyperactivity disorder) F90.9    Coronary artery disease I25.10    Right heart enlargement I51.7    Polycythemia D75.1    Moderate COPD (chronic obstructive pulmonary disease) ([facility]) J44.9    Essential hypertension with goal blood pressure less than 130/85 I10    Fatigue R53.83    Nocturnal hypoxia G47.34    Chest pain at rest R07.9    Chest discomfort R07.89    Sleep apnea G47.30    COPD exacerbation ([facility]) J44.1    Mixed hyperlipidemia E78.2    Pneumonia due to infectious organism J18.9    Palliative care patient Z51.5    Palpitations R00.2    Paroxysmal tachycardia ([facility]) I47.9           Past Medical History:   Diagnosis Date    Acute respiratory failure ([address] 2015     Intubated 2015    Acute respiratory failure ([address] 2015     Intubated 2015     Cancer ([facility]       basal cell,squamous cell    Chest pain, unspecified 2016    Chiari I malformation ([facility])       history of    COPD exacerbation ([address] 12-12    COPD exacerbation ([address] 2013 to 13     hospitalization    Paroxysmal tachycardia/NOS 2016    Pneumonia 2012    Pneumothorax       HX. of  2 on the left- required chest tube            Past Surgical History:   Procedure Laterality Date    HX CATARACT REMOVAL        HX HEART CATHETERIZATION        mild nonobstructive CAD    HX HEENT        Bilateral cataracts and bleth    HX HYSTERECTOMY       HX ORTHOPAEDIC        right shoulder sx; left thumb    HX OTHER SURGICAL         left thumb sx,forehead resection of squamous cell    HX SEPTOPLASTY   2010    HX SHOULDER ARTHROSCOPY               Family History   Problem Relation Age of Onset    Cancer Mother           breast    Other Father           neurological degeneration    Cancer Sister           exophageal      Social History            Tobacco Use    Smoking status: Former Smoker       Packs/day: 1.00       Years: 45.00       Pack years: 45.00       Types: Cigarettes       Quit date: 2019       Years since quittin.0    Smokeless tobacco: Current User   Substance Use Topics    Alcohol use:  Yes       Alcohol/week: 1.0 standard drinks       Types: 1 Glasses of wine per week       Comment: per month             Current Outpatient Medications   Medication Sig Dispense Refill    venlafaxine (EFFEXOR) 75 mg tablet Take 25 mg by mouth daily. Takes with effexor 150mg        venlafaxine-SR (EFFEXOR-XR) 150 mg capsule Take  by mouth daily.        rosuvastatin (CRESTOR) 10 mg tablet Take 1 Tablet by mouth nightly. 90 Tablet 3    predniSONE (DELTASONE) 10 mg tablet Take 10 mg by mouth daily (with breakfast). (Patient taking differently: Take 10 mg by mouth. As needed) 30 Tablet 4    dilTIAZem ER (CARDIZEM CD) 360 mg capsule Take 1 Cap by mouth daily. 30 Cap 11    atenoloL (TENORMIN) 25 mg tablet Take 1 Tab by mouth daily. 30 Tab 11    albuterol (ProAir HFA) 90 mcg/actuation inhaler Take 2 Puffs by inhalation every four (4) hours as needed for Wheezing or Shortness of Breath. 3 Inhaler 3    budesonide-formoteroL (Symbicort) 160-4.5 mcg/actuation HFAA Take 2 Puffs by inhalation two (2) times a day. 1 Inhaler 11    tiotropium bromide (Spiriva Respimat) 2.5 mcg/actuation inhaler Take 2 Puffs by inhalation daily. 1 Inhaler 1    DULoxetine (CYMBALTA) 30 mg capsule Take 30 mg by mouth daily.        mirtazapine (REMERON) 30 mg tablet Take 15 mg by mouth nightly.        olopatadine (PATADAY) 0.2 % drop ophthalmic solution Administer 1 drop to both eyes daily.        OXYGEN-AIR DELIVERY SYSTEMS by Nasal route. 2LPM at rest  6 lpm qhs        aspirin delayed-release 81 mg tablet Take  by mouth daily.        gabapentin (NEURONTIN) 300 mg capsule Take 300 mg by mouth two (2) times a day. 1 tab po qhs         methylphenidate (RITALIN) 5 mg tablet Take 5 mg by mouth two (2) times a day.          cpap machine kit by Does Not Apply route.  15cm with 5L of oxygen bled in        traZODone (DESYREL) 100 mg tablet Take 100 mg by mouth nightly.        nitroglycerin (NITROSTAT) 0.4 mg SL tablet by SubLINGual route every five (5) minutes as needed.        prednisoLONE acetate (PRED FORTE) 1 % ophthalmic suspension Administer 1 drop to both eyes four (4) times daily.             Review of Systems   Constitutional: Negative for fever, weight gain and weight loss. HENT: Negative for ear pain, nosebleeds and sore throat. Eyes: Negative for photophobia and visual disturbance. Cardiovascular: Positive for dyspnea on exertion and palpitations. Negative for claudication and syncope. Respiratory: Positive for shortness of breath and wheezing. Negative for hemoptysis. Endocrine: Negative for cold intolerance and heat intolerance. Hematologic/Lymphatic: Negative for adenopathy and bleeding problem. Skin: Negative for rash and suspicious lesions. Musculoskeletal: Negative for falls and joint swelling. Gastrointestinal: Negative for abdominal pain, change in bowel habit and hematemesis. Genitourinary: Negative for dysuria and hematuria. Neurological: Negative for focal weakness and seizures. Psychiatric/Behavioral: Negative for hallucinations and suicidal ideas. Allergic/Immunologic: Negative for hives and persistent infections.         PHYSICAL EXAM:     Visit Vitals  /74   Pulse 87   Ht 5' 4\" (1.626 m)   Wt 176 lb 6.4 oz (80 kg)   BMI 30.28 kg/m²         Physical Exam  General: Alert and awake and oriented in no acute distress   HEENT: Head is normocephalic and atraumatic, pupils are equal and bilaterally reactive to light, throat is clear  Neck: No significant jugular venous distention or carotid bruits. Cardiac: S1 and S2 heard, regular rate and rhythm, no significant murmurs rubs or gallops. Respiratory: Clear to auscultation bilaterally with no adventitious sounds. Respirations are normal.  Abdomen: Soft, nontender, nondistended, bowel sounds present. No abdominal bruits noted. Extremities: No cyanosis clubbing or edema. Peripheral pulses:  Bilateral radial pulses are equal. Bilateral pedal pulses are well felt.   Musculoskeletal: No significant joint swelling or redness noted.  Lymphatic: No significant enlarged lymphadenopathy noted  Skin: No significant rashes noted in exposed regions. Neurological: No Facial droop, no gross focal motor or neurological deficits     Medical problems and test results were reviewed with the patient today.      Recent Results   No results found for this or any previous visit (from the past 672 hour(s)). Lab Results   Component Value Date/Time     Cholesterol, total 209 (H) 03/16/2017 09:15 AM     HDL Cholesterol 88 (H) 03/16/2017 09:15 AM     LDL, calculated 96.8 03/16/2017 09:15 AM     VLDL, calculated 24.2 03/16/2017 09:15 AM     Triglyceride 121 03/16/2017 09:15 AM     CHOL/HDL Ratio 2.4 03/16/2017 09:15 AM   ,hemoglobin, basic metabolic panel,         Lab Results   Component Value Date/Time     TSH 1.350 01/02/2014 12:59 PM    ,        Lab Results   Component Value Date/Time     Sodium 143 03/11/2020 04:43 AM     Potassium 4.2 03/11/2020 04:43 AM     Chloride 106 03/11/2020 04:43 AM     CO2 33 (H) 03/11/2020 04:43 AM     Anion gap 4 (L) 03/11/2020 04:43 AM     Glucose 205 (H) 03/11/2020 04:43 AM     BUN 10 03/11/2020 04:43 AM     Creatinine 0.54 (L) 03/11/2020 04:43 AM     GFR est AA >60 03/11/2020 04:43 AM     GFR est non-AA >60 03/11/2020 04:43 AM     Calcium 8.0 (L) 03/11/2020 04:43 AM     Bilirubin, total 0.2 03/09/2020 01:25 PM     Alk. phosphatase 119 03/09/2020 01:25 PM     Protein, total 7.1 03/09/2020 01:25 PM     Albumin 3.1 (L) 03/09/2020 01:25 PM     Globulin 4.0 (H) 03/09/2020 01:25 PM     A-G Ratio 0.8 (L) 03/09/2020 01:25 PM     ALT (SGPT) 25 03/09/2020 01:25 PM     AST (SGOT) 22 03/09/2020 01:25 PM         No results found for any visits on 10/29/21.    EKG done today showed sinus rhythm, incomplete right bundle branch block, right atrial enlargement and no significant change compared to prior EKG apart from the heart rates being slower.     ASSESSMENT and PLAN     1. Coronary artery disease involving native coronary artery of native heart with unstable angina pectoris ([facility]  -View of her chest pain concerning for unstable angina, we will get her through coronary angiography. She already has dual antianginal therapy prescribed along with sublingual nitroglycerin as needed. She is already on aspirin 81 mg daily. Continue atenolol and diltiazem at current dosing. Started rosuvastatin 10 mg every evening and we can certainly uptitrate depending on the results of her blood work as ordered below. - ECHO ADULT COMPLETE; Future  - CASE REQUEST CATH LAB  - LIPID PANEL; Future  - CBC W/O DIFF; Future  - METABOLIC PANEL, COMPREHENSIVE; Future  - TSH; Future  - HEMOGLOBIN A1C WITH EAG; Future  - MAGNESIUM; Future     2. Essential hypertension with goal blood pressure less than 130/85  -EKG done today showed sinus rhythm and no other significant findings apart from incomplete right bundle branch block but this is not new. Pressures are reasonable. No changes with meds for now. Continue them. - AMB POC EKG ROUTINE W/ 12 LEADS, INTER & REP     3. Paroxysmal tachycardia ([facility] [address]  -She has inappropriate sinus tachycardia but we are getting both TSH and magnesium apart from baseline blood work today. She said she has not had any blood work for a while because of COVID-19.  - TSH; Future  - MAGNESIUM; Future     4. Right heart enlargement  -Noted on EKG and also noted on previous echoes. Echo at this time reordered as her last study was 5 years ago. - ECHO ADULT COMPLETE; Future     5. COPD, severe ([facility]  -Counseled on importance of quitting smoking completely. On multiple inhalers. Follows with pulmonary     6. JACQUELYN (obstructive sleep apnea)  -Continue CPAP therapy     7. Mixed hyperlipidemia  -Checking lipid panel. Getting fasting lipid panel today and also starting her on intermediate dose statin therapy-rosuvastatin 10 mg every evening.  - LIPID PANEL; Future     8.  Tobacco use disorder  -Counseled on the importance of quitting completely.     9. Type 2 diabetes mellitus with hyperglycemia, without long-term current use of insulin ([address]  -She said she was diabetic in the past and was on Metformin but diet control had helped. - HEMOGLOBIN A1C WITH EAG; Future           Overall Impression  -Unstable angina with known coronary artery disease based on a previous heart catheterization from 2012.  -Coronary angiography would be in her best interest considering she is already on dual antianginal therapy with good results from taking sublingual nitroglycerin intermittently. Right radial pulse is excellent with normal Mayank's test.  -Risks, alternatives, benefits of left heart catheterization, coronary angiography and possible PCI have been discussed with the patient and they have agreed to proceed after voicing complete understanding. Risks discussed included bleeding, infection, stroke, myocardial infarction, arterial dissection, renal failure and possibly even death. She understands and would like to proceed. -Further plans will be based on results of coronary angiography. Continue aspirin and statin therapy. We will see her tentatively in follow-up in 4 weeks time but of course we will see her sooner if needed. She has severe COPD and is not really a candidate for coronary artery bypass grafting surgery. Follow-up and Dispositions  ·   Return in about 4 weeks (around 11/26/2021) for for follow up CAD.             Reddy Cornell MD   10/29/2021               Electronically signed by Kimmie Dwyer MD at 10/29/21 1051        Note Details    Author Kimmie Dwyer MD File Time 10/29/21 1051   Author Type Physician Status Signed   Last  Kimmie Dwyer MD Specialty Cardiology    Office Visit on 10/29/2021           Office Visit on 10/29/2021                Detailed Report            Note shared with patient

## 2021-11-22 NOTE — ROUTINE PROCESS
Patient received to [address] [place] [address] room # 11  Ambulatory from [facility]. Patient scheduled for [facility] today with Dr Conchita Ordonez. Procedure reviewed & questions answered, voiced good understanding consent obtained & placed on chart. All medications and medical history reviewed. Will prep patient per orders. Patient & family updated on plan of care. The patient has a fraility score of 3-MANAGING WELL, based on reports no recent falls.

## 2021-11-22 NOTE — Clinical Note
Contrast Dose Calculator:   Patient's age: 70.   Patient's sex: Female. Patient weight (kg) = 79.8. Creatinine level (mg/dL) = 0.67. Creatinine clearance (mL/min): 97.   Contrast concentration (mg/mL) = 370. MACD = 300 mL. Max Contrast dose per Creatinine Cl calculator = 218.25 mL.

## 2021-11-22 NOTE — DISCHARGE INSTRUCTIONS
HEART CATHETERIZATION/ANGIOGRAPHY DISCHARGE INSTRUCTIONS    1. Check puncture site frequently for swelling or bleeding. If there is any bleeding, apply pressure over the area with a clean towel or washcloth and call 911. Notify your doctor for any redness, swelling, drainage, or oozing from the puncture site. Notify your doctor for any fever or chills. 2. If the extremity becomes cold, numb, or painful call [facility] at [phone].  3. Activity should be limited for the next 48 hours. No heavy lifting, pushing, pulling  or strenuous activity for 48 hours. No heavy lifting (anything over 10 pounds) for 3 days. 4. You may resume your usual diet. Drink more fluids than usual.   5. Have a responsible person drive you home and stay with you for at least 24 hours after your heart catheterization/angiography. No driving for 24 hours. 6. You may remove bandage from your ARM in 24 hours. You may shower in 24 hours. No tub baths, hot tubs, or swimming for 1 week. Do not place any lotions, creams, powders, or ointments over puncture site for 1 week. You may place a clean band-aid over the puncture site each day for 5 days. Change daily. 7. Continue all medications as prescribed. Sedation for a Medical Procedure: Care Instructions     You were given a sedative medication during your visit. While many of the effects will have worn   off before you leave; you may continue to feel some effects for several hours. Common side effects from sedation include:  · Feeling sleepy. (Your doctors and nurses will make sure you are not too sleepy to go home.)  · Nausea and vomiting. This usually does not last long. · Feeling tired. How can you care for yourself at home? Activity    · Don't do anything for 24 hours that requires attention to detail. It takes time for the medicine effects to completely wear off. · Do not make important legal decisions for 24 hours.      · Do not sign any legal documents for 24 hours.     · Do not drink alcohol today     · For your safety, you should not drive or operate heavy machinery for the remainder of the day     · Rest when you feel tired. Getting enough sleep will help you recover. Diet    · You can eat your normal diet, unless your doctor gives you other instructions. If your stomach is upset, try clear liquids and bland, low-fat foods like plain toast or rice. · Drink plenty of fluids (unless your doctor tells you not to). · Don't drink alcohol for 24 hours. Medicines    · Be safe with medicines. Read and follow all instructions on the label. · If the doctor gave you a prescription medicine for pain, take it as prescribed. · If you are not taking a prescription pain medicine, ask your doctor if you can take an over-the-counter medicine. · If you think your pain medicine is making you sick to your stomach:  · Take your medicine after meals (unless your doctor has told you not to). · Ask your doctor for a different pain medicine. I have read the above instructions and have had the opportunity to ask questions.       Patient: ________________________   Date: _____________    Witness: _______________________   Date: _____________

## 2021-11-24 NOTE — PROGRESS NOTES
1435-patient ambulated to bath room with no difficulties. Right radial with no bleeding or hematoma. Rband removed ans sterile dressing applied to site. 1440- all discharge instructions explained to patient and family. Time allowed for questions no. No questions and concerns at this time. 1445- right radial checked. Site with no redness or bleeding. pt discharged to home via Wheelchair with family.
Heart cath results reviewed-nonobstructive coronary artery disease. We will go over these results with her in detail when we see her in follow-up on 12/1/2021.
Patient pre-assessment complete for [facility] scheduled for 1100, arrival time 0900. Patient verified using . Patient instructed to bring a list of all home medications on the day of procedure. NPO status reinforced. Patient informed to take a full dose aspirin 325mg  or 81 mg x 4 on the day of procedure. Patient instructed to only take atenolol, cymbalta, cardizem,neurontin. Instructed they can take all other medications excluding vitamins & supplements. Patient verbalizes understanding of all instructions & denies any questions at this time.
Pt ambulated to bathroom at this time. Pt served lunch tray.
TRANSFER - IN REPORT:    Verbal report received from Aaron moore on Fala  being received from CCL(unit) for routine progression of care      Report consisted of patients Situation, Background, Assessment and   Recommendations(SBAR). Information from the following report(s) SBAR was reviewed with the receiving nurse. Opportunity for questions and clarification was provided. Assessment completed upon patients arrival to unit and care assumed.
TRANSFER - OUT REPORT:    Verbal report given to Wilburn Severin, RN(name) on Shruti Crowe  being transferred to [facility](unit) for routine progression of care       Report consisted of patients Situation, Background, Assessment and   Recommendations(SBAR). Information from the following report(s) SBAR was reviewed with the receiving nurse.     [facility] with Dr Durga Fontaine wired prox RCA - negative  R Radial  TR band at 12mL  Versed 2mg  Heparin 5000 units in radial cocktail  Angiomax bolus and gtt off at 1112
Fall with Harm Risk

## 2022-03-02 LAB
AVERAGE GLUCOSE: NORMAL
HBA1C MFR BLD: 6.4 %

## 2022-03-18 PROBLEM — J44.9 MODERATE COPD (CHRONIC OBSTRUCTIVE PULMONARY DISEASE) (HCC): Status: ACTIVE | Noted: 2017-01-27

## 2022-03-18 PROBLEM — J44.1 COPD EXACERBATION (HCC): Status: ACTIVE | Noted: 2019-09-27

## 2022-03-18 PROBLEM — R53.83 FATIGUE: Status: ACTIVE | Noted: 2017-03-23

## 2022-03-18 PROBLEM — E78.2 MIXED HYPERLIPIDEMIA: Status: ACTIVE | Noted: 2019-12-19

## 2022-03-18 PROBLEM — R07.9 CHEST PAIN AT REST: Status: ACTIVE | Noted: 2017-10-02

## 2022-03-19 PROBLEM — I10 ESSENTIAL HYPERTENSION WITH GOAL BLOOD PRESSURE LESS THAN 130/85: Status: ACTIVE | Noted: 2017-03-23

## 2022-03-19 PROBLEM — G47.33 OSA (OBSTRUCTIVE SLEEP APNEA): Status: ACTIVE | Noted: 2017-07-26

## 2022-03-19 PROBLEM — R00.2 PALPITATIONS: Status: ACTIVE | Noted: 2020-09-08

## 2022-03-19 PROBLEM — Z51.5 PALLIATIVE CARE PATIENT: Status: ACTIVE | Noted: 2020-05-05

## 2022-03-19 PROBLEM — J18.9 PNEUMONIA DUE TO INFECTIOUS ORGANISM: Status: ACTIVE | Noted: 2020-03-10

## 2022-03-19 PROBLEM — F17.200 TOBACCO USE DISORDER: Status: ACTIVE | Noted: 2017-07-26

## 2022-03-19 PROBLEM — I47.9 PAROXYSMAL TACHYCARDIA (HCC): Status: ACTIVE | Noted: 2020-09-08

## 2022-03-19 PROBLEM — E11.9 DIABETES MELLITUS (HCC): Status: ACTIVE | Noted: 2017-07-26

## 2022-03-19 PROBLEM — G47.30 SLEEP APNEA: Status: ACTIVE | Noted: 2018-03-22

## 2022-03-20 PROBLEM — R07.89 CHEST DISCOMFORT: Status: ACTIVE | Noted: 2018-03-22

## 2022-03-20 PROBLEM — G47.34 NOCTURNAL HYPOXIA: Status: ACTIVE | Noted: 2017-07-25

## 2022-03-20 PROBLEM — I20.0 UNSTABLE ANGINA (HCC): Status: ACTIVE | Noted: 2021-10-29

## 2022-04-21 ENCOUNTER — APPOINTMENT (RX ONLY)
Dept: URBAN - METROPOLITAN AREA CLINIC 329 | Facility: CLINIC | Age: 72
Setting detail: DERMATOLOGY
End: 2022-04-21

## 2022-04-21 DIAGNOSIS — L57.8 OTHER SKIN CHANGES DUE TO CHRONIC EXPOSURE TO NONIONIZING RADIATION: ICD-10-CM

## 2022-04-21 DIAGNOSIS — L81.4 OTHER MELANIN HYPERPIGMENTATION: ICD-10-CM

## 2022-04-21 DIAGNOSIS — D485 NEOPLASM OF UNCERTAIN BEHAVIOR OF SKIN: ICD-10-CM

## 2022-04-21 DIAGNOSIS — D22 MELANOCYTIC NEVI: ICD-10-CM

## 2022-04-21 DIAGNOSIS — L72.0 EPIDERMAL CYST: ICD-10-CM

## 2022-04-21 DIAGNOSIS — L57.0 ACTINIC KERATOSIS: ICD-10-CM

## 2022-04-21 DIAGNOSIS — Z85.828 PERSONAL HISTORY OF OTHER MALIGNANT NEOPLASM OF SKIN: ICD-10-CM

## 2022-04-21 PROBLEM — D22.61 MELANOCYTIC NEVI OF RIGHT UPPER LIMB, INCLUDING SHOULDER: Status: ACTIVE | Noted: 2022-04-21

## 2022-04-21 PROBLEM — D22.62 MELANOCYTIC NEVI OF LEFT UPPER LIMB, INCLUDING SHOULDER: Status: ACTIVE | Noted: 2022-04-21

## 2022-04-21 PROBLEM — D48.5 NEOPLASM OF UNCERTAIN BEHAVIOR OF SKIN: Status: ACTIVE | Noted: 2022-04-21

## 2022-04-21 PROCEDURE — 17110 DESTRUCTION B9 LES UP TO 14: CPT

## 2022-04-21 PROCEDURE — ? SUNSCREEN RECOMMENDATIONS

## 2022-04-21 PROCEDURE — ? ELECTRODESICCATION

## 2022-04-21 PROCEDURE — 99213 OFFICE O/P EST LOW 20 MIN: CPT | Mod: 25

## 2022-04-21 PROCEDURE — 17003 DESTRUCT PREMALG LES 2-14: CPT | Mod: 59

## 2022-04-21 PROCEDURE — ? LIQUID NITROGEN

## 2022-04-21 PROCEDURE — 11103 TANGNTL BX SKIN EA SEP/ADDL: CPT | Mod: 59

## 2022-04-21 PROCEDURE — ? COUNSELING

## 2022-04-21 PROCEDURE — 17000 DESTRUCT PREMALG LESION: CPT | Mod: 59

## 2022-04-21 PROCEDURE — ? BIOPSY BY SHAVE METHOD

## 2022-04-21 PROCEDURE — 11102 TANGNTL BX SKIN SINGLE LES: CPT | Mod: 59

## 2022-04-21 ASSESSMENT — LOCATION DETAILED DESCRIPTION DERM
LOCATION DETAILED: RIGHT VENTRAL PROXIMAL FOREARM
LOCATION DETAILED: RIGHT DISTAL RADIAL DORSAL FOREARM
LOCATION DETAILED: RIGHT CENTRAL MALAR CHEEK
LOCATION DETAILED: LEFT PROXIMAL DORSAL FOREARM
LOCATION DETAILED: RIGHT DISTAL DORSAL FOREARM
LOCATION DETAILED: RIGHT DISTAL POSTERIOR UPPER ARM
LOCATION DETAILED: RIGHT INFERIOR FOREHEAD
LOCATION DETAILED: LEFT CENTRAL ZYGOMA
LOCATION DETAILED: LEFT SUPERIOR CENTRAL MALAR CHEEK
LOCATION DETAILED: LEFT VENTRAL PROXIMAL FOREARM
LOCATION DETAILED: LEFT MEDIAL TEMPLE
LOCATION DETAILED: LEFT ANTECUBITAL SKIN

## 2022-04-21 ASSESSMENT — LOCATION SIMPLE DESCRIPTION DERM
LOCATION SIMPLE: LEFT CHEEK
LOCATION SIMPLE: LEFT ZYGOMA
LOCATION SIMPLE: RIGHT FOREARM
LOCATION SIMPLE: RIGHT CHEEK
LOCATION SIMPLE: RIGHT FOREHEAD
LOCATION SIMPLE: LEFT TEMPLE
LOCATION SIMPLE: RIGHT POSTERIOR UPPER ARM
LOCATION SIMPLE: LEFT FOREARM
LOCATION SIMPLE: LEFT UPPER ARM

## 2022-04-21 ASSESSMENT — LOCATION ZONE DERM
LOCATION ZONE: FACE
LOCATION ZONE: ARM

## 2022-04-21 NOTE — PROCEDURE: ELECTRODESICCATION
Post-Care Instructions: I reviewed with the patient in detail post-care instructions. Patient is to wear sunprotection, and avoid picking at any of the treated lesions. Pt may apply Vaseline to crusted or scabbing areas
Medical Necessity Clause: This procedure was medically necessary because the lesions that were treated were:
Anesthesia Type: 1% lidocaine with epinephrine
Medical Necessity Information: It is in your best interest to select a reason for this procedure from the list below. All of these items fulfill various CMS LCD requirements except the new and changing color options.
Consent: The patient's consent was obtained including but not limited to risks of crusting, scabbing, blistering, scarring, darker or lighter pigmentary change, recurrence, incomplete removal and infection.
Detail Level: Simple
Include Z78.9 (Other Specified Conditions Influencing Health Status) As An Associated Diagnosis?: No

## 2022-04-21 NOTE — PROCEDURE: MIPS QUALITY
Detail Level: Detailed
Quality 110: Preventive Care And Screening: Influenza Immunization: Influenza Immunization previously received during influenza season
Quality 47: Advance Care Plan: Advance Care Planning discussed and documented; advance care plan or surrogate decision maker documented in the medical record.
Quality 130: Documentation Of Current Medications In The Medical Record: Current Medications Documented

## 2022-06-20 ENCOUNTER — OFFICE VISIT (OUTPATIENT)
Dept: NEUROSURGERY | Age: 72
End: 2022-06-20
Payer: MEDICARE

## 2022-06-20 VITALS
HEART RATE: 110 BPM | BODY MASS INDEX: 28.85 KG/M2 | OXYGEN SATURATION: 92 % | TEMPERATURE: 98.5 F | HEIGHT: 64 IN | DIASTOLIC BLOOD PRESSURE: 47 MMHG | WEIGHT: 169 LBS | SYSTOLIC BLOOD PRESSURE: 112 MMHG

## 2022-06-20 DIAGNOSIS — M48.02 CERVICAL SPINAL STENOSIS: ICD-10-CM

## 2022-06-20 DIAGNOSIS — M50.30 DEGENERATIVE DISC DISEASE, CERVICAL: ICD-10-CM

## 2022-06-20 DIAGNOSIS — M54.2 NECK PAIN: Primary | ICD-10-CM

## 2022-06-20 DIAGNOSIS — F17.200 SMOKING: ICD-10-CM

## 2022-06-20 PROCEDURE — 3017F COLORECTAL CA SCREEN DOC REV: CPT | Performed by: NEUROLOGICAL SURGERY

## 2022-06-20 PROCEDURE — G8400 PT W/DXA NO RESULTS DOC: HCPCS | Performed by: NEUROLOGICAL SURGERY

## 2022-06-20 PROCEDURE — 99204 OFFICE O/P NEW MOD 45 MIN: CPT | Performed by: NEUROLOGICAL SURGERY

## 2022-06-20 PROCEDURE — 1123F ACP DISCUSS/DSCN MKR DOCD: CPT | Performed by: NEUROLOGICAL SURGERY

## 2022-06-20 PROCEDURE — G8427 DOCREV CUR MEDS BY ELIG CLIN: HCPCS | Performed by: NEUROLOGICAL SURGERY

## 2022-06-20 PROCEDURE — G8417 CALC BMI ABV UP PARAM F/U: HCPCS | Performed by: NEUROLOGICAL SURGERY

## 2022-06-20 PROCEDURE — 1090F PRES/ABSN URINE INCON ASSESS: CPT | Performed by: NEUROLOGICAL SURGERY

## 2022-06-20 PROCEDURE — 4004F PT TOBACCO SCREEN RCVD TLK: CPT | Performed by: NEUROLOGICAL SURGERY

## 2022-06-20 RX ORDER — FLUTICASONE PROPIONATE AND SALMETEROL 100; 50 UG/1; UG/1
1 POWDER RESPIRATORY (INHALATION) EVERY 12 HOURS
COMMUNITY

## 2022-06-20 RX ORDER — PRAMIPEXOLE DIHYDROCHLORIDE 0.25 MG/1
0.25 TABLET ORAL 3 TIMES DAILY
COMMUNITY

## 2022-06-20 ASSESSMENT — PATIENT HEALTH QUESTIONNAIRE - PHQ9
SUM OF ALL RESPONSES TO PHQ QUESTIONS 1-9: 2
2. FEELING DOWN, DEPRESSED OR HOPELESS: 1
SUM OF ALL RESPONSES TO PHQ9 QUESTIONS 1 & 2: 2
SUM OF ALL RESPONSES TO PHQ QUESTIONS 1-9: 2
1. LITTLE INTEREST OR PLEASURE IN DOING THINGS: 1

## 2022-06-20 NOTE — PROGRESS NOTES
[facility] CL[facility]IC NOTE:   History of Present Illness:    CC: Neck pain and stiffness    Fide Lowe is a 70 y.o. female who presents today for evaluation of neck pain and stiffness. She has not recently undergone any cervical epidural steroid injections or seen a pain management physician. She has not recently worked with physical therapy. She is currently retired. 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 She is currently retired in [employer]. She is not eager to undergo any surgery. She has a history of COPD is an everyday poker smoking approximately 1 pack/day. She has tried gabapentin. The patient has an MRI cervical spine without contrast performed at [facility] on 4/29/2022 that demonstrates degenerative cervical disc disease worse at C4-C5 C5-C6 and C6-C7. At C4-C5 there is a broad-based disc osteophyte complex results in mild central spinal stenosis. At C6-C7 there is a broad-based disc osteophyte complex that slightly effaces the ventral CSF signal at C6-C7 there is a broad-based and slightly right eccentric disc protrusion that effaces the right greater than left thecal sac. There is spinal cord is of normal caliber and intensity throughout there is no intrinsic cord signal change. At C6-C7 on the right there is neuroforaminal stenosis and moderate left neuroforaminal stenosis. At C5-C6 there is moderate to advanced right neuroforaminal stenosis. She endorses some popping and stiffness in her neck.     Past Medical History:   Diagnosis Date    Acute respiratory failure ([address] 1/29/2015    Intubated 1/29/2015    Acute respiratory failure ([address] 1/29/2015    Intubated 1/29/2015     Cancer ([address]     basal cell,squamous cell    Chest pain 7/7/2016    Chiari I malformation (HCC)     history of    COPD exacerbation ([address] 9/14/12-9/17/12    COPD exacerbation ([address] 4/2013 to 5/2/13 hospitalization    Coronary artery disease 3/3/2015    [facility]ox[facility] 2016    Pneumonia 2012    Pneumothorax     HX. of  2 on the left- required chest tube     Past Surgical History:   Procedure Laterality Date    CARDIAC CATHETERIZATION      mild nonobstructive CAD    CATARACT REMOVAL      HEENT      Bilateral cataracts and bleth    HYSTERECTOMY (CERVIX STATUS UNKNOWN)      [address]    right shoulder sx; left thumb    OTHER SURGICAL HISTORY      left thumb sx,forehead resection of squamous cell    SEPTOPLASTY  2010    SHOULDER ARTHROSCOPY       Allergies   Allergen Reactions    Azithromycin Diarrhea    Sulfa Antibiotics Itching     NOT ALLERGIC      Family History   Problem Relation Age of Onset    Cancer Sister         exophageal    Other Father         neurological degeneration    Cancer Mother         breast      Social History     Socioeconomic History    Marital status: Single     Spouse name: Not on file    Number of children: Not on file    Years of education: Not on file    Highest education level: Not on file   Occupational History    Not on file   Tobacco Use    Smoking status: Current Every Day Smoker     Packs/day: 1.00     Last attempt to quit: 2019     Years since quittin.7    Smokeless tobacco: Current User   Substance and Sexual Activity    Alcohol use: Yes     Alcohol/week: 1.0 standard drink    Drug use: No    Sexual activity: Not on file   Other Topics Concern    Not on file   Social History Narrative    Lives with granddaughter. Retired . Social Determinants of Health     Financial Resource Strain:     Difficulty of Paying Living Expenses: Not on file   Food Insecurity:     Worried About Running Out of Food in the Last Year: Not on file    Ann of Food in the Last Year: Not on file   Transportation Needs:     Lack of Transportation (Medical):  Not on file    Lack of Transportation (Non-Medical):  Not on file   Physical Activity:     Days of Exercise per Week: Not on file    Minutes of Exercise per Session: Not on file   Stress:     Feeling of Stress : Not on file   Social Connections:     Frequency of Communication with Friends and Family: Not on file    Frequency of Social Gatherings with Friends and Family: Not on file    Attends [employer] Services: Not on file    Active Member of [address] [place] [address] or Organizations: Not on file    Attends Club or Organization Meetings: Not on file    Marital Status: Not on file   Intimate Partner Violence:     Fear of Current or Ex-Partner: Not on file    Emotionally Abused: Not on file    Physically Abused: Not on file    Sexually Abused: Not on file   Housing Stability:     Unable to Pay for Housing in the Last Year: Not on file    Number of [place] in the Last Year: Not on file    Unstable Housing in the Last Year: Not on file     Current Outpatient Medications   Medication Sig Dispense Refill    Carboxymeth-Glyc-Polysorb PF 0.5-1-0.5 % SOLN Apply to eye      Lifitegrast 5 % SOLN Apply to eye      calcium-vitamin D (OSCAL) 250-125 MG-UNIT per tablet Take 1 tablet by mouth daily      fluticasone-salmeterol (ADVIAR) 100-50 MCG/ACT AEPB diskus inhaler Inhale 1 puff into the lungs every 12 hours      pramipexole (MIRAPEX) 0.25 MG tablet Take 0.25 mg by mouth 3 times daily      albuterol sulfate  (90 Base) MCG/ACT inhaler Inhale 2 puffs into the lungs every 4 hours as needed      aspirin 81 MG EC tablet Take by mouth daily      atenolol (TENORMIN) 25 MG tablet Take 25 mg by mouth daily      budesonide-formoterol (SYMBICORT) 160-4.5 MCG/ACT AERO Inhale 2 puffs into the lungs 2 times daily      buPROPion (WELLBUTRIN SR) 200 MG extended release tablet Take 200 mg by mouth 2 times daily      dilTIAZem (TIAZAC) 360 MG extended release capsule Take 360 mg by mouth daily      DULoxetine (CYMBALTA) 30 MG extended release Flex 5 5    Quads  5 5    Hamstrings 5 5    Dorsiflex 5 5    Plantarflex 5 5    EHL  5 5  Sensation intact to light touch and pin-prick   DTR 2+  No clonus or babinski present   No Whitten's sign present bilaterally   Gait ambulates with a slow antalgic gait. Assessment & Plan:  Cornel Mascorro is a 70 y.o. female who presents today for evaluation of neck pain and stiffness. I have independently reviewed and interpreted the patient's MRI cervical spine without contrast performed at [facility] on 4/29/2022 that demonstrates degenerative cervical disc disease worse at C4-C5 C5-C6 and C6-C7. At C4-C5 there is a broad-based disc osteophyte complex results in mild central spinal stenosis. At C6-C7 there is a broad-based disc osteophyte complex that slightly effaces the ventral CSF signal at C6-C7 there is a broad-based and slightly right eccentric disc protrusion that effaces the right greater than left thecal sac. There is spinal cord is of normal caliber and intensity throughout there is no intrinsic cord signal change. At C6-C7 on the right there is neuroforaminal stenosis and moderate left neuroforaminal stenosis. At C5-C6 there is moderate to advanced right neuroforaminal stenosis. At this time although the patient does have some degenerative disc disease and some cervical spinal stenosis. There is no intrinsic cord signal change and the patient is not interested in undergoing surgery at this time. I will therefore refer her to physical therapy 2 times a week for 8 weeks time. I will also refer her to pain management for consideration of cervical epidural steroid injections and multimodality pain management. I have counseled the patient about the importance of smoking cessation's impact on overall health and well being. I also counseled them that absolute smoking cessation is necessary prior to the performance of any spinal fusion surgery. ICD-10-CM    1.  Neck pain  M54.2 External Referral To Pain Medicine     [facility], [address] [facility]   2. Degenerative disc disease, cervical  M50.30    3. Cervical spinal stenosis  M48.02    4. Smoking  F17.200        Andrei Kay, [address] [place] [address]     Notes are transcribed with [address] [place] [address], a medical voice recording dictation service, and may contain minor errors.

## 2022-06-28 ENCOUNTER — CLINICAL DOCUMENTATION (OUTPATIENT)
Dept: NEUROSURGERY | Age: 72
End: 2022-06-28

## 2022-06-30 ENCOUNTER — TELEPHONE (OUTPATIENT)
Dept: PULMONOLOGY | Age: 72
End: 2022-06-30

## 2022-06-30 NOTE — TELEPHONE ENCOUNTER
Patient missed appt on 06/29 with Virgle Dials and was reschedule to 07/18. Patient stated that she needed refills for prednisone.

## 2022-07-01 ENCOUNTER — HOSPITAL ENCOUNTER (OUTPATIENT)
Dept: PHYSICAL THERAPY | Age: 72
Setting detail: RECURRING SERIES
End: 2022-07-01
Payer: MEDICARE

## 2022-07-01 RX ORDER — PREDNISONE 10 MG/1
20 TABLET ORAL
Qty: 30 TABLET | Refills: 0 | Status: SHIPPED | OUTPATIENT
Start: 2022-07-01 | End: 2022-07-18

## 2022-07-01 NOTE — TELEPHONE ENCOUNTER
Last seen: 4/8/22    Established palliative care patient, needs refills of steroid, daily dose of 20mg to last until next appt. Electronic Rx sent to pharmacy on file.

## 2022-07-01 NOTE — PROGRESS NOTES
Ade Fernández  : 1950  Primary: Medicare Part A And B  Secondary: [address] [facility] [address]  Phone: [phone]  Fax: [phone] No data recorded  No data recorded    PT Visit Info:  No data recorded       OUTPATIENT PHYSICAL THERAPY 2022     Appt Desk   Episode   MyChart      Ms. Kori Cr cancelled her initial eval with no reason given.      Cheryle Elliott, PT    Future Appointments   Date Time Provider Umm Hernandez   2022  7:00 PM Cheryle Elliott, [place] [facility] AND [facility]   2022  3:15 PM Angi Alcazar, [facility]   2022  3:15 PM Reid Mandujano MD [facility]   2022  2:40 PM MICK De Oliveira - [facility]

## 2022-07-07 ENCOUNTER — HOSPITAL ENCOUNTER (OUTPATIENT)
Dept: PHYSICAL THERAPY | Age: 72
Setting detail: RECURRING SERIES
Discharge: HOME | End: 2022-07-10
Payer: MEDICARE

## 2022-07-07 PROCEDURE — 97162 PT EVAL MOD COMPLEX 30 MIN: CPT

## 2022-07-07 PROCEDURE — 97110 THERAPEUTIC EXERCISES: CPT

## 2022-07-07 ASSESSMENT — PAIN DESCRIPTION - PAIN TYPE: TYPE: CHRONIC PAIN

## 2022-07-07 ASSESSMENT — PAIN SCALES - GENERAL: PAINLEVEL_OUTOF10: 5

## 2022-07-07 NOTE — PROGRESS NOTES
Timur Curry  : 1950  Primary: Medicare Part A And B  Secondary: [address] @ [address] [address]  Phone: [phone]  Fax: [phone] Plan Frequency: 2x/wk    Plan of Care/Certification Expiration Date: 22      PT Visit Info: Total # of Visits to Date: 1  No Show: 0  Canceled Appointment: 1      OUTPATIENT PHYSICAL THERAPY:OP NOTE TYPE: Treatment Note 2022       Episode  }Appt Desk              Treatment Diagnosis:  Cervicalgia (M54.2)  Headache (R51)  Pain in thoracic spine (M54.6)  Muscle Weakness, Generalized (M62.81)  Medical/Referring Diagnosis:  Cervicalgia [M54.2]  Referring Physician:  Brian Carlin MD MD Orders:  PT Eval and Treat   Date of Onset:  Onset Date: 55 (approx)     Allergies:   Azithromycin and Sulfa antibiotics  Restrictions/Precautions:  Restrictions/Precautions: Other (comment) (none)  No data recorded   Interventions Planned (Treatment may consist of any combination of the following):    Current Treatment Recommendations: Strengthening; Functional mobility training; Balance training; Manual Therapy - Joint Manipulation; Manual Therapy - Soft Tissue Mobilization; Pain management; Home exercise program; Modalities; Integrated dry needling; Therapeutic activities; Endurance training; Neuromuscular re-education     Subjective Comments: See Initial Evaluation for details     Initial:}    5/10Post Session:        /10  Medications Last Reviewed:  2022  Updated Objective Findings:  See Initial Evaluation for details  Treatment     THERAPEUTIC EXERCISE: (30 minutes):    Exercises per grid below to improve mobility, strength, balance, and coordination. Required minimal verbal cues to promote proper body mechanics. Progressed resistance and repetitions as indicated.      Date:  22 Date:   Date:     Activity/Exercise Parameters Parameters Parameters   education Diagnosis, prognosis, POC, 7/18/2022  2:40 PM MICK Garza - CNP [facility]

## 2022-07-07 NOTE — THERAPY EVALUATION
Neel Roberto  : 1950  Primary: Medicare Part A And B  Secondary: [address] [place] [address]  Phone: [phone]  Fax: [phone] Plan Frequency: 2x/wk    Plan of Care/Certification Expiration Date: 22      PT Visit Info:    No data recorded    OUTPATIENT PHYSICAL THERAPY:OP NOTE TYPE: Initial Assessment 2022               Episode  Appt Desk         ICD-10: Treatment Diagnosis: Cervicalgia (M54.2)  Headache (R51)  Pain in thoracic spine (M54.6)  Muscle Weakness, Generalized (M62.81)    * No diagnoses found *  Medical/Referring Diagnosis:  Cervicalgia [M54.2]  Referring Physician:  Ron Nunez MD MD Orders:  PT Eval and Treat   Return MD Appt:  TBD  Date of Onset:  No data recorded   Allergies:  Azithromycin and Sulfa antibiotics  Restrictions/Precautions:    Restrictions/Precautions: Other (comment) (none)  No data recorded   Medications Last Reviewed:  2022     SUBJECTIVE   History of Injury/Illness (Reason for Referral):  Pt reports long-standing history of neck and back pain. Pt reports having a chiari malformation which causes pain with over exertion in addition to history of fractures to her neck as a child sustained in a fall. Many years later, she sustained what sounds like a compression fracture to her thoracic spine when lifting a car seat. Over the years, she has tried many different treatments and has been through pain management and nothing has helped so far. Pt has tried PT previously and feels that it hurt more than it was helpful. However, she states mechanical traction did provide some relief but her over the door home traction device isn't helpful. Overall, pt is very frustrated with healthcare system and the process of being treated for her pain.     Patient Stated Goal(s):  Improve quality of life, improve function and reduce pain if possible  Initial:     5/10 Post Session: demonstrated good active motion of neck and B shoulders     Right Left   Cervical     Flexion      ---   Extension      ---   Sidebending       Rotation       Shoulder     Flexion       Abduction       External rotation       Internal rotation       Elbow     Flexion       Extension        Strength (note: write under \"RUE/LUE Strength Comment)             Special Tests  [x] This section not tested due to centrally sensitized pain presentation    Test/Result           Joint Accessory Motion Assessment/Palpation   [x] This section not tested due to centrally sensitized pain presentation    Joint         Palpation           Neurological Screen  [x] This section not tested    Test/Result   Dermatomes:       L UE:        R UE:        L LE:        R LE:    Myotomes:       L UE:        R UE:        L LE:        R LE:    Reflexes:       Upper Quarter:        Lower Quarter            Functional Mobility  [x] This section not tested    Test Result   Timed up and go     30 second sit to stand (note: write under 5 time sit to stand)   (reps)   6 minute walk test     Single Leg Balance         Test Comments   Sit to stand    Squat    Stair Negotiation          ASSESSMENT   Initial Assessment: Collin Crews presents to physical therapy with decreased strength, ROM, joint mobility, functional mobility. These S/S are consistent with referring diagnoses with a centrally sensitized pain presentation due to chronicity of pain. Patient will benefit from skilled physical therapy for manual therapeutic techniques (as appropriate), therapeutic exercises and activities, neuromuscular re-education, and comprehensive home exercises program to address current impairments and functional limitations. Interventions will primarily focus on general, graded exercise to increase activity tolerance.      Fide Abraham Mosquedaclarisse will benefit from skilled PT (medically necessary) in order to address above deficits affecting participation in basic ADLs and overall functional tolerance. Problem List: (Impacting functional limitations): Body Structures, Functions, Activity Limitations Requiring Skilled Therapeutic Intervention: Decreased functional mobility ; Decreased ADL status; Decreased ROM; Decreased strength; Decreased body mechanics; Decreased endurance; Decreased high-level IADLs; Decreased fine motor control; Increased pain; Decreased posture     Therapy Prognosis:   Therapy Prognosis: Fair     Assessment Complexity:   Medium Complexity  PLAN   Effective Dates: 7/7/2022   TO Plan of Care/Certification Expiration Date: 09/07/22     Frequency/Duration: Plan Frequency: 2x/wk     Interventions Planned (Treatment may consist of any combination of the following):    Current Treatment Recommendations: Strengthening; Functional mobility training; Balance training; Manual Therapy - Joint Manipulation; Manual Therapy - Soft Tissue Mobilization; Pain management; Home exercise program; Modalities; Integrated dry needling; Therapeutic activities; Endurance training; Neuromuscular re-education     Goals: (Goals have been discussed and agreed upon with patient.)  Short-Term Functional Goals: Time Frame: 4 weeks  1. Pt will improve NDI to <= 16/50 to demonstrate improved functional abilities. 2. Pt will improve cervical ROM to at least 50% of normal in all directions to improve ability to perform ADLs and drive. 3. Pt will participate in progressive walking program for desensitization and general health. Discharge Goals: Time Frame: 8 weeks  1. Pt will improve NDI to <= 9/50 to demonstrate improved functional abilities. 2. Pt will lift at least 10 lbs from chest to overhead to demonstrate improved functional strength. 3. Pt will carry at least 15 lbs in each hand at least 250 feet to demonstrate improved functional strength. Outcome Measure:    Tool Used: Neck Disability Index (NDI)  Score:  Initial: 23/50  Most Recent: X/50 (Date: -- )   Interpretation of Score: The Neck Disability Index is a revised form of the Oswestry Low Back Pain Index and is designed to measure the activities of daily living in person's with neck pain. Each section is scored on a 0-5 scale, 5 representing the greatest disability. The scores of each section are added together for a total score of 50. Medical Necessity:    Skilled intervention continues to be required due to current impairments. Reason For Services/Other Comments:  Patient continues to require skilled intervention due to patient continues to present with impairments assessed at initial evaluation and requiring skilled physical therapy to meet goals for PT. Total Duration:  Time In: 1515  Time Out: 8135    Regarding [facility] therapy, I certify that the treatment plan above will be carried out by a therapist or under their direction. Thank you for this referral,  Paula Bruner, PT     Referring Physician Signature:  Lupe Aguero MD        Post Session Pain  Charge Capture  PT Visit Info  POC Link  Treatment Note Link  MD Guidelines  [facility]

## 2022-07-08 ENCOUNTER — OFFICE VISIT (OUTPATIENT)
Dept: CARDIOLOGY CLINIC | Age: 72
End: 2022-07-08
Payer: MEDICARE

## 2022-07-08 VITALS
HEIGHT: 64 IN | WEIGHT: 169.7 LBS | HEART RATE: 92 BPM | SYSTOLIC BLOOD PRESSURE: 110 MMHG | DIASTOLIC BLOOD PRESSURE: 62 MMHG | BODY MASS INDEX: 28.97 KG/M2

## 2022-07-08 DIAGNOSIS — I47.9 PAROXYSMAL TACHYCARDIA (HCC): ICD-10-CM

## 2022-07-08 DIAGNOSIS — G47.33 OSA (OBSTRUCTIVE SLEEP APNEA): ICD-10-CM

## 2022-07-08 DIAGNOSIS — I25.10 CORONARY ARTERY DISEASE INVOLVING NATIVE CORONARY ARTERY OF NATIVE HEART WITHOUT ANGINA PECTORIS: Primary | ICD-10-CM

## 2022-07-08 DIAGNOSIS — I10 ESSENTIAL HYPERTENSION WITH GOAL BLOOD PRESSURE LESS THAN 130/85: ICD-10-CM

## 2022-07-08 DIAGNOSIS — E78.2 MIXED HYPERLIPIDEMIA: ICD-10-CM

## 2022-07-08 DIAGNOSIS — F17.200 TOBACCO USE DISORDER: ICD-10-CM

## 2022-07-08 PROBLEM — R07.9 CHEST PAIN AT REST: Status: RESOLVED | Noted: 2017-10-02 | Resolved: 2022-07-08

## 2022-07-08 PROCEDURE — G8417 CALC BMI ABV UP PARAM F/U: HCPCS | Performed by: INTERNAL MEDICINE

## 2022-07-08 PROCEDURE — G8427 DOCREV CUR MEDS BY ELIG CLIN: HCPCS | Performed by: INTERNAL MEDICINE

## 2022-07-08 PROCEDURE — 99214 OFFICE O/P EST MOD 30 MIN: CPT | Performed by: INTERNAL MEDICINE

## 2022-07-08 PROCEDURE — 4004F PT TOBACCO SCREEN RCVD TLK: CPT | Performed by: INTERNAL MEDICINE

## 2022-07-08 PROCEDURE — 1090F PRES/ABSN URINE INCON ASSESS: CPT | Performed by: INTERNAL MEDICINE

## 2022-07-08 PROCEDURE — 1123F ACP DISCUSS/DSCN MKR DOCD: CPT | Performed by: INTERNAL MEDICINE

## 2022-07-08 PROCEDURE — G8399 PT W/DXA RESULTS DOCUMENT: HCPCS | Performed by: INTERNAL MEDICINE

## 2022-07-08 PROCEDURE — 3017F COLORECTAL CA SCREEN DOC REV: CPT | Performed by: INTERNAL MEDICINE

## 2022-07-08 RX ORDER — ROSUVASTATIN CALCIUM 10 MG/1
10 TABLET, COATED ORAL DAILY
Qty: 90 TABLET | Refills: 3 | Status: SHIPPED | OUTPATIENT
Start: 2022-07-08

## 2022-07-08 ASSESSMENT — ENCOUNTER SYMPTOMS
HEMOPTYSIS: 0
WHEEZING: 0
DOUBLE VISION: 0
ABDOMINAL PAIN: 0
STRIDOR: 0
EYE REDNESS: 0
HOARSE VOICE: 0
HEMATEMESIS: 0
HEMATOCHEZIA: 0

## 2022-07-08 NOTE — PROGRESS NOTES
[facility] CARDIOLOGY  [address] [facility], [address]  PHONE: [phone]          22    NAME:  Lupe Blue  : 1950  MRN: [id number]         SUBJECTIVE:   Lupe Blue is a 70 y.o. female seen for a visit regarding the following:     Chief Complaint   Patient presents with    Coronary Artery Disease     6 month fu    Irregular Heart Beat     fu           HPI:    Cardiology problem list:   1.  Coronary artery disease   -Coronary angiography on 2012 showed a normal left main, LAD with no disease, circumflex with mid 30% disease, dominant RCA with no disease, EF of 60%. -Coronary angiography-2021-LAD with 20% disease, circumflex with 20% disease, proximal RCA with 60% disease with negative DFR of 0.93   2.  Inappropriate sinus tachycardia/paroxysmal tachycardia   -echocardiogram from 2017 showed an EF of 65 to 70% with no regional wall motion abnormalities, grade 1 diastolic dysfunction, normal atrial sizes, mild TR3.  COPD on oxygen. 4.  Former smoker   5.  Hypertension   6.  Hyperlipidemia-diet controlled    7.  Obstructive sleep apnea- on CPAP   8. COPD            -Former patient of Dr. Leon Tisha       I saw Ms. Isaiah Girard is a pleasant 66-year-old woman in cardiovascular follow-up on 2022. We last saw her in follow-up of paroxysmal tachycardia/inappropriate sinus tachycardia, hypertension, nonobstructive coronary artery  disease, hyperlipidemia.  She has COPD and is on home oxygen.  She has sleep apnea and follows with pulmonary.    -At her last visit, we made no significant changes with her medical therapy besides starting her on rosuvastatin to help with plaque stabilization for nonobstructive coronary artery disease. CAD:   -Coronary angiography showed minor nonobstructive coronary artery disease in the LAD and left circumflex coronary arteries but the proximal RCA had 60% long disease but DFR on this was negative at 0.93.   No further significant complaints of angina since we last met. She appears to be tolerating her atenolol well as antianginal therapy.     -She says she has done well for the most with no true angina, blood pressures and heart rates have been well controlled. She has some dyspnea on exertion from her COPD. Uses her CPAP every night with no limitations. No lower extremity edema orthopnea  PND. Tachycardia/palpitations-well-controlled with atenolol. Overall much better controlled after she started this. No complaints of presyncope or syncope. No TIAs or strokelike symptoms    Past Medical History, Past Surgical History, Family history, Social History, and Medications were all reviewed with the patient today and updated as necessary.      Allergies   Allergen Reactions    Azithromycin Diarrhea    Sulfa Antibiotics Itching     NOT ALLERGIC     Patient Active Problem List   Diagnosis    Polycythemia    [facility]    Fatigue    Moderate COPD (chronic obstructive pulmonary disease) ([facility])    Mixed hyperlipidemia    Paroxysmal tachycardia ([facility])    Tobacco use disorder    Chronic respiratory failure ([facility])    Pneumonia due to infectious organism    Coronary artery disease    Chronic sinusitis    ADHD (attention deficit hyperactivity disorder)    Restless leg syndrome    Depression    PERFECTO (obstructive sleep apnea)    Palliative care patient    Debility    Palpitations    Diabetes mellitus ([address]    Essential hypertension with goal blood pressure less than 130/85    Right heart enlargement    Nocturnal hypoxia    Chest discomfort    Fibromyalgia     Past Medical History:   Diagnosis Date    Acute respiratory failure ([address] 1/29/2015    Intubated 1/29/2015    Acute respiratory failure ([address] 1/29/2015    Intubated 1/29/2015     Cancer ([address]     basal cell,squamous cell    Chest pain 7/7/2016    Chiari I malformation ([facility])     history of    COPD exacerbation ([address] 9/14/12-9/17/12    COPD exacerbation ([facility] 2013 to 13    hospitalization    Coronary artery disease 3/3/2015    Paroxysmal tachycardia ([facility] 2016    Pneumonia 2012    Pneumothorax     HX. of  2 on the left- required chest tube     Past Surgical History:   Procedure Laterality Date    CARDIAC CATHETERIZATION      mild nonobstructive CAD    CATARACT REMOVAL      HEENT      Bilateral cataracts and bleth    HYSTERECTOMY (CERVIX STATUS UNKNOWN)  1982   [address]    right shoulder sx; left thumb    OTHER SURGICAL HISTORY      left thumb sx,forehead resection of squamous cell    SEPTOPLASTY  2010    SHOULDER ARTHROSCOPY       Family History   Problem Relation Age of Onset    Cancer Sister         exophageal    Other Father         neurological degeneration    Cancer Mother         breast     Social History     Tobacco Use    Smoking status: Current Every Day Smoker     Packs/day: 1.00     Last attempt to quit: 2019     Years since quittin.7    Smokeless tobacco: Current User   Substance Use Topics    Alcohol use:  Yes     Alcohol/week: 1.0 standard drink     Current Outpatient Medications   Medication Sig Dispense Refill    rosuvastatin (CRESTOR) 10 MG tablet Take 1 tablet by mouth daily 90 tablet 3    predniSONE (DELTASONE) 10 MG tablet Take 2 tablets by mouth daily (with breakfast) 30 tablet 0    Carboxymeth-Glyc-Polysorb PF 0.5-1-0.5 % SOLN Apply to eye      Lifitegrast 5 % SOLN Apply to eye      calcium-vitamin D (OSCAL) 250-125 MG-UNIT per tablet Take 1 tablet by mouth daily      fluticasone-salmeterol (ADVIAR) 100-50 MCG/ACT AEPB diskus inhaler Inhale 1 puff into the lungs every 12 hours      pramipexole (MIRAPEX) 0.25 MG tablet Take 0.25 mg by mouth 3 times daily      albuterol sulfate  (90 Base) MCG/ACT inhaler Inhale 2 puffs into the lungs every 4 hours as needed      aspirin 81 MG EC tablet Take by mouth daily      atenolol (TENORMIN) 25 MG tablet Take 25 mg by mouth daily      budesonide-formoterol (SYMBICORT) 160-4.5 MCG/ACT AERO Inhale 2 puffs into the lungs 2 times daily      buPROPion (WELLBUTRIN SR) 200 MG extended release tablet Take 200 mg by mouth 2 times daily      dilTIAZem (TIAZAC) 360 MG extended release capsule Take 360 mg by mouth daily      gabapentin (NEURONTIN) 300 MG capsule Take 300 mg by mouth 2 times daily.  methylphenidate (RITALIN) 5 MG tablet Take 20 mg by mouth 2 times daily.  mirtazapine (REMERON) 30 MG tablet Take 15 mg by mouth      nitroGLYCERIN (NITROSTAT) 0.4 MG SL tablet Place under the tongue      olopatadine (PATADAY) 0.2 % SOLN ophthalmic solution Apply 1 drop to eye daily      tiotropium (SPIRIVA RESPIMAT) 2.5 MCG/ACT AERS inhaler Inhale 2 puffs into the lungs daily      traZODone (DESYREL) 100 MG tablet Take 100 mg by mouth      venlafaxine (EFFEXOR) 75 MG tablet Take 25 mg by mouth daily      venlafaxine (EFFEXOR XR) 150 MG extended release capsule Take by mouth daily       No current facility-administered medications for this visit. Review of Systems   Constitutional: Negative for chills and fever. HENT: Negative for ear discharge, hoarse voice and stridor. Eyes: Negative for double vision and redness. Cardiovascular: Positive for dyspnea on exertion. Negative for cyanosis and syncope. Respiratory: Negative for hemoptysis and wheezing. Endocrine: Negative for polydipsia and polyphagia. Hematologic/Lymphatic: Negative for adenopathy. Skin: Negative for itching and rash. Musculoskeletal: Negative for joint swelling and muscle weakness. Gastrointestinal: Negative for abdominal pain, hematemesis and hematochezia. Genitourinary: Negative for flank pain and nocturia. Neurological: Negative for focal weakness and seizures. Psychiatric/Behavioral: Negative for altered mental status and suicidal ideas. Allergic/Immunologic: Negative for hives.        PHYSICAL EXAM:    BP 110/62   Pulse 92   Ht 5' 4\" (1.626 m)   Wt 169 lb 11.2 oz (77 kg)   BMI 29.13 kg/m²      Physical Exam    General: Alert and oriented in no acute distress  HEENT: Head is normocephalic, atraumatic, pupils are equal bilaterally, throat appears to be clear  Neck: No significant jugular venous distention no cervical bruits  Cardiovascular: S1 and S2 heard, regular rate and rhythm, no significant murmurs rubs or gallops. Respiratory: Clear to auscultation bilaterally with no adventitious sounds, respirations are normal  Abdomen: Soft, nontender, nondistended, bowel sounds present. Extremities: No cyanosis clubbing or edema  Peripheral pulses: Bilateral radial artery pulses are palpated. Bilateral pedal pulses are well felt. Neuro: No facial droop and no gross focal motor deficits  Lymphatic: No significant cervical lymphadenopathy noted. Musculoskeletal: No significant redness or swelling noted in all exposed joints. Skin: No significant rashes noted the of the exposed regions. Medical problems and test results were reviewed with the patient today. No results found for this or any previous visit (from the past 672 hour(s)). Lab Results   Component Value Date/Time    CHOL 206 10/29/2021 11:17 AM    HDL 73 10/29/2021 11:17 AM    VLDL 36 10/29/2021 11:17 AM   ,hemoglobin, basic metabolic panel,   Lab Results   Component Value Date/Time    TSH 1.100 10/29/2021 11:17 AM    ,  Lab Results   Component Value Date/Time     11/22/2021 09:25 AM    K 4.3 11/22/2021 09:25 AM     11/22/2021 09:25 AM    CO2 26 11/22/2021 09:25 AM    BUN 5 11/22/2021 09:25 AM    GFRAA >60 11/22/2021 09:25 AM    GLOB 4.0 03/09/2020 01:25 PM    ALT 35 10/29/2021 11:17 AM    AST 29 10/29/2021 11:17 AM      Lab Results   Component Value Date    LDLCALC 97 10/29/2021      Lab Results   Component Value Date    CREATININE 0.67 11/22/2021      No results found for this or any previous visit.        ASSESSMENT and PLAN      Coronary artery disease involving native coronary artery of native heart without angina pectoris  -60% proximal RCA disease-FFR negative at 0.94-managed medically-continue aspirin and statin therapy along with beta-blocker therapy. Tobacco use disorder   - Counseled on importance of quitting completely. She understands but is struggling to quit. Mixed hyperlipidemia  -     rosuvastatin (CRESTOR) 10 MG tablet; Take 1 tablet by mouth daily  -Has tolerated rosuvastatin well. Feels well on this. Has not rechecked her LDL since she has been on it but we can recheck it when we see her next or she said she might see her primary care and get her checked. Paroxysmal tachycardia (HCC)  -Much better controlled with atenolol 25 mg once daily    Essential hypertension with goal blood pressure less than 130/85  -Continue current antihypertensive therapy-well-controlled  PERFECTO (obstructive sleep apnea)  -Continue CPAP therapy with oxygen every night. Overall Impression  -She has done well from a cardiac perspective overall. No complaints of chest pain or any worsening dyspnea on exertion. Euvolemic on exam.  Has advanced stage IV COPD and understands the need to quit smoking completely which she is struggling to quit. We will see her in follow-up in about 6 months time. Return in about 6 months (around 1/8/2023) for cad, htn, hld. Thank you for allowing us to participate in the care of your patient. If you have any further questions, please do not hesitate to contact us.   Sincerely,        Eddie Parker MD   7/8/2022

## 2022-07-09 NOTE — PROGRESS NOTES
Problem: Mobility Impaired (Adult and Pediatric)  Goal: *Acute Goals and Plan of Care (Insert Text)  Description    LTG:  (1.)Ms. Melissa Hoffmann will move from supine to sit and sit to supine  in bed with SUPERVISION within 7 treatment day(s). (2.)Ms. Melissa Hoffmann will transfer from bed to chair and chair to bed with SUPERVISION using the least restrictive device within 7 treatment day(s). (3.)Ms. Melissa Hoffmann will ambulate with SUPERVISION for 300 feet with the least restrictive device within 7 treatment day(s). (4)Ms. Melissa Hoffmann will perform HEP with cues and assistance to increase safety on her feet in 7 days. (5)Ms. Melissa Hoffmann will go up 2 steps min assist in 7 days. ________________________________________________________________________________________________     Outcome: Progressing Towards Goal     PHYSICAL THERAPY: Initial Assessment and PM 9/23/2019  INPATIENT: PT Visit Days : 1  [place] / [place] / Product Type: Medicare /       NAME/AGE/GENDER: Cody Hand is a 71 y.o. female   PRIMARY DIAGNOSIS: COPD with acute exacerbation ([facility] [J44.1]  Acute tracheobronchitis [J20.9] COPD with acute exacerbation ([facility]   COPD with acute exacerbation ([facility]          ICD-10: Treatment Diagnosis:    Generalized Muscle Weakness (M62.81)  Other abnormalities of gait and mobility (R26.89)   Precaution/Allergies:  Azithromycin and Sulfa (sulfonamide antibiotics)      ASSESSMENT:     Ms. Melissa Hoffmann presents with decreased functional mobility and gait. Pt. Is normally independent at baseline, occasional use of a cane. She drives. She is up in the chair on contact. She is visiting with friends now who live close by and can assist her as needed when she gets home. She ambulated CGA on 5L O2 into the gastelum. She is a little unsteady. May benefit from using a cane as we tried the walker but she did not like it. O2 sats 89% at rest, decreased to 83% walking and increase back to 88% after resting a few minutes. Gait distance limited by breathing and O2 sats. Will follow. This section established at most recent assessment   PROBLEM LIST (Impairments causing functional limitations):  Decreased Strength  Decreased ADL/Functional Activities  Decreased Transfer Abilities  Decreased Ambulation Ability/Technique  Decreased Balance  Decreased Activity Tolerance  Increased Fatigue  Increased Shortness of Breath  Decreased Flexibility/Joint Mobility  Decreased Ava with Home Exercise Program   INTERVENTIONS PLANNED: (Benefits and precautions of physical therapy have been discussed with the patient.)  Balance Exercise  Bed Mobility  Family Education  Gait Training  Home Exercise Program (HEP)  Range of Motion (ROM)  Therapeutic Activites  Therapeutic Exercise/Strengthening  Transfer Training     TREATMENT PLAN: Frequency/Duration: daily for duration of hospital stay  Rehabilitation Potential For Stated Goals: Good     REHAB RECOMMENDATIONS (at time of discharge pending progress):    Placement:  HHPT  Equipment:   None at this time              HISTORY:   History of Present Injury/Illness (Reason for Referral):  Milton Snell is a 72 yo F admitted with COPD exacerbation. She wears oxygen at home prn at 3 LPM, but normally does not have to wear. She had severe wheezing and difficulty breathing initially. Started on IV steroids. Pulm consulted and transferred to unit for BiPAP and weaned to Airvo during day and BiPAP qHS. Trial of roxanol/morphine for work of breathing and this has helped. 9/23- breathing easier and asking when she can go home. Still on Airvo.   Past Medical History/Comorbidities:   Ms. Sincere Perkins  has a past medical history of Acute respiratory failure ([facility] (1/29/2015), Acute respiratory failure ([address] (1/29/2015), [facility] Chest pain, unspecified (7/7/2016), Chiari I malformation ([address] COPD exacerbation ([facility] (9/14/12-9/17/12), COPD exacerbation ([address] (4/2013 to 5/2/13), Paroxysmal tachycardia/NOS (7/7/2016), Pneumonia (9/16/2012), and Pneumothorax. She also has no past medical history of Aneurysm ([address] Difficult intubation, GERD (gastroesophageal reflux disease), Heart failure ([address] Liver disease, Malignant hyperthermia due to anesthesia, Morbid obesity ([address] Nausea & vomiting, Pseudocholinesterase deficiency, Seizures ([address] Thromboembolus ([address] or Unspecified adverse effect of anesthesia. Ms. Ramonita Forte  has a past surgical history that includes hx heent (2009); hx other surgical; hx heart catheterization (1/12); hx orthopaedic (1998); hx cataract removal; hx septoplasty (04/2010); hx shoulder arthroscopy (1990); and hx hysterectomy (1982). Social History/Living Environment:   Home Environment: Private residence  # Steps to Enter: 2  One/Two Story Residence: One story  Living Alone: No  Support Systems: Family member(s), Friends \ neighbors  Patient Expects to be Discharged to[de-identified] Private residence  Current DME Used/Available at Home: Cane, straight  Prior Level of Function/Work/Activity:  independent     Number of Personal Factors/Comorbidities that affect the Plan of Care: 1-2: MODERATE COMPLEXITY   EXAMINATION:   Most Recent Physical Functioning:   Gross Assessment:  AROM: Generally decreased, functional  Strength: Generally decreased, functional               Posture:  Posture (WDL): Exceptions to WDL  Posture Assessment: Rounded shoulders  Balance:  Standing: Impaired; Intact  Standing - Static: Good  Standing - Dynamic : Fair Bed Mobility:  Supine to Sit: (up in chair)  Wheelchair Mobility:     Transfers:  Sit to Stand: Contact guard assistance  Stand to Sit: Contact guard assistance  Gait:     Speed/Lizzeth: Delayed  Step Length: Left shortened;Right shortened  Gait Abnormalities: Decreased step clearance; Path deviations  Distance (ft): 100 Feet (ft)  Ambulation - Level of Assistance: Contact guard assistance  Interventions: Safety awareness training;Manual cues; Tactile cues; Verbal cues      Body Structures Involved:  Bones  Joints  Muscles  Ligaments Body Functions Affected: Movement Related Activities and Participation Affected: Mobility   Number of elements that affect the Plan of Care: 3: MODERATE COMPLEXITY   CLINICAL PRESENTATION:   Presentation: Stable and uncomplicated: LOW COMPLEXITY   CLINICAL DECISION MAKIN[address] [place] [id number] AM-PAC 6 Clicks   Basic Mobility Inpatient Short Form  How much difficulty does the patient currently have. .. Unable A Lot A Little None   1. Turning over in bed (including adjusting bedclothes, sheets and blankets)? ? 1   ? 2   ? 3   ? 4   2. Sitting down on and standing up from a chair with arms ( e.g., wheelchair, bedside commode, etc.)   ? 1   ? 2   ? 3   ? 4   3. Moving from lying on back to sitting on the side of the bed?   ? 1   ? 2   ? 3   ? 4   How much help from another person does the patient currently need. .. Total A Lot A Little None   4. Moving to and from a bed to a chair (including a wheelchair)? ? 1   ? 2   ? 3   ? 4   5. Need to walk in hospital room? ? 1   ? 2   ? 3   ? 4   6. Climbing 3-5 steps with a railing? ? 1   ? 2   ? 3   ? 4   © 2007, Trustees of [address] [place] [address], under license to [employer]. All rights reserved      Score:  Initial: 19 Most Recent: X (Date: -- )    Interpretation of Tool:  Represents activities that are increasingly more difficult (i.e. Bed mobility, Transfers, Gait). Medical Necessity:     Patient is expected to demonstrate progress in strength, range of motion and balance   to decrease assistance required with exercises and functional mobility   . Reason for Services/Other Comments:  Patient continues to require present interventions due to patient's inability to perform exercises and functional mobility independently   .    Use of outcome tool(s) and clinical judgement create a POC that gives a: Questionable prediction of patient's progress: MODERATE COMPLEXITY            TREATMENT:   (In addition to Assessment/Re-Assessment sessions the following treatments were rendered)   Pre-treatment Symptoms/Complaints:  fatigue, sob  Pain: Initial:      Post Session:  0     Assessment/Reassessment only, no treatment provided today    Braces/Orthotics/Lines/Etc:   Telemetry lines   O2 Device: Hi flow nasal cannula  Treatment/Session Assessment:    Response to Treatment:  Pt. Did fine, RN with O2 sat monitor throughout  Interdisciplinary Collaboration:   Registered Nurse  Rehabilitation Attendant  After treatment position/precautions:   Up in chair  Bed/Chair-wheels locked  Bed in low position  Caregiver at bedside  Call light within reach  RN notified  Family at bedside   Compliance with Program/Exercises: Will assess as treatment progresses  Recommendations/Intent for next treatment session: \"Next visit will focus on advancements to more challenging activities and reduction in assistance provided\".   Total Treatment Duration:  PT Patient Time In/Time Out  Time In: 1330  Time Out: [address], PT contact guard

## 2022-07-11 ENCOUNTER — HOSPITAL ENCOUNTER (OUTPATIENT)
Dept: PHYSICAL THERAPY | Age: 72
Setting detail: RECURRING SERIES
Discharge: HOME | End: 2022-07-14
Payer: MEDICARE

## 2022-07-11 PROCEDURE — 97140 MANUAL THERAPY 1/> REGIONS: CPT

## 2022-07-11 PROCEDURE — 97110 THERAPEUTIC EXERCISES: CPT

## 2022-07-11 NOTE — PROGRESS NOTES
Yola Brooks  : 1950  Primary: Medicare Part A And B  Secondary: Gavin Rivers @ [address] [place] [address]  Phone: [phone]  Fax: [phone] Plan Frequency: 2x/wk    Plan of Care/Certification Expiration Date: 22      PT Visit Info: Total # of Visits to Date: 1  No Show: 0  Canceled Appointment: 1      OUTPATIENT PHYSICAL THERAPY:OP NOTE TYPE: Treatment Note 2022       Episode  }Appt Desk              Treatment Diagnosis:  Cervicalgia (M54.2)  Headache (R51)  Pain in thoracic spine (M54.6)  Muscle Weakness, Generalized (M62.81)  Medical/Referring Diagnosis:  Cervicalgia [M54.2]  Referring Physician:  No ref. provider found  MD Orders:  PT Eval and Treat   Date of Onset:  Onset Date: 55 (approx)     Allergies:   Azithromycin and Sulfa antibiotics  Restrictions/Precautions:  Restrictions/Precautions: Other (comment) (none)  No data recorded   Interventions Planned (Treatment may consist of any combination of the following):    Current Treatment Recommendations: Strengthening; Functional mobility training; Balance training; Manual Therapy - Joint Manipulation; Manual Therapy - Soft Tissue Mobilization; Pain management; Home exercise program; Modalities; Integrated dry needling; Therapeutic activities; Endurance training; Neuromuscular re-education     Subjective Comments: See Initial Evaluation for details     Initial:}     /10Post Session:        /10  Medications Last Reviewed:  2022  Updated Objective Findings:  See Initial Evaluation for details  Treatment     THERAPEUTIC EXERCISE: (30 minutes):    Exercises per grid below to improve mobility, strength, balance, and coordination. Required minimal verbal cues to promote proper body mechanics. Progressed resistance and repetitions as indicated.      Date:  22 Date:  22 Date:     Activity/Exercise Parameters Parameters Parameters   education Diagnosis, prognosis, POC, HEP, anatomy/physiology of condition, rational of treatment, graded exercise approach, walking program     scap retract x30 2x10    scap retract with ER X30, no resistance 2x10    Shoulder shrugs x20     Treadmill  5 min, self-selected pace 0.7 mph    Rows  [id number] lbs    Lat pull downs  [id number] lbs    Whittaker's carries  Next session    Open books  x10/side    Ring stretch  X10, CGA                    THERAPEUTIC ACTIVITY: ( 0 minutes): Therapeutic activities per grid below to improve mobility, strength, coordination, and dynamic movement of generalized back and generalized neck to improve functional lifting, carrying, reaching, catching, and overhead activites. Date:   Date:   Date:     Activity/Exercise Parameters Parameters Parameters                                                 MANUAL THERAPY: (8 minutes):   Joint mobilization, Soft tissue mobilization, and Manipulation was utilized and necessary because of the patient's restricted joint motion, painful spasm, loss of articular motion, and restricted motion of soft tissue. Date  7/11/2022      Technique Used Grade Level # Time(s) Effect while being performed    manual traction 3   cervical 8   gross improvement in pain and AROM                                                                             HEP Log Date   1.  see 7/7/22    2.     3.    4.     5.        POC    Recertification Expiration Date      Visit Count  2    Number of Allowed Visits            Treatment/Session Summary:    · Treatment Assessment: Increased volume of general upper quarter strengthening and mobility with good tolerance. Pt somewhat fatigued by the end of the session but noted no negative signs or symptoms. Performed manual traction today to improve pain-free ROM and allow participation in graded exercises.      · Communication/Consultation:  Faxed initial evaluation to referring provider  · Equipment provided today: HEP

## 2022-07-14 ENCOUNTER — HOSPITAL ENCOUNTER (OUTPATIENT)
Dept: PHYSICAL THERAPY | Age: 72
Setting detail: RECURRING SERIES
Discharge: HOME | End: 2022-07-17
Payer: MEDICARE

## 2022-07-14 PROCEDURE — 97110 THERAPEUTIC EXERCISES: CPT

## 2022-07-14 NOTE — PROGRESS NOTES
Yola Brooks  : 1950  Primary: Medicare Part A [place] B  Secondary: [address] @ [address] [facility] [address]  Phone: [phone]  Fax: [phone] Plan Frequency: 2x/wk    Plan of Care/Certification Expiration Date: 22      PT Visit Info: Total # of Visits to Date: 1  No Show: 0  Canceled Appointment: 1      OUTPATIENT PHYSICAL THERAPY:OP NOTE TYPE: Treatment Note 2022       Episode  }Appt Desk              Treatment Diagnosis:  Cervicalgia (M54.2)  Headache (R51)  Pain in thoracic spine (M54.6)  Muscle Weakness, Generalized (M62.81)  Medical/Referring Diagnosis:  Cervicalgia [M54.2]  Referring Physician:  Héctor Rodriguez MD MD Orders:  PT Eval and Treat   Date of Onset:  Onset Date: 55 (approx)     Allergies:   Azithromycin and Sulfa antibiotics  Restrictions/Precautions:  Restrictions/Precautions: Other (comment) (none)  No data recorded   Interventions Planned (Treatment may consist of any combination of the following):    Current Treatment Recommendations: Strengthening; Functional mobility training; Balance training; Manual Therapy - Joint Manipulation; Manual Therapy - Soft Tissue Mobilization; Pain management; Home exercise program; Modalities; Integrated dry needling; Therapeutic activities; Endurance training; Neuromuscular re-education     Subjective Comments: Feeling tired today. Had a really emotional night and feels wiped out today. Initial:}     does not rate/10Post Session:        does not rate/10  Medications Last Reviewed:  2022  Updated Objective Findings:  See Initial Evaluation for details  Treatment     THERAPEUTIC EXERCISE: (12 minutes):    Exercises per grid below to improve mobility, strength, balance, and coordination. Required minimal verbal cues to promote proper body mechanics. Progressed resistance and repetitions as indicated.      Date:  22 Date:  22 Date:  22 Activity/Exercise Parameters Parameters Parameters   education Diagnosis, prognosis, POC, HEP, anatomy/physiology of condition, rational of treatment, graded exercise approach, walking program     scap retract x30 2x10    scap retract with ER X30, no resistance 2x10    Shoulder shrugs x20     Treadmill  5 min, self-selected pace 0.7 mph 6 min, self-selected pace 0.7 mph   Rows  [id number] lbs [id number] lbs   Lat pull downs  [id number] lbs [id number] lbs   Whittaker's carries  Next session    Open books  x10/side    Ring stretch  X10, CGA X10, CGA   STS                THERAPEUTIC ACTIVITY: ( 0 minutes): Therapeutic activities per grid below to improve mobility, strength, coordination, and dynamic movement of generalized back and generalized neck to improve functional lifting, carrying, reaching, catching, and overhead activites. Date:   Date:   Date:     Activity/Exercise Parameters Parameters Parameters                                                 MANUAL THERAPY: (0 minutes):   Joint mobilization, Soft tissue mobilization, and Manipulation was utilized and necessary because of the patient's restricted joint motion, painful spasm, loss of articular motion, and restricted motion of soft tissue. Date  7/14/2022      Technique Used Grade Level # Time(s) Effect while being performed    manual traction 3   cervical 0  gross improvement in pain and AROM                                                                             HEP Log Date   1.  see 7/7/22    2.     3.    4.     5.        POC    Recertification Expiration Date      Visit Count  3    Number of Allowed Visits            Treatment/Session Summary:    · Treatment Assessment: Able to progress resistance for some exercises today with no negative signs or symptoms. However, pt terminated session early stating that she felt done for the day.      · Communication/Consultation:  Faxed initial evaluation to referring provider  · Equipment provided today: HEP handout  · Recommendations/Intent for next treatment session: Next visit will focus on general graded exercise.     Total Treatment Billable Duration:  12 minutes  Time In: 1430  Time Out: 250 [facility], PT       Charge Capture  }Post Session Pain  PT Visit Info  [facility] Portal  MD Guidelines  Scanned Media  Benefits  MyChart    Future Appointments   Date Time Provider Umm Mary   7/18/2022  2:40 PM MICK Lynn - CNP [facility]   1/12/2023  2:00 PM Yane Perales MD [facility]

## 2022-07-18 ENCOUNTER — OFFICE VISIT (OUTPATIENT)
Dept: PULMONOLOGY | Age: 72
End: 2022-07-18
Payer: MEDICARE

## 2022-07-18 VITALS
RESPIRATION RATE: 16 BRPM | SYSTOLIC BLOOD PRESSURE: 134 MMHG | TEMPERATURE: 98.1 F | HEART RATE: 102 BPM | BODY MASS INDEX: 28.82 KG/M2 | DIASTOLIC BLOOD PRESSURE: 58 MMHG | WEIGHT: 167.9 LBS | OXYGEN SATURATION: 92 %

## 2022-07-18 DIAGNOSIS — R09.02 HYPOXIA: ICD-10-CM

## 2022-07-18 DIAGNOSIS — J44.9 MODERATE COPD (CHRONIC OBSTRUCTIVE PULMONARY DISEASE) (HCC): Primary | ICD-10-CM

## 2022-07-18 DIAGNOSIS — Z51.5 PALLIATIVE CARE PATIENT: ICD-10-CM

## 2022-07-18 DIAGNOSIS — R53.81 DEBILITY: ICD-10-CM

## 2022-07-18 PROCEDURE — 99214 OFFICE O/P EST MOD 30 MIN: CPT | Performed by: NURSE PRACTITIONER

## 2022-07-18 PROCEDURE — 1123F ACP DISCUSS/DSCN MKR DOCD: CPT | Performed by: NURSE PRACTITIONER

## 2022-07-18 RX ORDER — PREDNISONE 10 MG/1
10 TABLET ORAL DAILY
Qty: 30 TABLET | Refills: 5 | Status: SHIPPED | OUTPATIENT
Start: 2022-07-18 | End: 2023-01-14

## 2022-07-18 RX ORDER — ALBUTEROL SULFATE 90 UG/1
2 AEROSOL, METERED RESPIRATORY (INHALATION) EVERY 4 HOURS PRN
Qty: 18 G | Refills: 3 | Status: SHIPPED | OUTPATIENT
Start: 2022-07-18

## 2022-07-18 ASSESSMENT — ENCOUNTER SYMPTOMS
EYES NEGATIVE: 1
SHORTNESS OF BREATH: 1
STRIDOR: 0
COUGH: 0
CHOKING: 0
WHEEZING: 1

## 2022-07-18 NOTE — PROGRESS NOTES
does help. Continues to be on oxygen through her CPAP at night at 6L. She does experience shortness of breath with activities. Currently ON 20 mg of prednisone and reports feeling some significant dyspnea. She is caring for her granddaughter at this point. Review of Systems   Constitutional:  Positive for fatigue. HENT: Negative. Eyes: Negative. Respiratory:  Positive for shortness of breath and wheezing. Negative for cough, choking and stridor. Genitourinary: Negative. Musculoskeletal: Negative. Neurological:         Brain fog at times. Psychiatric/Behavioral: Negative. Objective   Physical Exam  Vitals reviewed. Constitutional:       Appearance: Normal appearance. She is normal weight. HENT:      Head: Normocephalic. Nose: Nose normal.   Eyes:      Pupils: Pupils are equal, round, and reactive to light. Cardiovascular:      Rate and Rhythm: Normal rate and regular rhythm. Pulses: Normal pulses. Pulmonary:      Effort: Pulmonary effort is normal. No respiratory distress. Breath sounds: Normal breath sounds. No stridor. No wheezing, rhonchi or rales. Chest:      Chest wall: No tenderness. Musculoskeletal:      Cervical back: Normal range of motion. Skin:     General: Skin is warm. Capillary Refill: Capillary refill takes less than 2 seconds. Neurological:      General: No focal deficit present. Mental Status: She is alert. On this date 7/18/2022 I have spent 35 minutes reviewing previous notes, test results and face to face with the patient discussing the diagnosis and importance of compliance with the treatment plan as well as documenting on the day of the visit. An electronic signature was used to authenticate this note.     --MICK Giles - CNP

## 2022-07-19 ENCOUNTER — TELEPHONE (OUTPATIENT)
Dept: PULMONOLOGY | Age: 72
End: 2022-07-19

## 2022-07-19 ENCOUNTER — APPOINTMENT (OUTPATIENT)
Dept: PHYSICAL THERAPY | Age: 72
End: 2022-07-19
Payer: MEDICARE

## 2022-07-19 RX ORDER — AMOXICILLIN AND CLAVULANATE POTASSIUM 875; 125 MG/1; MG/1
1 TABLET, FILM COATED ORAL 2 TIMES DAILY
Qty: 14 TABLET | Refills: 0 | Status: SHIPPED | OUTPATIENT
Start: 2022-07-19

## 2022-07-19 NOTE — TELEPHONE ENCOUNTER
TRIAGE CALL      Complaint: coughing/SOB  Cough: yes  Productive:  yes  Bloody Sputum:  no  Increased SOB/Wheezing:  yes  Duration: 1 days  Fever/Chills: no  OTC Meds tried: none  Ears are hurting too.   Asking for antibotic

## 2022-07-19 NOTE — TELEPHONE ENCOUNTER
Direct conversation with Enrique Hogan NP who approves for Augmentin 875 BID x7 days for pt. Need to confirm pharmacy. Medication pended.

## 2022-07-27 ENCOUNTER — TELEPHONE (OUTPATIENT)
Dept: CARDIOLOGY CLINIC | Age: 72
End: 2022-07-27

## 2022-07-27 RX ORDER — DILTIAZEM HYDROCHLORIDE 360 MG/1
360 CAPSULE, EXTENDED RELEASE ORAL DAILY
Qty: 90 CAPSULE | Refills: 3 | Status: SHIPPED | OUTPATIENT
Start: 2022-07-27

## 2022-07-27 NOTE — TELEPHONE ENCOUNTER
Spoke with patient - advised her to continue diltiazem. Bessy Myers Requested Prescriptions     Signed Prescriptions Disp Refills    dilTIAZem (TIAZAC) 360 MG extended release capsule 90 capsule 3     Sig: Take 1 capsule by mouth in the morning.      Authorizing Provider: Jase Saha     Ordering User: Remi Trejo

## 2022-12-19 ENCOUNTER — TELEPHONE (OUTPATIENT)
Dept: PULMONOLOGY | Age: 72
End: 2022-12-19

## 2022-12-19 NOTE — TELEPHONE ENCOUNTER
Patient has covid and  also COPD flare. TRIAGE CALL      Complaint: COPD and covid  Cough: yes  Productive:  yes  Bloody Sputum:  no  Increased SOB/Wheezing:  yes  Duration: 4 days  Fever/Chills: yes  OTC Meds tried: no    She is on oxygen to keep O2 above 90. She [phone].

## 2022-12-20 ENCOUNTER — SCHEDULED TELEPHONE ENCOUNTER (OUTPATIENT)
Dept: PULMONOLOGY | Age: 72
End: 2022-12-20
Payer: MEDICARE

## 2022-12-20 DIAGNOSIS — R06.00 DYSPNEA, UNSPECIFIED TYPE: ICD-10-CM

## 2022-12-20 DIAGNOSIS — J44.1 COPD EXACERBATION (HCC): ICD-10-CM

## 2022-12-20 DIAGNOSIS — U07.1 COVID-19: Primary | ICD-10-CM

## 2022-12-20 DIAGNOSIS — Z51.5 PALLIATIVE CARE PATIENT: ICD-10-CM

## 2022-12-20 PROCEDURE — 99443 PR PHYS/QHP TELEPHONE EVALUATION 21-30 MIN: CPT | Performed by: NURSE PRACTITIONER

## 2022-12-20 RX ORDER — CEPHALEXIN 500 MG/1
500 CAPSULE ORAL 2 TIMES DAILY
Qty: 20 CAPSULE | Refills: 0 | Status: SHIPPED | OUTPATIENT
Start: 2022-12-20 | End: 2022-12-30

## 2022-12-20 RX ORDER — HYDROCODONE BITARTRATE AND HOMATROPINE METHYLBROMIDE ORAL SOLUTION 5; 1.5 MG/5ML; MG/5ML
5 LIQUID ORAL 4 TIMES DAILY PRN
Qty: 240 ML | Refills: 0 | Status: SHIPPED | OUTPATIENT
Start: 2022-12-20 | End: 2023-01-03

## 2022-12-20 ASSESSMENT — ENCOUNTER SYMPTOMS
ABDOMINAL DISTENTION: 0
ANAL BLEEDING: 0
COUGH: 1
SHORTNESS OF BREATH: 1
STRIDOR: 0
BLOOD IN STOOL: 0
ABDOMINAL PAIN: 0
WHEEZING: 0
DIARRHEA: 1

## 2022-12-20 NOTE — PROGRESS NOTES
Charlene Alan is a 67 y.o. female evaluated via telephone on 12/20/2022 for SOB secondary to [address]   1. COVID-19  - Day 10 from testing positive for COVID. Significant body aches reported. Cough present but fairly mild. Nonproductive. Encouraged to use nebulizer QID. Currently on Prednisone 20mg QD  -     cephALEXin (KEFLEX) 500 MG capsule; Take 1 capsule by mouth 2 times daily for 10 days, Disp-20 capsule, R-0Normal  -     HYDROcodone homatropine (HYCODAN) 5-1.5 MG/5ML solution; Take 5 mLs by mouth 4 times daily as needed (cough) for up to 14 days. , Disp-240 mL, R-0Normal    2. COPD exacerbation ([address] - As above. Unable to take several ATB - can tolerate keflex but not the best coverage. Current has diarrhea so will have to use keflex  -     cephALEXin (KEFLEX) 500 MG capsule; Take 1 capsule by mouth 2 times daily for 10 days, Disp-20 capsule, R-0Normal  -     HYDROcodone homatropine (HYCODAN) 5-1.5 MG/5ML solution; Take 5 mLs by mouth 4 times daily as needed (cough) for up to 14 days. , Disp-240 mL, R-0Normal    3. Dyspnea, unspecified type - Has some baseline dyspnea with some acceleration symptoms over the past 10 days. Encouraged to use neb QID. 4. Palliative care patient  -     cephALEXin (KEFLEX) 500 MG capsule; Take 1 capsule by mouth 2 times daily for 10 days, Disp-20 capsule, R-0Normal  -     HYDROcodone homatropine (HYCODAN) 5-1.5 MG/5ML solution; Take 5 mLs by mouth 4 times daily as needed (cough) for up to 14 days. , Disp-240 mL, R-0Normal    Return in about 3 weeks (around 1/10/2023) for 20 Phone call. Subjective   Prior to Visit Medications    Medication Sig Taking? Authorizing Provider   cephALEXin (KEFLEX) 500 MG capsule Take 1 capsule by mouth 2 times daily for 10 days Yes MICK Vallejo - CNP   HYDROcodone homatropine (HYCODAN) 5-1.5 MG/5ML solution Take 5 mLs by mouth 4 times daily as needed (cough) for up to 14 days.  Yes Damaso Walsh, MICK - CNP dilTIAZem (TIAZAC) 360 MG extended release capsule Take 1 capsule by mouth in the morning. Yes Juliann Watkins MD   predniSONE (DELTASONE) 10 MG tablet Take 1 tablet by mouth in the morning. Yes MICK Mcdaniels CNP   albuterol sulfate HFA (PROVENTIL;VENTOLIN;PROAIR) 108 (90 Base) MCG/ACT inhaler Inhale 2 puffs into the lungs every 4 hours as needed for Shortness of Breath or Wheezing Yes MICK Mcdaniels CNP   rosuvastatin (CRESTOR) 10 MG tablet Take 1 tablet by mouth daily Yes Juliann Watkins MD   Carboxymeth-Glyc-Polysorb PF 0.5-1-0.5 % SOLN Apply to eye Yes Historical Provider, MD   Lifitegrast 5 % SOLN Apply to eye Yes Historical Provider, MD   calcium-vitamin D (OSCAL) 250-125 MG-UNIT per tablet Take 1 tablet by mouth daily Yes Historical Provider, MD   fluticasone-salmeterol (ADVIAR) 100-50 MCG/ACT AEPB diskus inhaler Inhale 1 puff into the lungs every 12 hours Yes Historical Provider, MD   pramipexole (MIRAPEX) 0.25 MG tablet Take 0.25 mg by mouth 3 times daily Yes Historical Provider, MD   aspirin 81 MG EC tablet Take by mouth daily Yes Ar Automatic Reconciliation   atenolol (TENORMIN) 25 MG tablet Take 25 mg by mouth daily Yes Ar Automatic Reconciliation   buPROPion (WELLBUTRIN SR) 200 MG extended release tablet Take 200 mg by mouth 2 times daily Yes Ar Automatic Reconciliation   methylphenidate (RITALIN) 5 MG tablet Take 20 mg by mouth 2 times daily.   Yes Ar Automatic Reconciliation   mirtazapine (REMERON) 30 MG tablet Take 15 mg by mouth Yes Ar Automatic Reconciliation   nitroGLYCERIN (NITROSTAT) 0.4 MG SL tablet Place under the tongue Yes Ar Automatic Reconciliation   olopatadine (PATADAY) 0.2 % SOLN ophthalmic solution Apply 1 drop to eye daily Yes Ar Automatic Reconciliation   tiotropium (SPIRIVA RESPIMAT) 2.5 MCG/ACT AERS inhaler Inhale 2 puffs into the lungs daily Yes Ar Automatic Reconciliation   traZODone (DESYREL) 100 MG tablet Take 100 mg by mouth Yes Ar Automatic Reconciliation   venlafaxine (EFFEXOR) 75 MG tablet Take 25 mg by mouth daily Yes Ar Automatic Reconciliation   venlafaxine (EFFEXOR XR) 150 MG extended release capsule Take by mouth daily Yes Ar Automatic Reconciliation   amoxicillin-clavulanate (AUGMENTIN) 875-125 MG per tablet Take 1 tablet by mouth in the morning and 1 tablet before bedtime. Patient not taking: Reported on 12/20/2022  MICK Benitez - MARCO   budesonide-formoterol [facility]) 160-4.5 MCG/ACT AERO Inhale 2 puffs into the lungs 2 times daily  Patient not taking: Reported on 12/20/2022  Ar Automatic Reconciliation   gabapentin (NEURONTIN) 300 MG capsule Take 300 mg by mouth 2 times daily. Ar Automatic Reconciliation     Ms. Johnathan Arrieta is well known to palliative care presenting today for reports of respiratory symptoms, body aches and overall poor feeling secondary to COVID -19. She tested positive 10 days ago and has been managing at home. No fever or chills but significant body aches. SPO2 at 90-92%. Review of Systems   Constitutional:  Positive for activity change and fatigue. Negative for fever. Respiratory:  Positive for cough and shortness of breath. Negative for wheezing and stridor. Cardiovascular: Negative. Gastrointestinal:  Positive for diarrhea. Negative for abdominal distention, abdominal pain, anal bleeding and blood in stool. Musculoskeletal:         Body aches     Skin: Negative. Psychiatric/Behavioral: Negative. All other systems reviewed and are negative. Objective   Patient-Reported Vitals  Patient-Reported Pulse: 90  Patient-Reported Weight: 160 lbs  Patient-Reported Height: 5' 4\"  Patient-Reported Pulse Oximetry: 78 (room air/ 90% on 2 lpm)         Total Time: minutes: 21-30 minutes     Fide Joshi was evaluated through a synchronous (real-time) audio only encounter. Patient identification was verified at the start of the visit.  She (or guardian if applicable) is aware that this is a billable service, which includes applicable co-pays. This visit was conducted with patient's (and/or legal guardian's) verbal consent. She has not had a related appointment within my department in the past 7 days or scheduled within the next 24 hours. The patient was located at Home: [address] [facility]. The provider was located at [place] [address]): [address] [facility] [address],  [address] [facility].      Shell Cortes, APRN - CNP

## 2022-12-20 NOTE — TELEPHONE ENCOUNTER
Patient has called back this morning because she has not heard anything back about the prescription she requested. Per the patient she is sick and a palliative care patient and can not afford to wait several days to hear back from someone. She is upset that she has to wait and does not see the benefit of being on palliative care if she has to wait on the nurse to call her.

## 2023-01-05 LAB
AVERAGE GLUCOSE: NORMAL
HBA1C MFR BLD: 6.1 %

## 2023-01-16 ENCOUNTER — OFFICE VISIT (OUTPATIENT)
Dept: CARDIOLOGY CLINIC | Age: 73
End: 2023-01-16
Payer: MEDICARE

## 2023-01-16 VITALS
HEIGHT: 64 IN | WEIGHT: 167 LBS | SYSTOLIC BLOOD PRESSURE: 118 MMHG | HEART RATE: 77 BPM | DIASTOLIC BLOOD PRESSURE: 64 MMHG | BODY MASS INDEX: 28.51 KG/M2

## 2023-01-16 DIAGNOSIS — I47.9 PAROXYSMAL TACHYCARDIA (HCC): ICD-10-CM

## 2023-01-16 DIAGNOSIS — F17.200 TOBACCO USE DISORDER: ICD-10-CM

## 2023-01-16 DIAGNOSIS — G47.33 OSA (OBSTRUCTIVE SLEEP APNEA): ICD-10-CM

## 2023-01-16 DIAGNOSIS — E78.2 MIXED HYPERLIPIDEMIA: ICD-10-CM

## 2023-01-16 DIAGNOSIS — I25.10 CORONARY ARTERY DISEASE INVOLVING NATIVE CORONARY ARTERY OF NATIVE HEART WITHOUT ANGINA PECTORIS: Primary | ICD-10-CM

## 2023-01-16 PROCEDURE — G8484 FLU IMMUNIZE NO ADMIN: HCPCS | Performed by: INTERNAL MEDICINE

## 2023-01-16 PROCEDURE — G8427 DOCREV CUR MEDS BY ELIG CLIN: HCPCS | Performed by: INTERNAL MEDICINE

## 2023-01-16 PROCEDURE — 3074F SYST BP LT 130 MM HG: CPT | Performed by: INTERNAL MEDICINE

## 2023-01-16 PROCEDURE — 1123F ACP DISCUSS/DSCN MKR DOCD: CPT | Performed by: INTERNAL MEDICINE

## 2023-01-16 PROCEDURE — 1090F PRES/ABSN URINE INCON ASSESS: CPT | Performed by: INTERNAL MEDICINE

## 2023-01-16 PROCEDURE — 3078F DIAST BP <80 MM HG: CPT | Performed by: INTERNAL MEDICINE

## 2023-01-16 PROCEDURE — 4004F PT TOBACCO SCREEN RCVD TLK: CPT | Performed by: INTERNAL MEDICINE

## 2023-01-16 PROCEDURE — 93000 ELECTROCARDIOGRAM COMPLETE: CPT | Performed by: INTERNAL MEDICINE

## 2023-01-16 PROCEDURE — G8399 PT W/DXA RESULTS DOCUMENT: HCPCS | Performed by: INTERNAL MEDICINE

## 2023-01-16 PROCEDURE — 99214 OFFICE O/P EST MOD 30 MIN: CPT | Performed by: INTERNAL MEDICINE

## 2023-01-16 PROCEDURE — G8417 CALC BMI ABV UP PARAM F/U: HCPCS | Performed by: INTERNAL MEDICINE

## 2023-01-16 PROCEDURE — 3017F COLORECTAL CA SCREEN DOC REV: CPT | Performed by: INTERNAL MEDICINE

## 2023-01-16 RX ORDER — DULOXETIN HYDROCHLORIDE 30 MG/1
30 CAPSULE, DELAYED RELEASE ORAL DAILY
COMMUNITY

## 2023-01-16 ASSESSMENT — ENCOUNTER SYMPTOMS
WHEEZING: 0
EYE REDNESS: 0
HEMATOCHEZIA: 0
ABDOMINAL PAIN: 0
STRIDOR: 0
DOUBLE VISION: 0
HEMOPTYSIS: 0
HOARSE VOICE: 0
HEMATEMESIS: 0

## 2023-01-16 NOTE — PROGRESS NOTES
[facility] CARDIOLOGY  [address] [facility], [address] [facility], [address] [place] [address]  PHONE: [phone]          23    NAME:  Arash Tinajero  : 1950  MRN: [id number]         SUBJECTIVE:   Arash Tinajero is a 67 y.o. female seen for a visit regarding the following:     Chief Complaint   Patient presents with    Coronary Artery Disease    Hypertension    Irregular Heart Beat           HPI:    Cardiology problem list:   1. Coronary artery disease   -Coronary angiography on 2012 showed a normal left main, LAD with no disease, circumflex with mid 30% disease, dominant RCA with no disease, EF of 60%. -Coronary angiography-2021-LAD with 20% disease, circumflex with 20% disease, proximal RCA with 60% disease with negative DFR of 0.93   2. Inappropriate sinus tachycardia/paroxysmal tachycardia   -echocardiogram from 2017 showed an EF of 65 to 70% with no regional wall motion abnormalities, grade 1 diastolic dysfunction, normal atrial sizes, mild TR3. COPD on oxygen. 4.  Former smoker   5. Hypertension   6. Hyperlipidemia-diet controlled    7. Obstructive sleep apnea- on CPAP   8. COPD            -Former patient of Dr. Eli Centers       I saw Ms. Trevon Alicea who is a pleasant 79-year-old woman in cardiovascular follow-up for paroxysmal tachycardia/inappropriate sinus tachycardia, hypertension, nonobstructive coronary artery  disease, hyperlipidemia. She has COPD and is on home oxygen. She has sleep apnea and follows with pulmonary.      -At her last visit, we made no significant changes with her medical therapy besides starting her on rosuvastatin to help with plaque stabilization for nonobstructive coronary artery disease. She has done lab work since [employer] January and showed significant improvement in her lipids.     CAD:   -Coronary angiography showed minor nonobstructive coronary artery disease in the LAD and left circumflex coronary arteries but the proximal RCA had 60% long disease but DFR on this was negative at 0.93. No further significant complaints of angina since we last met. She appears to be tolerating her atenolol and diltiazem well as antianginal therapy.     -She says she has done well for the most with no true angina, blood pressures and heart rates have been well controlled. She has some dyspnea on exertion from her COPD. Uses her CPAP every night with no limitations. No lower extremity edema orthopnea  PND. Tachycardia/palpitations-well-controlled with atenolol and diltiazem. Overall much better controlled after she started this. No complaints of presyncope or syncope. No TIAs or strokelike symptoms    Past Medical History, Past Surgical History, Family history, Social History, and Medications were all reviewed with the patient today and updated as necessary.      Allergies   Allergen Reactions    Azithromycin Diarrhea    Sulfa Antibiotics Itching     NOT ALLERGIC     Patient Active Problem List   Diagnosis    Polycythemia    COPD exacerbation (HCC)    Fatigue    Moderate COPD (chronic obstructive pulmonary disease) (HCC)    Mixed hyperlipidemia    Paroxysmal tachycardia (HCC)    Tobacco use disorder    Chronic respiratory failure (HCC)    Pneumonia due to infectious organism    Coronary artery disease    Chronic sinusitis    ADHD (attention deficit hyperactivity disorder)    Restless leg syndrome    Depression    PERFECTO (obstructive sleep apnea)    Palliative care patient    Debility    Palpitations    Diabetes mellitus ([address]    Essential hypertension with goal blood pressure less than 130/85    Right heart enlargement    Nocturnal hypoxia    Chest discomfort    Fibromyalgia     Past Medical History:   Diagnosis Date    Acute respiratory failure ([address] 1/29/2015    Intubated 1/29/2015    Acute respiratory failure ([address] 1/29/2015    Intubated 1/29/2015     Cancer ([address]     basal cell,squamous cell    Chest pain 7/7/2016    Chiari I malformation ([facility])     history of    COPD exacerbation ([facility] 9/14/12-9/17/12    COPD exacerbation ([facility] 4/2013 to 5/2/13    hospitalization    Coronary artery disease 3/3/2015    Paroxysmal tachycardia ([facility] 7/7/2016    Pneumonia 9/16/2012    Pneumothorax     HX. of  2 on the left- required chest tube     Past Surgical History:   Procedure Laterality Date    CARDIAC CATHETERIZATION  1/12    mild nonobstructive CAD    CATARACT REMOVAL      HEENT  2009    Bilateral cataracts and bleth    HYSTERECTOMY (CERVIX STATUS UNKNOWN)  [place]    right shoulder sx; left thumb    OTHER SURGICAL HISTORY      left thumb sx,forehead resection of squamous cell    SEPTOPLASTY  04/2010    SHOULDER ARTHROSCOPY  1990     Family History   Problem Relation Age of Onset    Cancer Sister         exophageal    Other Father         neurological degeneration    Cancer Mother         breast     Social History     Tobacco Use    Smoking status: Every Day     Packs/day: 1.00     Types: Cigarettes    Smokeless tobacco: Current   Substance Use Topics    Alcohol use: Yes     Alcohol/week: 1.0 standard drink     Current Outpatient Medications   Medication Sig Dispense Refill    DULoxetine (CYMBALTA) 30 MG extended release capsule Take 30 mg by mouth daily      dilTIAZem (TIAZAC) 360 MG extended release capsule Take 1 capsule by mouth in the morning.  90 capsule 3    albuterol sulfate HFA (PROVENTIL;VENTOLIN;PROAIR) 108 (90 Base) MCG/ACT inhaler Inhale 2 puffs into the lungs every 4 hours as needed for Shortness of Breath or Wheezing 18 g 3    rosuvastatin (CRESTOR) 10 MG tablet Take 1 tablet by mouth daily 90 tablet 3    Carboxymeth-Glyc-Polysorb PF 0.5-1-0.5 % SOLN Apply to eye      Lifitegrast 5 % SOLN Apply to eye      calcium-vitamin D (OSCAL) 250-125 MG-UNIT per tablet Take 1 tablet by mouth daily      fluticasone-salmeterol (ADVIAR) 100-50 MCG/ACT AEPB diskus inhaler Inhale 1 puff into the lungs every 12 hours      pramipexole (MIRAPEX) 0.25 MG tablet Take 0.25 mg by mouth 3 times daily      aspirin 81 MG EC tablet Take by mouth daily      atenolol (TENORMIN) 25 MG tablet Take 25 mg by mouth daily      buPROPion (WELLBUTRIN SR) 200 MG extended release tablet Take 200 mg by mouth 2 times daily      methylphenidate (RITALIN) 5 MG tablet Take 20 mg by mouth 2 times daily. mirtazapine (REMERON) 30 MG tablet Take 15 mg by mouth      nitroGLYCERIN (NITROSTAT) 0.4 MG SL tablet Place under the tongue      olopatadine (PATADAY) 0.2 % SOLN ophthalmic solution Apply 1 drop to eye daily      tiotropium (SPIRIVA RESPIMAT) 2.5 MCG/ACT AERS inhaler Inhale 2 puffs into the lungs daily      traZODone (DESYREL) 100 MG tablet Take 100 mg by mouth      venlafaxine (EFFEXOR) 75 MG tablet Take 25 mg by mouth daily       No current facility-administered medications for this visit. Review of Systems   Constitutional: Negative for chills and fever. HENT:  Negative for ear discharge, hoarse voice and stridor. Eyes:  Negative for double vision and redness. Cardiovascular:  Positive for dyspnea on exertion. Negative for cyanosis and syncope. Respiratory:  Negative for hemoptysis and wheezing. Endocrine: Negative for polydipsia and polyphagia. Hematologic/Lymphatic: Negative for adenopathy. Skin:  Negative for itching and rash. Musculoskeletal:  Negative for joint swelling and muscle weakness. Gastrointestinal:  Negative for abdominal pain, hematemesis and hematochezia. Genitourinary:  Negative for flank pain and nocturia. Neurological:  Negative for focal weakness and seizures. Psychiatric/Behavioral:  Negative for altered mental status and suicidal ideas. Allergic/Immunologic: Negative for hives.      PHYSICAL EXAM:    /64   Pulse 77   Ht 5' 4\" (1.626 m)   Wt 167 lb (75.8 kg)   BMI 28.67 kg/m²      Physical Exam    General: Alert and oriented in no acute distress  HEENT: Head is normocephalic, atraumatic, pupils are equal bilaterally, throat appears to be clear  Neck: No significant jugular venous distention no cervical bruits  Cardiovascular: S1 and S2 heard, regular rate and rhythm, no significant murmurs rubs or gallops. Respiratory: Clear to auscultation bilaterally with no adventitious sounds, respirations are normal  Abdomen: Soft, nontender, nondistended, bowel sounds present. Extremities: No cyanosis clubbing or edema  Peripheral pulses: Bilateral radial artery pulses are palpated. Bilateral pedal pulses are well felt. Neuro: No facial droop and no gross focal motor deficits  Lymphatic: No significant cervical lymphadenopathy noted. Musculoskeletal: No significant redness or swelling noted in all exposed joints. Skin: No significant rashes noted the of the exposed regions. Medical problems and test results were reviewed with the patient today. No results found for this or any previous visit (from the past 672 hour(s)). Lab Results   Component Value Date/Time    CHOL 206 10/29/2021 11:17 AM    HDL 73 10/29/2021 11:17 AM    VLDL 36 10/29/2021 11:17 AM   ,hemoglobin, basic metabolic panel,   Lab Results   Component Value Date/Time    TSH 1.100 10/29/2021 11:17 AM    ,  Lab Results   Component Value Date/Time     11/22/2021 09:25 AM    K 4.3 11/22/2021 09:25 AM     11/22/2021 09:25 AM    CO2 26 11/22/2021 09:25 AM    BUN 5 11/22/2021 09:25 AM    GFRAA >60 11/22/2021 09:25 AM    GLOB 4.0 03/09/2020 01:25 PM    ALT 35 10/29/2021 11:17 AM    AST 29 10/29/2021 11:17 AM      Lab Results   Component Value Date    LDLCALC 97 10/29/2021      Lab Results   Component Value Date    CREATININE 0.67 11/22/2021      No results found for this or any previous visit.   Blood work from the [address] [place] [address] from 1/5/2023 showed a sodium of 142, potassium 4.2, creatinine 0.7, A1c 6.1, total cholesterol 139, triglycerides 133, HDL 76 and LDL 36      ASSESSMENT and PLAN      Coronary artery disease involving native coronary artery of native heart without angina pectoris  -60% proximal RCA disease-FFR negative at 0.94-managed medically-continue aspirin and statin therapy along with beta-blocker therapy. Tobacco use disorder   - Counseled on importance of quitting completely. She understands but is struggling to quit. Mixed hyperlipidemia  -Has tolerated rosuvastatin well. Feels well on this. Lipids are much better. Paroxysmal tachycardia (HCC)  -Much better controlled with atenolol 25 mg once daily-continue this along with diltiazem CD3 60 mg once daily    Essential hypertension with goal blood pressure less than 130/85  -Continue current antihypertensive therapy-well-controlled on atenolol 25 mg once daily and diltiazem 360 once daily. PERFECTO (obstructive sleep apnea)  -Continue CPAP therapy with oxygen every night. Overall Impression  -She has done well from a cardiac perspective overall. No complaints of chest pain or any worsening dyspnea on exertion. Euvolemic on exam.  Has advanced stage IV COPD and understands the need to quit smoking completely which she is struggling to quit. -Lipids are far better and I reviewed her most recent blood work with the [place]. Continue rosuvastatin. We will see her in follow-up in about 6 months time. Return in about 6 months (around 7/16/2023) for cad, htn, hld. Thank you for allowing us to participate in the care of your patient. If you have any further questions, please do not hesitate to contact us.   Sincerely,        Peterson Arrieta MD   1/16/2023

## 2023-01-18 ENCOUNTER — CLINICAL DOCUMENTATION (OUTPATIENT)
Dept: PHYSICAL THERAPY | Age: 73
End: 2023-01-18

## 2023-01-18 NOTE — THERAPY DISCHARGE
Maurilio Rivera  : 1950  Primary: [unfilled]  Secondary:  [address] @ [address] [facility] [address]  Phone: [phone]  Fax: [phone] Plan Frequency: 2x/wk    Plan of Care/Certification Expiration Date: 22      PT Visit Info: Total # of Visits to Date: 1  No Show: 0  Canceled Appointment: 1         OUTPATIENT PHYSICAL THERAPY 2023     Appt Desk   Episode   MyChart      Ms. Florian Cifuentes was last seen in-clinic on 22.  Pt discharged from [address] [place] [address], PT    Future Appointments   Date Time Provider [facility]   2023  9:20 AM MICK Olmstead - CNP [facility] AM[facility]   2023 10:00 AM Norma Akins MD [facility] AM[facility]

## 2023-01-24 ENCOUNTER — SCHEDULED TELEPHONE ENCOUNTER (OUTPATIENT)
Age: 73
End: 2023-01-24
Payer: MEDICARE

## 2023-01-24 DIAGNOSIS — Z51.5 PALLIATIVE CARE PATIENT: ICD-10-CM

## 2023-01-24 DIAGNOSIS — J44.9 MODERATE COPD (CHRONIC OBSTRUCTIVE PULMONARY DISEASE) (HCC): ICD-10-CM

## 2023-01-24 DIAGNOSIS — R05.2 SUBACUTE COUGH: Primary | ICD-10-CM

## 2023-01-24 PROCEDURE — 99442 PR PHYS/QHP TELEPHONE EVALUATION 11-20 MIN: CPT | Performed by: NURSE PRACTITIONER

## 2023-01-24 RX ORDER — HYDROCODONE BITARTRATE AND HOMATROPINE METHYLBROMIDE ORAL SOLUTION 5; 1.5 MG/5ML; MG/5ML
5 LIQUID ORAL EVERY 6 HOURS PRN
Qty: 240 ML | Refills: 0 | Status: SHIPPED | OUTPATIENT
Start: 2023-01-24 | End: 2023-02-23

## 2023-01-24 ASSESSMENT — ENCOUNTER SYMPTOMS
COUGH: 1
GASTROINTESTINAL NEGATIVE: 1

## 2023-01-24 NOTE — PROGRESS NOTES
Ramírez Graf is a 67 y.o. female evaluated via telephone on 1/24/2023 for     Assessment & Plan     1. Subacute cough  -     HYDROcodone homatropine (HYCODAN) 5-1.5 MG/5ML solution; Take 5 mLs by mouth every 6 hours as needed (cough) for up to 30 days. Max Daily Amount: 20 mLs, Disp-240 mL, R-0Normal    2. Moderate COPD (chronic obstructive pulmonary disease) (HCC)  -     HYDROcodone homatropine (HYCODAN) 5-1.5 MG/5ML solution; Take 5 mLs by mouth every 6 hours as needed (cough) for up to 30 days. Max Daily Amount: 20 mLs, Disp-240 mL, R-0Normal    3. Palliative care patient  -     HYDROcodone homatropine (HYCODAN) 5-1.5 MG/5ML solution; Take 5 mLs by mouth every 6 hours as needed (cough) for up to 30 days. Max Daily Amount: 20 mLs, Disp-240 mL, R-0Normal    Return in about 3 months (around 4/24/2023). Subjective   Prior to Visit Medications    Medication Sig Taking? Authorizing Provider   HYDROcodone homatropine (HYCODAN) 5-1.5 MG/5ML solution Take 5 mLs by mouth every 6 hours as needed (cough) for up to 30 days. Max Daily Amount: 20 mLs Yes MICK Ames CNP   DULoxetine (CYMBALTA) 30 MG extended release capsule Take 30 mg by mouth daily Yes Historical Provider, MD   dilTIAZem (TIAZAC) 360 MG extended release capsule Take 1 capsule by mouth in the morning.  Yes Sofi Clark MD   albuterol sulfate HFA (PROVENTIL;VENTOLIN;PROAIR) 108 (90 Base) MCG/ACT inhaler Inhale 2 puffs into the lungs every 4 hours as needed for Shortness of Breath or Wheezing Yes MICK Ames CNP   rosuvastatin (CRESTOR) 10 MG tablet Take 1 tablet by mouth daily Yes Sofi Clark MD   Carboxymeth-Glyc-Polysorb PF 0.5-1-0.5 % SOLN Apply to eye Yes Historical Provider, MD   Lifitegrast 5 % SOLN Apply to eye Yes Historical Provider, MD   calcium-vitamin D (OSCAL) 250-125 MG-UNIT per tablet Take 1 tablet by mouth daily Yes Historical Provider, MD   fluticasone-salmeterol (ADVIAR) 100-50 MCG/ACT AEPB diskus inhaler Inhale 1 puff into the lungs every 12 hours Yes Historical Provider, MD   pramipexole (MIRAPEX) 0.25 MG tablet Take 0.25 mg by mouth 3 times daily Yes Historical Provider, MD   aspirin 81 MG EC tablet Take by mouth daily Yes Ar Automatic Reconciliation   atenolol (TENORMIN) 25 MG tablet Take 25 mg by mouth daily Yes Ar Automatic Reconciliation   methylphenidate (RITALIN) 5 MG tablet Take 20 mg by mouth 2 times daily. Yes Ar Automatic Reconciliation   mirtazapine (REMERON) 30 MG tablet Take 15 mg by mouth Yes Ar Automatic Reconciliation   tiotropium (SPIRIVA RESPIMAT) 2.5 MCG/ACT AERS inhaler Inhale 2 puffs into the lungs daily Yes Ar Automatic Reconciliation   traZODone (DESYREL) 100 MG tablet Take 100 mg by mouth Yes Ar Automatic Reconciliation   venlafaxine (EFFEXOR) 75 MG tablet Take 25 mg by mouth daily Yes Ar Automatic Reconciliation     Ms. Tim Tubbs is well known to palliative care presenting for discussion of COVID recovery. She states the cough is still lingering some but overall she has noted some improvements. No longer on prednisone and finished the ATB therapy. No fever or chills but significant body aches. SPO2 at 92-94%. Review of Systems   Constitutional: Negative. HENT: Negative. Respiratory:  Positive for cough. Cough has improved over the past several weeks. Cardiovascular: Negative. Gastrointestinal: Negative. Genitourinary: Negative. All other systems reviewed and are negative. Objective   Patient-Reported Vitals  Patient-Reported Weight: 167.0lb  Patient-Reported Height: 5f4i         Total Time: minutes: 11-20 minutes     Fide Oleary was evaluated through a synchronous (real-time) audio only encounter. Patient identification was verified at the start of the visit. She (or guardian if applicable) is aware that this is a billable service, which includes applicable co-pays.  This visit was conducted with patient's (and/or legal guardian's) verbal consent. She has not had a related appointment within my department in the past 7 days or scheduled within the next 24 hours. The patient was located at [facility] ([address] [place] [address]): [address] Devante Figueroa [address] [facility],  [address] [facility]. The provider was located at [facility] ([place] [address]): [address] Devante Figueroa [address] [facility],  [address] [facility].      Luis Becker, MICK - CNP

## 2023-02-28 ENCOUNTER — TELEPHONE (OUTPATIENT)
Dept: PULMONOLOGY | Age: 73
End: 2023-02-28

## 2023-02-28 NOTE — TELEPHONE ENCOUNTER
TRIAGE CALL      Complaint: SOB/coughing  Cough: yes  Productive:  yes  Bloody Sputum:  no  Increased SOB/Wheezing:  yes  Duration: 3 days  Fever/Chills: no  OTC Meds tried: none

## 2023-03-01 NOTE — TELEPHONE ENCOUNTER
Last seen: 1/24/23 virtual  Hx: COPD, subacute cough, palliative care, post COVID    Patient call reporting increased sob & coughing for 3 days; cough is productive; contacted patient to review medication regimen; notes this is her normal response to leaves & blooms of spring, normally treats w/ steroid taper and Keflex. Asking for Rx for both, she has steroids on hand and has started but wants Rx to replace. Confirmed pharmacy on file. Pended keflex Rx to match last fill.

## 2023-03-02 RX ORDER — CEPHALEXIN 500 MG/1
500 CAPSULE ORAL 2 TIMES DAILY
Qty: 20 CAPSULE | Refills: 0 | Status: SHIPPED | OUTPATIENT
Start: 2023-03-02 | End: 2023-03-12

## 2023-03-08 ENCOUNTER — TELEPHONE (OUTPATIENT)
Dept: PULMONOLOGY | Age: 73
End: 2023-03-08

## 2023-03-08 DIAGNOSIS — J44.9 MODERATE COPD (CHRONIC OBSTRUCTIVE PULMONARY DISEASE) (HCC): Primary | ICD-10-CM

## 2023-03-08 DIAGNOSIS — R06.02 SHORTNESS OF BREATH: ICD-10-CM

## 2023-03-08 DIAGNOSIS — Z51.5 PALLIATIVE CARE PATIENT: ICD-10-CM

## 2023-03-08 NOTE — TELEPHONE ENCOUNTER
Patient says that the 2 prescriptions that Sosa Soni was to send to be filled has not made to the [address], [address] [facility] - P [phone] - F [phone]   [address] [facility], [address]   Phone:  [phone]  Fax:  [phone]    cephALEXin (KEFLEX) 500 MG capsule      There was also suppose to be a prescription for Prednisone 10mg

## 2023-03-09 ENCOUNTER — OFFICE VISIT (OUTPATIENT)
Dept: ENT CLINIC | Age: 73
End: 2023-03-09
Payer: OTHER GOVERNMENT

## 2023-03-09 VITALS — BODY MASS INDEX: 28 KG/M2 | HEIGHT: 64 IN | WEIGHT: 164 LBS

## 2023-03-09 DIAGNOSIS — H60.8X3 CHRONIC ECZEMATOUS OTITIS EXTERNA OF BOTH EARS: ICD-10-CM

## 2023-03-09 DIAGNOSIS — Z98.890 HISTORY OF VOCAL CORD POLYPECTOMY: ICD-10-CM

## 2023-03-09 DIAGNOSIS — Z72.0 TOBACCO ABUSE: ICD-10-CM

## 2023-03-09 DIAGNOSIS — K21.9 LARYNGOPHARYNGEAL REFLUX (LPR): Primary | ICD-10-CM

## 2023-03-09 DIAGNOSIS — Z98.890 S/P FESS (FUNCTIONAL ENDOSCOPIC SINUS SURGERY): ICD-10-CM

## 2023-03-09 DIAGNOSIS — L29.9 EAR ITCH: ICD-10-CM

## 2023-03-09 DIAGNOSIS — R49.0 HOARSENESS OF VOICE: ICD-10-CM

## 2023-03-09 DIAGNOSIS — J34.89 NASAL MUCOSA DRY: ICD-10-CM

## 2023-03-09 PROCEDURE — 31575 DIAGNOSTIC LARYNGOSCOPY: CPT | Performed by: STUDENT IN AN ORGANIZED HEALTH CARE EDUCATION/TRAINING PROGRAM

## 2023-03-09 PROCEDURE — 1123F ACP DISCUSS/DSCN MKR DOCD: CPT | Performed by: STUDENT IN AN ORGANIZED HEALTH CARE EDUCATION/TRAINING PROGRAM

## 2023-03-09 PROCEDURE — 99204 OFFICE O/P NEW MOD 45 MIN: CPT | Performed by: STUDENT IN AN ORGANIZED HEALTH CARE EDUCATION/TRAINING PROGRAM

## 2023-03-09 RX ORDER — PANTOPRAZOLE SODIUM 40 MG/1
40 TABLET, DELAYED RELEASE ORAL 2 TIMES DAILY
Qty: 120 TABLET | Refills: 2 | Status: SHIPPED | OUTPATIENT
Start: 2023-03-09

## 2023-03-09 RX ORDER — FLUOCINOLONE ACETONIDE 0.11 MG/ML
5 OIL AURICULAR (OTIC) 2 TIMES DAILY
Qty: 20 ML | Refills: 3 | Status: SHIPPED | OUTPATIENT
Start: 2023-03-09

## 2023-03-09 ASSESSMENT — ENCOUNTER SYMPTOMS
EYE PAIN: 0
SHORTNESS OF BREATH: 0
APNEA: 0
VOICE CHANGE: 1
SINUS PAIN: 0
COUGH: 0
CHOKING: 0
DIARRHEA: 0
SINUS PRESSURE: 0
STRIDOR: 0
EYE DISCHARGE: 0
FACIAL SWELLING: 0
WHEEZING: 0
NAUSEA: 0
EYE ITCHING: 0
CONSTIPATION: 0

## 2023-03-09 NOTE — PROGRESS NOTES
HPI:  Chase Liu is a 67 y.o. female seen New    Chief Complaint   Patient presents with    Ear Problem     Patient presents today with c/o bilateral itching ears and the sound of her heartbeat in her ears that occurs randomly . Hoarse     Patient also has frequent hoarseness, she was previously seen by Dr. Anisa Wei . 68-year-old female seen as a new patient referral evaluation with concern of ear itching long-term as well as very long-term hoarseness. She has had worsening ear itching as an adult with minimal relief with any other therapies. She denies a history of cerumen. She has used some sort oil in the past with minimal relief. She denies any otalgia otorrhea tinnitus dizziness or vertigo. She also is a daily tobacco smoker lifelong and has struggled with hoarseness since she was younger. She does have a remote history of vocal cord polyps and/or nodules removed many years ago. This never recurred that she is aware of. She is also a former patient of Dr. Anisa Wei who performed sinus surgery several years ago. Her sinuses have been relatively stable since that time outside of significant nasal dryness seasonally. She uses some Vaseline to her ears and her nose for the dryness. Past Medical History, Past Surgical History, Family history, Social History, and Medications were all reviewed with the patient today and updated as necessary.      Allergies   Allergen Reactions    Erythromycin Nausea And Vomiting    Sulfa Antibiotics Itching and Shortness Of Breath     NOT ALLERGIC    Azithromycin Diarrhea    Penicillin G Nausea And Vomiting       Patient Active Problem List   Diagnosis    Polycythemia    COPD exacerbation (HCC)    Fatigue    Moderate COPD (chronic obstructive pulmonary disease) (HCC)    Mixed hyperlipidemia    Paroxysmal tachycardia (HCC)    Tobacco use disorder    Chronic respiratory failure (HCC)    Pneumonia due to infectious organism    Coronary artery disease    Chronic sinusitis    ADHD (attention deficit hyperactivity disorder)    Restless leg syndrome    Depression    PERFECTO (obstructive sleep apnea)    Palliative care patient    Debility    Palpitations    Diabetes mellitus ([address]    Essential hypertension with goal blood pressure less than 130/85    Right heart enlargement    Nocturnal hypoxia    Chest discomfort    Fibromyalgia       Current Outpatient Medications   Medication Sig    fluocinolone (DERMOTIC) 0.01 % OIL oil Place 5 drops in ear(s) 2 times daily Up to 2 times daily    cephALEXin (KEFLEX) 500 MG capsule Take 1 capsule by mouth 2 times daily for 10 days    DULoxetine (CYMBALTA) 30 MG extended release capsule Take 30 mg by mouth daily    dilTIAZem (TIAZAC) 360 MG extended release capsule Take 1 capsule by mouth in the morning. albuterol sulfate HFA (PROVENTIL;VENTOLIN;PROAIR) 108 (90 Base) MCG/ACT inhaler Inhale 2 puffs into the lungs every 4 hours as needed for Shortness of Breath or Wheezing    rosuvastatin (CRESTOR) 10 MG tablet Take 1 tablet by mouth daily    Carboxymeth-Glyc-Polysorb PF 0.5-1-0.5 % SOLN Apply to eye    Lifitegrast 5 % SOLN Apply to eye    calcium-vitamin D (OSCAL) 250-125 MG-UNIT per tablet Take 1 tablet by mouth daily    fluticasone-salmeterol (ADVIAR) 100-50 MCG/ACT AEPB diskus inhaler Inhale 1 puff into the lungs every 12 hours    pramipexole (MIRAPEX) 0.25 MG tablet Take 0.25 mg by mouth 3 times daily    aspirin 81 MG EC tablet Take by mouth daily    atenolol (TENORMIN) 25 MG tablet Take 25 mg by mouth daily    methylphenidate (RITALIN) 5 MG tablet Take 20 mg by mouth 2 times daily. mirtazapine (REMERON) 30 MG tablet Take 15 mg by mouth    tiotropium (SPIRIVA RESPIMAT) 2.5 MCG/ACT AERS inhaler Inhale 2 puffs into the lungs daily    traZODone (DESYREL) 100 MG tablet Take 100 mg by mouth    venlafaxine (EFFEXOR) 75 MG tablet Take 25 mg by mouth daily     No current facility-administered medications for this visit.        Past Medical History:   Diagnosis Date    Acute respiratory failure ([facility] 1/29/2015    Intubated 1/29/2015    Acute respiratory failure ([facility] 1/29/2015    Intubated 1/29/2015     Cancer ([facility]     basal cell,squamous cell    Chest pain 7/7/2016    Chiari I malformation (HCC)     history of    COPD exacerbation ([facility] 9/14/12-9/17/12    COPD exacerbation ([facility] 4/2013 to 5/2/13    hospitalization    Coronary artery disease 3/3/2015    Paroxysmal tachycardia ([facility] 7/7/2016    Pneumonia 9/16/2012    Pneumothorax     HX. of  2 on the left- required chest tube       Past Surgical History:   Procedure Laterality Date    CARDIAC CATHETERIZATION  1/12    mild nonobstructive CAD    CATARACT REMOVAL      HEENT  2009    Bilateral cataracts and bleth    HYSTERECTOMY (CERVIX STATUS UNKNOWN)  [place]    right shoulder sx; left thumb    OTHER SURGICAL HISTORY      left thumb sx,forehead resection of squamous cell    SEPTOPLASTY  04/2010    SHOULDER ARTHROSCOPY  1990       Social History     Tobacco Use    Smoking status: Every Day     Packs/day: 1.00     Types: Cigarettes    Smokeless tobacco: Current   Substance Use Topics    Alcohol use: Yes     Alcohol/week: 1.0 standard drink       Family History   Problem Relation Age of Onset    Cancer Sister         exophageal    Other Father         neurological degeneration    Cancer Mother         breast        ROS:    Review of Systems   Constitutional:  Negative for chills and fever. HENT:  Positive for voice change. Negative for congestion, facial swelling, hearing loss, nosebleeds, sinus pressure, sinus pain and sneezing. Eyes:  Negative for pain, discharge and itching. Respiratory:  Negative for apnea, cough, choking, shortness of breath, wheezing and stridor. Cardiovascular:  Negative for chest pain. Gastrointestinal:  Negative for constipation, diarrhea and nausea. Endocrine: Negative for polyuria.    Genitourinary:  Negative for difficulty urinating and flank pain. Musculoskeletal:  Negative for arthralgias, myalgias and neck stiffness. Skin:  Negative for rash and wound. Allergic/Immunologic: Positive for environmental allergies. Neurological:  Negative for dizziness, facial asymmetry and headaches. Hematological:  Negative for adenopathy. Does not bruise/bleed easily. Psychiatric/Behavioral:  Negative for agitation, behavioral problems and confusion. PHYSICAL EXAM:    Ht 5' 4\" (1.626 m)   Wt 164 lb (74.4 kg)   BMI 28.15 kg/m²     Physical Exam  Vitals and nursing note reviewed. Constitutional:       Appearance: Normal appearance. HENT:      Head: Normocephalic and atraumatic. Right Ear: Tympanic membrane, ear canal and external ear normal. There is no impacted cerumen. Tympanic membrane is not scarred, perforated, erythematous or retracted. Left Ear: Tympanic membrane, ear canal and external ear normal. There is no impacted cerumen. Tympanic membrane is not scarred, perforated, erythematous or retracted. Ears:      Comments: Atrophic EAC skin changes with mild dry skin noted. Bilateral TMs intact no perforations retractions or middle ear effusions     Nose: Nose normal. No congestion or rhinorrhea. Comments: Dry nasal cavity mucosa. Septum in the midline. Mouth/Throat:      Mouth: Mucous membranes are moist.      Pharynx: Oropharynx is clear. No oropharyngeal exudate or posterior oropharyngeal erythema. Eyes:      General: No scleral icterus. Neck:      Comments: No palpable cervical lymphadenopathy or neck masses  Pulmonary:      Effort: Pulmonary effort is normal.   Musculoskeletal:      Cervical back: Normal range of motion and neck supple. No rigidity. Lymphadenopathy:      Cervical: No cervical adenopathy. Skin:     General: Skin is warm and dry. Neurological:      Mental Status: She is alert and oriented to person, place, and time.    Psychiatric:         Mood and Affect: Mood normal. Behavior: Behavior normal.          PROCEDURE: Flexible laryngoscopy  INDICATIONS: Hoarseness of voice, history of vocal cord polyps, history of chronic rhinosinusitis  DESCRIPTION: After verbal consent was obtained and a timeout was performed, the flexible scope was advanced down one of the patient's nares into the nasopharynx. Bilateral prior surgical sinus cavities with scar tissue and narrowing right greater than left. Minimal mucopurulence or mucostasis. Very dry nasal cavity mucosa noted. The nasal cavity and nasopharynx were clear. The scope was then turned distally down towards the oropharynx. The BOT and vallecula were clear and the epiglottis was normal in appearance. Both aryepiglottic folds were clear and there was a normal appearing glottis w/ healthy TVCs. No nodules or polyps and the cords were fully mobile. Bilateral arytenoid and interarytenoid space with moderate edema and polypoid mucosal degeneration. Airway widely patent. No concerning lesions seen along post-cricoid region or within either piriform sinus. The posterior pharyngeal was clear as well. The scope was then carefully removed. The patient tolerated the procedure well and there were no complications. ASSESSMENT and PLAN        ICD-10-CM    1. Laryngopharyngeal reflux (LPR)  K21.9       2. Ear itch  L29.9       3. Chronic eczematous otitis externa of both ears  H60.8X3       4. Nasal mucosa dry  J34.89       5. S/P FESS (functional endoscopic sinus surgery)  Z98.890       6. Hoarseness of voice  R49.0       7. History of vocal cord polypectomy  Z98.890       8. Tobacco abuse  Z72.0           Long-term daily tobacco smoker now with lifelong hoarseness, ear itching, and history of vocal cord polyps and history of CRS status post endoscopic sinus surgery by Dr. Esteban Clancy several years ago. Reassured that her ears were clear no cerumen in diffuse atrophy and dry skin.   Derm otic eardrops have been sent to her pharmacy to be used as needed for symptomatic relief.  Her nasal laryngoscopy exam shows diffuse dryness to the nasal cavity mucosa in her prior sinus surgical cavities have some moderate amount of narrowing and crusting.  I have advised that she begin a daily consistent use of nasal saline irrigations and ointment as needed.  On her laryngoscopy there was no evidence of recurrence of vocal cord polyps or nodules.  She does have rather diffuse moderate arytenoid and interarytenoid edema/erythema with some polypoid mucosal degeneration typical of long-term tobacco abuse.  For this smoking cessation has been advised and also I question whether there could be a component of an silent LPR/GERD.  Educational handout regarding LPR has been provided and I have recommended a trial of Protonix 40 mg daily for the next few months.  Follow-up in 6 months for repeat evaluation and repeat laryngoscopy.    Otoniel Guajardo,   3/9/2023

## 2023-03-10 NOTE — TELEPHONE ENCOUNTER
Spoke with pharmacist at [facility]--- he states that the Keflex was filled yesterday. Pt is asking about the prednisone to keep on hand. Attempted to call pt x2. Seems like someone answered but no speaking heard on RN end. Routing to [place] to address when she is back in office.

## 2023-03-14 RX ORDER — PREDNISONE 10 MG/1
10 TABLET ORAL EVERY MORNING
Qty: 30 TABLET | Refills: 3 | Status: SHIPPED | OUTPATIENT
Start: 2023-03-14 | End: 2023-07-12

## 2023-03-14 NOTE — TELEPHONE ENCOUNTER
Unsure why Prednisone RX did not populate. Routed on 3/14/2023 at 8:51am to [facility].

## 2023-04-25 ENCOUNTER — TELEPHONE (OUTPATIENT)
Dept: PULMONOLOGY | Age: 73
End: 2023-04-25

## 2023-07-14 ENCOUNTER — OFFICE VISIT (OUTPATIENT)
Age: 73
End: 2023-07-14

## 2023-07-14 VITALS
WEIGHT: 160 LBS | OXYGEN SATURATION: 95 % | DIASTOLIC BLOOD PRESSURE: 90 MMHG | SYSTOLIC BLOOD PRESSURE: 128 MMHG | HEART RATE: 95 BPM | HEIGHT: 64 IN | BODY MASS INDEX: 27.31 KG/M2 | RESPIRATION RATE: 16 BRPM

## 2023-07-14 DIAGNOSIS — J96.11 CHRONIC RESPIRATORY FAILURE WITH HYPOXIA AND HYPERCAPNIA (HCC): ICD-10-CM

## 2023-07-14 DIAGNOSIS — R05.3 CHRONIC COUGH: ICD-10-CM

## 2023-07-14 DIAGNOSIS — R25.1 TREMOR OF BOTH HANDS: ICD-10-CM

## 2023-07-14 DIAGNOSIS — J44.9 MODERATE COPD (CHRONIC OBSTRUCTIVE PULMONARY DISEASE) (HCC): Primary | ICD-10-CM

## 2023-07-14 DIAGNOSIS — R26.89 ABNORMALITY OF GAIT DUE TO IMPAIRMENT OF BALANCE: ICD-10-CM

## 2023-07-14 DIAGNOSIS — J96.12 CHRONIC RESPIRATORY FAILURE WITH HYPOXIA AND HYPERCAPNIA (HCC): ICD-10-CM

## 2023-07-14 DIAGNOSIS — Z51.5 PALLIATIVE CARE PATIENT: ICD-10-CM

## 2023-07-14 RX ORDER — PREDNISONE 20 MG/1
20 TABLET ORAL DAILY
Qty: 90 TABLET | Refills: 4 | Status: SHIPPED | OUTPATIENT
Start: 2023-07-14 | End: 2024-10-06

## 2023-07-14 RX ORDER — ALBUTEROL SULFATE 90 UG/1
2 AEROSOL, METERED RESPIRATORY (INHALATION) EVERY 4 HOURS PRN
Qty: 3 EACH | Refills: 4 | Status: SHIPPED | OUTPATIENT
Start: 2023-07-14

## 2023-07-14 RX ORDER — BUPROPION HYDROCHLORIDE 200 MG/1
200 TABLET, EXTENDED RELEASE ORAL 2 TIMES DAILY
COMMUNITY

## 2023-07-14 RX ORDER — HYDROCODONE BITARTRATE AND HOMATROPINE METHYLBROMIDE ORAL SOLUTION 5; 1.5 MG/5ML; MG/5ML
5 LIQUID ORAL EVERY 6 HOURS PRN
Qty: 240 ML | Refills: 0 | Status: SHIPPED | OUTPATIENT
Start: 2023-07-14 | End: 2023-08-13

## 2023-07-14 NOTE — ACP (ADVANCE CARE PLANNING)
Advance Care Planning     General Advance Care Planning (ACP) Conversation    Date of Conversation: 7/14/2023  Conducted with: Patient with Decision Making Capacity    Healthcare Decision Maker:  No healthcare decision makers have been documented. Click here to complete [address] including selection of the Healthcare Decision Maker Relationship (ie \"Primary\")  Today we discussed [address] The patient is considering options. Content/Action Overview:  Has NO ACP documents/care preferences - information provided, considering goals and options  Reviewed DNR/DNI and patient -   treatment goals, benefit/burden of treatment options, ventilation preferences, resuscitation preferences, and end of life care preferences (vegetative state/imminent death)  Provided My[employer] [place] handbook to review with her daughter/son-in-law.     Length of Voluntary ACP Conversation in minutes:  25 minutes    MICK Cannon - CNP

## 2023-07-14 NOTE — PROGRESS NOTES
predniSONE (DELTASONE) 20 MG tablet; Take 1 tablet by mouth daily, Disp-90 tablet, R-4Normal  -     HYDROcodone homatropine (HYCODAN) 5-1.5 MG/5ML solution; Take 5 mLs by mouth every 6 hours as needed (Cough) for up to 30 days. Z51.5 Palliative Care Max Daily Amount: 20 mLs, Disp-240 mL, R-0Normal    5. Tremors of both hands - unknown etiology. Referred to [facility]. 6.  Abnormality of gait due to impaired balance - unknown etiology. Referred to [facility]      Return in about 4 months (around 11/14/2023) for In person - Need to complete ACP documents. Subjective   SUBJECTIVE/OBJECTIVE:  Ms. Melo Oakes is well known to palliative care presenting for ongoing support for COPD management. Since our last visit, she has not required hospitalization, urgent care/ED visits. Has been using prednisone intermittently (20mg tablet) would good successful self-care management. She uses her maintenance inhalers, rescue inhaler, and CPAP machine/oxygen. She has reports of struggling with fatigue, intermittent hand tremors, some tripping while walking and drooling unknowingly. She is concerned that she may be developing similar issues that her father had of neurogenerative disorder (he passed away at 77). Since our last visit, she has been diagnosed with reflux by GI and allergy issues by ENT/Allergist. Symptoms seem to have improved. She is the primary caregiver for her 12ear old granddaughter. See ACP notes for details regarding conversation today.     Current daytime O2 flow rate: 4L   Current night O2 flow rate: CPAP   Current O2 flow rate with activities:  4L    Mobility devices used: Walking cane at times but rarely  Handicap license/hangtag: UTD  Meals on wheels: Declined    Advanced care planning documentation    Code Status: Discussed today - leaning heavily to DNR  [address] of : Most likely her daughter/son-in-law  [place] POST: Provided information about documents    Condensed

## 2023-08-15 ENCOUNTER — OFFICE VISIT (OUTPATIENT)
Age: 73
End: 2023-08-15
Payer: MEDICARE

## 2023-08-15 VITALS
BODY MASS INDEX: 26.46 KG/M2 | DIASTOLIC BLOOD PRESSURE: 56 MMHG | HEIGHT: 64 IN | HEART RATE: 80 BPM | SYSTOLIC BLOOD PRESSURE: 92 MMHG | WEIGHT: 155 LBS

## 2023-08-15 DIAGNOSIS — E78.2 MIXED HYPERLIPIDEMIA: ICD-10-CM

## 2023-08-15 DIAGNOSIS — I10 ESSENTIAL HYPERTENSION WITH GOAL BLOOD PRESSURE LESS THAN 130/85: ICD-10-CM

## 2023-08-15 DIAGNOSIS — I47.9 PAROXYSMAL TACHYCARDIA (HCC): ICD-10-CM

## 2023-08-15 DIAGNOSIS — G47.33 OSA (OBSTRUCTIVE SLEEP APNEA): ICD-10-CM

## 2023-08-15 DIAGNOSIS — I25.10 CORONARY ARTERY DISEASE INVOLVING NATIVE CORONARY ARTERY OF NATIVE HEART WITHOUT ANGINA PECTORIS: Primary | ICD-10-CM

## 2023-08-15 PROCEDURE — 4004F PT TOBACCO SCREEN RCVD TLK: CPT | Performed by: INTERNAL MEDICINE

## 2023-08-15 PROCEDURE — 3074F SYST BP LT 130 MM HG: CPT | Performed by: INTERNAL MEDICINE

## 2023-08-15 PROCEDURE — G8399 PT W/DXA RESULTS DOCUMENT: HCPCS | Performed by: INTERNAL MEDICINE

## 2023-08-15 PROCEDURE — 3017F COLORECTAL CA SCREEN DOC REV: CPT | Performed by: INTERNAL MEDICINE

## 2023-08-15 PROCEDURE — 3078F DIAST BP <80 MM HG: CPT | Performed by: INTERNAL MEDICINE

## 2023-08-15 PROCEDURE — G8427 DOCREV CUR MEDS BY ELIG CLIN: HCPCS | Performed by: INTERNAL MEDICINE

## 2023-08-15 PROCEDURE — 1123F ACP DISCUSS/DSCN MKR DOCD: CPT | Performed by: INTERNAL MEDICINE

## 2023-08-15 PROCEDURE — 1090F PRES/ABSN URINE INCON ASSESS: CPT | Performed by: INTERNAL MEDICINE

## 2023-08-15 PROCEDURE — G8417 CALC BMI ABV UP PARAM F/U: HCPCS | Performed by: INTERNAL MEDICINE

## 2023-08-15 PROCEDURE — 99214 OFFICE O/P EST MOD 30 MIN: CPT | Performed by: INTERNAL MEDICINE

## 2023-08-15 ASSESSMENT — ENCOUNTER SYMPTOMS
HEMATOCHEZIA: 0
STRIDOR: 0
HEMATEMESIS: 0
ABDOMINAL PAIN: 0
WHEEZING: 0
HOARSE VOICE: 0
EYE REDNESS: 0
DOUBLE VISION: 0
HEMOPTYSIS: 0

## 2023-08-15 NOTE — PATIENT INSTRUCTIONS
-Stop atenolol completely from now onwards. Your blood pressures running too low with your weight loss. Hold diltiazem completely on 8/16/2023 and only from 8/17/2023, restart just diltiazem without the atenolol once daily.   Keep an eye on your blood pressures at home and if the top number is below [id number] us a call in 1 week

## 2023-08-15 NOTE — PROGRESS NOTES
11/22/2021 09:25 AM     11/22/2021 09:25 AM    CO2 26 11/22/2021 09:25 AM    BUN 5 11/22/2021 09:25 AM    GFRAA >60 11/22/2021 09:25 AM    GLOB 4.0 03/09/2020 01:25 PM    ALT 35 10/29/2021 11:17 AM    AST 29 10/29/2021 11:17 AM      Lab Results   Component Value Date    LDLCALC 97 10/29/2021      Lab Results   Component Value Date    CREATININE 0.67 11/22/2021      No results found for this or any previous visit. Blood work from the [facility] from 1/5/2023 showed a sodium of 142, potassium 4.2, creatinine 0.7, A1c 6.1, total cholesterol 139, triglycerides 133, HDL 76 and LDL 36      ASSESSMENT and PLAN      Coronary artery disease involving native coronary artery of native heart without angina pectoris  -60% proximal RCA disease-FFR negative at 0.94-managed medically-continue aspirin and statin therapy along with beta-blocker therapy. Tobacco use disorder   - Counseled on importance of quitting completely. She understands but is struggling to quit. Mixed hyperlipidemia  -Has tolerated rosuvastatin well. Feels well on this. Lipids are much better. Paroxysmal tachycardia/inappropriate sinus tachycardia (HCC)  -Discontinued atenolol 25 mg and continued diltiazem  mg daily. Essential hypertension with goal blood pressure less than 130/85  -Discontinued atenolol with low blood pressures but continued long-acting diltiazem. PERFECTO (obstructive sleep apnea)  -Continue CPAP therapy with oxygen every night. Overall Impression  -Generalized fatigue and weakness, hypotensive so I discontinued atenolol. No complaints of chest pain or any worsening dyspnea on exertion-however on oxygen now more often even during the day. .  Euvolemic on exam.  Has advanced stage IV COPD and understands the need to quit smoking completely which she is struggling to quit. -Lipids are far better and I reviewed her most recent blood work with the [place]. Continue rosuvastatin.   We will see her in follow-up in about 6

## 2023-09-26 ENCOUNTER — TELEPHONE (OUTPATIENT)
Dept: PULMONOLOGY | Age: 73
End: 2023-09-26

## 2023-09-26 NOTE — TELEPHONE ENCOUNTER
Patient needs these 2 prescriptions sent to     [facility] in [address] [place] [address] Dr Harper Refills Start End    HYDROcodone homatropine (HYCODAN) 5-1.5 MG/5ML solution             Disp Refills Start End    albuterol sulfate HFA (PROVENTIL;VENTOLIN;PROAIR) 108 (90 Base) MCG/ACT inhaler

## 2023-09-29 DIAGNOSIS — J96.11 CHRONIC RESPIRATORY FAILURE WITH HYPOXIA AND HYPERCAPNIA (HCC): ICD-10-CM

## 2023-09-29 DIAGNOSIS — J44.9 MODERATE COPD (CHRONIC OBSTRUCTIVE PULMONARY DISEASE) (HCC): ICD-10-CM

## 2023-09-29 DIAGNOSIS — J96.12 CHRONIC RESPIRATORY FAILURE WITH HYPOXIA AND HYPERCAPNIA (HCC): ICD-10-CM

## 2023-09-29 DIAGNOSIS — Z51.5 PALLIATIVE CARE PATIENT: ICD-10-CM

## 2023-09-29 DIAGNOSIS — R05.3 CHRONIC COUGH: ICD-10-CM

## 2023-10-03 RX ORDER — ALBUTEROL SULFATE 90 UG/1
2 AEROSOL, METERED RESPIRATORY (INHALATION) EVERY 4 HOURS PRN
Qty: 3 EACH | Refills: 3 | Status: SHIPPED | OUTPATIENT
Start: 2023-10-03

## 2023-10-03 RX ORDER — HYDROCODONE BITARTRATE AND HOMATROPINE METHYLBROMIDE ORAL SOLUTION 5; 1.5 MG/5ML; MG/5ML
5 LIQUID ORAL EVERY 6 HOURS PRN
Qty: 240 ML | Refills: 0 | Status: SHIPPED | OUTPATIENT
Start: 2023-10-03 | End: 2023-11-02

## 2023-10-09 ENCOUNTER — OFFICE VISIT (OUTPATIENT)
Dept: NEUROLOGY | Age: 73
End: 2023-10-09
Payer: MEDICARE

## 2023-10-09 VITALS
HEART RATE: 108 BPM | DIASTOLIC BLOOD PRESSURE: 88 MMHG | SYSTOLIC BLOOD PRESSURE: 136 MMHG | WEIGHT: 160 LBS | OXYGEN SATURATION: 96 % | BODY MASS INDEX: 27.46 KG/M2

## 2023-10-09 DIAGNOSIS — R20.2 NUMBNESS AND TINGLING OF BOTH UPPER EXTREMITIES: ICD-10-CM

## 2023-10-09 DIAGNOSIS — R26.89 ABNORMALITY OF GAIT DUE TO IMPAIRMENT OF BALANCE: ICD-10-CM

## 2023-10-09 DIAGNOSIS — M50.30 DDD (DEGENERATIVE DISC DISEASE), CERVICAL: ICD-10-CM

## 2023-10-09 DIAGNOSIS — R20.0 NUMBNESS AND TINGLING OF BOTH UPPER EXTREMITIES: ICD-10-CM

## 2023-10-09 DIAGNOSIS — R25.1 TREMOR OF BOTH HANDS: Primary | ICD-10-CM

## 2023-10-09 DIAGNOSIS — R41.89 SUBJECTIVE MEMORY COMPLAINTS: ICD-10-CM

## 2023-10-09 PROCEDURE — 3017F COLORECTAL CA SCREEN DOC REV: CPT | Performed by: PSYCHIATRY & NEUROLOGY

## 2023-10-09 PROCEDURE — 3075F SYST BP GE 130 - 139MM HG: CPT | Performed by: PSYCHIATRY & NEUROLOGY

## 2023-10-09 PROCEDURE — 3079F DIAST BP 80-89 MM HG: CPT | Performed by: PSYCHIATRY & NEUROLOGY

## 2023-10-09 PROCEDURE — 4004F PT TOBACCO SCREEN RCVD TLK: CPT | Performed by: PSYCHIATRY & NEUROLOGY

## 2023-10-09 PROCEDURE — G8417 CALC BMI ABV UP PARAM F/U: HCPCS | Performed by: PSYCHIATRY & NEUROLOGY

## 2023-10-09 PROCEDURE — G8399 PT W/DXA RESULTS DOCUMENT: HCPCS | Performed by: PSYCHIATRY & NEUROLOGY

## 2023-10-09 PROCEDURE — 1123F ACP DISCUSS/DSCN MKR DOCD: CPT | Performed by: PSYCHIATRY & NEUROLOGY

## 2023-10-09 PROCEDURE — G8427 DOCREV CUR MEDS BY ELIG CLIN: HCPCS | Performed by: PSYCHIATRY & NEUROLOGY

## 2023-10-09 PROCEDURE — G8484 FLU IMMUNIZE NO ADMIN: HCPCS | Performed by: PSYCHIATRY & NEUROLOGY

## 2023-10-09 PROCEDURE — 1090F PRES/ABSN URINE INCON ASSESS: CPT | Performed by: PSYCHIATRY & NEUROLOGY

## 2023-10-09 PROCEDURE — 99205 OFFICE O/P NEW HI 60 MIN: CPT | Performed by: PSYCHIATRY & NEUROLOGY

## 2023-10-09 ASSESSMENT — PATIENT HEALTH QUESTIONNAIRE - PHQ9
SUM OF ALL RESPONSES TO PHQ QUESTIONS 1-9: 2
SUM OF ALL RESPONSES TO PHQ QUESTIONS 1-9: 2
1. LITTLE INTEREST OR PLEASURE IN DOING THINGS: 1
SUM OF ALL RESPONSES TO PHQ QUESTIONS 1-9: 2
SUM OF ALL RESPONSES TO PHQ9 QUESTIONS 1 & 2: 2
SUM OF ALL RESPONSES TO PHQ QUESTIONS 1-9: 2
2. FEELING DOWN, DEPRESSED OR HOPELESS: 1

## 2023-10-09 ASSESSMENT — ENCOUNTER SYMPTOMS
SHORTNESS OF BREATH: 1
ABDOMINAL PAIN: 0
COUGH: 1
VOICE CHANGE: 1

## 2023-10-09 NOTE — PROGRESS NOTES
Future    Subjective memory complaints  -     MRI BRAIN W WO CONTRAST; Future      Patient presents for further evaluation of tremors of the nervous system in addition to other concerns including gait and balance impairment, cognitive difficulties, and insomnia. She has a family history of an unspecified neurodegenerative disease. Tremors:   Overall exam appears relatively benign with no obvious features of parkinsonism. Reassurance given. I cannot rule out the possibility of a mild benign essential tremor and we discussed this potential diagnosis with respect to its prognosis and management options. Chronic use of prednisone, opioids, and other serotonergic agents could intentionally be contributing. Numbness upper extremities: There may be other contributions from an underlying peripheral neuropathy or her cervical degenerative disc disease. I will have her come back for an EMG/nerve conduction studies of the upper extremities for further evaluation. Further studies of the lower extremities could also be considered. Memory loss: Recommended MRI of the brain for further evaluation of any cortical and/or hippocampal atrophy in addition to assessing the burden of cerebrovascular disease which could be underlying some of her cognitive and balance complaints. Cervical degenerative disc disease: With respect to her cervical degenerative disc disease there are some areas of at least moderate central canal stenosis though it appears that she has not been considered to be a surgical candidate at this time. No obvious features of myelopathy on her exam.  Continue to monitor for worsening symptoms and she will continue follow-up with her other providers for neuropathic pain. Gait/balance impairment:  Counseled on fall precautions but overall gait disturbance looks relatively mild. Follow-up to be arranged.       Signature: Glenn Rai MD      Date:  10/9/2023    [facility]   [phone] [facility]

## 2023-10-09 NOTE — PATIENT INSTRUCTIONS
We will bring you back for an EMG study to look at the nerves in your arms. You can schedule this up front. We have ordered an MRI of your brain. Radiology will contact you to schedule this.

## 2023-11-15 NOTE — PROGRESS NOTES
Attempted to do breathing Tx. Pt had to use bathroom first. Came back to do Tx, pt trying to sleep, declined Tx. How Severe Are Your Spot(S)?: mild Statement Selected

## 2023-11-20 ENCOUNTER — TELEPHONE (OUTPATIENT)
Dept: PULMONOLOGY | Age: 73
End: 2023-11-20

## 2023-11-20 DIAGNOSIS — J44.9 MODERATE COPD (CHRONIC OBSTRUCTIVE PULMONARY DISEASE) (HCC): ICD-10-CM

## 2023-11-20 DIAGNOSIS — Z51.5 PALLIATIVE CARE PATIENT: ICD-10-CM

## 2023-11-20 DIAGNOSIS — J96.11 CHRONIC RESPIRATORY FAILURE WITH HYPOXIA AND HYPERCAPNIA (HCC): ICD-10-CM

## 2023-11-20 DIAGNOSIS — J96.12 CHRONIC RESPIRATORY FAILURE WITH HYPOXIA AND HYPERCAPNIA (HCC): ICD-10-CM

## 2023-11-20 DIAGNOSIS — R05.3 CHRONIC COUGH: ICD-10-CM

## 2023-11-20 RX ORDER — HYDROCODONE BITARTRATE AND HOMATROPINE METHYLBROMIDE ORAL SOLUTION 5; 1.5 MG/5ML; MG/5ML
5 LIQUID ORAL EVERY 6 HOURS PRN
Qty: 300 ML | Refills: 0 | Status: SHIPPED | OUTPATIENT
Start: 2023-11-20 | End: 2023-12-20

## 2023-11-20 NOTE — TELEPHONE ENCOUNTER
I have reviewed the patient's controlled substance prescription history, as maintained in the [facility], so that the prescription for a controlled substance can be given. Refilled in for Karen Sanches NP--palliative care patient.

## 2023-11-20 NOTE — TELEPHONE ENCOUNTER
Spoke with patient to let her know we have been having trouble reaching her due to some type of connectivity issue. Also informed her that another provider has agreed to call in her cough medicine. Encouraged her to sign up for [employer] and e-mailed link for this.      Patient has my cell phone number and was asked to call me directly should she have any other concerns or problems. /dd

## 2023-11-20 NOTE — TELEPHONE ENCOUNTER
Received message from front office that patient needed two rx's called in, but would not give  any further information and stated she would call corporate if this were not taken care of.   10:08 - Attempted to call, sounds like someone picks up, but noone is there. 10:12 - Attempted to call, sounds like someone picks up but noone is there. If patient returns call, please try to find Lillian to take call. Patient is not active on [employer].

## 2023-11-27 ENCOUNTER — TELEPHONE (OUTPATIENT)
Dept: PULMONOLOGY | Age: 73
End: 2023-11-27

## 2023-11-27 NOTE — TELEPHONE ENCOUNTER
Patients mother called Silke Allison . She wants to speak with someone that can explain what [facility] does for the patient .        Silke Allison    [phone]

## 2023-11-27 NOTE — TELEPHONE ENCOUNTER
Contacted person on [employer], Howard Madrid. Confirmed that patient's mother is not listed on [employer] & that she is aggitated and scared that patient is dying. Patient is agitated. Advises that it will not help if we call patient's mother back right now. Encouraged to call back if any further needs or concerns, expresses appreciation.

## 2023-12-12 ENCOUNTER — APPOINTMENT (OUTPATIENT)
Dept: GENERAL RADIOLOGY | Age: 73
End: 2023-12-12
Payer: OTHER GOVERNMENT

## 2023-12-12 ENCOUNTER — APPOINTMENT (OUTPATIENT)
Dept: CT IMAGING | Age: 73
End: 2023-12-12
Payer: OTHER GOVERNMENT

## 2023-12-12 ENCOUNTER — HOSPITAL ENCOUNTER (EMERGENCY)
Age: 73
Discharge: HOME | End: 2023-12-12
Attending: GENERAL PRACTICE
Payer: OTHER GOVERNMENT

## 2023-12-12 VITALS
HEIGHT: 64 IN | RESPIRATION RATE: 18 BRPM | HEART RATE: 99 BPM | OXYGEN SATURATION: 94 % | DIASTOLIC BLOOD PRESSURE: 74 MMHG | SYSTOLIC BLOOD PRESSURE: 143 MMHG | TEMPERATURE: 98.7 F | WEIGHT: 165 LBS | BODY MASS INDEX: 28.17 KG/M2

## 2023-12-12 DIAGNOSIS — T17.500A MUCUS PLUGGING OF BRONCHI: ICD-10-CM

## 2023-12-12 DIAGNOSIS — S22.20XA CLOSED FRACTURE OF STERNUM, UNSPECIFIED PORTION OF STERNUM, INITIAL ENCOUNTER: ICD-10-CM

## 2023-12-12 DIAGNOSIS — R91.8 LUNG MASS: Primary | ICD-10-CM

## 2023-12-12 LAB
ANION GAP SERPL CALC-SCNC: 5 MMOL/L (ref 2–11)
BASOPHILS # BLD: 0 K/UL (ref 0–0.2)
BASOPHILS NFR BLD: 0 % (ref 0–2)
BUN SERPL-MCNC: 5 MG/DL (ref 8–23)
CALCIUM SERPL-MCNC: 8.4 MG/DL (ref 8.3–10.4)
CHLORIDE SERPL-SCNC: 103 MMOL/L (ref 103–113)
CO2 SERPL-SCNC: 32 MMOL/L (ref 21–32)
CREAT SERPL-MCNC: 0.59 MG/DL (ref 0.6–1)
DIFFERENTIAL METHOD BLD: ABNORMAL
EKG ATRIAL RATE: 103 BPM
EKG DIAGNOSIS: NORMAL
EKG P AXIS: 12 DEGREES
EKG P-R INTERVAL: 130 MS
EKG Q-T INTERVAL: 399 MS
EKG QRS DURATION: 88 MS
EKG QTC CALCULATION (BAZETT): 530 MS
EKG R AXIS: 87 DEGREES
EKG T AXIS: 68 DEGREES
EKG VENTRICULAR RATE: 106 BPM
EOSINOPHIL # BLD: 0 K/UL (ref 0–0.8)
EOSINOPHIL NFR BLD: 0 % (ref 0.5–7.8)
ERYTHROCYTE [DISTWIDTH] IN BLOOD BY AUTOMATED COUNT: 13.5 % (ref 11.9–14.6)
GLUCOSE SERPL-MCNC: 187 MG/DL (ref 65–100)
HCT VFR BLD AUTO: 42 % (ref 35.8–46.3)
HGB BLD-MCNC: 13.4 G/DL (ref 11.7–15.4)
IMM GRANULOCYTES # BLD AUTO: 0 K/UL (ref 0–0.5)
IMM GRANULOCYTES NFR BLD AUTO: 0 % (ref 0–5)
LYMPHOCYTES # BLD: 0.6 K/UL (ref 0.5–4.6)
LYMPHOCYTES NFR BLD: 7 % (ref 13–44)
MAGNESIUM SERPL-MCNC: 2 MG/DL (ref 1.8–2.4)
MCH RBC QN AUTO: 31.1 PG (ref 26.1–32.9)
MCHC RBC AUTO-ENTMCNC: 31.9 G/DL (ref 31.4–35)
MCV RBC AUTO: 97.4 FL (ref 82–102)
MONOCYTES # BLD: 0.1 K/UL (ref 0.1–1.3)
MONOCYTES NFR BLD: 2 % (ref 4–12)
NEUTS SEG # BLD: 7.7 K/UL (ref 1.7–8.2)
NEUTS SEG NFR BLD: 91 % (ref 43–78)
NRBC # BLD: 0 K/UL (ref 0–0.2)
PLATELET # BLD AUTO: 291 K/UL (ref 150–450)
PMV BLD AUTO: 9.7 FL (ref 9.4–12.3)
POTASSIUM SERPL-SCNC: 4.1 MMOL/L (ref 3.5–5.1)
RBC # BLD AUTO: 4.31 M/UL (ref 4.05–5.2)
SODIUM SERPL-SCNC: 140 MMOL/L (ref 136–146)
TROPONIN I SERPL HS-MCNC: 8.3 PG/ML (ref 0–14)
TROPONIN I SERPL HS-MCNC: 8.4 PG/ML (ref 0–14)
WBC # BLD AUTO: 8.5 K/UL (ref 4.3–11.1)

## 2023-12-12 PROCEDURE — 6360000004 HC RX CONTRAST MEDICATION: Performed by: GENERAL PRACTICE

## 2023-12-12 PROCEDURE — 84484 ASSAY OF TROPONIN QUANT: CPT

## 2023-12-12 PROCEDURE — 71046 X-RAY EXAM CHEST 2 VIEWS: CPT

## 2023-12-12 PROCEDURE — 94760 N-INVAS EAR/PLS OXIMETRY 1: CPT

## 2023-12-12 PROCEDURE — 6360000002 HC RX W HCPCS: Performed by: GENERAL PRACTICE

## 2023-12-12 PROCEDURE — 96374 THER/PROPH/DIAG INJ IV PUSH: CPT

## 2023-12-12 PROCEDURE — 80048 BASIC METABOLIC PNL TOTAL CA: CPT

## 2023-12-12 PROCEDURE — 72125 CT NECK SPINE W/O DYE: CPT

## 2023-12-12 PROCEDURE — 96375 TX/PRO/DX INJ NEW DRUG ADDON: CPT

## 2023-12-12 PROCEDURE — 83735 ASSAY OF MAGNESIUM: CPT

## 2023-12-12 PROCEDURE — 99285 EMERGENCY DEPT VISIT HI MDM: CPT

## 2023-12-12 PROCEDURE — 93010 ELECTROCARDIOGRAM REPORT: CPT | Performed by: INTERNAL MEDICINE

## 2023-12-12 PROCEDURE — 85025 COMPLETE CBC W/AUTO DIFF WBC: CPT

## 2023-12-12 PROCEDURE — 71260 CT THORAX DX C+: CPT

## 2023-12-12 PROCEDURE — 93005 ELECTROCARDIOGRAM TRACING: CPT | Performed by: GENERAL PRACTICE

## 2023-12-12 RX ORDER — GUAIFENESIN/DEXTROMETHORPHAN 100-10MG/5
5 SYRUP ORAL 3 TIMES DAILY PRN
Qty: 120 ML | Refills: 0 | Status: SHIPPED | OUTPATIENT
Start: 2023-12-12 | End: 2023-12-22

## 2023-12-12 RX ORDER — HYDROCODONE BITARTRATE AND ACETAMINOPHEN 5; 325 MG/1; MG/1
1 TABLET ORAL EVERY 6 HOURS PRN
Qty: 10 TABLET | Refills: 0 | Status: SHIPPED | OUTPATIENT
Start: 2023-12-12 | End: 2023-12-15

## 2023-12-12 RX ORDER — MORPHINE SULFATE 4 MG/ML
4 INJECTION, SOLUTION INTRAMUSCULAR; INTRAVENOUS
Status: COMPLETED | OUTPATIENT
Start: 2023-12-12 | End: 2023-12-12

## 2023-12-12 RX ORDER — ONDANSETRON 2 MG/ML
4 INJECTION INTRAMUSCULAR; INTRAVENOUS ONCE
Status: COMPLETED | OUTPATIENT
Start: 2023-12-12 | End: 2023-12-12

## 2023-12-12 RX ADMIN — ONDANSETRON 4 MG: 2 INJECTION INTRAMUSCULAR; INTRAVENOUS at 15:30

## 2023-12-12 RX ADMIN — IOPAMIDOL 80 ML: 755 INJECTION, SOLUTION INTRAVENOUS at 15:42

## 2023-12-12 RX ADMIN — MORPHINE SULFATE 4 MG: 4 INJECTION, SOLUTION INTRAMUSCULAR; INTRAVENOUS at 15:30

## 2023-12-12 ASSESSMENT — PAIN - FUNCTIONAL ASSESSMENT: PAIN_FUNCTIONAL_ASSESSMENT: 0-10

## 2023-12-12 ASSESSMENT — PAIN SCALES - GENERAL: PAINLEVEL_OUTOF10: 7

## 2023-12-12 NOTE — ED TRIAGE NOTES
Pt BIB by EMS from home. Pt been having chest pain for 3 weeks after a fall. Pt states that the pain radiates to her back and neck. Pain is reproducible. Pt has hx of COPD and is normally on 3L. 1 sublingual nitro given by EMS.

## 2023-12-12 NOTE — ED PROVIDER NOTES
Emergency Department Provider Note       PCP: Brigid Whitt MD   Age: 68 y.o. Sex: female     DISPOSITION Decision To Discharge 12/12/2023 05:47:21 PM       ICD-10-CM    1. Lung mass  R91.8       2. Mucus plugging of bronchi  T17.500A       3. Closed fracture of sternum, unspecified portion of sternum, initial encounter  S22.20XA           Medical Decision Making     Complexity of Problems Addressed:  1 or more acute illnesses that pose a threat to life or bodily function. Data Reviewed and Analyzed:  I independently ordered and reviewed each unique test.  I reviewed external records: provider visit note from outside specialist.  I reviewed external records: previous imaging study including radiologist interpretation. I interpreted the X-rays right lower lobe atelectasis of the lung on the chest x-ray. I reviewed and agree with radiology report. .  I interpreted the CT Scan sternal fracture is noted. Patient has right lower lobe atelectasis likely from mucous plugging and left suprahilar lung mass. I have reviewed and agree with radiology report. .    Discussion of management or test interpretation. Patient presents with fall from 3 weeks ago that showed sternal fracture. Pain is controlled here and I have prescribed pain medicine for her. I made her aware of the mucous plugging likely on the right. She is not having any increased oxygen demand or respiratory distress. Therefore, I believe this can be seen by pulmonology as an outpatient. I have also made her aware of the suprahilar mass. I have also asked her to follow-up with pulmonology regarding this. Patient is agreeable and return precautions are given. Risk of Complications and/or Morbidity of Patient Management:  Prescription drug management performed. Parental controlled substances given in the ED. Drug therapy given requiring intensive monitoring for toxicity.   Patient was discharged risks and benefits of

## 2023-12-12 NOTE — DISCHARGE INSTRUCTIONS
You also likely have mucous plugging causing right lower lobe lung collapse and need to follow-up with pulmonology regarding this.

## 2023-12-19 ENCOUNTER — PREP FOR PROCEDURE (OUTPATIENT)
Dept: PULMONOLOGY | Age: 73
End: 2023-12-19

## 2023-12-19 DIAGNOSIS — R91.8 PULMONARY INFILTRATE: ICD-10-CM

## 2023-12-19 DIAGNOSIS — R91.8 LUNG MASS: ICD-10-CM

## 2023-12-26 RX ORDER — VENLAFAXINE 25 MG/1
75 TABLET ORAL NIGHTLY
COMMUNITY

## 2023-12-26 NOTE — PROGRESS NOTES
Patient verified name and .    Order for consent found in EHR and matches case posting; patient verifies procedure.     Type 1B surgery, phone assessment complete.  Orders received.  Labs per surgeon: NONE  Labs per anesthesia protocol: NONE    Patient answered medical/surgical history questions at their best of ability. All prior to admission medications documented in EPIC.    Patient instructed to take the following medications the day of surgery according to anesthesia guidelines with a small sip of water: Albuterol Inhaler (use and bring with you); Advair Inhaler, Spiriva Inhaler, Bupropion, Duloxetine, Prednisone. Hold all vitamins 7 days prior to surgery and NSAIDS 5 days prior to surgery. Prescription meds to hold: NONE.    Patient instructed on the following:    > Arrive at [facility] MAIN Entrance, time of arrival to be called the day before by 1700  > NPO after midnight, unless otherwise indicated, including gum, mints, and ice chips  > Responsible adult must drive patient to the hospital, stay during surgery, and patient will need supervision 24 hours after anesthesia  > Use non moisturizing soap in shower the night before surgery and on the morning of surgery  > All piercings must be removed prior to arrival.    > Leave all valuables (money and jewelry) at home but bring insurance card and ID on DOS.   > You may be required to pay a deductible or co-pay on the day of your procedure. You can pre-pay by calling [phone] if your surgery is at the [facility] and the [facility] or [phone] if your surgery is at the [facility].  > Do not wear make-up, nail polish, lotions, cologne, perfumes, powders, or oil on skin. Artificial nails are not permitted.

## 2024-01-04 ENCOUNTER — TELEPHONE (OUTPATIENT)
Dept: PULMONOLOGY | Age: 74
End: 2024-01-04

## 2024-01-04 ENCOUNTER — APPOINTMENT (OUTPATIENT)
Dept: GENERAL RADIOLOGY | Age: 74
End: 2024-01-04
Attending: INTERNAL MEDICINE
Payer: MEDICARE

## 2024-01-04 ENCOUNTER — ANESTHESIA EVENT (OUTPATIENT)
Dept: ENDOSCOPY | Age: 74
End: 2024-01-04
Payer: MEDICARE

## 2024-01-04 ENCOUNTER — HOSPITAL ENCOUNTER (OUTPATIENT)
Age: 74
Setting detail: OUTPATIENT SURGERY
Discharge: HOME | End: 2024-01-04
Attending: INTERNAL MEDICINE | Admitting: INTERNAL MEDICINE
Payer: MEDICARE

## 2024-01-04 ENCOUNTER — ANESTHESIA (OUTPATIENT)
Dept: ENDOSCOPY | Age: 74
End: 2024-01-04
Payer: MEDICARE

## 2024-01-04 VITALS
OXYGEN SATURATION: 92 % | TEMPERATURE: 97.6 F | HEART RATE: 93 BPM | WEIGHT: 146 LBS | SYSTOLIC BLOOD PRESSURE: 132 MMHG | DIASTOLIC BLOOD PRESSURE: 63 MMHG | HEIGHT: 64 IN | BODY MASS INDEX: 24.92 KG/M2 | RESPIRATION RATE: 20 BRPM

## 2024-01-04 DIAGNOSIS — J96.11 CHRONIC RESPIRATORY FAILURE WITH HYPOXIA AND HYPERCAPNIA (HCC): ICD-10-CM

## 2024-01-04 DIAGNOSIS — R05.3 CHRONIC COUGH: ICD-10-CM

## 2024-01-04 DIAGNOSIS — R91.8 LUNG MASS: ICD-10-CM

## 2024-01-04 DIAGNOSIS — J96.12 CHRONIC RESPIRATORY FAILURE WITH HYPOXIA AND HYPERCAPNIA (HCC): ICD-10-CM

## 2024-01-04 DIAGNOSIS — Z51.5 PALLIATIVE CARE PATIENT: ICD-10-CM

## 2024-01-04 DIAGNOSIS — R91.8 PULMONARY INFILTRATE: ICD-10-CM

## 2024-01-04 DIAGNOSIS — J44.9 MODERATE COPD (CHRONIC OBSTRUCTIVE PULMONARY DISEASE) (HCC): ICD-10-CM

## 2024-01-04 PROCEDURE — 31627 NAVIGATIONAL BRONCHOSCOPY: CPT | Performed by: INTERNAL MEDICINE

## 2024-01-04 PROCEDURE — 3609027000 HC BRONCHOSCOPY: Performed by: INTERNAL MEDICINE

## 2024-01-04 PROCEDURE — 2580000003 HC RX 258: Performed by: ANESTHESIOLOGY

## 2024-01-04 PROCEDURE — 7100000010 HC PHASE II RECOVERY - FIRST 15 MIN: Performed by: INTERNAL MEDICINE

## 2024-01-04 PROCEDURE — 7100000000 HC PACU RECOVERY - FIRST 15 MIN: Performed by: INTERNAL MEDICINE

## 2024-01-04 PROCEDURE — 2500000003 HC RX 250 WO HCPCS: Performed by: REGISTERED NURSE

## 2024-01-04 PROCEDURE — 99213 OFFICE O/P EST LOW 20 MIN: CPT | Performed by: INTERNAL MEDICINE

## 2024-01-04 PROCEDURE — 88334 PATH CONSLTJ SURG CYTO XM EA: CPT

## 2024-01-04 PROCEDURE — 31628 BRONCHOSCOPY/LUNG BX EACH: CPT | Performed by: INTERNAL MEDICINE

## 2024-01-04 PROCEDURE — 88333 PATH CONSLTJ SURG CYTO XM 1: CPT

## 2024-01-04 PROCEDURE — 2709999900 HC NON-CHARGEABLE SUPPLY: Performed by: INTERNAL MEDICINE

## 2024-01-04 PROCEDURE — 6360000002 HC RX W HCPCS: Performed by: REGISTERED NURSE

## 2024-01-04 PROCEDURE — 3700000001 HC ADD 15 MINUTES (ANESTHESIA): Performed by: INTERNAL MEDICINE

## 2024-01-04 PROCEDURE — 3603165200 HC BRNCHSC EBUS GUIDED SAMPL 1/2 NODE STATION/STRUX: Performed by: INTERNAL MEDICINE

## 2024-01-04 PROCEDURE — C1713 ANCHOR/SCREW BN/BN,TIS/BN: HCPCS | Performed by: INTERNAL MEDICINE

## 2024-01-04 PROCEDURE — 88173 CYTOPATH EVAL FNA REPORT: CPT

## 2024-01-04 PROCEDURE — 2720000010 HC SURG SUPPLY STERILE: Performed by: INTERNAL MEDICINE

## 2024-01-04 PROCEDURE — 3700000000 HC ANESTHESIA ATTENDED CARE: Performed by: INTERNAL MEDICINE

## 2024-01-04 PROCEDURE — 7100000001 HC PACU RECOVERY - ADDTL 15 MIN: Performed by: INTERNAL MEDICINE

## 2024-01-04 PROCEDURE — 88172 CYTP DX EVAL FNA 1ST EA SITE: CPT

## 2024-01-04 PROCEDURE — 31625 BRONCHOSCOPY W/BIOPSY(S): CPT | Performed by: INTERNAL MEDICINE

## 2024-01-04 PROCEDURE — 31654 BRONCH EBUS IVNTJ PERPH LES: CPT | Performed by: INTERNAL MEDICINE

## 2024-01-04 PROCEDURE — 88305 TISSUE EXAM BY PATHOLOGIST: CPT

## 2024-01-04 PROCEDURE — 88112 CYTOPATH CELL ENHANCE TECH: CPT

## 2024-01-04 PROCEDURE — 31652 BRONCH EBUS SAMPLNG 1/2 NODE: CPT | Performed by: INTERNAL MEDICINE

## 2024-01-04 PROCEDURE — 88177 CYTP FNA EVAL EA ADDL: CPT

## 2024-01-04 RX ORDER — DEXAMETHASONE SODIUM PHOSPHATE 10 MG/ML
INJECTION INTRAMUSCULAR; INTRAVENOUS PRN
Status: DISCONTINUED | OUTPATIENT
Start: 2024-01-04 | End: 2024-01-04 | Stop reason: SDUPTHER

## 2024-01-04 RX ORDER — LIDOCAINE HYDROCHLORIDE 10 MG/ML
1 INJECTION, SOLUTION INFILTRATION; PERINEURAL
Status: DISCONTINUED | OUTPATIENT
Start: 2024-01-04 | End: 2024-01-04 | Stop reason: HOSPADM

## 2024-01-04 RX ORDER — SODIUM CHLORIDE 9 MG/ML
INJECTION, SOLUTION INTRAVENOUS PRN
Status: DISCONTINUED | OUTPATIENT
Start: 2024-01-04 | End: 2024-01-04 | Stop reason: HOSPADM

## 2024-01-04 RX ORDER — HYDROMORPHONE HYDROCHLORIDE 2 MG/ML
0.5 INJECTION, SOLUTION INTRAMUSCULAR; INTRAVENOUS; SUBCUTANEOUS EVERY 5 MIN PRN
Status: DISCONTINUED | OUTPATIENT
Start: 2024-01-04 | End: 2024-01-04 | Stop reason: SDUPTHER

## 2024-01-04 RX ORDER — SODIUM CHLORIDE 0.9 % (FLUSH) 0.9 %
5-40 SYRINGE (ML) INJECTION PRN
Status: DISCONTINUED | OUTPATIENT
Start: 2024-01-04 | End: 2024-01-04 | Stop reason: HOSPADM

## 2024-01-04 RX ORDER — OXYCODONE HYDROCHLORIDE 5 MG/1
10 TABLET ORAL ONCE
Status: DISCONTINUED | OUTPATIENT
Start: 2024-01-04 | End: 2024-01-04 | Stop reason: HOSPADM

## 2024-01-04 RX ORDER — SODIUM CHLORIDE 0.9 % (FLUSH) 0.9 %
5-40 SYRINGE (ML) INJECTION EVERY 12 HOURS SCHEDULED
Status: DISCONTINUED | OUTPATIENT
Start: 2024-01-04 | End: 2024-01-04 | Stop reason: HOSPADM

## 2024-01-04 RX ORDER — ONDANSETRON 2 MG/ML
INJECTION INTRAMUSCULAR; INTRAVENOUS PRN
Status: DISCONTINUED | OUTPATIENT
Start: 2024-01-04 | End: 2024-01-04 | Stop reason: SDUPTHER

## 2024-01-04 RX ORDER — PROPOFOL 10 MG/ML
INJECTION, EMULSION INTRAVENOUS PRN
Status: DISCONTINUED | OUTPATIENT
Start: 2024-01-04 | End: 2024-01-04 | Stop reason: SDUPTHER

## 2024-01-04 RX ORDER — PROCHLORPERAZINE EDISYLATE 5 MG/ML
5 INJECTION INTRAMUSCULAR; INTRAVENOUS
Status: DISCONTINUED | OUTPATIENT
Start: 2024-01-04 | End: 2024-01-04 | Stop reason: HOSPADM

## 2024-01-04 RX ORDER — GLYCOPYRROLATE 0.2 MG/ML
INJECTION INTRAMUSCULAR; INTRAVENOUS PRN
Status: DISCONTINUED | OUTPATIENT
Start: 2024-01-04 | End: 2024-01-04 | Stop reason: SDUPTHER

## 2024-01-04 RX ORDER — LIDOCAINE HYDROCHLORIDE 20 MG/ML
INJECTION, SOLUTION EPIDURAL; INFILTRATION; INTRACAUDAL; PERINEURAL PRN
Status: DISCONTINUED | OUTPATIENT
Start: 2024-01-04 | End: 2024-01-04 | Stop reason: SDUPTHER

## 2024-01-04 RX ORDER — MIDAZOLAM HYDROCHLORIDE 2 MG/2ML
2 INJECTION, SOLUTION INTRAMUSCULAR; INTRAVENOUS
Status: DISCONTINUED | OUTPATIENT
Start: 2024-01-04 | End: 2024-01-04 | Stop reason: HOSPADM

## 2024-01-04 RX ORDER — HYDROMORPHONE HYDROCHLORIDE 2 MG/ML
0.5 INJECTION, SOLUTION INTRAMUSCULAR; INTRAVENOUS; SUBCUTANEOUS EVERY 5 MIN PRN
Status: DISCONTINUED | OUTPATIENT
Start: 2024-01-04 | End: 2024-01-04 | Stop reason: HOSPADM

## 2024-01-04 RX ORDER — NEOSTIGMINE METHYLSULFATE 1 MG/ML
INJECTION, SOLUTION INTRAVENOUS PRN
Status: DISCONTINUED | OUTPATIENT
Start: 2024-01-04 | End: 2024-01-04 | Stop reason: SDUPTHER

## 2024-01-04 RX ORDER — FAMOTIDINE 20 MG/1
20 TABLET, FILM COATED ORAL ONCE
Status: DISCONTINUED | OUTPATIENT
Start: 2024-01-04 | End: 2024-01-04 | Stop reason: HOSPADM

## 2024-01-04 RX ORDER — ROCURONIUM BROMIDE 10 MG/ML
INJECTION, SOLUTION INTRAVENOUS PRN
Status: DISCONTINUED | OUTPATIENT
Start: 2024-01-04 | End: 2024-01-04 | Stop reason: SDUPTHER

## 2024-01-04 RX ORDER — FENTANYL CITRATE 50 UG/ML
100 INJECTION, SOLUTION INTRAMUSCULAR; INTRAVENOUS
Status: DISCONTINUED | OUTPATIENT
Start: 2024-01-04 | End: 2024-01-04 | Stop reason: HOSPADM

## 2024-01-04 RX ORDER — SODIUM CHLORIDE, SODIUM LACTATE, POTASSIUM CHLORIDE, CALCIUM CHLORIDE 600; 310; 30; 20 MG/100ML; MG/100ML; MG/100ML; MG/100ML
INJECTION, SOLUTION INTRAVENOUS CONTINUOUS
Status: DISCONTINUED | OUTPATIENT
Start: 2024-01-04 | End: 2024-01-04 | Stop reason: HOSPADM

## 2024-01-04 RX ORDER — OXYCODONE HYDROCHLORIDE 5 MG/1
5 TABLET ORAL ONCE
Status: DISCONTINUED | OUTPATIENT
Start: 2024-01-04 | End: 2024-01-04 | Stop reason: HOSPADM

## 2024-01-04 RX ORDER — DIPHENHYDRAMINE HYDROCHLORIDE 50 MG/ML
12.5 INJECTION INTRAMUSCULAR; INTRAVENOUS
Status: DISCONTINUED | OUTPATIENT
Start: 2024-01-04 | End: 2024-01-04 | Stop reason: HOSPADM

## 2024-01-04 RX ADMIN — ROCURONIUM BROMIDE 20 MG: 10 INJECTION, SOLUTION INTRAVENOUS at 09:22

## 2024-01-04 RX ADMIN — ONDANSETRON 4 MG: 2 INJECTION INTRAMUSCULAR; INTRAVENOUS at 08:51

## 2024-01-04 RX ADMIN — SODIUM CHLORIDE, POTASSIUM CHLORIDE, SODIUM LACTATE AND CALCIUM CHLORIDE: 600; 310; 30; 20 INJECTION, SOLUTION INTRAVENOUS at 07:40

## 2024-01-04 RX ADMIN — LIDOCAINE HYDROCHLORIDE 100 MG: 20 INJECTION, SOLUTION EPIDURAL; INFILTRATION; INTRACAUDAL; PERINEURAL at 08:41

## 2024-01-04 RX ADMIN — DEXAMETHASONE SODIUM PHOSPHATE 10 MG: 10 INJECTION INTRAMUSCULAR; INTRAVENOUS at 08:51

## 2024-01-04 RX ADMIN — SUGAMMADEX 200 MG: 100 INJECTION, SOLUTION INTRAVENOUS at 10:02

## 2024-01-04 RX ADMIN — PROPOFOL 80 MG: 10 INJECTION, EMULSION INTRAVENOUS at 08:41

## 2024-01-04 RX ADMIN — GLYCOPYRROLATE 0.4 MG: 0.2 INJECTION INTRAMUSCULAR; INTRAVENOUS at 09:56

## 2024-01-04 RX ADMIN — ROCURONIUM BROMIDE 30 MG: 10 INJECTION, SOLUTION INTRAVENOUS at 08:41

## 2024-01-04 RX ADMIN — Medication 3 MG: at 09:56

## 2024-01-04 ASSESSMENT — COPD QUESTIONNAIRES: CAT_SEVERITY: SEVERE

## 2024-01-04 ASSESSMENT — PAIN DESCRIPTION - LOCATION: LOCATION: THROAT

## 2024-01-04 ASSESSMENT — PAIN SCALES - GENERAL: PAINLEVEL_OUTOF10: 2

## 2024-01-04 ASSESSMENT — PAIN - FUNCTIONAL ASSESSMENT: PAIN_FUNCTIONAL_ASSESSMENT: 0-10

## 2024-01-04 ASSESSMENT — PAIN DESCRIPTION - DESCRIPTORS: DESCRIPTORS: SORE

## 2024-01-04 NOTE — OP NOTE
Operative Note      Patient: Fide Mejia  YOB: 1950  MRN: [id number]    Date of Procedure: 1/4/2024    Pre-Op Diagnosis Codes:     * Lung mass [R91.8]     * Pulmonary infiltrate [R91.8]    Post-Op Diagnosis: Same       Procedure(s):  BRONCHOSCOPY ROBOTIC with possible cautery  BRONCHOSCOPY ENDOBRONCHIAL ULTRASOUND    Surgeon(s):  Rome Youssef MD    Assistant:   * No surgical staff found *    Anesthesia: General    Estimated Blood Loss (mL): Minimal    Complications: None    Specimens:   ID Type Source Tests Collected by Time Destination   A : Bronch wash Respiratory Bronchial Washing CYTOLOGY, NON-GYN Rome Youssef MD 1/4/2024 0930        Implants:  * No implants in log *      Drains: * No LDAs found *          Detailed Description of Procedure:     PROCEDURE  Bronchoscopy with Endobronchial Ultrasound Guided Fine Needle Aspiration Of Medistinal Lymph nodes and airway inspection and EMN aided biopsy of peripheral lung nodule.    EQUIPMENT:  Olympus T 190 bronchoscope, Olympus [id number] EBUS scope, UM 17 Radial probe, and Fluoroscopy.[place] robotic bronchoscope, bronchoscopic forceps        INDICATION   Peripheral nodule/mass suspicious for malignancy/staging of presumed malignancy        POST OP DIAGNOSIS:    linear EBUS was performed for mediastinal staging prior to navigation bronchoscopy due to visible tumor islands in MITZI bronchus    Stations 11L location tumor/LN(seen wrapping around pulmonary artery), were biopsied and revealed atypical cells on sample #5, the rest had blood and cartilage on NORMA. Station 7 was also biopsied (2cm in size) and negative for malignancy on NORMA..  Following EBUS 10 EBBx from MITZI bronchus were performed revealing atypical and suspicious cells.  Histology from this is pending    Given lack of diagnosis with EBBX,  Navigation bronch was performed.  StyroPower EMN system was employed to identify and biopsy the MITZI mass mass seen on CT.  10 forceps  biopsies,   greatest dimension.  ? Most pleural (pericardial) effusions with lung cancer are due to tumor. In a few patients, however, multiple microscopic examinations of pleural (pericardial) fluid are negative for tumor and the fluid is nonbloody and not an exudate. When these elements and clinical judgment dictate that the effusion is not related to the tumor, the effusion should be excluded as a staging descriptor.  § This includes involvement of a single distant (nonregional) lymph node.  Reproduced from: Jed P, Pilar K, Clair J, et al. The IASLC Lung Cancer Staging Project: Proposals for Revision of the TNM Stage Groupings in the Forthcoming (Eighth) Edition of the TNM Classification for Lung Cancer. J Thorac Oncol 2016; 11:39. Table used with the permission of [employer] All rights reserved.  Graphic 715425 Version 1.0      TOUCH PREP BIOPSY# MALIGNANT SUSPICIOUS ATYPIA NONDGNSTC   MITZI bronchus Endo Bronchial tumor forceps 1 - - ++     2 - - ++     3 - ++ -     4 - - - Bronch Epith    5 - - - \"    6 - - ++     7 - - ++     8 - - ++     9 - - ++     10 - ++ -            MITZI tumor Dayne biopsy Forceps 1 ++++ - -             2 -- -- -- In toto for histo and IHC    3 -- -- -- \"    4 -- -- -- \"    5 -- -- -- \"    6 -- -- -- \"    7 -- -- -- \"    8 -- -- -- \"    9 -- -- -- \"    10 -- -- -- \"     EBL: less than 10 cc    Complications: NONE with no evidence of pneumothorax on post-op flouroscopy.    Rome Hooper MD.         Electronically signed by Rome Duenas MD on 1/4/2024 at 9:55 AM

## 2024-01-04 NOTE — ANESTHESIA PRE PROCEDURE
Department of Anesthesiology  Preprocedure Note       Name:  Fide Mejia   Age:  73 y.o.  :  1950                                          MRN:  [id number]         Date:  2024      Surgeon: Surgeon(s):  Rome Youssef MD    Procedure: Procedure(s):  BRONCHOSCOPY ROBOTIC with possible cautery    Medications prior to admission:   Prior to Admission medications    Medication Sig Start Date End Date Taking? Authorizing Provider   venlafaxine (EFFEXOR) 25 MG tablet Take 3 tablets by mouth at bedtime   Yes ProviderDavid MD   HYDROcodone homatropine (HYCODAN) 5-1.5 MG/5ML solution Take 5 mLs by mouth every 6 hours as needed (Cough) for up to 30 days. Z51.5 Palliative Care Max Daily Amount: 20 mLs 23  Beba Brennan APRN - CNP   albuterol sulfate HFA (PROVENTIL;VENTOLIN;PROAIR) 108 (90 Base) MCG/ACT inhaler Inhale 2 puffs into the lungs every 4 hours as needed for Shortness of Breath or Wheezing 10/3/23   Shannon Xiong, APRN - CNP   buPROPion (WELLBUTRIN SR) 200 MG extended release tablet Take 1 tablet by mouth 2 times daily    ProviderDavid MD   predniSONE (DELTASONE) 20 MG tablet Take 1 tablet by mouth daily 7/14/23 10/6/24  Shannon Xiong APRN - CNP   fluocinolone (DERMOTIC) 0.01 % OIL oil Place 5 drops in ear(s) 2 times daily Up to 2 times daily 3/9/23   Otoniel Guajardo DO   pantoprazole (PROTONIX) 40 MG tablet Take 1 tablet by mouth 2 times daily  Patient taking differently: Take 1 tablet by mouth at bedtime 3/9/23   Otoniel Guajardo DO   DULoxetine (CYMBALTA) 30 MG extended release capsule Take 1 capsule by mouth daily    ProviderDavid MD   dilTIAZem (TIAZAC) 360 MG extended release capsule Take 1 capsule by mouth in the morning.  Patient taking differently: Take 1 capsule by mouth at bedtime 22   Erasmo Ku MD   rosuvastatin (CRESTOR) 10 MG tablet Take 1 tablet by mouth daily  Patient taking differently: Take 1 tablet

## 2024-01-04 NOTE — PERIOP NOTE
Discharge instructions reviewed with pt and family member (son Cullen) who verbalize understanding of follow up care.

## 2024-01-04 NOTE — H&P
Dave Garcia DO  Physician  Specialty: Emergency Medicine     ED Provider Notes      Signed     Date of Service: 12/12/2023  1:57 PM     Signed       Expand All Collapse All       Emergency Department Provider Note       PCP: Kelsi Driscoll MD   Age: 73 y.o.   Sex: female      DISPOSITION Decision To Discharge 12/12/2023 05:47:21 PM         ICD-10-CM     1. Lung mass  R91.8         2. Mucus plugging of bronchi  T17.500A         3. Closed fracture of sternum, unspecified portion of sternum, initial encounter  S22.20XA               Medical Decision Making      Complexity of Problems Addressed:  1 or more acute illnesses that pose a threat to life or bodily function.      Data Reviewed and Analyzed:  I independently ordered and reviewed each unique test.  I reviewed external records: provider visit note from outside specialist.  I reviewed external records: previous imaging study including radiologist interpretation.         I interpreted the X-rays right lower lobe atelectasis of the lung on the chest x-ray.  I reviewed and agree with radiology report..  I interpreted the CT Scan sternal fracture is noted.  Patient has right lower lobe atelectasis likely from mucous plugging and left suprahilar lung mass.  I have reviewed and agree with radiology report..     Discussion of management or test interpretation.  Patient presents with fall from 3 weeks ago that showed sternal fracture.  Pain is controlled here and I have prescribed pain medicine for her.  I made her aware of the mucous plugging likely on the right.  She is not having any increased oxygen demand or respiratory distress.  Therefore, I believe this can be seen by pulmonology as an outpatient.  I have also made her aware of the suprahilar mass.  I have also asked her to follow-up with pulmonology regarding this.  Patient is agreeable and return precautions are given.        Risk of Complications and/or Morbidity of Patient Management:  Prescription      Troponin, High Sensitivity 8.4 0 - 14 pg/mL   Magnesium   Result Value Ref Range     Magnesium 2.0 1.8 - 2.4 mg/dL   EKG 12 Lead   Result Value Ref Range     Ventricular Rate 106 BPM     Atrial Rate 103 BPM     P-R Interval 130 ms     QRS Duration 88 ms     Q-T Interval 399 ms     QTc Calculation (Bazett) 530 ms     P Axis 12 degrees     R Axis 87 degrees     T Axis 68 degrees     Diagnosis           Sinus tachycardia  Probable left atrial enlargement  Right bundle branch block  Confirmed by MD LANDRY DANIEL ([id number]) on 12/12/2023 3:58:16 PM            CT CHEST W CONTRAST   Final Result   Left suprahilar mass is consistent with malignancy until proven otherwise.       Complete right lower lobe atelectasis possibly on the basis of mucous plugging   or aspiration. Endobronchial lesion would be difficult to exclude. Trace right   pleural effusion.       Mildly displaced sternal fracture with mild adjacent hematoma. Pathologic   fracture would be difficult to exclude       CT CSpine W/O Contrast   Final Result       Moderate cervical spine degeneration, as above.       No acute cervical spine injury.       No acute fracture or acute compression deformity.       No epidural mass or epidural hematoma formation.       Follow-up if the patient remains symptomatic.               XR CHEST (2 VW)   Final Result   Pleural-parenchymal opacification of the right lung base possibly   reflecting moderate pleural effusion. Although, some deviation of the trachea to   the right could indicate volume loss in the right hemithorax secondary to right   lower lobe atelectasis.                         Voice dictation software was used during the making of this note.  This software is not perfect and grammatical and other typographical errors may be present.  This note has not been completely proofread for errors.     Dave Garcia DO  12/12/23 1800  Beba Brennan, APRN - CNP  Nurse Practitioner  Specialty: Pulmonary Disease

## 2024-01-04 NOTE — ANESTHESIA POSTPROCEDURE EVALUATION
Department of Anesthesiology  Postprocedure Note    Patient: Fdie Mejia  MRN: [id number]  YOB: 1950  Date of evaluation: 1/4/2024    Procedure Summary       Date: 01/04/24 Room / Location: [facility] ENDO FLOURO 1 / [facility] ENDOSCOPY    Anesthesia Start: 0835 Anesthesia Stop: 1011    Procedures:       BRONCHOSCOPY ROBOTIC with possible cautery (Chest)      BRONCHOSCOPY ENDOBRONCHIAL ULTRASOUND (Chest) Diagnosis:       Lung mass      Pulmonary infiltrate      (Lung mass [R91.8])      (Pulmonary infiltrate [R91.8])    Surgeons: Rome Youssef MD Responsible Provider: GONZÁLEZ Walsh MD    Anesthesia Type: General ASA Status: 4            Anesthesia Type: General    Reny Phase I: Reny Score: 8    Reny Phase II:      Anesthesia Post Evaluation    Patient location during evaluation: PACU  Patient participation: complete - patient participated  Level of consciousness: awake and alert  Airway patency: patent  Nausea & Vomiting: no nausea and no vomiting  Cardiovascular status: hemodynamically stable  Respiratory status: acceptable, nonlabored ventilation and spontaneous ventilation  Hydration status: euvolemic  Comments: BP (!) 150/65   Pulse 96   Temp 97 °F (36.1 °C) (Temporal)   Resp 20   Ht 1.626 m (5' 4\")   Wt 66.2 kg (146 lb)   SpO2 93%   BMI 25.06 kg/m²     Multimodal analgesia pain management approach  Pain management: adequate and satisfactory to patient    No notable events documented.

## 2024-01-04 NOTE — DISCHARGE INSTRUCTIONS

## 2024-01-05 RX ORDER — HYDROCODONE BITARTRATE AND HOMATROPINE METHYLBROMIDE ORAL SOLUTION 5; 1.5 MG/5ML; MG/5ML
5 LIQUID ORAL EVERY 6 HOURS
Qty: 240 ML | Refills: 0 | Status: SHIPPED | OUTPATIENT
Start: 2024-01-05 | End: 2024-01-17

## 2024-01-05 NOTE — TELEPHONE ENCOUNTER
So sorry to hear about the cancer diagnosis - Hope to talk with the patient in the near future. Hycodan RX are problematic given the huge shortages. Pharmacies are rationing the medications, which I understand but am disappointed to hear. Seems the will not use the same prescription that has 600ml to fill the balance that is unfilled. I have routed a new RX for 240ml - there is not an alternative per se for Hycodan. Happy to talk with the patient about using liquid hydrocodone during a face-to-face or virtual meeting. She might want to contact [facility] pharmacy in [place] - they seem to be able to dispense the volumes needed for other palliative care patients.

## 2024-01-05 NOTE — TELEPHONE ENCOUNTER
Spoke with patient.  Has PET scheduled for next week.  F/U appt made with Shannon for discussion on Hycodan. Aware we are awaiting path results from biopsy from yesterday. /dd

## 2024-01-10 ENCOUNTER — HOSPITAL ENCOUNTER (OUTPATIENT)
Dept: PET IMAGING | Age: 74
Discharge: HOME | End: 2024-01-13
Payer: MEDICARE

## 2024-01-10 DIAGNOSIS — R91.8 MASS OF UPPER LOBE OF LEFT LUNG: ICD-10-CM

## 2024-01-10 LAB
GLUCOSE BLD STRIP.AUTO-MCNC: 127 MG/DL (ref 65–100)
SERVICE CMNT-IMP: ABNORMAL

## 2024-01-10 PROCEDURE — 82962 GLUCOSE BLOOD TEST: CPT

## 2024-01-10 PROCEDURE — A9609 HC RX DIAGNOSTIC RADIOPHARMACEUTICAL: HCPCS | Performed by: NURSE PRACTITIONER

## 2024-01-10 PROCEDURE — 2580000003 HC RX 258: Performed by: NURSE PRACTITIONER

## 2024-01-10 PROCEDURE — 78815 PET IMAGE W/CT SKULL-THIGH: CPT

## 2024-01-10 PROCEDURE — 3430000000 HC RX DIAGNOSTIC RADIOPHARMACEUTICAL: Performed by: NURSE PRACTITIONER

## 2024-01-10 RX ORDER — SODIUM CHLORIDE 0.9 % (FLUSH) 0.9 %
10 SYRINGE (ML) INJECTION ONCE AS NEEDED
Status: COMPLETED | OUTPATIENT
Start: 2024-01-10 | End: 2024-01-10

## 2024-01-10 RX ORDER — FLUDEOXYGLUCOSE F 18 200 MCI/ML
13.5 INJECTION, SOLUTION INTRAVENOUS
Status: COMPLETED | OUTPATIENT
Start: 2024-01-10 | End: 2024-01-10

## 2024-01-10 RX ADMIN — SODIUM CHLORIDE, PRESERVATIVE FREE 10 ML: 5 INJECTION INTRAVENOUS at 10:10

## 2024-01-10 RX ADMIN — FLUDEOXYGLUCOSE F 18 13.5 MILLICURIE: 200 INJECTION, SOLUTION INTRAVENOUS at 10:10

## 2024-01-15 ENCOUNTER — TELEPHONE (OUTPATIENT)
Dept: PULMONOLOGY | Age: 74
End: 2024-01-15

## 2024-01-15 ENCOUNTER — SCHEDULED TELEPHONE ENCOUNTER (OUTPATIENT)
Dept: PULMONOLOGY | Age: 74
End: 2024-01-15
Payer: MEDICARE
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
## 2024-01-15 DIAGNOSIS — R91.8 MASS OF UPPER LOBE OF LEFT LUNG: Primary | ICD-10-CM

## 2024-01-15 DIAGNOSIS — J96.11 CHRONIC RESPIRATORY FAILURE WITH HYPOXIA AND HYPERCAPNIA (HCC): ICD-10-CM

## 2024-01-15 DIAGNOSIS — J96.12 CHRONIC RESPIRATORY FAILURE WITH HYPOXIA AND HYPERCAPNIA (HCC): ICD-10-CM

## 2024-01-15 DIAGNOSIS — C34.92 SQUAMOUS CELL LUNG CANCER, LEFT (HCC): ICD-10-CM

## 2024-01-15 DIAGNOSIS — R05.3 CHRONIC COUGH: ICD-10-CM

## 2024-01-15 DIAGNOSIS — Z51.5 PALLIATIVE CARE PATIENT: ICD-10-CM

## 2024-01-15 DIAGNOSIS — J44.9 MODERATE COPD (CHRONIC OBSTRUCTIVE PULMONARY DISEASE) (HCC): ICD-10-CM

## 2024-01-15 PROCEDURE — 99442 PR PHYS/QHP TELEPHONE EVALUATION 11-20 MIN: CPT | Performed by: NURSE PRACTITIONER

## 2024-01-15 RX ORDER — HYDROCODONE BITARTRATE AND HOMATROPINE METHYLBROMIDE ORAL SOLUTION 5; 1.5 MG/5ML; MG/5ML
5 LIQUID ORAL EVERY 6 HOURS
Qty: 240 ML | Refills: 0 | Status: SHIPPED | OUTPATIENT
Start: 2024-01-15 | End: 2024-01-27
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
## 2024-01-15 NOTE — TELEPHONE ENCOUNTER
Beba to Call.  
Dr. Kevin VIZCAINO told the patient she has cancer. She has not heard anything else from him are our office . She is asking about an Oncologist. And what he wants to do about further treatment for it.   
Patient asked that someone call her to discuss her PET scan. She is very anxious. She does not feel like she should wait until her appointment with Shannon 2/13/24. She believes we need to see her this week. She was told that we will have a doctor look at her PET scan and call her with recommendations.     Please call her at [phone] or [phone] if she does not answer the home phone.  Sending note to triage.      
Spoke with patient and Anatoly. Kirby will call patient now and discuss plan of care.   
Jahaira Zapata(wife)

## 2024-01-17 ENCOUNTER — HOSPITAL ENCOUNTER (OUTPATIENT)
Dept: PULMONOLOGY | Age: 74
Discharge: HOME | End: 2024-01-20

## 2024-01-17 DIAGNOSIS — C34.92 SQUAMOUS CELL LUNG CANCER, LEFT (HCC): ICD-10-CM

## 2024-01-17 DIAGNOSIS — J44.9 MODERATE COPD (CHRONIC OBSTRUCTIVE PULMONARY DISEASE) (HCC): ICD-10-CM

## 2024-01-17 NOTE — PROGRESS NOTES
NEW PATIENT INTAKE    Referral Diagnosis: Squamous cell lung cancer, left    Referring Provider: Beba Brennan, APRN - CNP    Primary Care Provider: Kelsi Driscoll MD    Family History of Cancer/ Hematology Disorders: Family history significant for mother with breast cancer and sister with esophageal cancer.    Presenting Symptoms: Abnormal CT of Chest    Chronological History of Pertinent Events:     73-year-old white female    PMH: ARF, Anxiety & Depression, BCC, Chiari I malformation, COPD, CAD, GERD, HLD, Palliative care patient, Paroxysmal tachycardia, Tremor of both hands  Followed by Pulmonology, Neurology, Cardiology, Otolaryngology, Pain Management    12/12/23 - Patient went to ED (EPIC)  Patient having chest pain x 3 weeks after a fall, hit her chest on a \"bed rail, then she said she felt a crunch and has had significant pain since then.    She has pain with breathing and with arm and torso movements.  It has not gotten better according to her.    Denies any new or worsening SOB other than her baseline COPD.  Denies fevers.  She does have a persistent cough and neck pain since the incident in the midline cervical area.    Denies any weakness or numbness to the extremities.  States pain radiates to her back and neck.  Pain is reproducible.    Has h/o COPD and is normally on 3L.  1 sublingual nitro given by EMS.    12/12/23 - Chest X-ray (EPIC)  IMPRESSION:  Pleural-parenchymal opacification of the right lung base possibly reflecting moderate pleural effusion. Although, some deviation of the trachea to the right could indicate volume loss in the right hemithorax secondary to right lower lobe atelectasis.    12/12/23 - CT of the Chest (EPIC)  IMPRESSION:  Left suprahilar mass is consistent with malignancy until proven otherwise.  Complete right lower lobe atelectasis possibly on the basis of mucous plugging or aspiration. Endobronchial lesion would be difficult to exclude. Trace right pleural

## 2024-01-17 NOTE — PROGRESS NOTES
Attempted full PFT, patient had difficulties generating enough expiratory flow to achieved a volume.Attempted multiple times but was not successful. Patient was on 4lpm, Spo2 96%

## 2024-01-24 ENCOUNTER — CLINICAL DOCUMENTATION (OUTPATIENT)
Dept: CASE MANAGEMENT | Age: 74
End: 2024-01-24

## 2024-01-24 SDOH — ECONOMIC STABILITY: TRANSPORTATION INSECURITY
IN THE PAST 12 MONTHS, HAS LACK OF TRANSPORTATION KEPT YOU FROM MEETINGS, WORK, OR FROM GETTING THINGS NEEDED FOR DAILY LIVING?: NO

## 2024-01-24 SDOH — ECONOMIC STABILITY: FOOD INSECURITY: WITHIN THE PAST 12 MONTHS, THE FOOD YOU BOUGHT JUST DIDN'T LAST AND YOU DIDN'T HAVE MONEY TO GET MORE.: NEVER TRUE

## 2024-01-24 SDOH — ECONOMIC STABILITY: TRANSPORTATION INSECURITY
IN THE PAST 12 MONTHS, HAS THE LACK OF TRANSPORTATION KEPT YOU FROM MEDICAL APPOINTMENTS OR FROM GETTING MEDICATIONS?: NO

## 2024-01-24 SDOH — ECONOMIC STABILITY: FOOD INSECURITY: WITHIN THE PAST 12 MONTHS, YOU WORRIED THAT YOUR FOOD WOULD RUN OUT BEFORE YOU GOT MONEY TO BUY MORE.: NEVER TRUE

## 2024-01-24 SDOH — ECONOMIC STABILITY: INCOME INSECURITY: IN THE LAST 12 MONTHS, WAS THERE A TIME WHEN YOU WERE NOT ABLE TO PAY THE MORTGAGE OR RENT ON TIME?: NO

## 2024-01-24 SDOH — ECONOMIC STABILITY: HOUSING INSECURITY
IN THE LAST 12 MONTHS, WAS THERE A TIME WHEN YOU DID NOT HAVE A STEADY PLACE TO SLEEP OR SLEPT IN A SHELTER (INCLUDING NOW)?: NO

## 2024-01-24 ASSESSMENT — LIFESTYLE VARIABLES
HOW MANY STANDARD DRINKS CONTAINING ALCOHOL DO YOU HAVE ON A TYPICAL DAY: PATIENT DOES NOT DRINK
HOW OFTEN DO YOU HAVE A DRINK CONTAINING ALCOHOL: NEVER

## 2024-01-24 ASSESSMENT — PATIENT HEALTH QUESTIONNAIRE - PHQ9
1. LITTLE INTEREST OR PLEASURE IN DOING THINGS: 0
2. FEELING DOWN, DEPRESSED OR HOPELESS: 0
SUM OF ALL RESPONSES TO PHQ QUESTIONS 1-9: 0
SUM OF ALL RESPONSES TO PHQ QUESTIONS 1-9: 0
SUM OF ALL RESPONSES TO PHQ9 QUESTIONS 1 & 2: 0
SUM OF ALL RESPONSES TO PHQ QUESTIONS 1-9: 0
SUM OF ALL RESPONSES TO PHQ QUESTIONS 1-9: 0

## 2024-01-24 ASSESSMENT — SOCIAL DETERMINANTS OF HEALTH (SDOH): HOW HARD IS IT FOR YOU TO PAY FOR THE VERY BASICS LIKE FOOD, HOUSING, MEDICAL CARE, AND HEATING?: NOT HARD AT ALL

## 2024-01-24 NOTE — PROGRESS NOTES
1/24/2024 Lung Navigation intake complete.  Reviewed role of navigation, gave contact information for navigators, any remaining work up needed, and reviewed upcoming appointments.  Patient is a retired .  Independent in self care with minimal physical limitations, currently on 8L O2.  Barriers: No financial, psychosocial,or transportation barriers noted.  Lives with granddaughter who she is guardian for. Reports that she has many family members close by and that her mother is planning to come to her consultation appt.  Verbal permission given to speak with Trudi Espinosa or Porfirio Hurt.  Type of cancer: Squamous cell of the left lung  MRI: TBD-Pt will call to schedule today.  PET: 1/11/24    Social Determinants of Health     Tobacco Use: High Risk (1/24/2024)    Patient History     Smoking Tobacco Use: Every Day     Smokeless Tobacco Use: Never     Passive Exposure: Not on file   Alcohol Use: Not At Risk (1/24/2024)    AUDIT-C     Frequency of Alcohol Consumption: Never     Average Number of Drinks: Patient does not drink     Frequency of Binge Drinking: Never   Financial Resource Strain: Low Risk  (1/24/2024)    Overall Financial Resource Strain (CARDIA)     Difficulty of Paying Living Expenses: Not hard at all   Food Insecurity: No Food Insecurity (1/24/2024)    Hunger Vital Sign     Worried About Running Out of Food in the Last Year: Never true     Ran Out of Food in the Last Year: Never true   Transportation Needs: No Transportation Needs (1/24/2024)    PRAPARE - Transportation     Lack of Transportation (Medical): No     Lack of Transportation (Non-Medical): No   Physical Activity: Not on file   Stress: No Stress Concern Present (1/24/2024)    [employer] - Occupational Stress Questionnaire     Feeling of Stress : Not at all   Social Connections: Not on file   Intimate Partner Violence: Not on file   Depression: Not at risk (1/24/2024)    PHQ-2     PHQ-2 Score: 0   Housing

## 2024-01-29 NOTE — PROGRESS NOTES
[facility]: Office Visit New Patient H & P    Reason for visit:  Squamous cell carcinoma of the left lung (stage IIB/IIIA).    History of Present Illness:  Ms. Mejia is a This is a 73 years old female patient with multiple medical problems including coronary artery disease, chronic Oxygen dependent respiratory failure, advanced COPD (followed by pulmonary palliative care) and paroxysmal atrial tachycardia who presents to discuss treatment options for recently diagnosed squamous cell carcinoma of the left lung.  She presented to the ED on 12/12/2023 with complaint of persistent chest pain about 3 weeks after sustaining traumatic injury to her chest.  CT chest noted mildly displaced sternal fracture and a left suprahilar mass.  She was evaluated by pulmonology and underwent bronchoscopy on 1/4/2024.  Stations 11 L and 7 were biopsied and returned as \"rare atypical cells suspicious for SCC\" and \"rare atypical squamous cells present\" respectively.  Endobronchial tumor biopsies returned as detached fragments of squamous carcinoma, focally poorly differentiated.  Left upper lobe tumor biopsies also returned as detached fragments of squamous carcinoma focally poorly differentiated.  PET/CT showed a 4.5 cm spiculated, pleural-based mass in the suprahilar and hilar region on the left with a hypermetabolic hilar lymph node.  Increased metabolic activity in the ribs and sternum were felt to represent posttraumatic changes.  Patient is accompanied by her mother during today's visit.  Prior to her chest wall injury, she was using supplemental oxygen at 3 LPM during daytime and 6 LPM at night.  However she feels that recently her oxygen requirement has increased and she is having difficult time recalibrating her oxygen concentrator.  She continues to smoke about half pack per day.  She reports some worsening of chronic exertional dyspnea and has lost about 20 pounds in last 1 month.  She reports chronic

## 2024-01-30 ENCOUNTER — CLINICAL DOCUMENTATION (OUTPATIENT)
Dept: CASE MANAGEMENT | Age: 74
End: 2024-01-30

## 2024-01-30 ENCOUNTER — HOSPITAL ENCOUNTER (OUTPATIENT)
Dept: LAB | Age: 74
Discharge: HOME | End: 2024-02-02
Payer: MEDICARE

## 2024-01-30 ENCOUNTER — OFFICE VISIT (OUTPATIENT)
Dept: ONCOLOGY | Age: 74
End: 2024-01-30
Payer: MEDICARE

## 2024-01-30 VITALS
SYSTOLIC BLOOD PRESSURE: 105 MMHG | OXYGEN SATURATION: 94 % | HEIGHT: 64 IN | DIASTOLIC BLOOD PRESSURE: 72 MMHG | TEMPERATURE: 97.8 F | RESPIRATION RATE: 18 BRPM | HEART RATE: 93 BPM | WEIGHT: 144 LBS | BODY MASS INDEX: 24.59 KG/M2

## 2024-01-30 DIAGNOSIS — C34.12 MALIGNANT NEOPLASM OF UPPER LOBE OF LEFT LUNG (HCC): Primary | ICD-10-CM

## 2024-01-30 DIAGNOSIS — R91.8 LUNG MASS: Primary | ICD-10-CM

## 2024-01-30 DIAGNOSIS — C34.92 SQUAMOUS CARCINOMA OF LUNG, LEFT (HCC): ICD-10-CM

## 2024-01-30 DIAGNOSIS — R91.8 LUNG MASS: ICD-10-CM

## 2024-01-30 DIAGNOSIS — Z95.828 PORT-A-CATH IN PLACE: ICD-10-CM

## 2024-01-30 DIAGNOSIS — G89.3 CANCER RELATED PAIN: ICD-10-CM

## 2024-01-30 DIAGNOSIS — Z79.899 NEED FOR PROPHYLACTIC CHEMOTHERAPY: ICD-10-CM

## 2024-01-30 DIAGNOSIS — R11.2 CINV (CHEMOTHERAPY-INDUCED NAUSEA AND VOMITING): ICD-10-CM

## 2024-01-30 DIAGNOSIS — T45.1X5A CINV (CHEMOTHERAPY-INDUCED NAUSEA AND VOMITING): ICD-10-CM

## 2024-01-30 LAB
ALBUMIN SERPL-MCNC: 2.4 G/DL (ref 3.2–4.6)
ALBUMIN/GLOB SERPL: 0.5 (ref 0.4–1.6)
ALP SERPL-CCNC: 159 U/L (ref 50–136)
ALT SERPL-CCNC: 16 U/L (ref 12–65)
ANION GAP SERPL CALC-SCNC: 5 MMOL/L (ref 2–11)
AST SERPL-CCNC: 19 U/L (ref 15–37)
BASOPHILS # BLD: 0 K/UL (ref 0–0.2)
BASOPHILS NFR BLD: 0 % (ref 0–2)
BILIRUB SERPL-MCNC: 0.3 MG/DL (ref 0.2–1.1)
BUN SERPL-MCNC: 4 MG/DL (ref 8–23)
CALCIUM SERPL-MCNC: 8.6 MG/DL (ref 8.3–10.4)
CHLORIDE SERPL-SCNC: 100 MMOL/L (ref 103–113)
CO2 SERPL-SCNC: 33 MMOL/L (ref 21–32)
CREAT SERPL-MCNC: 0.5 MG/DL (ref 0.6–1)
DIFFERENTIAL METHOD BLD: ABNORMAL
EOSINOPHIL # BLD: 0 K/UL (ref 0–0.8)
EOSINOPHIL NFR BLD: 0 % (ref 0.5–7.8)
ERYTHROCYTE [DISTWIDTH] IN BLOOD BY AUTOMATED COUNT: 13.2 % (ref 11.9–14.6)
GLOBULIN SER CALC-MCNC: 4.8 G/DL (ref 2.8–4.5)
GLUCOSE SERPL-MCNC: 154 MG/DL (ref 65–100)
HCT VFR BLD AUTO: 42 % (ref 35.8–46.3)
HGB BLD-MCNC: 12.9 G/DL (ref 11.7–15.4)
IMM GRANULOCYTES # BLD AUTO: 0.1 K/UL (ref 0–0.5)
IMM GRANULOCYTES NFR BLD AUTO: 1 % (ref 0–5)
LYMPHOCYTES # BLD: 0.6 K/UL (ref 0.5–4.6)
LYMPHOCYTES NFR BLD: 6 % (ref 13–44)
Lab: NORMAL
Lab: NORMAL
MCH RBC QN AUTO: 29.2 PG (ref 26.1–32.9)
MCHC RBC AUTO-ENTMCNC: 30.7 G/DL (ref 31.4–35)
MCV RBC AUTO: 95 FL (ref 82–102)
MONOCYTES # BLD: 0.1 K/UL (ref 0.1–1.3)
MONOCYTES NFR BLD: 1 % (ref 4–12)
NEUTS SEG # BLD: 10.2 K/UL (ref 1.7–8.2)
NEUTS SEG NFR BLD: 92 % (ref 43–78)
NRBC # BLD: 0 K/UL (ref 0–0.2)
PLATELET # BLD AUTO: 386 K/UL (ref 150–450)
PMV BLD AUTO: 9.4 FL (ref 9.4–12.3)
POTASSIUM SERPL-SCNC: 3.6 MMOL/L (ref 3.5–5.1)
PROT SERPL-MCNC: 7.2 G/DL (ref 6.3–8.2)
RBC # BLD AUTO: 4.42 M/UL (ref 4.05–5.2)
REFERENCE LAB: NORMAL
SODIUM SERPL-SCNC: 138 MMOL/L (ref 136–146)
WBC # BLD AUTO: 10.9 K/UL (ref 4.3–11.1)

## 2024-01-30 PROCEDURE — 99205 OFFICE O/P NEW HI 60 MIN: CPT | Performed by: INTERNAL MEDICINE

## 2024-01-30 PROCEDURE — 4004F PT TOBACCO SCREEN RCVD TLK: CPT | Performed by: INTERNAL MEDICINE

## 2024-01-30 PROCEDURE — 80053 COMPREHEN METABOLIC PANEL: CPT

## 2024-01-30 PROCEDURE — 85025 COMPLETE CBC W/AUTO DIFF WBC: CPT

## 2024-01-30 PROCEDURE — 36415 COLL VENOUS BLD VENIPUNCTURE: CPT

## 2024-01-30 PROCEDURE — 1123F ACP DISCUSS/DSCN MKR DOCD: CPT | Performed by: INTERNAL MEDICINE

## 2024-01-30 PROCEDURE — 1090F PRES/ABSN URINE INCON ASSESS: CPT | Performed by: INTERNAL MEDICINE

## 2024-01-30 PROCEDURE — G8427 DOCREV CUR MEDS BY ELIG CLIN: HCPCS | Performed by: INTERNAL MEDICINE

## 2024-01-30 PROCEDURE — G8399 PT W/DXA RESULTS DOCUMENT: HCPCS | Performed by: INTERNAL MEDICINE

## 2024-01-30 PROCEDURE — 3074F SYST BP LT 130 MM HG: CPT | Performed by: INTERNAL MEDICINE

## 2024-01-30 PROCEDURE — 3017F COLORECTAL CA SCREEN DOC REV: CPT | Performed by: INTERNAL MEDICINE

## 2024-01-30 PROCEDURE — 3078F DIAST BP <80 MM HG: CPT | Performed by: INTERNAL MEDICINE

## 2024-01-30 PROCEDURE — G8484 FLU IMMUNIZE NO ADMIN: HCPCS | Performed by: INTERNAL MEDICINE

## 2024-01-30 PROCEDURE — G8420 CALC BMI NORM PARAMETERS: HCPCS | Performed by: INTERNAL MEDICINE

## 2024-01-30 RX ORDER — LIDOCAINE AND PRILOCAINE 25; 25 MG/G; MG/G
CREAM TOPICAL
Qty: 30 G | Refills: 2 | Status: SHIPPED | OUTPATIENT
Start: 2024-01-30

## 2024-01-30 RX ORDER — ONDANSETRON HYDROCHLORIDE 8 MG/1
8 TABLET, FILM COATED ORAL EVERY 8 HOURS PRN
Qty: 90 TABLET | Refills: 2 | Status: SHIPPED | OUTPATIENT
Start: 2024-01-30

## 2024-01-30 RX ORDER — PROCHLORPERAZINE MALEATE 10 MG
10 TABLET ORAL EVERY 6 HOURS PRN
Qty: 90 TABLET | Refills: 2 | Status: SHIPPED | OUTPATIENT
Start: 2024-01-30

## 2024-01-30 ASSESSMENT — PATIENT HEALTH QUESTIONNAIRE - PHQ9
SUM OF ALL RESPONSES TO PHQ QUESTIONS 1-9: 2
SUM OF ALL RESPONSES TO PHQ9 QUESTIONS 1 & 2: 2
SUM OF ALL RESPONSES TO PHQ QUESTIONS 1-9: 2
1. LITTLE INTEREST OR PLEASURE IN DOING THINGS: 1
SUM OF ALL RESPONSES TO PHQ QUESTIONS 1-9: 2
SUM OF ALL RESPONSES TO PHQ QUESTIONS 1-9: 2
2. FEELING DOWN, DEPRESSED OR HOPELESS: 1

## 2024-01-30 NOTE — PROGRESS NOTES
20 pound weight loss in the past 2 weeks. Current Oxygen needs are 8L. Guardant today and NGS today.

## 2024-01-30 NOTE — PATIENT INSTRUCTIONS
Patient Instructions from Today's Visit    Reason for Visit:  New consult for lung cancer    Diagnosis Information:  https://www.cancer.net/about-us/asco-answers-patient-education-materials/hqfg-toidlkr-cwpu-sheets  Patient was educated and given handouts published by ASCO entitled “ASCO Answers Fact Sheets” about their diagnosis of lung cancer during today’s office visit.     Plan:  Proceed with Brain MRI.  Referral to Radiation Oncology for treatment options.  The recommended course of treatment for Stage III is concurrent chemotherapy/radiation.  Interventional Radiology at [facility] for port placement.  Labs today.    Follow Up:  Office visit with Dr Gagnon in a couple of weeks    Recent Lab Results:  No visits with results within 3 Day(s) from this visit.   Latest known visit with results is:   Hospital Outpatient Visit on 01/10/2024   Component Date Value Ref Range Status    POC Glucose 01/10/2024 127 (H)  65 - 100 mg/dL Final    Comment: 47 - 60 mg/dl Consistent with, but not fully diagnostic of hypoglycemia.  101 - 125 mg/dl Impaired fasting glucose/consistent with pre-diabetes mellitus  > 126 mg/dl Fasting glucose consistent with overt diabetes mellitus      Performed by: 01/10/2024 GilliamEmmaGraceNucMed/Pet   Final       Treatment Summary has been discussed and given to patient: n/a    ASCO ANSWERS is a collection of oncologist-approved patient education materials developed by the American Society of Clinical Oncology (ASCO) for people with cancer and their caregivers.     Lung Cancer  ILLUSTRATION BY [employer]PL[employer]. © 2004 AMERICAN SOCIETY OF CLINICAL ONCOLOGY.     What is lung cancer?   Lung cancer begins when cells in the lung grow out of control and form a mass called a tumor, lesion, or nodule. There are 2 major types of lung cancer: non-small cell and small cell. They are usually treated in different ways. Lung cancer is the second most common cancer diagnosed in

## 2024-01-31 ENCOUNTER — CLINICAL DOCUMENTATION (OUTPATIENT)
Dept: CASE MANAGEMENT | Age: 74
End: 2024-01-31

## 2024-01-31 ENCOUNTER — HOSPITAL ENCOUNTER (OUTPATIENT)
Age: 74
Discharge: HOME | End: 2024-02-02
Payer: MEDICARE

## 2024-01-31 DIAGNOSIS — C34.92 SQUAMOUS CELL LUNG CANCER, LEFT (HCC): ICD-10-CM

## 2024-01-31 PROCEDURE — 6360000004 HC RX CONTRAST MEDICATION: Performed by: NURSE PRACTITIONER

## 2024-01-31 PROCEDURE — A9579 GAD-BASE MR CONTRAST NOS,1ML: HCPCS | Performed by: NURSE PRACTITIONER

## 2024-01-31 PROCEDURE — 70553 MRI BRAIN STEM W/O & W/DYE: CPT

## 2024-01-31 RX ADMIN — GADOTERIDOL 13 ML: 279.3 INJECTION, SOLUTION INTRAVENOUS at 13:25

## 2024-02-01 ENCOUNTER — CLINICAL DOCUMENTATION (OUTPATIENT)
Dept: CASE MANAGEMENT | Age: 74
End: 2024-02-01

## 2024-02-01 NOTE — PROGRESS NOTES
Received call from patient with questions regarding need for port. Explained that port is necessary for chemotherapy and reiterated that chemo is recommended. Also clarified that immunotherapy is the medication that may or may not be used based on results of Caris. Pt verbalized understanding and plans to proceed with port. Reviewed upcoming appts with pt. Pt will call with questions or concerns.

## 2024-02-05 ENCOUNTER — TELEPHONE (OUTPATIENT)
Dept: PULMONOLOGY | Age: 74
End: 2024-02-05

## 2024-02-05 ENCOUNTER — CLINICAL DOCUMENTATION (OUTPATIENT)
Dept: CASE MANAGEMENT | Age: 74
End: 2024-02-05

## 2024-02-05 DIAGNOSIS — C34.12 MALIGNANT NEOPLASM OF UPPER LOBE OF LEFT LUNG (HCC): Primary | ICD-10-CM

## 2024-02-05 DIAGNOSIS — J96.12 CHRONIC RESPIRATORY FAILURE WITH HYPOXIA AND HYPERCAPNIA (HCC): ICD-10-CM

## 2024-02-05 DIAGNOSIS — Z51.5 PALLIATIVE CARE PATIENT: ICD-10-CM

## 2024-02-05 DIAGNOSIS — J44.9 MODERATE COPD (CHRONIC OBSTRUCTIVE PULMONARY DISEASE) (HCC): ICD-10-CM

## 2024-02-05 DIAGNOSIS — J96.11 CHRONIC RESPIRATORY FAILURE WITH HYPOXIA AND HYPERCAPNIA (HCC): ICD-10-CM

## 2024-02-05 DIAGNOSIS — R05.3 CHRONIC COUGH: ICD-10-CM

## 2024-02-05 NOTE — PROGRESS NOTES
Spoke with patient's Mother today regarding appointments and prescriptions and their uses. Educated on the use of EMLA, Compazine and Zofran. Patient requested a refill on Hycodan. Forwarded refill request to Dr Gagnon. Encouraged to call with questions. Navigation will continue to follow.

## 2024-02-05 NOTE — TELEPHONE ENCOUNTER
She is a patient of [facility] and is needing prescription for:      HYDROcodone homatropine (HYCODAN) 5-1.5 MG/5ML solution      Needs this sent in asap per patient

## 2024-02-06 ENCOUNTER — CLINICAL DOCUMENTATION (OUTPATIENT)
Dept: CASE MANAGEMENT | Age: 74
End: 2024-02-06

## 2024-02-06 RX ORDER — HYDROCODONE BITARTRATE AND HOMATROPINE METHYLBROMIDE ORAL SOLUTION 5; 1.5 MG/5ML; MG/5ML
5 LIQUID ORAL EVERY 6 HOURS
Qty: 240 ML | Refills: 0 | Status: SHIPPED | OUTPATIENT
Start: 2024-02-06 | End: 2024-02-18

## 2024-02-06 NOTE — PROGRESS NOTES
Palliative with Pulmonary sent script for Hycodan and patient picked it up today at [facility]. Scheduled to see Palliative Care at [facility] on 2/14.

## 2024-02-08 ENCOUNTER — HOSPITAL ENCOUNTER (OUTPATIENT)
Dept: INTERVENTIONAL RADIOLOGY/VASCULAR | Age: 74
End: 2024-02-08
Attending: INTERNAL MEDICINE
Payer: MEDICARE

## 2024-02-08 ENCOUNTER — TELEPHONE (OUTPATIENT)
Dept: CASE MANAGEMENT | Age: 74
End: 2024-02-08

## 2024-02-08 VITALS
BODY MASS INDEX: 24.59 KG/M2 | DIASTOLIC BLOOD PRESSURE: 59 MMHG | RESPIRATION RATE: 16 BRPM | WEIGHT: 144 LBS | HEIGHT: 64 IN | SYSTOLIC BLOOD PRESSURE: 129 MMHG | HEART RATE: 102 BPM | TEMPERATURE: 97.8 F | OXYGEN SATURATION: 100 %

## 2024-02-08 PROCEDURE — 77001 FLUOROGUIDE FOR VEIN DEVICE: CPT

## 2024-02-08 PROCEDURE — 6360000002 HC RX W HCPCS: Performed by: RADIOLOGY

## 2024-02-08 PROCEDURE — 77001 FLUOROGUIDE FOR VEIN DEVICE: CPT | Performed by: RADIOLOGY

## 2024-02-08 PROCEDURE — 36561 INSERT TUNNELED CV CATH: CPT | Performed by: RADIOLOGY

## 2024-02-08 PROCEDURE — 76937 US GUIDE VASCULAR ACCESS: CPT | Performed by: RADIOLOGY

## 2024-02-08 PROCEDURE — 36561 INSERT TUNNELED CV CATH: CPT

## 2024-02-08 RX ORDER — FENTANYL CITRATE 50 UG/ML
INJECTION, SOLUTION INTRAMUSCULAR; INTRAVENOUS PRN
Status: COMPLETED | OUTPATIENT
Start: 2024-02-08 | End: 2024-02-08

## 2024-02-08 RX ORDER — ACETAMINOPHEN 325 MG/1
650 TABLET ORAL EVERY 4 HOURS PRN
Status: DISCONTINUED | OUTPATIENT
Start: 2024-02-08 | End: 2024-02-12 | Stop reason: HOSPADM

## 2024-02-08 RX ORDER — MIDAZOLAM HYDROCHLORIDE 2 MG/2ML
INJECTION, SOLUTION INTRAMUSCULAR; INTRAVENOUS PRN
Status: COMPLETED | OUTPATIENT
Start: 2024-02-08 | End: 2024-02-08

## 2024-02-08 RX ORDER — CLINDAMYCIN PHOSPHATE 900 MG/50ML
INJECTION, SOLUTION INTRAVENOUS CONTINUOUS PRN
Status: COMPLETED | OUTPATIENT
Start: 2024-02-08 | End: 2024-02-08

## 2024-02-08 RX ADMIN — FENTANYL CITRATE 25 MCG: 50 INJECTION, SOLUTION INTRAMUSCULAR; INTRAVENOUS at 12:36

## 2024-02-08 RX ADMIN — MIDAZOLAM HYDROCHLORIDE 0.5 MG: 1 INJECTION, SOLUTION INTRAMUSCULAR; INTRAVENOUS at 12:37

## 2024-02-08 RX ADMIN — CLINDAMYCIN PHOSPHATE 900 MG: 900 INJECTION, SOLUTION INTRAVENOUS at 12:38

## 2024-02-08 RX ADMIN — MIDAZOLAM HYDROCHLORIDE 0.5 MG: 1 INJECTION, SOLUTION INTRAMUSCULAR; INTRAVENOUS at 12:44

## 2024-02-08 RX ADMIN — FENTANYL CITRATE 25 MCG: 50 INJECTION, SOLUTION INTRAMUSCULAR; INTRAVENOUS at 12:43

## 2024-02-08 ASSESSMENT — PAIN - FUNCTIONAL ASSESSMENT: PAIN_FUNCTIONAL_ASSESSMENT: 0-10

## 2024-02-08 NOTE — TELEPHONE ENCOUNTER
LVM that appt on 2/13 with pallitive care had been cancelled after discussion with [facility] Xiong and she is scheduled with palliative care here at the [facility] on 2/14.

## 2024-02-08 NOTE — PRE SEDATION
Sedation Pre-Procedure Note    Patient Name: Fide Mejia   YOB: 1950  Room/Bed: Room/bed info not found  Medical Record Number: [id number]  Date: 2/8/2024   Time: 12:01 PM       Indication:  lung cancer    Consent: I have discussed with the patient and/or the patient representative the indication, alternatives, and the possible risks and/or complications of the planned procedure and the anesthesia methods. The patient and/or patient representative appear to understand and agree to proceed.    Vital Signs:   Vitals:    02/08/24 1115   BP: 127/61   Pulse: (!) 117   Resp: 20   Temp: 97.8 °F (36.6 °C)   SpO2: 96%       Past Medical History:   has a past medical history of Acute respiratory failure (HCC), Anxiety and depression, Cancer (HCC), Chest pain, Chiari I malformation (HCC), COPD exacerbation (HCC), COPD exacerbation (HCC), Coronary artery disease, GERD (gastroesophageal reflux disease), Hyperlipidemia, Moderate COPD (chronic obstructive pulmonary disease) (HCC), Palliative care patient, Paroxysmal tachycardia (HCC), Pneumonia, Pneumothorax, and Tremor of both hands.    Past Surgical History:   has a past surgical history that includes Hysterectomy (1982); Shoulder arthroscopy (Right, 1990); Septoplasty (04/2010); Cataract removal (Bilateral); Cardiac catheterization (11/22/2021); Thumb arthroscopy (Left, 1998); Squamous cell carcinoma excision; blepharoplasty (Bilateral); Colonoscopy (07/16/2019); bronchoscopy (N/A, 1/4/2024); and bronchoscopy (N/A, 1/4/2024).    Medications:   Scheduled Meds:   Continuous Infusions:   PRN Meds:   Home Meds:   Prior to Admission medications    Medication Sig Start Date End Date Taking? Authorizing Provider   HYDROcodone homatropine (HYCODAN) 5-1.5 MG/5ML solution Take 5 mLs by mouth every 6 hours for 12 days. Z51.5 Palliative Care Max Daily Amount: 20 mLs 2/6/24 2/18/24  Shannon Xiong, APRN - CNP   lidocaine-prilocaine (EMLA) 2.5-2.5 % cream Apply topically

## 2024-02-08 NOTE — DISCHARGE INSTRUCTIONS
If you have any questions about your procedure, please call the Interventional Radiology department at [phone].   After business hours (5pm) and weekends, call the answering service at [phone] and ask for the Radiologist on call to be paged.     Si tiene Preguntas acerca del procedimiento, por favor llame al departamento de Radiología Intervencional al [phone].   Después de horas de oficina (5 pm) y los fines de semana, llamar al servicio de llamadas al [phone] y pregunte por el Radiologo de rigoberto.      Interventional Radiology General Nurse Discharge    After general anesthesia or intravenous sedation, for 24 hours or while taking prescription Narcotics:  Limit your activities  Do not drive and operate hazardous machinery  Do not make important personal or business decisions  Do  not drink alcoholic beverages  If you have not urinated within 8 hours after discharge, please contact your surgeon on call.    * Please give a list of your current medications to your Primary Care Provider.  * Please update this list whenever your medications are discontinued, doses are     changed, or new medications (including over-the-counter products) are added.  * Please carry medication information at all times in case of emergency situations.    These are general instructions for a healthy lifestyle:    No smoking/ No tobacco products/ Avoid exposure to second hand smoke  Surgeon General's Warning:  Quitting smoking now greatly reduces serious risk to your health.    Obesity, smoking, and sedentary lifestyle greatly increases your risk for illness  A healthy diet, regular physical exercise & weight monitoring are important for maintaining a healthy lifestyle    You may be retaining fluid if you have a history of heart failure or if you experience any of the following symptoms:  Weight gain of 3 pounds or more overnight or 5 pounds in a week, increased swelling in our hands or feet or shortness of breath

## 2024-02-08 NOTE — OP NOTE
[facility]  Department of Interventional Radiology  [phone]        Interventional Radiology Brief Procedure Note    Patient: Fide Mejia MRN: [id number]  SSN: [id number]    YOB: 1950  Age: 73 y.o.  Sex: female      Date of Procedure: 2/8/2024    Pre-Procedure Diagnosis: lung cancer    Post-Procedure Diagnosis: SAME    Procedure(s): Venous Chest Port Placement    Brief Description of Procedure: as above    Performed By: ROXANNE ACOSTA MD     Assistants: None    Anesthesia:Moderate Sedation    Estimated Blood Loss: Less than 10ml    Specimens:  None    Implants:  Subcutaneous Port    Findings: patent right IJ    Complications: None    Recommendations: ok to use     Follow Up: prn    Signed By: ROXANNE ACOSTA MD     February 8, 2024

## 2024-02-08 NOTE — H&P
[facility]  Department of Interventional Radiology  [phone]    History and Physical    Patient:  Fide Mejia MRN:  [id number]  SSN:  [id number]    YOB: 1950  Age:  73 y.o.  Sex:  female      Primary Care Provider:  Kelsi Driscoll MD  Referring Physician:  Saul Gagnon MD    Subjective:     Chief Complaint: lung cancer    History of the Present Illness:  The patient is a 73 y.o. female who presents with left lung cancer needs access for IV therapy.  NPO        Past Medical History:   Diagnosis Date    Acute respiratory failure ([facility]) 1/29/2015    Intubated 1/29/2015     Anxiety and depression     managed with med    Cancer ([facility])     basal cell,squamous cell    Chest pain 7/7/2016    Chiari I malformation ([facility])     history of    COPD exacerbation ([facility]) 9/14/12-9/17/12    COPD exacerbation ([facility]) 4/2013 to 5/2/13    hospitalization    Coronary artery disease 3/3/2015    Nonobstructive; followed by Dr. Ku    GERD (gastroesophageal reflux disease)     managed with med    Hyperlipidemia     managed with med    Moderate COPD (chronic obstructive pulmonary disease) ([facility])     6 Liters O2 NC daily and at night; Inhalers    Palliative care patient     Paroxysmal tachycardia ([facility]) 7/7/2016    Pneumonia 9/16/2012    Pneumothorax     HX. of  2 on the left- required chest tube    Tremor of both hands      Past Surgical History:   Procedure Laterality Date    BLEPHAROPLASTY Bilateral     BRONCHOSCOPY N/A 1/4/2024    BRONCHOSCOPY ROBOTIC with possible cautery performed by Rome Youssef MD at [facility] ENDOSCOPY    BRONCHOSCOPY N/A 1/4/2024    BRONCHOSCOPY ENDOBRONCHIAL ULTRASOUND performed by Rome Youssef MD at [facility] ENDOSCOPY    CARDIAC CATHETERIZATION  11/22/2021    mild nonobstructive CAD    CATARACT REMOVAL Bilateral     COLONOSCOPY  07/16/2019    HYSTERECTOMY (CERVIX STATUS UNKNOWN)  1982    SEPTOPLASTY  04/2010    SHOULDER ARTHROSCOPY Right 1990    SQUAMOUS    Weight: 65.3 kg (144 lb)   Height: 1.626 m (5' 4\")       Pain Assessment       No data to display                            HEART: regular rate and rhythm, S1, S2 normal, no murmur, click, rub or gallop  LUNG: clear to auscultation bilaterally    Laboratory:     Lab Results   Component Value Date/Time     01/30/2024 12:48 PM     12/12/2023 02:26 PM    K 3.6 01/30/2024 12:48 PM    K 4.1 12/12/2023 02:26 PM     01/30/2024 12:48 PM     12/12/2023 02:26 PM    CO2 33 01/30/2024 12:48 PM    CO2 32 12/12/2023 02:26 PM    BUN 4 01/30/2024 12:48 PM    BUN 5 12/12/2023 02:26 PM    GFRAA >60 11/22/2021 09:25 AM    GFRAA 105 10/29/2021 11:17 AM    MG 2.0 12/12/2023 02:26 PM    MG 2.1 11/22/2021 09:25 AM    PHOS 4.4 03/11/2020 04:43 AM    GLOB 4.8 01/30/2024 12:48 PM    GLOB 4.0 03/09/2020 01:25 PM    ALT 16 01/30/2024 12:48 PM    ALT 35 10/29/2021 11:17 AM     Lab Results   Component Value Date/Time    WBC 10.9 01/30/2024 12:48 PM    WBC 8.5 12/12/2023 02:26 PM    HGB 12.9 01/30/2024 12:48 PM    HGB 13.4 12/12/2023 02:26 PM    HCT 42.0 01/30/2024 12:48 PM    HCT 42.0 12/12/2023 02:26 PM     01/30/2024 12:48 PM     12/12/2023 02:26 PM     No results found for: \"APTT\", \"INR\"    Assessment:     Lung cancer         Plan:     Planned Procedure:  chest port with moderate sedation    Risks, benefits, and alternatives reviewed with patient and she agrees to proceed with the procedure.      Signed By: ROXANNE ACOSTA MD     February 8, 2024

## 2024-02-08 NOTE — OR NURSING
Recovery period without difficulty. Pt alert and oriented and denies pain. Dressing is clean, dry, and intact. Reviewed discharge instructions with patient and mom, both verbalized understanding. [facility] Vital signs and Reny score completed.

## 2024-02-08 NOTE — OR NURSING
TRANSFER - OUT REPORT:           Verbal report given to YURIY Candelaria on Fide Mejia  being transferred to IR Recovery for routine post-op      Report consisted of patient’s Situation, Background, Assessment and Recommendations(SBAR).          Information from the following report(s) SBAR, Procedure Summary, and MAR was reviewed with the receiving nurse.       Opportunity for questions and clarification was provided.          Conscious Sedation:    50 Mcg of Fentanyl administered   1 Mg of Versed administered   0 Mg of Benadryl administered        Pt tolerated procedure well.     Other: skin glue clean, dry, intact, and nontender    VITALS:  BP (!) 137/57   Pulse (!) 109   Temp 97.8 °F (36.6 °C) (Infrared)   Resp 20   Ht 1.626 m (5' 4\")   Wt 65.3 kg (144 lb)   SpO2 96%   BMI 24.72 kg/m²

## 2024-02-13 LAB
Lab: NORMAL
Lab: NORMAL
REFERENCE LAB: NORMAL

## 2024-02-13 NOTE — PROGRESS NOTES
showed no masses or acute intracranial abnormality.        PLAN:  I reviewed pathogenesis, natural history, prognosis and management approach to locally advanced squamous cell carcinoma of the lung.  Discussed various treatment options including surgical resection, CRT and platinum based systemic chemotherapy with or without immunotherapy.  Patient is not deemed to be a surgical candidate due to advanced COPD.  Definitive chemoradiation may be challenging given performance status and comorbid medical conditions.  However patient has been referred to radiation oncology for further discussion regarding the feasibility of this approach. The other option would be to initiate  systemic platinum based chemotherapy with potential role for radiation in a sequential fashion if deemed feasible.  Discussed risks/benefits of each approach. All questions were answered to the best of my abilities.Patient and mother verbalized understanding and await recommendations from radiation oncology. Dominga molecular profile results are pending at this time.  Patient has been referred to palliative care. I shall plan to see him back in about 2 weeks with labs pending finalization to treatment plan. Total visit time 30 minutes.    Saul Gagnon MD  [facility] Hematology and Oncology  [address] [place] [address]  Office : [phone]  Fax : [phone]    Elements of this note have been dictated using speech recognition software. As a result, errors of speech recognition may have occurred.

## 2024-02-14 ENCOUNTER — OFFICE VISIT (OUTPATIENT)
Dept: PALLATIVE CARE | Age: 74
End: 2024-02-14
Payer: MEDICARE

## 2024-02-14 ENCOUNTER — OFFICE VISIT (OUTPATIENT)
Dept: ONCOLOGY | Age: 74
End: 2024-02-14
Payer: MEDICARE

## 2024-02-14 ENCOUNTER — HOSPITAL ENCOUNTER (OUTPATIENT)
Dept: LAB | Age: 74
Discharge: HOME | End: 2024-02-17
Payer: MEDICARE

## 2024-02-14 VITALS
WEIGHT: 145 LBS | SYSTOLIC BLOOD PRESSURE: 131 MMHG | HEIGHT: 64 IN | HEART RATE: 109 BPM | RESPIRATION RATE: 16 BRPM | TEMPERATURE: 98 F | DIASTOLIC BLOOD PRESSURE: 66 MMHG | BODY MASS INDEX: 24.75 KG/M2 | OXYGEN SATURATION: 96 %

## 2024-02-14 DIAGNOSIS — C34.12 MALIGNANT NEOPLASM OF UPPER LOBE OF LEFT LUNG (HCC): ICD-10-CM

## 2024-02-14 DIAGNOSIS — Z51.5 PALLIATIVE CARE PATIENT: ICD-10-CM

## 2024-02-14 DIAGNOSIS — K59.03 THERAPEUTIC OPIOID-INDUCED CONSTIPATION (OIC): ICD-10-CM

## 2024-02-14 DIAGNOSIS — R05.3 CHRONIC COUGH: Primary | ICD-10-CM

## 2024-02-14 DIAGNOSIS — T40.2X5A THERAPEUTIC OPIOID-INDUCED CONSTIPATION (OIC): ICD-10-CM

## 2024-02-14 DIAGNOSIS — C34.12 MALIGNANT NEOPLASM OF UPPER LOBE OF LEFT LUNG (HCC): Primary | ICD-10-CM

## 2024-02-14 DIAGNOSIS — J44.9 MODERATE COPD (CHRONIC OBSTRUCTIVE PULMONARY DISEASE) (HCC): ICD-10-CM

## 2024-02-14 DIAGNOSIS — R26.89 ABNORMALITY OF GAIT DUE TO IMPAIRMENT OF BALANCE: ICD-10-CM

## 2024-02-14 DIAGNOSIS — G89.3 CANCER-RELATED PAIN: ICD-10-CM

## 2024-02-14 LAB
ALBUMIN SERPL-MCNC: 2.1 G/DL (ref 3.2–4.6)
ALBUMIN/GLOB SERPL: 0.4 (ref 0.4–1.6)
ALP SERPL-CCNC: 183 U/L (ref 50–136)
ALT SERPL-CCNC: 13 U/L (ref 12–65)
ANION GAP SERPL CALC-SCNC: 4 MMOL/L (ref 2–11)
AST SERPL-CCNC: 15 U/L (ref 15–37)
BASOPHILS # BLD: 0 K/UL (ref 0–0.2)
BASOPHILS NFR BLD: 0 % (ref 0–2)
BILIRUB SERPL-MCNC: 0.1 MG/DL (ref 0.2–1.1)
BUN SERPL-MCNC: 3 MG/DL (ref 8–23)
CALCIUM SERPL-MCNC: 8.3 MG/DL (ref 8.3–10.4)
CHLORIDE SERPL-SCNC: 102 MMOL/L (ref 103–113)
CO2 SERPL-SCNC: 33 MMOL/L (ref 21–32)
CREAT SERPL-MCNC: 0.5 MG/DL (ref 0.6–1)
DIFFERENTIAL METHOD BLD: ABNORMAL
EOSINOPHIL # BLD: 0.2 K/UL (ref 0–0.8)
EOSINOPHIL NFR BLD: 2 % (ref 0.5–7.8)
ERYTHROCYTE [DISTWIDTH] IN BLOOD BY AUTOMATED COUNT: 13.3 % (ref 11.9–14.6)
GLOBULIN SER CALC-MCNC: 4.8 G/DL (ref 2.8–4.5)
HCT VFR BLD AUTO: 38.5 % (ref 35.8–46.3)
HGB BLD-MCNC: 11.8 G/DL (ref 11.7–15.4)
IMM GRANULOCYTES # BLD AUTO: 0 K/UL (ref 0–0.5)
IMM GRANULOCYTES NFR BLD AUTO: 0 % (ref 0–5)
LYMPHOCYTES # BLD: 1.5 K/UL (ref 0.5–4.6)
LYMPHOCYTES NFR BLD: 17 % (ref 13–44)
MCH RBC QN AUTO: 29 PG (ref 26.1–32.9)
MCHC RBC AUTO-ENTMCNC: 30.6 G/DL (ref 31.4–35)
MCV RBC AUTO: 94.6 FL (ref 82–102)
MONOCYTES # BLD: 0.5 K/UL (ref 0.1–1.3)
MONOCYTES NFR BLD: 6 % (ref 4–12)
NEUTS SEG # BLD: 6.3 K/UL (ref 1.7–8.2)
NEUTS SEG NFR BLD: 75 % (ref 43–78)
NRBC # BLD: 0 K/UL (ref 0–0.2)
PLATELET # BLD AUTO: 334 K/UL (ref 150–450)
PMV BLD AUTO: 9.3 FL (ref 9.4–12.3)
POTASSIUM SERPL-SCNC: 3.5 MMOL/L (ref 3.5–5.1)
PROT SERPL-MCNC: 6.9 G/DL (ref 6.3–8.2)
RBC # BLD AUTO: 4.07 M/UL (ref 4.05–5.2)
SODIUM SERPL-SCNC: 139 MMOL/L (ref 136–146)
WBC # BLD AUTO: 8.5 K/UL (ref 4.3–11.1)

## 2024-02-14 PROCEDURE — 1123F ACP DISCUSS/DSCN MKR DOCD: CPT | Performed by: INTERNAL MEDICINE

## 2024-02-14 PROCEDURE — G8399 PT W/DXA RESULTS DOCUMENT: HCPCS | Performed by: INTERNAL MEDICINE

## 2024-02-14 PROCEDURE — 1123F ACP DISCUSS/DSCN MKR DOCD: CPT | Performed by: NURSE PRACTITIONER

## 2024-02-14 PROCEDURE — G8427 DOCREV CUR MEDS BY ELIG CLIN: HCPCS | Performed by: NURSE PRACTITIONER

## 2024-02-14 PROCEDURE — 99214 OFFICE O/P EST MOD 30 MIN: CPT | Performed by: INTERNAL MEDICINE

## 2024-02-14 PROCEDURE — G8484 FLU IMMUNIZE NO ADMIN: HCPCS | Performed by: NURSE PRACTITIONER

## 2024-02-14 PROCEDURE — G8399 PT W/DXA RESULTS DOCUMENT: HCPCS | Performed by: NURSE PRACTITIONER

## 2024-02-14 PROCEDURE — 36415 COLL VENOUS BLD VENIPUNCTURE: CPT

## 2024-02-14 PROCEDURE — 3078F DIAST BP <80 MM HG: CPT | Performed by: INTERNAL MEDICINE

## 2024-02-14 PROCEDURE — 3017F COLORECTAL CA SCREEN DOC REV: CPT | Performed by: NURSE PRACTITIONER

## 2024-02-14 PROCEDURE — 3017F COLORECTAL CA SCREEN DOC REV: CPT | Performed by: INTERNAL MEDICINE

## 2024-02-14 PROCEDURE — 85025 COMPLETE CBC W/AUTO DIFF WBC: CPT

## 2024-02-14 PROCEDURE — G8484 FLU IMMUNIZE NO ADMIN: HCPCS | Performed by: INTERNAL MEDICINE

## 2024-02-14 PROCEDURE — 99205 OFFICE O/P NEW HI 60 MIN: CPT | Performed by: NURSE PRACTITIONER

## 2024-02-14 PROCEDURE — 3075F SYST BP GE 130 - 139MM HG: CPT | Performed by: INTERNAL MEDICINE

## 2024-02-14 PROCEDURE — 1090F PRES/ABSN URINE INCON ASSESS: CPT | Performed by: NURSE PRACTITIONER

## 2024-02-14 PROCEDURE — G8420 CALC BMI NORM PARAMETERS: HCPCS | Performed by: NURSE PRACTITIONER

## 2024-02-14 PROCEDURE — 1036F TOBACCO NON-USER: CPT | Performed by: INTERNAL MEDICINE

## 2024-02-14 PROCEDURE — 1036F TOBACCO NON-USER: CPT | Performed by: NURSE PRACTITIONER

## 2024-02-14 PROCEDURE — G8427 DOCREV CUR MEDS BY ELIG CLIN: HCPCS | Performed by: INTERNAL MEDICINE

## 2024-02-14 PROCEDURE — 80053 COMPREHEN METABOLIC PANEL: CPT

## 2024-02-14 PROCEDURE — G8420 CALC BMI NORM PARAMETERS: HCPCS | Performed by: INTERNAL MEDICINE

## 2024-02-14 PROCEDURE — 1090F PRES/ABSN URINE INCON ASSESS: CPT | Performed by: INTERNAL MEDICINE

## 2024-02-14 PROCEDURE — 3023F SPIROM DOC REV: CPT | Performed by: NURSE PRACTITIONER

## 2024-02-14 RX ORDER — MORPHINE SULFATE 15 MG/1
7.5-15 TABLET ORAL EVERY 6 HOURS PRN
Qty: 120 TABLET | Refills: 0 | Status: SHIPPED | OUTPATIENT
Start: 2024-02-14 | End: 2024-03-15

## 2024-02-14 RX ORDER — SENNA AND DOCUSATE SODIUM 50; 8.6 MG/1; MG/1
1-2 TABLET, FILM COATED ORAL 2 TIMES DAILY
Qty: 60 TABLET | Refills: 1 | Status: SHIPPED | OUTPATIENT
Start: 2024-02-14

## 2024-02-14 RX ORDER — NALOXONE HYDROCHLORIDE 4 MG/.1ML
1 SPRAY NASAL PRN
Qty: 2 EACH | Refills: 1 | Status: SHIPPED | OUTPATIENT
Start: 2024-02-14

## 2024-02-14 RX ORDER — HYDROCODONE BITARTRATE AND HOMATROPINE METHYLBROMIDE ORAL SOLUTION 5; 1.5 MG/5ML; MG/5ML
5 LIQUID ORAL EVERY 6 HOURS
Qty: 240 ML | Refills: 0 | Status: SHIPPED | OUTPATIENT
Start: 2024-02-18 | End: 2024-03-01

## 2024-02-14 NOTE — PATIENT INSTRUCTIONS
[facility] Hematology and Oncology Pain Management  You have been prescribed pain medicine from this office.  To provide you and all our patients with the best care, it is important for us to know what to expect from each other moving forward.  We understand that most patients are concerned with addiction and over-using pain medicine.  We will work to use the lowest dose that manages your pain effectively.  We will reduce pain medicine doses as your pain decreases.      Do not use illicit drugs while taking pain medicine.  This is unsafe and could result in death. If you do use illicit drugs, please let us know so that we can safely care for you.    It is our goal to reduce your pain so that you can be more functional and have a better quality of life.  We may not get your pain to “zero” but we will work to safely improve your pain to a tolerable level.    If your pain is not controlled with the prescribed medicine, please notify your navigator at the number provided to you or call triage at [phone].  It is NOT OK to take more medicine than you are prescribed without authorization from this office.     If you take more medicine than prescribed without authorization, we will most likely not be able to fill medicine early when you run out early.  It is also important to note that even if we are willing to write the prescription for more medicine, your insurance may not pay for it and the pharmacy can refuse to fill it.      We need a 72-hour (3 business day) notice if you are going to be out of your medication before your next visit.  We know that this happens often as your chemotherapy may be held or appointments are pushed out for various reasons.  Please give us as much notice as possible if you will be out of medication before you will be back in the office.    Please notify your navigator or triage ASAP if you can not afford your medicine.  Many times, a Prior Authorization (PA) is required by insurance and

## 2024-02-14 NOTE — PATIENT INSTRUCTIONS
Patient Instructions from Today's Visit    Reason for Visit:  Lung Cancer    Plan:  Appointment with Radiation Oncology tomorrow morning.  We will call you to schedule Chemo Education and Financial Navigation.    Follow Up:  Office visit in a couple of weeks to start.    Recent Lab Results:  Hospital Outpatient Visit on 02/14/2024   Component Date Value Ref Range Status    Sodium 02/14/2024 139  136 - 146 mmol/L Final    Potassium 02/14/2024 3.5  3.5 - 5.1 mmol/L Final    Chloride 02/14/2024 102 (L)  103 - 113 mmol/L Final    CO2 02/14/2024 33 (H)  21 - 32 mmol/L Final    Anion Gap 02/14/2024 4  2 - 11 mmol/L Final    Glucose 02/14/2024 108 (H)  65 - 100 mg/dL Final    BUN 02/14/2024 3 (L)  8 - 23 MG/DL Final    Creatinine 02/14/2024 0.50 (L)  0.6 - 1.0 MG/DL Final    Est, Glom Filt Rate 02/14/2024 >60  >60 ml/min/1.73m2 Final    Comment:    Pediatric calculator link: https://www.kidney.org/professionals/kdoqi/gfr_calculatorped     These results are not intended for use in patients <18 years of age.     eGFR results are calculated without a race factor using  the 2021 CKD-EPI equation. Careful clinical correlation is recommended, particularly when comparing to results calculated using previous equations.  The CKD-EPI equation is less accurate in patients with extremes of muscle mass, extra-renal metabolism of creatinine, excessive creatine ingestion, or following therapy that affects renal tubular secretion.      Calcium 02/14/2024 8.3  8.3 - 10.4 MG/DL Final    Total Bilirubin 02/14/2024 0.1 (L)  0.2 - 1.1 MG/DL Final    ALT 02/14/2024 13  12 - 65 U/L Final    AST 02/14/2024 15  15 - 37 U/L Final    Alk Phosphatase 02/14/2024 183 (H)  50 - 136 U/L Final    Total Protein 02/14/2024 6.9  6.3 - 8.2 g/dL Final    Albumin 02/14/2024 2.1 (L)  3.2 - 4.6 g/dL Final    Globulin 02/14/2024 4.8 (H)  2.8 - 4.5 g/dL Final    Albumin/Globulin Ratio 02/14/2024 0.4  0.4 - 1.6   Final    WBC 02/14/2024 8.5  4.3 - 11.1 K/uL Final

## 2024-02-15 ENCOUNTER — HOSPITAL ENCOUNTER (OUTPATIENT)
Dept: RADIATION ONCOLOGY | Age: 74
Setting detail: RECURRING SERIES
Discharge: HOME | End: 2024-02-18
Payer: MEDICARE

## 2024-02-15 VITALS
TEMPERATURE: 97.8 F | DIASTOLIC BLOOD PRESSURE: 63 MMHG | HEART RATE: 91 BPM | OXYGEN SATURATION: 94 % | SYSTOLIC BLOOD PRESSURE: 109 MMHG | WEIGHT: 145.2 LBS | BODY MASS INDEX: 24.92 KG/M2

## 2024-02-15 PROCEDURE — 99211 OFF/OP EST MAY X REQ PHY/QHP: CPT

## 2024-02-15 ASSESSMENT — PATIENT HEALTH QUESTIONNAIRE - PHQ9
SUM OF ALL RESPONSES TO PHQ9 QUESTIONS 1 & 2: 0
SUM OF ALL RESPONSES TO PHQ QUESTIONS 1-9: 0
2. FEELING DOWN, DEPRESSED OR HOPELESS: 0
SUM OF ALL RESPONSES TO PHQ QUESTIONS 1-9: 0
1. LITTLE INTEREST OR PLEASURE IN DOING THINGS: 0

## 2024-02-15 ASSESSMENT — PAIN SCALES - GENERAL: PAINLEVEL_OUTOF10: 5

## 2024-02-15 ASSESSMENT — PAIN DESCRIPTION - LOCATION: LOCATION: BACK

## 2024-02-15 NOTE — PROGRESS NOTES
Consult Lung Cancer.  Pt arrived in wheelchair with Mother for consult.  She is using Oxygen 3 liters per nasal cannula.  Pain level is 5 to mid back at present and she is not currently taking pain meds.  She has seen Palliative Care.   She has chronic depression and sees Psychiatry who is managing her meds.  No previous RT history.  Radiation teaching was completed.  New pt folder was given and explained.  Plan to see pt back after systemic treatment for sequential RT.  Navigation notified.   NO CONSENTS WERE SIGNED TODAY AND NO F/U APT WAS MADE.     Alicia Mejia, RN            
sodium (SENOKOT-S) 8.6-50 MG tablet, Take 1-2 tablets by mouth 2 times daily, Disp: 60 tablet, Rfl: 1    naloxone 4 MG/0.1ML LIQD nasal spray, 1 spray by Nasal route as needed for Opioid Reversal, Disp: 2 each, Rfl: 1    lidocaine-prilocaine (EMLA) 2.5-2.5 % cream, Apply topically for pain 1/2 inch to port site 30-45 minutes prior to lab/chemo appt as needed daily.  Cover with saran wrap. (Patient not taking: Reported on 2/14/2024), Disp: 30 g, Rfl: 2    prochlorperazine (COMPAZINE) 10 MG tablet, Take 1 tablet by mouth every 6 hours as needed (CINV) (Patient not taking: Reported on 2/14/2024), Disp: 90 tablet, Rfl: 2    ondansetron (ZOFRAN) 8 MG tablet, Take 1 tablet by mouth every 8 hours as needed for Nausea or Vomiting (Patient not taking: Reported on 2/14/2024), Disp: 90 tablet, Rfl: 2    venlafaxine (EFFEXOR) 25 MG tablet, Take 3 tablets by mouth at bedtime, Disp: , Rfl:     albuterol sulfate HFA (PROVENTIL;VENTOLIN;PROAIR) 108 (90 Base) MCG/ACT inhaler, Inhale 2 puffs into the lungs every 4 hours as needed for Shortness of Breath or Wheezing, Disp: 3 each, Rfl: 3    buPROPion (WELLBUTRIN SR) 200 MG extended release tablet, Take 1 tablet by mouth 2 times daily, Disp: , Rfl:     predniSONE (DELTASONE) 20 MG tablet, Take 1 tablet by mouth daily, Disp: 90 tablet, Rfl: 4    fluocinolone (DERMOTIC) 0.01 % OIL oil, Place 5 drops in ear(s) 2 times daily Up to 2 times daily, Disp: 20 mL, Rfl: 3    pantoprazole (PROTONIX) 40 MG tablet, Take 1 tablet by mouth 2 times daily, Disp: 120 tablet, Rfl: 2    DULoxetine (CYMBALTA) 30 MG extended release capsule, Take 1 capsule by mouth daily, Disp: , Rfl:     dilTIAZem (TIAZAC) 360 MG extended release capsule, Take 1 capsule by mouth in the morning. (Patient not taking: Reported on 1/30/2024), Disp: 90 capsule, Rfl: 3    rosuvastatin (CRESTOR) 10 MG tablet, Take 1 tablet by mouth daily (Patient taking differently: Take 1 tablet by mouth at bedtime), Disp: 90 tablet, Rfl: 3

## 2024-02-16 DIAGNOSIS — C34.12 MALIGNANT NEOPLASM OF UPPER LOBE OF LEFT LUNG (HCC): Primary | ICD-10-CM

## 2024-02-20 ENCOUNTER — OFFICE VISIT (OUTPATIENT)
Age: 74
End: 2024-02-20

## 2024-02-20 VITALS
BODY MASS INDEX: 24.59 KG/M2 | HEIGHT: 64 IN | SYSTOLIC BLOOD PRESSURE: 104 MMHG | WEIGHT: 144 LBS | HEART RATE: 96 BPM | DIASTOLIC BLOOD PRESSURE: 40 MMHG

## 2024-02-20 DIAGNOSIS — I47.9 PAROXYSMAL TACHYCARDIA (HCC): ICD-10-CM

## 2024-02-20 DIAGNOSIS — I25.10 CORONARY ARTERY DISEASE INVOLVING NATIVE CORONARY ARTERY OF NATIVE HEART WITHOUT ANGINA PECTORIS: Primary | ICD-10-CM

## 2024-02-20 DIAGNOSIS — I10 ESSENTIAL HYPERTENSION WITH GOAL BLOOD PRESSURE LESS THAN 130/85: ICD-10-CM

## 2024-02-20 DIAGNOSIS — E78.2 MIXED HYPERLIPIDEMIA: ICD-10-CM

## 2024-02-20 ASSESSMENT — ENCOUNTER SYMPTOMS
STRIDOR: 0
WHEEZING: 0
HEMATOCHEZIA: 0
HOARSE VOICE: 0
HEMOPTYSIS: 0
ABDOMINAL PAIN: 0
HEMATEMESIS: 0
EYE REDNESS: 0
DOUBLE VISION: 0

## 2024-02-20 NOTE — PROGRESS NOTES
of 142, potassium 4.2, creatinine 0.7, A1c 6.1, total cholesterol 139, triglycerides 133, HDL 76 and LDL 36      ASSESSMENT and PLAN      Coronary artery disease involving native coronary artery of native heart without angina pectoris  -60% proximal RCA disease-FFR negative at 0.94-managed medically-continue aspirin and statin therapy along with beta-blocker therapy.    Former Smoker  - now that she has lung cancer.    Mixed hyperlipidemia  -Has tolerated rosuvastatin well.  Feels well on this.  Lipids are much better.    Paroxysmal tachycardia/inappropriate sinus tachycardia (HCC)  -Discontinued atenolol 25 mg and continued diltiazem  mg daily.    Essential hypertension with goal blood pressure less than 130/85  -Discontinued atenolol previously with low blood pressures but continued long-acting diltiazem.    PERFECTO (obstructive sleep apnea)  -Continue CPAP therapy with oxygen every night.       Overall Impression  -She has been diagnosed with stage IIIa lung cancer since we last saw her. Continue rosuvastatin. We continued her long-acting diltiazem and rosuvastatin.  She is on aspirin but if she has a lot of hemoptysis this can be discontinued.  She will need echoes intermittently while she goes through chemotherapy.  We would only need to see her in follow-up in about 6 months time but of course we will see her sooner if needed.      Return in about 6 months (around 8/20/2024) for ruano, tachycardia, hld.     Thank you for allowing us to participate in the care of your patient.  If you have any further questions, please do not hesitate to contact us.  Sincerely,        Erasmo Conti MD   2/20/2024

## 2024-02-25 NOTE — ONCOLOGY
START OFF PATHWAY REGIMEN - Non-Small Cell Lung        [id number]:      Pembrolizumab (Keytruda)       Paclitaxel       Carboplatin     **Always confirm dose/schedule in your pharmacy ordering system**    Patient Characteristics:  Preoperative or Nonsurgical Candidate (Clinical Staging), Stage II, Nonsurgical Candidate  Therapeutic Status: Preoperative or Nonsurgical Candidate (Clinical Staging)  AJCC T Category: cT2b  AJCC N Category: cN1  AJCC M Category: cM0  AJCC 8 Stage Grouping: IIB  Intent of Therapy:  Curative Intent, Not Discussed with Patient

## 2024-02-27 ENCOUNTER — OFFICE VISIT (OUTPATIENT)
Dept: ONCOLOGY | Age: 74
End: 2024-02-27

## 2024-02-27 DIAGNOSIS — C34.12 MALIGNANT NEOPLASM OF UPPER LOBE OF LEFT LUNG (HCC): Primary | ICD-10-CM

## 2024-03-12 NOTE — PROGRESS NOTES
systemic platinum based chemotherapy with potential role for radiation in a sequential fashion if deemed feasible.  Discussed risks/benefits of each approach. All questions were answered to the best of my abilities.Patient and mother verbalized understanding and await recommendations from radiation oncology. Caris molecular profile results are pending at this time.  Patient has been referred to palliative care.     3/13/23: Patient returns accompanied by his mother to discuss treatment for stage IIb squamous cell lung cancer.  She has been evaluated by Dr. Zee and based on medical comorbid conditions, concurrent chemoradiation therapy is deemed very high risk.  Chemotherapy followed potentially by radiation in a sequential fashion has therefore been recommended.  Dominga molecular profile results were reviewed which showed PD-L1 positive status but no targetable mutations.  Based on stage IIb disease, chemotherapy with carboplatin (AUC 5) + paclitaxel (200 mg/m²) q. 21 days x 4 cycles will be arranged.  Discussed rationale/logistics/risks/benefits/potential toxicities.  All questions were answered to the best of my abilities.  He will be scheduled for chemo education and hopefully start cycle 1 of treatment in 1-2 weeks.  Total visit time 30 minutes.    Saul Gagnon MD  [facility] Hematology and Oncology  [address] [place] [address]  Office : [phone]  Fax : [phone]    Elements of this note have been dictated using speech recognition software. As a result, errors of speech recognition may have occurred.

## 2024-03-13 ENCOUNTER — HOME HEALTH ADMISSION (OUTPATIENT)
Dept: HOME HEALTH SERVICES | Facility: HOME HEALTH | Age: 74
End: 2024-03-13

## 2024-03-13 ENCOUNTER — OFFICE VISIT (OUTPATIENT)
Dept: PALLATIVE CARE | Age: 74
End: 2024-03-13

## 2024-03-13 ENCOUNTER — HOSPITAL ENCOUNTER (OUTPATIENT)
Dept: LAB | Age: 74
Discharge: HOME | End: 2024-03-16
Payer: MEDICARE

## 2024-03-13 ENCOUNTER — OFFICE VISIT (OUTPATIENT)
Dept: ONCOLOGY | Age: 74
End: 2024-03-13
Payer: MEDICARE

## 2024-03-13 VITALS
DIASTOLIC BLOOD PRESSURE: 59 MMHG | RESPIRATION RATE: 18 BRPM | OXYGEN SATURATION: 95 % | BODY MASS INDEX: 24.6 KG/M2 | HEART RATE: 73 BPM | TEMPERATURE: 97.8 F | SYSTOLIC BLOOD PRESSURE: 94 MMHG | HEIGHT: 64 IN | WEIGHT: 144.1 LBS

## 2024-03-13 DIAGNOSIS — C34.92 SQUAMOUS CARCINOMA OF LUNG, LEFT (HCC): ICD-10-CM

## 2024-03-13 DIAGNOSIS — T40.2X5A THERAPEUTIC OPIOID-INDUCED CONSTIPATION (OIC): Primary | ICD-10-CM

## 2024-03-13 DIAGNOSIS — G89.3 CANCER-RELATED PAIN: ICD-10-CM

## 2024-03-13 DIAGNOSIS — J44.9 CHRONIC OBSTRUCTIVE PULMONARY DISEASE, UNSPECIFIED COPD TYPE (HCC): ICD-10-CM

## 2024-03-13 DIAGNOSIS — R11.0 NAUSEA: ICD-10-CM

## 2024-03-13 DIAGNOSIS — Z51.5 ENCOUNTER FOR PALLIATIVE CARE: ICD-10-CM

## 2024-03-13 DIAGNOSIS — C34.12 MALIGNANT NEOPLASM OF UPPER LOBE OF LEFT LUNG (HCC): Primary | ICD-10-CM

## 2024-03-13 DIAGNOSIS — C34.12 MALIGNANT NEOPLASM OF UPPER LOBE OF LEFT LUNG (HCC): ICD-10-CM

## 2024-03-13 DIAGNOSIS — R63.0 APPETITE IMPAIRED: ICD-10-CM

## 2024-03-13 DIAGNOSIS — K59.03 THERAPEUTIC OPIOID-INDUCED CONSTIPATION (OIC): Primary | ICD-10-CM

## 2024-03-13 LAB
ALBUMIN SERPL-MCNC: 2.2 G/DL (ref 3.2–4.6)
ALBUMIN/GLOB SERPL: 0.5 (ref 0.4–1.6)
ALP SERPL-CCNC: 149 U/L (ref 50–136)
ALT SERPL-CCNC: 7 U/L (ref 12–65)
ANION GAP SERPL CALC-SCNC: 6 MMOL/L (ref 2–11)
AST SERPL-CCNC: 13 U/L (ref 15–37)
BASOPHILS # BLD: 0 K/UL (ref 0–0.2)
BASOPHILS NFR BLD: 0 % (ref 0–2)
BILIRUB SERPL-MCNC: 0.3 MG/DL (ref 0.2–1.1)
BUN SERPL-MCNC: 5 MG/DL (ref 8–23)
CALCIUM SERPL-MCNC: 8.4 MG/DL (ref 8.3–10.4)
CHLORIDE SERPL-SCNC: 102 MMOL/L (ref 103–113)
CO2 SERPL-SCNC: 30 MMOL/L (ref 21–32)
CREAT SERPL-MCNC: 0.6 MG/DL (ref 0.6–1)
DIFFERENTIAL METHOD BLD: ABNORMAL
EOSINOPHIL # BLD: 0.1 K/UL (ref 0–0.8)
EOSINOPHIL NFR BLD: 1 % (ref 0.5–7.8)
ERYTHROCYTE [DISTWIDTH] IN BLOOD BY AUTOMATED COUNT: 14.7 % (ref 11.9–14.6)
GLOBULIN SER CALC-MCNC: 4.8 G/DL (ref 2.8–4.5)
GLUCOSE SERPL-MCNC: 106 MG/DL (ref 65–100)
HCT VFR BLD AUTO: 35.2 % (ref 35.8–46.3)
HGB BLD-MCNC: 10.7 G/DL (ref 11.7–15.4)
IMM GRANULOCYTES # BLD AUTO: 0 K/UL (ref 0–0.5)
IMM GRANULOCYTES NFR BLD AUTO: 0 % (ref 0–5)
LYMPHOCYTES # BLD: 1.9 K/UL (ref 0.5–4.6)
LYMPHOCYTES NFR BLD: 19 % (ref 13–44)
MCH RBC QN AUTO: 27.9 PG (ref 26.1–32.9)
MCHC RBC AUTO-ENTMCNC: 30.4 G/DL (ref 31.4–35)
MCV RBC AUTO: 91.7 FL (ref 82–102)
MONOCYTES # BLD: 0.8 K/UL (ref 0.1–1.3)
MONOCYTES NFR BLD: 8 % (ref 4–12)
NEUTS SEG # BLD: 7.2 K/UL (ref 1.7–8.2)
NEUTS SEG NFR BLD: 72 % (ref 43–78)
NRBC # BLD: 0 K/UL (ref 0–0.2)
PLATELET # BLD AUTO: 430 K/UL (ref 150–450)
PMV BLD AUTO: 9.2 FL (ref 9.4–12.3)
POTASSIUM SERPL-SCNC: 3.9 MMOL/L (ref 3.5–5.1)
PROT SERPL-MCNC: 7 G/DL (ref 6.3–8.2)
RBC # BLD AUTO: 3.84 M/UL (ref 4.05–5.2)
SODIUM SERPL-SCNC: 138 MMOL/L (ref 136–146)
WBC # BLD AUTO: 10 K/UL (ref 4.3–11.1)

## 2024-03-13 PROCEDURE — 3078F DIAST BP <80 MM HG: CPT | Performed by: INTERNAL MEDICINE

## 2024-03-13 PROCEDURE — 85025 COMPLETE CBC W/AUTO DIFF WBC: CPT

## 2024-03-13 PROCEDURE — 99214 OFFICE O/P EST MOD 30 MIN: CPT | Performed by: INTERNAL MEDICINE

## 2024-03-13 PROCEDURE — 1090F PRES/ABSN URINE INCON ASSESS: CPT | Performed by: INTERNAL MEDICINE

## 2024-03-13 PROCEDURE — 1123F ACP DISCUSS/DSCN MKR DOCD: CPT | Performed by: INTERNAL MEDICINE

## 2024-03-13 PROCEDURE — 3017F COLORECTAL CA SCREEN DOC REV: CPT | Performed by: INTERNAL MEDICINE

## 2024-03-13 PROCEDURE — 80053 COMPREHEN METABOLIC PANEL: CPT

## 2024-03-13 PROCEDURE — 3074F SYST BP LT 130 MM HG: CPT | Performed by: INTERNAL MEDICINE

## 2024-03-13 PROCEDURE — 3023F SPIROM DOC REV: CPT | Performed by: INTERNAL MEDICINE

## 2024-03-13 PROCEDURE — G8427 DOCREV CUR MEDS BY ELIG CLIN: HCPCS | Performed by: INTERNAL MEDICINE

## 2024-03-13 PROCEDURE — G8420 CALC BMI NORM PARAMETERS: HCPCS | Performed by: INTERNAL MEDICINE

## 2024-03-13 PROCEDURE — G8484 FLU IMMUNIZE NO ADMIN: HCPCS | Performed by: INTERNAL MEDICINE

## 2024-03-13 PROCEDURE — G8399 PT W/DXA RESULTS DOCUMENT: HCPCS | Performed by: INTERNAL MEDICINE

## 2024-03-13 PROCEDURE — 1036F TOBACCO NON-USER: CPT | Performed by: INTERNAL MEDICINE

## 2024-03-13 PROCEDURE — 36415 COLL VENOUS BLD VENIPUNCTURE: CPT

## 2024-03-13 RX ORDER — ONDANSETRON HYDROCHLORIDE 8 MG/1
TABLET, FILM COATED ORAL
Qty: 90 TABLET | Refills: 3 | Status: SHIPPED | OUTPATIENT
Start: 2024-03-13

## 2024-03-13 RX ORDER — SENNOSIDES A AND B 8.6 MG/1
1 TABLET, FILM COATED ORAL 2 TIMES DAILY
Qty: 60 TABLET | Refills: 11 | Status: SHIPPED | OUTPATIENT
Start: 2024-03-13 | End: 2025-03-13

## 2024-03-13 RX ORDER — PROCHLORPERAZINE MALEATE 10 MG
TABLET ORAL
Qty: 120 TABLET | Refills: 3 | Status: SHIPPED | OUTPATIENT
Start: 2024-03-13

## 2024-03-13 ASSESSMENT — PATIENT HEALTH QUESTIONNAIRE - PHQ9
SUM OF ALL RESPONSES TO PHQ QUESTIONS 1-9: 2
1. LITTLE INTEREST OR PLEASURE IN DOING THINGS: 1
2. FEELING DOWN, DEPRESSED OR HOPELESS: 1
SUM OF ALL RESPONSES TO PHQ QUESTIONS 1-9: 2
SUM OF ALL RESPONSES TO PHQ9 QUESTIONS 1 & 2: 2

## 2024-03-13 ASSESSMENT — ENCOUNTER SYMPTOMS
SHORTNESS OF BREATH: 1
BACK PAIN: 1
NAUSEA: 1
COUGH: 1

## 2024-03-13 NOTE — PROGRESS NOTES
Outpatient Palliative Care at the  [facility]: Office Visit Established Patient    Diagnosis: IIB/IIIA squamous cell carcinoma (L lung)    Treatment Plan: Keytruda + Carbo/Taxol    Treatment Intent: Curative    Medical Oncologist: Dr. Gagnon    Radiation Oncologist: Dr. Zee    Navigator: Karlee Tellez RN      Chief Complaint:    Chief Complaint   Patient presents with    Follow-up       History of Present Illness:  Ms. Mejia is a 73 y.o. female who presents today for evaluation regarding pain and symptom management and introduction to care at the [facility].  She is well-known to palliative care at [facility] where she receives care for stage IV COPD.  Notably she also has a history of oxygen dependence paroxysmal atrial tachycardia, ongoing cigarette smoking, intermittent hemoptysis.  She ambulates with a cane and a walker.  She is in a wheelchair today.  She has used Hycodan for cough as needed for quite some time and finds this to be fairly effective.  She complains of pain across her upper chest which has been fairly new and especially since falling and fracturing her sternum.  She also complains of chronic low back pain and intermittent headaches related to a Chiari malformation.  She is not currently prescribed any oral opioids other than Hycodan syrup.  She is prescribed Ritalin, Remeron, and Cymbalta.  She is here today with her 92-year-old mother.  The patient is the primary caregiver and guardian of her 17-year-old granddaughter and it is a goal of the patient's to live for at least another year until this granddaughter is 18 years old.  The patient is an [employer]  and does receive VA benefits.    Interval History:  Pt seen in follow up in coordination with her office visit with Dr. Gagnon. Pt's mother is present for our visit. Pt c/o continued chest, back, and waist pain. She reports the MS Contin takes about an hour to kick in and she is not satisfied with

## 2024-03-13 NOTE — PATIENT INSTRUCTIONS
Patient Information from Today's Visit        Treatment Summary has been discussed and given to patient:Yes,     Chemotherapy/Agent Information:     Diagnosis: Lung Cancer    Goal of Therapy: __ Palliative      _x_Curative         Name of medication(s): Carboplatin + Paclitaxel + Pembrolizumab     Number of Cycles Planned: 4                  Length of Cycle:  21 days      Chemotherapy plan is subject to change at your provider's discretion    A copy of this plan has been discussed and given to patient by RN.    Chemo Education Needed Yes   Scheduled Date: TBD  Chemo Education Materials Given (eating hints, chemotherapy and you, chemotherapy education and self-care guidelines): Yes    Drug Materials:  Date Given: 3/31/24    Consent for therapy:   To be obtained during chemo education    Follow Up: after chemo education with Dr. Gagnon, labs prior - infusion after office visit for Carbo/Taxol    Please refer to After Visit Summary or [employer] for upcoming appointment information. If you have any questions regarding your upcoming schedule please reach out to your care team through [employer] or call [phone].          -------------------------------------------------------------------------------------------------------------------  Please call our office at [phone] if you have any  of the following symptoms:   Fever of 100.5 or greater  Chills  Shortness of breath  Swelling or pain in one leg    After office hours an answering service is available and will contact a provider for emergencies or if you are experiencing any of the above symptoms.    Patient does not express an interest in My Chart.  My Chart log in information explained on the after visit summary printout at the check-out desk.    Tova Herrera RN

## 2024-03-14 ENCOUNTER — HOME CARE VISIT (OUTPATIENT)
Dept: HOME HEALTH SERVICES | Facility: HOME HEALTH | Age: 74
End: 2024-03-14

## 2024-03-15 DIAGNOSIS — E03.2 HYPOTHYROIDISM DUE TO MEDICATION: ICD-10-CM

## 2024-03-15 DIAGNOSIS — R68.89 OTHER GENERAL SYMPTOMS AND SIGNS: ICD-10-CM

## 2024-03-15 DIAGNOSIS — C34.12 MALIGNANT NEOPLASM OF UPPER LOBE OF LEFT LUNG (HCC): Primary | ICD-10-CM

## 2024-03-15 DIAGNOSIS — D64.9 ANEMIA, UNSPECIFIED TYPE: ICD-10-CM

## 2024-03-20 ENCOUNTER — TELEPHONE (OUTPATIENT)
Dept: ONCOLOGY | Age: 74
End: 2024-03-20

## 2024-03-20 NOTE — TELEPHONE ENCOUNTER
Physician provider: Saul Gagnon MD  Reason for today's call: Referral for HH  Last office visit: n/a     Yesy Hurt w/[facility] called stating they have not been able to see Pt since referral was received. They have made several attempts to visit, but Pt has refused service. Yesy states \"Pt is very, very angry and not dealing well with what's going on.\" Yesy stated they will close out referral and if Pt agrees to be seen later another referral can be sent. Yesy can be reached at: [phone].

## 2024-03-25 ENCOUNTER — CASE MANAGEMENT (OUTPATIENT)
Dept: CASE MANAGEMENT | Age: 74
End: 2024-03-25

## 2024-03-25 ENCOUNTER — TELEPHONE (OUTPATIENT)
Dept: ONCOLOGY | Age: 74
End: 2024-03-25

## 2024-03-25 ENCOUNTER — HOME HEALTH ADMISSION (OUTPATIENT)
Dept: HOME HEALTH SERVICES | Facility: HOME HEALTH | Age: 74
End: 2024-03-25
Payer: MEDICARE

## 2024-03-25 DIAGNOSIS — J44.9 CHRONIC OBSTRUCTIVE PULMONARY DISEASE, UNSPECIFIED COPD TYPE (HCC): ICD-10-CM

## 2024-03-25 DIAGNOSIS — C34.12 MALIGNANT NEOPLASM OF UPPER LOBE OF LEFT LUNG (HCC): Primary | ICD-10-CM

## 2024-03-25 RX ORDER — LIDOCAINE AND PRILOCAINE 25; 25 MG/G; MG/G
CREAM TOPICAL
Qty: 1 EACH | Refills: 1 | Status: CANCELLED | OUTPATIENT
Start: 2024-03-25

## 2024-03-25 ASSESSMENT — PATIENT HEALTH QUESTIONNAIRE - PHQ9
8. MOVING OR SPEAKING SO SLOWLY THAT OTHER PEOPLE COULD HAVE NOTICED. OR THE OPPOSITE, BEING SO FIGETY OR RESTLESS THAT YOU HAVE BEEN MOVING AROUND A LOT MORE THAN USUAL: NOT AT ALL
1. LITTLE INTEREST OR PLEASURE IN DOING THINGS: MORE THAN HALF THE DAYS
7. TROUBLE CONCENTRATING ON THINGS, SUCH AS READING THE NEWSPAPER OR WATCHING TELEVISION: SEVERAL DAYS
SUM OF ALL RESPONSES TO PHQ QUESTIONS 1-9: 8
9. THOUGHTS THAT YOU WOULD BE BETTER OFF DEAD, OR OF HURTING YOURSELF: NOT AT ALL
6. FEELING BAD ABOUT YOURSELF - OR THAT YOU ARE A FAILURE OR HAVE LET YOURSELF OR YOUR FAMILY DOWN: NOT AT ALL
5. POOR APPETITE OR OVEREATING: SEVERAL DAYS
10. IF YOU CHECKED OFF ANY PROBLEMS, HOW DIFFICULT HAVE THESE PROBLEMS MADE IT FOR YOU TO DO YOUR WORK, TAKE CARE OF THINGS AT HOME, OR GET ALONG WITH OTHER PEOPLE: SOMEWHAT DIFFICULT
SUM OF ALL RESPONSES TO PHQ9 QUESTIONS 1 & 2: 4
4. FEELING TIRED OR HAVING LITTLE ENERGY: SEVERAL DAYS
3. TROUBLE FALLING OR STAYING ASLEEP: SEVERAL DAYS
2. FEELING DOWN, DEPRESSED OR HOPELESS: MORE THAN HALF THE DAYS

## 2024-03-25 NOTE — TELEPHONE ENCOUNTER
Physician provider: Saul Gagnon MD  Reason for today's call: Referral  Last office visit: n/a     Pt called asking for referral for Physical Education due to initial one expiring.

## 2024-03-25 NOTE — PROGRESS NOTES
IV Chemotherapy/Biotherapy Education:  Fide Mejia  is a 73 y.o. year old female with lung cancer who presents for chemotherapy education for the following medication(s): Carboplatin, Paclitaxel, and Pembrolizumab.  Schedule and frequency of chemotherapy were discussed with the patient and every three weeks.    The patient was given handouts published by the [employer] titled \"Chemotherapy and You\" and \"Eating Hints Before, During, and After Cancer Treatment\" for reference.  Medication specific information was printed from [employer] and distributed to the patient.    Self-care guidelines were distributed and discussed with the patient and included the followin) Potential long term and short term side effects of therapy including fertility risks for appropriate patients    2) Symptoms and side effects that require the patient to contact [facility] or require immediate attention    3) Symptoms or events that require immediate discontinuation of oral or self-administered treatments    4) Procedures for handling medications at home, including storage, safe handling, and management of unused medications    5) Procedures for handling bodily fluids and waste in the home    6) The [facility]'s contact information with availability and instructions on who and when to call    7) The [facility] missed appointment policy and expectations for rescheduling or canceling    Patient denies any needs or referrals at this time.  Prescriptions for Zofran, Compazine, and EMLA have been sent electronically to the local pharmacy.  Proper use and frequency of these meds were reviewed with patient and patient verbalized understanding of the treatment recommendations.    Patient asked appropriate questions and was very involved and engaged during the educational session.  There were no barriers to learning that were observed or demonstrated

## 2024-03-26 ENCOUNTER — CLINICAL DOCUMENTATION (OUTPATIENT)
Facility: HOSPITAL | Age: 74
End: 2024-03-26

## 2024-03-26 NOTE — PROGRESS NOTES
Financial Navigator spoke with patient regarding their insurance benefits.  FN reviewed patients chart to see if the patient was in need of assistance due to financial toxicity.  FN determined no assistance is needed for patient at this time due patient having dual coverage. Patient did not have any questions regarding treatment/billing at this time.  Patient will receive folder with [employer] [employer] and [employer] to review and if they should decide to participate in the program return forms with their signatures. FN provided patient with her contact information should any questions or concerns arise.

## 2024-03-27 ENCOUNTER — OFFICE VISIT (OUTPATIENT)
Dept: ONCOLOGY | Age: 74
End: 2024-03-27
Payer: MEDICARE

## 2024-03-27 VITALS
SYSTOLIC BLOOD PRESSURE: 113 MMHG | RESPIRATION RATE: 22 BRPM | OXYGEN SATURATION: 99 % | BODY MASS INDEX: 23.29 KG/M2 | DIASTOLIC BLOOD PRESSURE: 60 MMHG | TEMPERATURE: 98.4 F | HEIGHT: 64 IN | HEART RATE: 102 BPM | WEIGHT: 136.4 LBS

## 2024-03-27 DIAGNOSIS — C34.12 MALIGNANT NEOPLASM OF UPPER LOBE OF LEFT LUNG (HCC): Primary | ICD-10-CM

## 2024-03-27 DIAGNOSIS — Z71.9 HEALTH EDUCATION/COUNSELING: ICD-10-CM

## 2024-03-27 PROCEDURE — 1090F PRES/ABSN URINE INCON ASSESS: CPT | Performed by: NURSE PRACTITIONER

## 2024-03-27 PROCEDURE — 3017F COLORECTAL CA SCREEN DOC REV: CPT | Performed by: NURSE PRACTITIONER

## 2024-03-27 PROCEDURE — 3074F SYST BP LT 130 MM HG: CPT | Performed by: NURSE PRACTITIONER

## 2024-03-27 PROCEDURE — 1036F TOBACCO NON-USER: CPT | Performed by: NURSE PRACTITIONER

## 2024-03-27 PROCEDURE — G8420 CALC BMI NORM PARAMETERS: HCPCS | Performed by: NURSE PRACTITIONER

## 2024-03-27 PROCEDURE — G8484 FLU IMMUNIZE NO ADMIN: HCPCS | Performed by: NURSE PRACTITIONER

## 2024-03-27 PROCEDURE — 3078F DIAST BP <80 MM HG: CPT | Performed by: NURSE PRACTITIONER

## 2024-03-27 PROCEDURE — G8427 DOCREV CUR MEDS BY ELIG CLIN: HCPCS | Performed by: NURSE PRACTITIONER

## 2024-03-27 PROCEDURE — 1123F ACP DISCUSS/DSCN MKR DOCD: CPT | Performed by: NURSE PRACTITIONER

## 2024-03-27 PROCEDURE — G8399 PT W/DXA RESULTS DOCUMENT: HCPCS | Performed by: NURSE PRACTITIONER

## 2024-03-27 PROCEDURE — 99214 OFFICE O/P EST MOD 30 MIN: CPT | Performed by: NURSE PRACTITIONER

## 2024-03-27 ASSESSMENT — PATIENT HEALTH QUESTIONNAIRE - PHQ9
SUM OF ALL RESPONSES TO PHQ QUESTIONS 1-9: 2
SUM OF ALL RESPONSES TO PHQ QUESTIONS 1-9: 2
2. FEELING DOWN, DEPRESSED OR HOPELESS: MORE THAN HALF THE DAYS
SUM OF ALL RESPONSES TO PHQ9 QUESTIONS 1 & 2: 2
2. FEELING DOWN, DEPRESSED OR HOPELESS: SEVERAL DAYS
SUM OF ALL RESPONSES TO PHQ QUESTIONS 1-9: 2
SUM OF ALL RESPONSES TO PHQ9 QUESTIONS 1 & 2: 2
10. IF YOU CHECKED OFF ANY PROBLEMS, HOW DIFFICULT HAVE THESE PROBLEMS MADE IT FOR YOU TO DO YOUR WORK, TAKE CARE OF THINGS AT HOME, OR GET ALONG WITH OTHER PEOPLE: SOMEWHAT DIFFICULT
10. IF YOU CHECKED OFF ANY PROBLEMS, HOW DIFFICULT HAVE THESE PROBLEMS MADE IT FOR YOU TO DO YOUR WORK, TAKE CARE OF THINGS AT HOME, OR GET ALONG WITH OTHER PEOPLE: SOMEWHAT DIFFICULT
1. LITTLE INTEREST OR PLEASURE IN DOING THINGS: SEVERAL DAYS
SUM OF ALL RESPONSES TO PHQ9 QUESTIONS 1 & 2: 2
SUM OF ALL RESPONSES TO PHQ QUESTIONS 1-9: 2
1. LITTLE INTEREST OR PLEASURE IN DOING THINGS: NOT AT ALL
2. FEELING DOWN, DEPRESSED OR HOPELESS: SEVERAL DAYS
1. LITTLE INTEREST OR PLEASURE IN DOING THINGS: SEVERAL DAYS

## 2024-03-27 NOTE — FLOWSHEET NOTE
Patient is under the care of a psychiatrist; notes improvement with medication. Encouraged to call Psychiatrist is symptoms worsen.

## 2024-03-27 NOTE — FLOWSHEET NOTE
Patient is under the care of a psychiatrist and notes improvement. Advised to call psychiatrist is symptoms worsen.

## 2024-03-27 NOTE — PROGRESS NOTES
Spoke with Ms. Roberto to offer Q&A about upcoming treatments. Discussed side effects and new medications. Encouraged to bring questions to her Chemo Ed on 3/27. Encouraged to call with questions. Navigation will continue to follow.

## 2024-03-28 ENCOUNTER — HOME CARE VISIT (OUTPATIENT)
Dept: SCHEDULING | Facility: HOME HEALTH | Age: 74
End: 2024-03-28

## 2024-03-28 VITALS
TEMPERATURE: 98.3 F | SYSTOLIC BLOOD PRESSURE: 101 MMHG | OXYGEN SATURATION: 96 % | DIASTOLIC BLOOD PRESSURE: 58 MMHG | HEART RATE: 78 BPM | RESPIRATION RATE: 18 BRPM

## 2024-03-28 DIAGNOSIS — Z51.5 PALLIATIVE CARE PATIENT: ICD-10-CM

## 2024-03-28 DIAGNOSIS — T45.1X5A CINV (CHEMOTHERAPY-INDUCED NAUSEA AND VOMITING): ICD-10-CM

## 2024-03-28 DIAGNOSIS — G89.3 CANCER-RELATED PAIN: ICD-10-CM

## 2024-03-28 DIAGNOSIS — C34.12 MALIGNANT NEOPLASM OF UPPER LOBE OF LEFT LUNG (HCC): ICD-10-CM

## 2024-03-28 DIAGNOSIS — R11.2 CINV (CHEMOTHERAPY-INDUCED NAUSEA AND VOMITING): ICD-10-CM

## 2024-03-28 PROCEDURE — G0151 HHCP-SERV OF PT,EA 15 MIN: HCPCS

## 2024-03-28 PROCEDURE — 0221000100 HH NO PAY CLAIM PROCEDURE

## 2024-03-28 RX ORDER — PROCHLORPERAZINE MALEATE 10 MG
10 TABLET ORAL EVERY 6 HOURS PRN
Qty: 90 TABLET | Refills: 2 | Status: SHIPPED | OUTPATIENT
Start: 2024-03-28

## 2024-03-28 RX ORDER — ONDANSETRON HYDROCHLORIDE 8 MG/1
8 TABLET, FILM COATED ORAL EVERY 8 HOURS PRN
Qty: 90 TABLET | Refills: 2 | Status: SHIPPED | OUTPATIENT
Start: 2024-03-28

## 2024-03-28 RX ORDER — MORPHINE SULFATE 15 MG/1
7.5-15 TABLET ORAL EVERY 6 HOURS PRN
Qty: 120 TABLET | Refills: 0 | Status: SHIPPED | OUTPATIENT
Start: 2024-03-28 | End: 2024-03-29

## 2024-03-28 ASSESSMENT — ENCOUNTER SYMPTOMS
SPUTUM PRODUCTION: 1
SPUTUM AMOUNT: MODERATE
DYSPNEA ACTIVITY LEVEL: AFTER AMBULATING LESS THAN 10 FT
COUGH: 1
COUGH CHARACTERISTICS: PRODUCTIVE
SPUTUM CONSISTENCY: THICK
PAIN LOCATION - PAIN QUALITY: SHARP

## 2024-03-28 NOTE — TELEPHONE ENCOUNTER
Medication Requested: Morphine    Is this medication a narcotic: YES    Is the patient's pain controlled? YES    Date of Last OV: 3/27/28    Date of Next OV: 4/1/24    Date last prescribed: 2/14/24    Last Rx fill in per [facility] site: 2/16/24    Out Early: NO    Took Extra: NO    Not out early but not enough to make it until next appointment: yes    Requested Pharmacy: [facility]    Action Taken: Chart reviewed, refill pended and routed for signature      I have reviewed the patient’s controlled substance prescription history, as maintained in the [place] prescription monitoring program, so that the prescription(s) for a  controlled substance can be given.

## 2024-03-28 NOTE — TELEPHONE ENCOUNTER
Physician provider: Saul Gagnon MD  Reason for today's call: Medication refill  Last office visit:N/A    Patient notified that their information will be routed to the [facility] clinical triage team for review. Patient is advised that they will receive a phone call from the triage department. If symptoms worsen before receiving a call back, the patient has been advised to proceed to the nearest ED.      Pt called in to request refill Morphine, Prochlorperazine, and ondansetron. She is worried about running out.  [facility]

## 2024-03-29 ENCOUNTER — TELEPHONE (OUTPATIENT)
Dept: ONCOLOGY | Age: 74
End: 2024-03-29

## 2024-03-29 DIAGNOSIS — G89.3 CANCER-RELATED PAIN: ICD-10-CM

## 2024-03-29 DIAGNOSIS — Z51.5 PALLIATIVE CARE PATIENT: ICD-10-CM

## 2024-03-29 RX ORDER — MORPHINE SULFATE 15 MG/1
7.5-15 TABLET ORAL EVERY 6 HOURS PRN
Qty: 120 TABLET | Refills: 0 | Status: SHIPPED | OUTPATIENT
Start: 2024-03-29 | End: 2024-04-28

## 2024-03-29 NOTE — TELEPHONE ENCOUNTER
Physician provider: Saul Gagnon MD  Reason for today's call: home health  Last office visit:n/a    Patient notified that their information will be routed to the [facility] clinical triage team for review. Patient is advised that they will receive a phone call from the triage department. If symptoms worsen before receiving a call back, the patient has been advised to proceed to the nearest ED.    Received call from Yazmin Meade with  Home Health & they want to hold off on Pt OT & Aide til next week to verify what the pt needs  will be after Chemo. Also she Verbal order for A Skilled Nurse . Yazmin can be reached @[phone] .

## 2024-03-29 NOTE — PROGRESS NOTES
exertional dyspnea and has lost about 20 pounds in last 1 month.  She reports chronic cough productive of thick mucoid phlegm intermittently mixed with blood.  Her performance status is limited by dyspnea and she ambulates with a cane and a walker at home.       Interval history:  Interval history updated in the assessment and plan.      Review of Systems:  14 point ROS was negative except as per HPI      ECOG PERFORMANCE STATUS - 2 - Ambulatory and capable of all selfcare but unable to carry out any work activities. Up and about more than 50% of waking hours.    Pain - /10. Mild to moderate pain, requiring medication - see MAR       Past Medical History:   Diagnosis Date    Acute respiratory failure ([facility]) 1/29/2015    Intubated 1/29/2015     Anxiety and depression     managed with med    Cancer ([facility])     basal cell,squamous cell    Chest pain 7/7/2016    Chiari I malformation ([facility])     history of    COPD exacerbation ([facility]) 9/14/12-9/17/12    COPD exacerbation ([facility]) 4/2013 to 5/2/13    hospitalization    Coronary artery disease 3/3/2015    Nonobstructive; followed by Dr. Ku    GERD (gastroesophageal reflux disease)     managed with med    Hyperlipidemia     managed with med    Lung cancer, upper lobe ([facility])     Left lung    Moderate COPD (chronic obstructive pulmonary disease) ([facility])     6 Liters O2 NC daily and at night; Inhalers    Palliative care patient     Paroxysmal tachycardia ([facility]) 7/7/2016    Pneumonia 9/16/2012    Pneumothorax     HX. of  2 on the left- required chest tube    Tremor of both hands           Reviewed and updated this visit by provider:  Tobacco  Allergies  Meds  Problems  Med Hx  Surg Hx  Fam Hx          Current Outpatient Medications   Medication Sig Dispense Refill    morphine (MSIR) 15 MG tablet Take 0.5-1 tablets by mouth every 6 hours as needed for Pain for up to 30 days. Max Daily Amount: 60 mg 120 tablet 0    ondansetron (ZOFRAN) 8 MG tablet Take 1 tablet by mouth every

## 2024-04-01 ENCOUNTER — HOSPITAL ENCOUNTER (OUTPATIENT)
Dept: LAB | Age: 74
Discharge: HOME | End: 2024-04-04
Payer: OTHER GOVERNMENT

## 2024-04-01 ENCOUNTER — OFFICE VISIT (OUTPATIENT)
Dept: PALLATIVE CARE | Age: 74
End: 2024-04-01
Payer: MEDICARE

## 2024-04-01 ENCOUNTER — HOSPITAL ENCOUNTER (OUTPATIENT)
Dept: INFUSION THERAPY | Age: 74
Setting detail: INFUSION SERIES
Discharge: HOME | End: 2024-04-01
Payer: OTHER GOVERNMENT

## 2024-04-01 ENCOUNTER — OFFICE VISIT (OUTPATIENT)
Dept: ONCOLOGY | Age: 74
End: 2024-04-01
Payer: MEDICARE

## 2024-04-01 VITALS
DIASTOLIC BLOOD PRESSURE: 57 MMHG | TEMPERATURE: 98 F | RESPIRATION RATE: 16 BRPM | OXYGEN SATURATION: 91 % | SYSTOLIC BLOOD PRESSURE: 90 MMHG | WEIGHT: 136.9 LBS | HEIGHT: 64 IN | HEART RATE: 83 BPM | BODY MASS INDEX: 23.37 KG/M2

## 2024-04-01 DIAGNOSIS — G89.3 CANCER-RELATED PAIN: Primary | ICD-10-CM

## 2024-04-01 DIAGNOSIS — E03.2 HYPOTHYROIDISM DUE TO MEDICATION: ICD-10-CM

## 2024-04-01 DIAGNOSIS — E87.6 HYPOKALEMIA: ICD-10-CM

## 2024-04-01 DIAGNOSIS — C34.12 MALIGNANT NEOPLASM OF UPPER LOBE OF LEFT LUNG (HCC): Primary | ICD-10-CM

## 2024-04-01 DIAGNOSIS — T40.2X5A THERAPEUTIC OPIOID-INDUCED CONSTIPATION (OIC): ICD-10-CM

## 2024-04-01 DIAGNOSIS — D64.9 ANEMIA, UNSPECIFIED TYPE: ICD-10-CM

## 2024-04-01 DIAGNOSIS — Z51.5 ENCOUNTER FOR PALLIATIVE CARE: ICD-10-CM

## 2024-04-01 DIAGNOSIS — K59.03 THERAPEUTIC OPIOID-INDUCED CONSTIPATION (OIC): ICD-10-CM

## 2024-04-01 DIAGNOSIS — R63.0 APPETITE IMPAIRED: ICD-10-CM

## 2024-04-01 DIAGNOSIS — C34.92 SQUAMOUS CARCINOMA OF LUNG, LEFT (HCC): ICD-10-CM

## 2024-04-01 DIAGNOSIS — C34.12 MALIGNANT NEOPLASM OF UPPER LOBE OF LEFT LUNG (HCC): ICD-10-CM

## 2024-04-01 LAB
ALBUMIN SERPL-MCNC: 2.2 G/DL (ref 3.2–4.6)
ALBUMIN/GLOB SERPL: 0.5 (ref 0.4–1.6)
ALP SERPL-CCNC: 132 U/L (ref 50–136)
ALT SERPL-CCNC: 10 U/L (ref 12–65)
ANION GAP SERPL CALC-SCNC: 4 MMOL/L (ref 2–11)
AST SERPL-CCNC: 16 U/L (ref 15–37)
BASOPHILS # BLD: 0 K/UL (ref 0–0.2)
BASOPHILS NFR BLD: 0 % (ref 0–2)
BILIRUB SERPL-MCNC: 0.3 MG/DL (ref 0.2–1.1)
BUN SERPL-MCNC: 5 MG/DL (ref 8–23)
CALCIUM SERPL-MCNC: 8.6 MG/DL (ref 8.3–10.4)
CHLORIDE SERPL-SCNC: 104 MMOL/L (ref 103–113)
CO2 SERPL-SCNC: 32 MMOL/L (ref 21–32)
CORTIS BS SERPL-MCNC: 11.6 UG/DL
CREAT SERPL-MCNC: 0.4 MG/DL (ref 0.6–1)
DIFFERENTIAL METHOD BLD: ABNORMAL
EOSINOPHIL # BLD: 0 K/UL (ref 0–0.8)
EOSINOPHIL NFR BLD: 0 % (ref 0.5–7.8)
ERYTHROCYTE [DISTWIDTH] IN BLOOD BY AUTOMATED COUNT: 15.9 % (ref 11.9–14.6)
FERRITIN SERPL-MCNC: 87 NG/ML (ref 8–388)
FOLATE SERPL-MCNC: 5.7 NG/ML (ref 3.1–17.5)
GLOBULIN SER CALC-MCNC: 4.8 G/DL (ref 2.8–4.5)
GLUCOSE SERPL-MCNC: 86 MG/DL (ref 65–100)
HBV SURFACE AB SERPL IA-ACNC: <3.1 MIU/ML
HCT VFR BLD AUTO: 33.8 % (ref 35.8–46.3)
HGB BLD-MCNC: 10.4 G/DL (ref 11.7–15.4)
IMM GRANULOCYTES # BLD AUTO: 0 K/UL (ref 0–0.5)
IMM GRANULOCYTES NFR BLD AUTO: 0 % (ref 0–5)
IRON SATN MFR SERPL: 12 %
IRON SERPL-MCNC: 22 UG/DL (ref 35–150)
LYMPHOCYTES # BLD: 2 K/UL (ref 0.5–4.6)
LYMPHOCYTES NFR BLD: 19 % (ref 13–44)
MCH RBC QN AUTO: 26.5 PG (ref 26.1–32.9)
MCHC RBC AUTO-ENTMCNC: 30.8 G/DL (ref 31.4–35)
MCV RBC AUTO: 86.2 FL (ref 82–102)
MONOCYTES # BLD: 0.8 K/UL (ref 0.1–1.3)
MONOCYTES NFR BLD: 8 % (ref 4–12)
NEUTS SEG # BLD: 7.8 K/UL (ref 1.7–8.2)
NEUTS SEG NFR BLD: 73 % (ref 43–78)
NRBC # BLD: 0 K/UL (ref 0–0.2)
PLATELET # BLD AUTO: 386 K/UL (ref 150–450)
PMV BLD AUTO: 9.5 FL (ref 9.4–12.3)
POTASSIUM SERPL-SCNC: 3.2 MMOL/L (ref 3.5–5.1)
PROT SERPL-MCNC: 7 G/DL (ref 6.3–8.2)
RBC # BLD AUTO: 3.92 M/UL (ref 4.05–5.2)
SODIUM SERPL-SCNC: 140 MMOL/L (ref 136–146)
TIBC SERPL-MCNC: 179 UG/DL (ref 250–450)
TSH W FREE THYROID IF ABNORMAL: 0.87 UIU/ML (ref 0.36–3)
VIT B12 SERPL-MCNC: 287 PG/ML (ref 193–986)
WBC # BLD AUTO: 10.7 K/UL (ref 4.3–11.1)

## 2024-04-01 PROCEDURE — 2580000003 HC RX 258: Performed by: INTERNAL MEDICINE

## 2024-04-01 PROCEDURE — 96413 CHEMO IV INFUSION 1 HR: CPT

## 2024-04-01 PROCEDURE — 82746 ASSAY OF FOLIC ACID SERUM: CPT

## 2024-04-01 PROCEDURE — 1036F TOBACCO NON-USER: CPT | Performed by: INTERNAL MEDICINE

## 2024-04-01 PROCEDURE — 82728 ASSAY OF FERRITIN: CPT

## 2024-04-01 PROCEDURE — 1090F PRES/ABSN URINE INCON ASSESS: CPT | Performed by: INTERNAL MEDICINE

## 2024-04-01 PROCEDURE — 96367 TX/PROPH/DG ADDL SEQ IV INF: CPT

## 2024-04-01 PROCEDURE — 3017F COLORECTAL CA SCREEN DOC REV: CPT | Performed by: PHYSICIAN ASSISTANT

## 2024-04-01 PROCEDURE — 82607 VITAMIN B-12: CPT

## 2024-04-01 PROCEDURE — 96417 CHEMO IV INFUS EACH ADDL SEQ: CPT

## 2024-04-01 PROCEDURE — 83550 IRON BINDING TEST: CPT

## 2024-04-01 PROCEDURE — 85025 COMPLETE CBC W/AUTO DIFF WBC: CPT

## 2024-04-01 PROCEDURE — 84443 ASSAY THYROID STIM HORMONE: CPT

## 2024-04-01 PROCEDURE — G8420 CALC BMI NORM PARAMETERS: HCPCS | Performed by: PHYSICIAN ASSISTANT

## 2024-04-01 PROCEDURE — G8399 PT W/DXA RESULTS DOCUMENT: HCPCS | Performed by: PHYSICIAN ASSISTANT

## 2024-04-01 PROCEDURE — 3017F COLORECTAL CA SCREEN DOC REV: CPT | Performed by: INTERNAL MEDICINE

## 2024-04-01 PROCEDURE — 99215 OFFICE O/P EST HI 40 MIN: CPT | Performed by: INTERNAL MEDICINE

## 2024-04-01 PROCEDURE — 2500000003 HC RX 250 WO HCPCS: Performed by: INTERNAL MEDICINE

## 2024-04-01 PROCEDURE — 1090F PRES/ABSN URINE INCON ASSESS: CPT | Performed by: PHYSICIAN ASSISTANT

## 2024-04-01 PROCEDURE — G8420 CALC BMI NORM PARAMETERS: HCPCS | Performed by: INTERNAL MEDICINE

## 2024-04-01 PROCEDURE — 3074F SYST BP LT 130 MM HG: CPT | Performed by: INTERNAL MEDICINE

## 2024-04-01 PROCEDURE — 3078F DIAST BP <80 MM HG: CPT | Performed by: INTERNAL MEDICINE

## 2024-04-01 PROCEDURE — A4216 STERILE WATER/SALINE, 10 ML: HCPCS | Performed by: INTERNAL MEDICINE

## 2024-04-01 PROCEDURE — G8428 CUR MEDS NOT DOCUMENT: HCPCS | Performed by: PHYSICIAN ASSISTANT

## 2024-04-01 PROCEDURE — 6370000000 HC RX 637 (ALT 250 FOR IP): Performed by: INTERNAL MEDICINE

## 2024-04-01 PROCEDURE — 96375 TX/PRO/DX INJ NEW DRUG ADDON: CPT

## 2024-04-01 PROCEDURE — 82533 TOTAL CORTISOL: CPT

## 2024-04-01 PROCEDURE — 1123F ACP DISCUSS/DSCN MKR DOCD: CPT | Performed by: PHYSICIAN ASSISTANT

## 2024-04-01 PROCEDURE — G8427 DOCREV CUR MEDS BY ELIG CLIN: HCPCS | Performed by: INTERNAL MEDICINE

## 2024-04-01 PROCEDURE — 86706 HEP B SURFACE ANTIBODY: CPT

## 2024-04-01 PROCEDURE — 80053 COMPREHEN METABOLIC PANEL: CPT

## 2024-04-01 PROCEDURE — 6360000002 HC RX W HCPCS: Performed by: INTERNAL MEDICINE

## 2024-04-01 PROCEDURE — 83540 ASSAY OF IRON: CPT

## 2024-04-01 PROCEDURE — 1123F ACP DISCUSS/DSCN MKR DOCD: CPT | Performed by: INTERNAL MEDICINE

## 2024-04-01 PROCEDURE — G8399 PT W/DXA RESULTS DOCUMENT: HCPCS | Performed by: INTERNAL MEDICINE

## 2024-04-01 PROCEDURE — 1036F TOBACCO NON-USER: CPT | Performed by: PHYSICIAN ASSISTANT

## 2024-04-01 PROCEDURE — 99214 OFFICE O/P EST MOD 30 MIN: CPT | Performed by: PHYSICIAN ASSISTANT

## 2024-04-01 PROCEDURE — 86704 HEP B CORE ANTIBODY TOTAL: CPT

## 2024-04-01 RX ORDER — SODIUM CHLORIDE 9 MG/ML
INJECTION, SOLUTION INTRAVENOUS CONTINUOUS
Status: CANCELLED | OUTPATIENT
Start: 2024-04-01

## 2024-04-01 RX ORDER — SODIUM CHLORIDE 0.9 % (FLUSH) 0.9 %
5-40 SYRINGE (ML) INJECTION 2 TIMES DAILY
Status: DISCONTINUED | OUTPATIENT
Start: 2024-04-01 | End: 2024-04-05 | Stop reason: HOSPADM

## 2024-04-01 RX ORDER — ONDANSETRON 2 MG/ML
8 INJECTION INTRAMUSCULAR; INTRAVENOUS
Status: CANCELLED | OUTPATIENT
Start: 2024-04-01

## 2024-04-01 RX ORDER — SODIUM CHLORIDE 0.9 % (FLUSH) 0.9 %
5-40 SYRINGE (ML) INJECTION PRN
Status: DISCONTINUED | OUTPATIENT
Start: 2024-04-01 | End: 2024-04-02 | Stop reason: HOSPADM

## 2024-04-01 RX ORDER — DIPHENHYDRAMINE HYDROCHLORIDE 50 MG/ML
50 INJECTION INTRAMUSCULAR; INTRAVENOUS ONCE
Status: CANCELLED | OUTPATIENT
Start: 2024-04-01 | End: 2024-04-01

## 2024-04-01 RX ORDER — SODIUM CHLORIDE 9 MG/ML
5-250 INJECTION, SOLUTION INTRAVENOUS PRN
Status: DISCONTINUED | OUTPATIENT
Start: 2024-04-01 | End: 2024-04-02 | Stop reason: HOSPADM

## 2024-04-01 RX ORDER — ONDANSETRON 2 MG/ML
8 INJECTION INTRAMUSCULAR; INTRAVENOUS ONCE
Status: CANCELLED | OUTPATIENT
Start: 2024-04-01 | End: 2024-04-01

## 2024-04-01 RX ORDER — PERPHENAZINE 16 MG
600 TABLET ORAL DAILY
Qty: 30 CAPSULE | Refills: 3 | Status: ON HOLD | OUTPATIENT
Start: 2024-04-01 | End: 2024-05-01

## 2024-04-01 RX ORDER — MEPERIDINE HYDROCHLORIDE 25 MG/ML
12.5 INJECTION INTRAMUSCULAR; INTRAVENOUS; SUBCUTANEOUS PRN
Status: DISCONTINUED | OUTPATIENT
Start: 2024-04-01 | End: 2024-04-02 | Stop reason: HOSPADM

## 2024-04-01 RX ORDER — ONDANSETRON 2 MG/ML
8 INJECTION INTRAMUSCULAR; INTRAVENOUS ONCE
Status: COMPLETED | OUTPATIENT
Start: 2024-04-01 | End: 2024-04-01

## 2024-04-01 RX ORDER — MEPERIDINE HYDROCHLORIDE 25 MG/ML
12.5 INJECTION INTRAMUSCULAR; INTRAVENOUS; SUBCUTANEOUS PRN
Status: CANCELLED | OUTPATIENT
Start: 2024-04-01

## 2024-04-01 RX ORDER — EPINEPHRINE 1 MG/ML
0.3 INJECTION, SOLUTION, CONCENTRATE INTRAVENOUS PRN
Status: DISCONTINUED | OUTPATIENT
Start: 2024-04-01 | End: 2024-04-02 | Stop reason: HOSPADM

## 2024-04-01 RX ORDER — ALBUTEROL SULFATE 90 UG/1
4 AEROSOL, METERED RESPIRATORY (INHALATION) PRN
Status: CANCELLED | OUTPATIENT
Start: 2024-04-01

## 2024-04-01 RX ORDER — DIPHENHYDRAMINE HYDROCHLORIDE 50 MG/ML
50 INJECTION INTRAMUSCULAR; INTRAVENOUS ONCE
Status: COMPLETED | OUTPATIENT
Start: 2024-04-01 | End: 2024-04-01

## 2024-04-01 RX ORDER — SODIUM CHLORIDE 9 MG/ML
5-250 INJECTION, SOLUTION INTRAVENOUS PRN
Status: CANCELLED | OUTPATIENT
Start: 2024-04-01

## 2024-04-01 RX ORDER — EPINEPHRINE 1 MG/ML
0.3 INJECTION, SOLUTION, CONCENTRATE INTRAVENOUS PRN
Status: CANCELLED | OUTPATIENT
Start: 2024-04-01

## 2024-04-01 RX ORDER — MORPHINE SULFATE 15 MG/1
15 TABLET, FILM COATED, EXTENDED RELEASE ORAL EVERY 12 HOURS
Qty: 60 TABLET | Refills: 0 | Status: ON HOLD | OUTPATIENT
Start: 2024-04-01 | End: 2024-05-01

## 2024-04-01 RX ORDER — DIPHENHYDRAMINE HYDROCHLORIDE 50 MG/ML
50 INJECTION INTRAMUSCULAR; INTRAVENOUS
Status: DISCONTINUED | OUTPATIENT
Start: 2024-04-01 | End: 2024-04-02 | Stop reason: HOSPADM

## 2024-04-01 RX ORDER — ACETAMINOPHEN 325 MG/1
650 TABLET ORAL
Status: CANCELLED | OUTPATIENT
Start: 2024-04-01

## 2024-04-01 RX ORDER — DIPHENHYDRAMINE HYDROCHLORIDE 50 MG/ML
50 INJECTION INTRAMUSCULAR; INTRAVENOUS
Status: CANCELLED | OUTPATIENT
Start: 2024-04-01

## 2024-04-01 RX ORDER — ALBUTEROL SULFATE 90 UG/1
4 AEROSOL, METERED RESPIRATORY (INHALATION) PRN
Status: DISCONTINUED | OUTPATIENT
Start: 2024-04-01 | End: 2024-04-02 | Stop reason: HOSPADM

## 2024-04-01 RX ORDER — ONDANSETRON 2 MG/ML
8 INJECTION INTRAMUSCULAR; INTRAVENOUS
Status: DISCONTINUED | OUTPATIENT
Start: 2024-04-01 | End: 2024-04-02 | Stop reason: HOSPADM

## 2024-04-01 RX ORDER — POTASSIUM CHLORIDE 20 MEQ/1
40 TABLET, EXTENDED RELEASE ORAL ONCE
Status: CANCELLED
Start: 2024-04-01 | End: 2024-04-01

## 2024-04-01 RX ORDER — ACETAMINOPHEN 325 MG/1
650 TABLET ORAL
Status: DISCONTINUED | OUTPATIENT
Start: 2024-04-01 | End: 2024-04-02 | Stop reason: HOSPADM

## 2024-04-01 RX ORDER — MORPHINE SULFATE 15 MG/1
15-30 TABLET ORAL EVERY 4 HOURS PRN
Qty: 180 TABLET | Refills: 0 | Status: ON HOLD | OUTPATIENT
Start: 2024-04-01 | End: 2024-05-01

## 2024-04-01 RX ORDER — HEPARIN 100 UNIT/ML
500 SYRINGE INTRAVENOUS PRN
Status: CANCELLED | OUTPATIENT
Start: 2024-04-01

## 2024-04-01 RX ORDER — SODIUM CHLORIDE 0.9 % (FLUSH) 0.9 %
5-40 SYRINGE (ML) INJECTION PRN
Status: CANCELLED | OUTPATIENT
Start: 2024-04-01

## 2024-04-01 RX ORDER — POTASSIUM CHLORIDE 750 MG/1
40 TABLET, EXTENDED RELEASE ORAL ONCE
Status: COMPLETED | OUTPATIENT
Start: 2024-04-01 | End: 2024-04-01

## 2024-04-01 RX ADMIN — FAMOTIDINE 20 MG: 10 INJECTION, SOLUTION INTRAVENOUS at 11:01

## 2024-04-01 RX ADMIN — DIPHENHYDRAMINE HYDROCHLORIDE 50 MG: 50 INJECTION, SOLUTION INTRAMUSCULAR; INTRAVENOUS at 11:00

## 2024-04-01 RX ADMIN — CARBOPLATIN 475 MG: 10 INJECTION, SOLUTION INTRAVENOUS at 15:03

## 2024-04-01 RX ADMIN — DEXAMETHASONE SODIUM PHOSPHATE 12 MG: 4 INJECTION, SOLUTION INTRAMUSCULAR; INTRAVENOUS at 11:05

## 2024-04-01 RX ADMIN — SODIUM CHLORIDE 200 MG: 9 INJECTION, SOLUTION INTRAVENOUS at 10:22

## 2024-04-01 RX ADMIN — PACLITAXEL 342 MG: 6 INJECTION, SOLUTION, CONCENTRATE INTRAVENOUS at 11:55

## 2024-04-01 RX ADMIN — ONDANSETRON 8 MG: 2 INJECTION INTRAMUSCULAR; INTRAVENOUS at 10:59

## 2024-04-01 RX ADMIN — FOSAPREPITANT 150 MG: 150 INJECTION, POWDER, LYOPHILIZED, FOR SOLUTION INTRAVENOUS at 11:27

## 2024-04-01 RX ADMIN — SODIUM CHLORIDE, PRESERVATIVE FREE 10 ML: 5 INJECTION INTRAVENOUS at 09:42

## 2024-04-01 RX ADMIN — POTASSIUM CHLORIDE 40 MEQ: 750 TABLET, EXTENDED RELEASE ORAL at 10:28

## 2024-04-01 RX ADMIN — SODIUM CHLORIDE, PRESERVATIVE FREE 10 ML: 5 INJECTION INTRAVENOUS at 08:25

## 2024-04-01 RX ADMIN — SODIUM CHLORIDE 100 ML/HR: 9 INJECTION, SOLUTION INTRAVENOUS at 10:21

## 2024-04-01 ASSESSMENT — ENCOUNTER SYMPTOMS
COUGH: 1
BACK PAIN: 1
SHORTNESS OF BREATH: 1
NAUSEA: 1

## 2024-04-01 ASSESSMENT — PATIENT HEALTH QUESTIONNAIRE - PHQ9
SUM OF ALL RESPONSES TO PHQ QUESTIONS 1-9: 2
2. FEELING DOWN, DEPRESSED OR HOPELESS: SEVERAL DAYS
SUM OF ALL RESPONSES TO PHQ QUESTIONS 1-9: 2
SUM OF ALL RESPONSES TO PHQ9 QUESTIONS 1 & 2: 2
1. LITTLE INTEREST OR PLEASURE IN DOING THINGS: SEVERAL DAYS

## 2024-04-01 NOTE — PROGRESS NOTES
Patient to port lab for port access and lab draw. Port accessed using 20g 0.75\" alegre needle without difficulty. Labs drawn from port and port flushed. Port remains accessed. Patient tolerated well. Discharged ambulatory.

## 2024-04-01 NOTE — PROGRESS NOTES
Arrived to the Infusion Center in a wheelchair.  C1D1 Keytruda/Taxol/Carbo completed. Patient tolerated well.   Any issues or concerns during appointment: none.  Patient aware of next infusion appointment on 4/22/24 (date) at 1000 (time).  Patient aware of next lab and [facility] office visit on 4/22/24 (date) at 0730 (time).  Patient instructed to call provider with temperature of 100.4 or greater or nausea/vomiting/ diarrhea or pain not controlled by medications  Discharged home in a wheelchair after 30 minute observation period. Patient in stable condition on home O2.

## 2024-04-01 NOTE — TELEPHONE ENCOUNTER
Returned call to Yazmin Meade.  ADDISON with ok for skilled nursing per Dr. Gagnon.  Instructed to call back for any questions or concerns.

## 2024-04-02 ENCOUNTER — CLINICAL DOCUMENTATION (OUTPATIENT)
Dept: CASE MANAGEMENT | Age: 74
End: 2024-04-02

## 2024-04-02 LAB — HBV CORE AB SERPL QL IA: NEGATIVE

## 2024-04-02 NOTE — PROGRESS NOTES
NURSE NAVIGATION TREATMENT START FOLLOW-UP    -How are you feeling?: \"I'm feeling pretty good. On a scale of 0-5 with 0 being the best, I'm about a 2, which is better than I have been.\"  -Are you experiencing any side effects?: \"No side effects so far. I'm taking the nausea medicine just in case and I slept the whole night last night.\"   -Discussion regarding prescribed medications: Discussed that ondansetron may cause constipation and emphasized the importance of keeping aware of this and utilizing the senna she takes as needed.  -Review \"who to call when\" and send [employer] message: Pt does not use [employer]. Reinforced to call the clinic if she is in need of a prescription refill or if she feels she needs to be seen in the clinic. Pt verbalized understanding and will reach out to navigation with questions or concerns.  -Next appt: 4/22/24 for cycle 2      Next planned outreach: End of Cycle 4, approx 6/4/24  Time Spent: 15 minutes

## 2024-04-03 ENCOUNTER — HOME CARE VISIT (OUTPATIENT)
Dept: SCHEDULING | Facility: HOME HEALTH | Age: 74
End: 2024-04-03
Payer: MEDICARE

## 2024-04-03 LAB — PATH REV BLD -IMP: NORMAL

## 2024-04-03 PROCEDURE — G0152 HHCP-SERV OF OT,EA 15 MIN: HCPCS

## 2024-04-04 ENCOUNTER — HOME CARE VISIT (OUTPATIENT)
Dept: SCHEDULING | Facility: HOME HEALTH | Age: 74
End: 2024-04-04
Payer: MEDICARE

## 2024-04-04 VITALS
OXYGEN SATURATION: 98 % | HEART RATE: 100 BPM | TEMPERATURE: 98.1 F | DIASTOLIC BLOOD PRESSURE: 63 MMHG | RESPIRATION RATE: 18 BRPM | SYSTOLIC BLOOD PRESSURE: 108 MMHG

## 2024-04-04 PROCEDURE — G0151 HHCP-SERV OF PT,EA 15 MIN: HCPCS

## 2024-04-04 ASSESSMENT — ENCOUNTER SYMPTOMS: PAIN LOCATION - PAIN QUALITY: SHARP, RADIATING

## 2024-04-05 ENCOUNTER — TELEPHONE (OUTPATIENT)
Dept: ONCOLOGY | Age: 74
End: 2024-04-05

## 2024-04-05 NOTE — TELEPHONE ENCOUNTER
Physician provider: Saul Gagnon MD  Reason for today's call: Orders  Last office visit:04/01/2024    Patient notified that their information will be routed to the [facility] clinical triage team for review. Patient is advised that they will receive a phone call from the triage department. If symptoms worsen before receiving a call back, the patient has been advised to proceed to the nearest ED.     Maddie dixon/ [facility] is calling stating that the pt is requesting to meet with their  and Maddie is stating that was not  in the initial referral. She is requesting for verbal orders so the pt can meet with their .

## 2024-04-06 ENCOUNTER — HOME CARE VISIT (OUTPATIENT)
Dept: SCHEDULING | Facility: HOME HEALTH | Age: 74
End: 2024-04-06
Payer: MEDICARE

## 2024-04-06 PROCEDURE — G0299 HHS/HOSPICE OF RN EA 15 MIN: HCPCS

## 2024-04-07 ENCOUNTER — APPOINTMENT (OUTPATIENT)
Dept: CT IMAGING | Age: 74
DRG: 178 | End: 2024-04-07
Payer: MEDICARE

## 2024-04-07 ENCOUNTER — HOSPITAL ENCOUNTER (INPATIENT)
Age: 74
LOS: 4 days | Discharge: HOSPICE | DRG: 178 | End: 2024-04-11
Attending: GENERAL PRACTICE | Admitting: INTERNAL MEDICINE
Payer: MEDICARE

## 2024-04-07 ENCOUNTER — APPOINTMENT (OUTPATIENT)
Dept: GENERAL RADIOLOGY | Age: 74
DRG: 178 | End: 2024-04-07
Payer: MEDICARE

## 2024-04-07 ENCOUNTER — CLINICAL DOCUMENTATION (OUTPATIENT)
Dept: ONCOLOGY | Age: 74
End: 2024-04-07

## 2024-04-07 VITALS
TEMPERATURE: 98.2 F | SYSTOLIC BLOOD PRESSURE: 98 MMHG | RESPIRATION RATE: 16 BRPM | DIASTOLIC BLOOD PRESSURE: 58 MMHG | OXYGEN SATURATION: 97 % | HEART RATE: 118 BPM

## 2024-04-07 DIAGNOSIS — C34.10 MALIGNANT NEOPLASM OF UPPER LOBE OF LUNG, UNSPECIFIED LATERALITY (HCC): ICD-10-CM

## 2024-04-07 DIAGNOSIS — R09.02 HYPOXIA: ICD-10-CM

## 2024-04-07 DIAGNOSIS — Z51.5 END OF LIFE CARE: ICD-10-CM

## 2024-04-07 DIAGNOSIS — J18.9 HEALTHCARE-ASSOCIATED PNEUMONIA: Primary | ICD-10-CM

## 2024-04-07 DIAGNOSIS — R06.03 ACUTE RESPIRATORY DISTRESS: ICD-10-CM

## 2024-04-07 PROBLEM — J15.9 BACTERIAL PNEUMONIA: Status: ACTIVE | Noted: 2024-04-07

## 2024-04-07 LAB
ALBUMIN SERPL-MCNC: 2.4 G/DL (ref 3.2–4.6)
ALBUMIN/GLOB SERPL: 0.5 (ref 0.4–1.6)
ALP SERPL-CCNC: 114 U/L (ref 50–136)
ALT SERPL-CCNC: 17 U/L (ref 12–65)
ANION GAP SERPL CALC-SCNC: 5 MMOL/L (ref 2–11)
AST SERPL-CCNC: 19 U/L (ref 15–37)
BASOPHILS # BLD: 0 K/UL (ref 0–0.2)
BASOPHILS NFR BLD: 0 % (ref 0–2)
BILIRUB SERPL-MCNC: 0.5 MG/DL (ref 0.2–1.1)
BUN SERPL-MCNC: 5 MG/DL (ref 8–23)
CALCIUM SERPL-MCNC: 8.5 MG/DL (ref 8.3–10.4)
CHLORIDE SERPL-SCNC: 99 MMOL/L (ref 103–113)
CO2 SERPL-SCNC: 31 MMOL/L (ref 21–32)
CREAT SERPL-MCNC: 0.5 MG/DL (ref 0.6–1)
DIFFERENTIAL METHOD BLD: ABNORMAL
EOSINOPHIL # BLD: 0.2 K/UL (ref 0–0.8)
EOSINOPHIL NFR BLD: 4 % (ref 0.5–7.8)
ERYTHROCYTE [DISTWIDTH] IN BLOOD BY AUTOMATED COUNT: 16 % (ref 11.9–14.6)
GLOBULIN SER CALC-MCNC: 4.5 G/DL (ref 2.8–4.5)
GLUCOSE SERPL-MCNC: 130 MG/DL (ref 65–100)
HCT VFR BLD AUTO: 35.5 % (ref 35.8–46.3)
HGB BLD-MCNC: 11.2 G/DL (ref 11.7–15.4)
IMM GRANULOCYTES # BLD AUTO: 0 K/UL (ref 0–0.5)
IMM GRANULOCYTES NFR BLD AUTO: 1 % (ref 0–5)
LACTATE SERPL-SCNC: 1.3 MMOL/L (ref 0.4–2)
LYMPHOCYTES # BLD: 1.2 K/UL (ref 0.5–4.6)
LYMPHOCYTES NFR BLD: 20 % (ref 13–44)
MCH RBC QN AUTO: 26.7 PG (ref 26.1–32.9)
MCHC RBC AUTO-ENTMCNC: 31.5 G/DL (ref 31.4–35)
MCV RBC AUTO: 84.7 FL (ref 82–102)
MONOCYTES # BLD: 0.1 K/UL (ref 0.1–1.3)
MONOCYTES NFR BLD: 2 % (ref 4–12)
NEUTS SEG # BLD: 4.2 K/UL (ref 1.7–8.2)
NEUTS SEG NFR BLD: 73 % (ref 43–78)
NRBC # BLD: 0 K/UL (ref 0–0.2)
NT PRO BNP: 434 PG/ML (ref 5–125)
PLATELET # BLD AUTO: 313 K/UL (ref 150–450)
PMV BLD AUTO: 9.8 FL (ref 9.4–12.3)
POTASSIUM SERPL-SCNC: 3.2 MMOL/L (ref 3.5–5.1)
POTASSIUM SERPL-SCNC: 3.8 MMOL/L (ref 3.5–5.1)
PROCALCITONIN SERPL-MCNC: 0.06 NG/ML (ref 0–0.49)
PROT SERPL-MCNC: 6.9 G/DL (ref 6.3–8.2)
RBC # BLD AUTO: 4.19 M/UL (ref 4.05–5.2)
SODIUM SERPL-SCNC: 135 MMOL/L (ref 136–146)
TROPONIN I SERPL HS-MCNC: 6.4 PG/ML (ref 0–14)
WBC # BLD AUTO: 5.7 K/UL (ref 4.3–11.1)

## 2024-04-07 PROCEDURE — 2580000003 HC RX 258: Performed by: GENERAL PRACTICE

## 2024-04-07 PROCEDURE — 6370000000 HC RX 637 (ALT 250 FOR IP): Performed by: INTERNAL MEDICINE

## 2024-04-07 PROCEDURE — 80053 COMPREHEN METABOLIC PANEL: CPT

## 2024-04-07 PROCEDURE — 84132 ASSAY OF SERUM POTASSIUM: CPT

## 2024-04-07 PROCEDURE — 94760 N-INVAS EAR/PLS OXIMETRY 1: CPT

## 2024-04-07 PROCEDURE — 2700000000 HC OXYGEN THERAPY PER DAY

## 2024-04-07 PROCEDURE — 2500000003 HC RX 250 WO HCPCS: Performed by: INTERNAL MEDICINE

## 2024-04-07 PROCEDURE — 2400000000

## 2024-04-07 PROCEDURE — 99285 EMERGENCY DEPT VISIT HI MDM: CPT

## 2024-04-07 PROCEDURE — 6360000002 HC RX W HCPCS: Performed by: GENERAL PRACTICE

## 2024-04-07 PROCEDURE — 6360000004 HC RX CONTRAST MEDICATION: Performed by: GENERAL PRACTICE

## 2024-04-07 PROCEDURE — 71045 X-RAY EXAM CHEST 1 VIEW: CPT

## 2024-04-07 PROCEDURE — 36415 COLL VENOUS BLD VENIPUNCTURE: CPT

## 2024-04-07 PROCEDURE — 84145 PROCALCITONIN (PCT): CPT

## 2024-04-07 PROCEDURE — 81003 URINALYSIS AUTO W/O SCOPE: CPT

## 2024-04-07 PROCEDURE — 84484 ASSAY OF TROPONIN QUANT: CPT

## 2024-04-07 PROCEDURE — 6360000002 HC RX W HCPCS: Performed by: INTERNAL MEDICINE

## 2024-04-07 PROCEDURE — 83605 ASSAY OF LACTIC ACID: CPT

## 2024-04-07 PROCEDURE — 85025 COMPLETE CBC W/AUTO DIFF WBC: CPT

## 2024-04-07 PROCEDURE — 96375 TX/PRO/DX INJ NEW DRUG ADDON: CPT

## 2024-04-07 PROCEDURE — 71260 CT THORAX DX C+: CPT

## 2024-04-07 PROCEDURE — 2580000003 HC RX 258: Performed by: INTERNAL MEDICINE

## 2024-04-07 PROCEDURE — 94640 AIRWAY INHALATION TREATMENT: CPT

## 2024-04-07 PROCEDURE — 74176 CT ABD & PELVIS W/O CONTRAST: CPT

## 2024-04-07 PROCEDURE — 96374 THER/PROPH/DIAG INJ IV PUSH: CPT

## 2024-04-07 PROCEDURE — 1100000000 HC RM PRIVATE

## 2024-04-07 PROCEDURE — 6360000004 HC RX CONTRAST MEDICATION: Performed by: INTERNAL MEDICINE

## 2024-04-07 PROCEDURE — 87040 BLOOD CULTURE FOR BACTERIA: CPT

## 2024-04-07 PROCEDURE — 83880 ASSAY OF NATRIURETIC PEPTIDE: CPT

## 2024-04-07 RX ORDER — TRAZODONE HYDROCHLORIDE 50 MG/1
150 TABLET ORAL NIGHTLY
Status: DISCONTINUED | OUTPATIENT
Start: 2024-04-07 | End: 2024-04-11 | Stop reason: HOSPADM

## 2024-04-07 RX ORDER — PREDNISONE 20 MG/1
20 TABLET ORAL DAILY
Status: DISCONTINUED | OUTPATIENT
Start: 2024-04-08 | End: 2024-04-11 | Stop reason: HOSPADM

## 2024-04-07 RX ORDER — BUPROPION HYDROCHLORIDE 300 MG/1
300 TABLET ORAL DAILY
Status: DISCONTINUED | OUTPATIENT
Start: 2024-04-08 | End: 2024-04-11 | Stop reason: HOSPADM

## 2024-04-07 RX ORDER — ALBUTEROL SULFATE 90 UG/1
2 AEROSOL, METERED RESPIRATORY (INHALATION) EVERY 4 HOURS PRN
Status: DISCONTINUED | OUTPATIENT
Start: 2024-04-07 | End: 2024-04-11 | Stop reason: HOSPADM

## 2024-04-07 RX ORDER — SODIUM CHLORIDE 0.9 % (FLUSH) 0.9 %
5-40 SYRINGE (ML) INJECTION EVERY 12 HOURS SCHEDULED
Status: DISCONTINUED | OUTPATIENT
Start: 2024-04-07 | End: 2024-04-11 | Stop reason: HOSPADM

## 2024-04-07 RX ORDER — HYDROMORPHONE HYDROCHLORIDE 1 MG/ML
1 INJECTION, SOLUTION INTRAMUSCULAR; INTRAVENOUS; SUBCUTANEOUS
Status: DISCONTINUED | OUTPATIENT
Start: 2024-04-07 | End: 2024-04-07

## 2024-04-07 RX ORDER — ONDANSETRON 4 MG/1
4 TABLET, ORALLY DISINTEGRATING ORAL EVERY 8 HOURS PRN
Status: DISCONTINUED | OUTPATIENT
Start: 2024-04-07 | End: 2024-04-11 | Stop reason: HOSPADM

## 2024-04-07 RX ORDER — SODIUM CHLORIDE 0.9 % (FLUSH) 0.9 %
5-40 SYRINGE (ML) INJECTION PRN
Status: DISCONTINUED | OUTPATIENT
Start: 2024-04-07 | End: 2024-04-11 | Stop reason: HOSPADM

## 2024-04-07 RX ORDER — MIRTAZAPINE 15 MG/1
15 TABLET, FILM COATED ORAL NIGHTLY
Status: DISCONTINUED | OUTPATIENT
Start: 2024-04-07 | End: 2024-04-11 | Stop reason: HOSPADM

## 2024-04-07 RX ORDER — VENLAFAXINE 75 MG/1
75 TABLET ORAL NIGHTLY
Status: DISCONTINUED | OUTPATIENT
Start: 2024-04-07 | End: 2024-04-11 | Stop reason: HOSPADM

## 2024-04-07 RX ORDER — DOCUSATE SODIUM 100 MG/1
100 CAPSULE, LIQUID FILLED ORAL DAILY
Status: DISCONTINUED | OUTPATIENT
Start: 2024-04-08 | End: 2024-04-11 | Stop reason: HOSPADM

## 2024-04-07 RX ORDER — ASPIRIN 81 MG/1
81 TABLET ORAL DAILY
Status: DISCONTINUED | OUTPATIENT
Start: 2024-04-08 | End: 2024-04-11 | Stop reason: HOSPADM

## 2024-04-07 RX ORDER — MAGNESIUM SULFATE IN WATER 40 MG/ML
2000 INJECTION, SOLUTION INTRAVENOUS PRN
Status: DISCONTINUED | OUTPATIENT
Start: 2024-04-07 | End: 2024-04-11 | Stop reason: HOSPADM

## 2024-04-07 RX ORDER — ACETAMINOPHEN 650 MG/1
650 SUPPOSITORY RECTAL EVERY 6 HOURS PRN
Status: DISCONTINUED | OUTPATIENT
Start: 2024-04-07 | End: 2024-04-11 | Stop reason: HOSPADM

## 2024-04-07 RX ORDER — ROSUVASTATIN CALCIUM 20 MG/1
20 TABLET, COATED ORAL NIGHTLY
Status: DISCONTINUED | OUTPATIENT
Start: 2024-04-07 | End: 2024-04-11 | Stop reason: HOSPADM

## 2024-04-07 RX ORDER — POLYETHYLENE GLYCOL 3350 17 G/17G
17 POWDER, FOR SOLUTION ORAL DAILY PRN
Status: DISCONTINUED | OUTPATIENT
Start: 2024-04-07 | End: 2024-04-11 | Stop reason: HOSPADM

## 2024-04-07 RX ORDER — MORPHINE SULFATE 4 MG/ML
4 INJECTION, SOLUTION INTRAMUSCULAR; INTRAVENOUS
Status: COMPLETED | OUTPATIENT
Start: 2024-04-07 | End: 2024-04-07

## 2024-04-07 RX ORDER — POTASSIUM CHLORIDE 7.45 MG/ML
10 INJECTION INTRAVENOUS PRN
Status: DISCONTINUED | OUTPATIENT
Start: 2024-04-07 | End: 2024-04-11 | Stop reason: HOSPADM

## 2024-04-07 RX ORDER — SODIUM CHLORIDE 9 MG/ML
INJECTION, SOLUTION INTRAVENOUS PRN
Status: DISCONTINUED | OUTPATIENT
Start: 2024-04-07 | End: 2024-04-11 | Stop reason: HOSPADM

## 2024-04-07 RX ORDER — SENNOSIDES A AND B 8.6 MG/1
1 TABLET, FILM COATED ORAL 2 TIMES DAILY
Status: DISCONTINUED | OUTPATIENT
Start: 2024-04-07 | End: 2024-04-11 | Stop reason: HOSPADM

## 2024-04-07 RX ORDER — ONDANSETRON 2 MG/ML
4 INJECTION INTRAMUSCULAR; INTRAVENOUS ONCE
Status: COMPLETED | OUTPATIENT
Start: 2024-04-07 | End: 2024-04-07

## 2024-04-07 RX ORDER — ONDANSETRON 2 MG/ML
4 INJECTION INTRAMUSCULAR; INTRAVENOUS EVERY 6 HOURS PRN
Status: DISCONTINUED | OUTPATIENT
Start: 2024-04-07 | End: 2024-04-11 | Stop reason: HOSPADM

## 2024-04-07 RX ORDER — PANTOPRAZOLE SODIUM 40 MG/1
40 TABLET, DELAYED RELEASE ORAL
Status: DISCONTINUED | OUTPATIENT
Start: 2024-04-08 | End: 2024-04-11 | Stop reason: HOSPADM

## 2024-04-07 RX ORDER — ACETAMINOPHEN 325 MG/1
650 TABLET ORAL EVERY 6 HOURS PRN
Status: DISCONTINUED | OUTPATIENT
Start: 2024-04-07 | End: 2024-04-11 | Stop reason: HOSPADM

## 2024-04-07 RX ORDER — POTASSIUM CHLORIDE 20 MEQ/1
40 TABLET, EXTENDED RELEASE ORAL PRN
Status: DISCONTINUED | OUTPATIENT
Start: 2024-04-07 | End: 2024-04-11 | Stop reason: HOSPADM

## 2024-04-07 RX ORDER — SODIUM CHLORIDE 9 MG/ML
INJECTION, SOLUTION INTRAVENOUS CONTINUOUS
Status: ACTIVE | OUTPATIENT
Start: 2024-04-07 | End: 2024-04-09

## 2024-04-07 RX ORDER — ENOXAPARIN SODIUM 100 MG/ML
40 INJECTION SUBCUTANEOUS DAILY
Status: DISCONTINUED | OUTPATIENT
Start: 2024-04-07 | End: 2024-04-11 | Stop reason: HOSPADM

## 2024-04-07 RX ORDER — HYDROMORPHONE HYDROCHLORIDE 1 MG/ML
0.5 INJECTION, SOLUTION INTRAMUSCULAR; INTRAVENOUS; SUBCUTANEOUS EVERY 4 HOURS PRN
Status: DISCONTINUED | OUTPATIENT
Start: 2024-04-07 | End: 2024-04-11 | Stop reason: HOSPADM

## 2024-04-07 RX ADMIN — PIPERACILLIN AND TAZOBACTAM 4500 MG: 4; .5 INJECTION, POWDER, FOR SOLUTION INTRAVENOUS at 18:23

## 2024-04-07 RX ADMIN — MIRTAZAPINE 15 MG: 15 TABLET, FILM COATED ORAL at 20:55

## 2024-04-07 RX ADMIN — HYDROMORPHONE HYDROCHLORIDE 0.5 MG: 1 INJECTION, SOLUTION INTRAMUSCULAR; INTRAVENOUS; SUBCUTANEOUS at 20:58

## 2024-04-07 RX ADMIN — PRAMIPEXOLE DIHYDROCHLORIDE 0.75 MG: 0.5 TABLET ORAL at 20:38

## 2024-04-07 RX ADMIN — IOPAMIDOL 100 ML: 755 INJECTION, SOLUTION INTRAVENOUS at 15:20

## 2024-04-07 RX ADMIN — TUBERCULIN PURIFIED PROTEIN DERIVATIVE 5 UNITS: 5 INJECTION, SOLUTION INTRADERMAL at 20:39

## 2024-04-07 RX ADMIN — TRAZODONE HYDROCHLORIDE 150 MG: 50 TABLET ORAL at 20:55

## 2024-04-07 RX ADMIN — ENOXAPARIN SODIUM 40 MG: 100 INJECTION SUBCUTANEOUS at 20:38

## 2024-04-07 RX ADMIN — DIATRIZOATE MEGLUMINE AND DIATRIZOATE SODIUM 15 ML: 660; 100 LIQUID ORAL; RECTAL at 19:30

## 2024-04-07 RX ADMIN — MORPHINE SULFATE 4 MG: 4 INJECTION INTRAVENOUS at 15:32

## 2024-04-07 RX ADMIN — SENNOSIDES 8.6 MG: 8.6 TABLET, FILM COATED ORAL at 20:39

## 2024-04-07 RX ADMIN — ONDANSETRON 4 MG: 2 INJECTION INTRAMUSCULAR; INTRAVENOUS at 15:32

## 2024-04-07 RX ADMIN — SODIUM CHLORIDE, PRESERVATIVE FREE 10 ML: 5 INJECTION INTRAVENOUS at 20:40

## 2024-04-07 RX ADMIN — MOMETASONE FUROATE AND FORMOTEROL FUMARATE DIHYDRATE 2 PUFF: 100; 5 AEROSOL RESPIRATORY (INHALATION) at 20:51

## 2024-04-07 RX ADMIN — ROSUVASTATIN CALCIUM 20 MG: 20 TABLET, COATED ORAL at 20:39

## 2024-04-07 RX ADMIN — SODIUM CHLORIDE: 9 INJECTION, SOLUTION INTRAVENOUS at 20:42

## 2024-04-07 RX ADMIN — VANCOMYCIN HYDROCHLORIDE 1500 MG: 10 INJECTION, POWDER, LYOPHILIZED, FOR SOLUTION INTRAVENOUS at 21:47

## 2024-04-07 RX ADMIN — VENLAFAXINE 75 MG: 75 TABLET ORAL at 20:55

## 2024-04-07 ASSESSMENT — PAIN DESCRIPTION - DESCRIPTORS
DESCRIPTORS: STABBING;SHARP
DESCRIPTORS: SHARP;SHOOTING

## 2024-04-07 ASSESSMENT — PAIN DESCRIPTION - DIRECTION: RADIATING_TOWARDS: BACK

## 2024-04-07 ASSESSMENT — PAIN DESCRIPTION - FREQUENCY
FREQUENCY: CONTINUOUS
FREQUENCY: CONTINUOUS

## 2024-04-07 ASSESSMENT — PAIN DESCRIPTION - ORIENTATION
ORIENTATION: ANTERIOR
ORIENTATION: MID

## 2024-04-07 ASSESSMENT — PAIN - FUNCTIONAL ASSESSMENT
PAIN_FUNCTIONAL_ASSESSMENT: ACTIVITIES ARE NOT PREVENTED
PAIN_FUNCTIONAL_ASSESSMENT: PREVENTS OR INTERFERES SOME ACTIVE ACTIVITIES AND ADLS

## 2024-04-07 ASSESSMENT — PAIN SCALES - GENERAL
PAINLEVEL_OUTOF10: 4
PAINLEVEL_OUTOF10: 7
PAINLEVEL_OUTOF10: 0
PAINLEVEL_OUTOF10: 2

## 2024-04-07 ASSESSMENT — PAIN DESCRIPTION - LOCATION
LOCATION: ABDOMEN
LOCATION: ABDOMEN

## 2024-04-07 ASSESSMENT — PAIN DESCRIPTION - ONSET
ONSET: GRADUAL
ONSET: ON-GOING

## 2024-04-07 ASSESSMENT — PAIN DESCRIPTION - PAIN TYPE
TYPE: ACUTE PAIN
TYPE: ACUTE PAIN

## 2024-04-07 NOTE — ED NOTES
TRANSFER - OUT REPORT:    Verbal report given to YURIY Cook on Fide Mejia  being transferred to [facility] for routine progression of patient care       Report consisted of patient's Situation, Background, Assessment and   Recommendations(SBAR).     Information from the following report(s) Nurse Handoff Report was reviewed with the receiving nurse.    Kinder Fall Assessment:                           Lines:   Single Lumen Implantable Port 02/08/24 Right Chest (Active)       Peripheral IV 04/07/24 Left;Proximal Forearm (Active)        Opportunity for questions and clarification was provided.      Patient transported with:  O2 @ 3lpm           Chuyita Fitch RN  04/07/24 [id number]

## 2024-04-07 NOTE — ED TRIAGE NOTES
Per EMS: patient coming from home. Stage 2 diagnosed lung cancer on the 04/01/2024. Chemo on the 04/01/2024. Patient woke up with shortness of breath. Denies chest pain. Pain when taking deep breath. Coughing up green phlegm since the 04/05/2024. Patient contacted her provider and informed to come to the ER.     Patient is on 3l baseline continuous at home and in route. No changes with oxygen therapy.     Hx: htn, copd, lung cancer, fractured sternum, eye problem.

## 2024-04-07 NOTE — ED PROVIDER NOTES
Troponin, High Sensitivity 6.4 0 - 14 pg/mL   Brain Natriuretic Peptide   Result Value Ref Range    NT Pro- (H) 5 - 125 PG/ML         CT CHEST PULMONARY EMBOLISM W CONTRAST   Final Result   1.  No pulmonary embolus.   2.  Left hilar lung mass previously demonstrated to be lung carcinoma. There is   interval development of significant consolidation of the left upper lobe,   compression of the left main pulmonary artery and interval development of right   upper lobe airspace disease. The right lower lobe consolidative phenomenon has   revitalize a demonstrating excellent aeration. Minimal residual scarring at the   lung bases is present.            XR CHEST PORTABLE   Final Result   Interval development of opacification in the left upper lobe with   associated volume loss suggesting left upper lobe collapse.      Left upper lobe perihilar mass is similar in appearance.      Right chest port.                   No results for input(s): \"COVID19\" in the last 72 hours.    Voice dictation software was used during the making of this note.  This software is not perfect and grammatical and other typographical errors may be present.  This note has not been completely proofread for errors.     Dave Garcia DO  04/07/24 [id number]

## 2024-04-08 ENCOUNTER — HOME CARE VISIT (OUTPATIENT)
Dept: SCHEDULING | Facility: HOME HEALTH | Age: 74
End: 2024-04-08
Payer: MEDICARE

## 2024-04-08 VITALS
RESPIRATION RATE: 16 BRPM | DIASTOLIC BLOOD PRESSURE: 62 MMHG | SYSTOLIC BLOOD PRESSURE: 98 MMHG | HEART RATE: 78 BPM | OXYGEN SATURATION: 97 % | TEMPERATURE: 98.9 F

## 2024-04-08 PROBLEM — J18.9 HEALTHCARE-ASSOCIATED PNEUMONIA: Status: ACTIVE | Noted: 2024-04-08

## 2024-04-08 PROBLEM — R09.02 HYPOXIA: Status: ACTIVE | Noted: 2017-07-25

## 2024-04-08 PROBLEM — C34.10 MALIGNANT NEOPLASM OF UPPER LOBE OF LUNG (HCC): Status: ACTIVE | Noted: 2024-04-08

## 2024-04-08 PROBLEM — R06.03 ACUTE RESPIRATORY DISTRESS: Status: ACTIVE | Noted: 2024-04-08

## 2024-04-08 PROBLEM — Z51.5 ENCOUNTER FOR PALLIATIVE CARE: Status: ACTIVE | Noted: 2024-04-08

## 2024-04-08 LAB
ANION GAP SERPL CALC-SCNC: 2 MMOL/L (ref 2–11)
APPEARANCE UR: CLEAR
BASOPHILS # BLD: 0 K/UL (ref 0–0.2)
BASOPHILS NFR BLD: 0 % (ref 0–2)
BILIRUB UR QL: NEGATIVE
BUN SERPL-MCNC: 3 MG/DL (ref 8–23)
CALCIUM SERPL-MCNC: 8 MG/DL (ref 8.3–10.4)
CHLORIDE SERPL-SCNC: 107 MMOL/L (ref 103–113)
CO2 SERPL-SCNC: 29 MMOL/L (ref 21–32)
COLOR UR: ABNORMAL
CREAT SERPL-MCNC: 0.3 MG/DL (ref 0.6–1)
DIFFERENTIAL METHOD BLD: ABNORMAL
EOSINOPHIL # BLD: 0.3 K/UL (ref 0–0.8)
EOSINOPHIL NFR BLD: 6 % (ref 0.5–7.8)
ERYTHROCYTE [DISTWIDTH] IN BLOOD BY AUTOMATED COUNT: 16 % (ref 11.9–14.6)
GLUCOSE SERPL-MCNC: 104 MG/DL (ref 65–100)
GLUCOSE UR STRIP.AUTO-MCNC: NEGATIVE MG/DL
HCT VFR BLD AUTO: 32.6 % (ref 35.8–46.3)
HGB BLD-MCNC: 10.2 G/DL (ref 11.7–15.4)
HGB UR QL STRIP: NEGATIVE
IMM GRANULOCYTES # BLD AUTO: 0 K/UL (ref 0–0.5)
IMM GRANULOCYTES NFR BLD AUTO: 1 % (ref 0–5)
KETONES UR QL STRIP.AUTO: ABNORMAL MG/DL
LEUKOCYTE ESTERASE UR QL STRIP.AUTO: NEGATIVE
LIPASE SERPL-CCNC: 64 U/L (ref 73–393)
LYMPHOCYTES # BLD: 1.1 K/UL (ref 0.5–4.6)
LYMPHOCYTES NFR BLD: 27 % (ref 13–44)
MAGNESIUM SERPL-MCNC: 1.7 MG/DL (ref 1.8–2.4)
MCH RBC QN AUTO: 26.9 PG (ref 26.1–32.9)
MCHC RBC AUTO-ENTMCNC: 31.3 G/DL (ref 31.4–35)
MCV RBC AUTO: 86 FL (ref 82–102)
MM INDURATION, POC: 0 MM (ref 0–5)
MONOCYTES # BLD: 0.2 K/UL (ref 0.1–1.3)
MONOCYTES NFR BLD: 5 % (ref 4–12)
MRSA DNA SPEC QL NAA+PROBE: DETECTED
NEUTS SEG # BLD: 2.5 K/UL (ref 1.7–8.2)
NEUTS SEG NFR BLD: 61 % (ref 43–78)
NITRITE UR QL STRIP.AUTO: NEGATIVE
NRBC # BLD: 0 K/UL (ref 0–0.2)
PH UR STRIP: 7.5 (ref 5–9)
PLATELET # BLD AUTO: 289 K/UL (ref 150–450)
PMV BLD AUTO: 9.6 FL (ref 9.4–12.3)
POTASSIUM SERPL-SCNC: 3.5 MMOL/L (ref 3.5–5.1)
PPD, POC: NEGATIVE
PROT UR STRIP-MCNC: NEGATIVE MG/DL
RBC # BLD AUTO: 3.79 M/UL (ref 4.05–5.2)
S AUREUS CPE NOSE QL NAA+PROBE: DETECTED
SODIUM SERPL-SCNC: 138 MMOL/L (ref 136–146)
SP GR UR REFRACTOMETRY: 1.03 (ref 1–1.02)
UROBILINOGEN UR QL STRIP.AUTO: 0.2 EU/DL (ref 0.2–1)
WBC # BLD AUTO: 4.2 K/UL (ref 4.3–11.1)

## 2024-04-08 PROCEDURE — 2580000003 HC RX 258: Performed by: INTERNAL MEDICINE

## 2024-04-08 PROCEDURE — 99223 1ST HOSP IP/OBS HIGH 75: CPT | Performed by: INTERNAL MEDICINE

## 2024-04-08 PROCEDURE — 97530 THERAPEUTIC ACTIVITIES: CPT

## 2024-04-08 PROCEDURE — 6370000000 HC RX 637 (ALT 250 FOR IP): Performed by: INTERNAL MEDICINE

## 2024-04-08 PROCEDURE — 6360000002 HC RX W HCPCS: Performed by: HOSPITALIST

## 2024-04-08 PROCEDURE — 87077 CULTURE AEROBIC IDENTIFY: CPT

## 2024-04-08 PROCEDURE — 87641 MR-STAPH DNA AMP PROBE: CPT

## 2024-04-08 PROCEDURE — 2500000003 HC RX 250 WO HCPCS: Performed by: INTERNAL MEDICINE

## 2024-04-08 PROCEDURE — 87205 SMEAR GRAM STAIN: CPT

## 2024-04-08 PROCEDURE — 97112 NEUROMUSCULAR REEDUCATION: CPT

## 2024-04-08 PROCEDURE — 87493 C DIFF AMPLIFIED PROBE: CPT

## 2024-04-08 PROCEDURE — 97161 PT EVAL LOW COMPLEX 20 MIN: CPT

## 2024-04-08 PROCEDURE — 97165 OT EVAL LOW COMPLEX 30 MIN: CPT

## 2024-04-08 PROCEDURE — 87186 SC STD MICRODIL/AGAR DIL: CPT

## 2024-04-08 PROCEDURE — 99222 1ST HOSP IP/OBS MODERATE 55: CPT

## 2024-04-08 PROCEDURE — 87070 CULTURE OTHR SPECIMN AEROBIC: CPT

## 2024-04-08 PROCEDURE — 6360000002 HC RX W HCPCS: Performed by: INTERNAL MEDICINE

## 2024-04-08 PROCEDURE — 36415 COLL VENOUS BLD VENIPUNCTURE: CPT

## 2024-04-08 PROCEDURE — 83735 ASSAY OF MAGNESIUM: CPT

## 2024-04-08 PROCEDURE — 2700000000 HC OXYGEN THERAPY PER DAY

## 2024-04-08 PROCEDURE — 85025 COMPLETE CBC W/AUTO DIFF WBC: CPT

## 2024-04-08 PROCEDURE — 94640 AIRWAY INHALATION TREATMENT: CPT

## 2024-04-08 PROCEDURE — 1100000000 HC RM PRIVATE

## 2024-04-08 PROCEDURE — 94760 N-INVAS EAR/PLS OXIMETRY 1: CPT

## 2024-04-08 PROCEDURE — 83690 ASSAY OF LIPASE: CPT

## 2024-04-08 PROCEDURE — 80048 BASIC METABOLIC PNL TOTAL CA: CPT

## 2024-04-08 RX ORDER — BUDESONIDE 0.5 MG/2ML
0.5 INHALANT ORAL
Status: DISCONTINUED | OUTPATIENT
Start: 2024-04-08 | End: 2024-04-11 | Stop reason: HOSPADM

## 2024-04-08 RX ORDER — LEVALBUTEROL INHALATION SOLUTION 0.63 MG/3ML
0.63 SOLUTION RESPIRATORY (INHALATION)
Status: DISCONTINUED | OUTPATIENT
Start: 2024-04-08 | End: 2024-04-10

## 2024-04-08 RX ADMIN — HYDROMORPHONE HYDROCHLORIDE 0.5 MG: 1 INJECTION, SOLUTION INTRAMUSCULAR; INTRAVENOUS; SUBCUTANEOUS at 18:41

## 2024-04-08 RX ADMIN — BUDESONIDE 500 MCG: 0.5 INHALANT RESPIRATORY (INHALATION) at 12:41

## 2024-04-08 RX ADMIN — SODIUM CHLORIDE, PRESERVATIVE FREE 10 ML: 5 INJECTION INTRAVENOUS at 20:33

## 2024-04-08 RX ADMIN — HYDROMORPHONE HYDROCHLORIDE 0.5 MG: 1 INJECTION, SOLUTION INTRAMUSCULAR; INTRAVENOUS; SUBCUTANEOUS at 14:07

## 2024-04-08 RX ADMIN — HYDROMORPHONE HYDROCHLORIDE 0.5 MG: 1 INJECTION, SOLUTION INTRAMUSCULAR; INTRAVENOUS; SUBCUTANEOUS at 08:26

## 2024-04-08 RX ADMIN — SODIUM CHLORIDE, PRESERVATIVE FREE 10 ML: 5 INJECTION INTRAVENOUS at 08:30

## 2024-04-08 RX ADMIN — SENNOSIDES 8.6 MG: 8.6 TABLET, FILM COATED ORAL at 08:29

## 2024-04-08 RX ADMIN — ROSUVASTATIN CALCIUM 20 MG: 20 TABLET, COATED ORAL at 20:32

## 2024-04-08 RX ADMIN — LEVALBUTEROL HYDROCHLORIDE 0.63 MG: 0.63 SOLUTION RESPIRATORY (INHALATION) at 12:41

## 2024-04-08 RX ADMIN — VENLAFAXINE 75 MG: 75 TABLET ORAL at 20:32

## 2024-04-08 RX ADMIN — PIPERACILLIN AND TAZOBACTAM 3375 MG: 3; .375 INJECTION, POWDER, LYOPHILIZED, FOR SOLUTION INTRAVENOUS at 00:43

## 2024-04-08 RX ADMIN — TIOTROPIUM BROMIDE INHALATION SPRAY 2 PUFF: 3.12 SPRAY, METERED RESPIRATORY (INHALATION) at 07:44

## 2024-04-08 RX ADMIN — PANTOPRAZOLE SODIUM 40 MG: 40 TABLET, DELAYED RELEASE ORAL at 06:16

## 2024-04-08 RX ADMIN — BUDESONIDE 500 MCG: 0.5 INHALANT RESPIRATORY (INHALATION) at 20:21

## 2024-04-08 RX ADMIN — LEVALBUTEROL HYDROCHLORIDE 0.63 MG: 0.63 SOLUTION RESPIRATORY (INHALATION) at 20:20

## 2024-04-08 RX ADMIN — PANTOPRAZOLE SODIUM 40 MG: 40 TABLET, DELAYED RELEASE ORAL at 15:24

## 2024-04-08 RX ADMIN — ASPIRIN 81 MG: 81 TABLET, COATED ORAL at 08:29

## 2024-04-08 RX ADMIN — SODIUM CHLORIDE: 9 INJECTION, SOLUTION INTRAVENOUS at 08:41

## 2024-04-08 RX ADMIN — PIPERACILLIN AND TAZOBACTAM 3375 MG: 3; .375 INJECTION, POWDER, LYOPHILIZED, FOR SOLUTION INTRAVENOUS at 08:39

## 2024-04-08 RX ADMIN — MIRTAZAPINE 15 MG: 15 TABLET, FILM COATED ORAL at 20:32

## 2024-04-08 RX ADMIN — VANCOMYCIN HYDROCHLORIDE 750 MG: 750 INJECTION, POWDER, LYOPHILIZED, FOR SOLUTION INTRAVENOUS at 08:49

## 2024-04-08 RX ADMIN — TRAZODONE HYDROCHLORIDE 150 MG: 50 TABLET ORAL at 20:32

## 2024-04-08 RX ADMIN — PREDNISONE 20 MG: 20 TABLET ORAL at 08:29

## 2024-04-08 RX ADMIN — PIPERACILLIN AND TAZOBACTAM 3375 MG: 3; .375 INJECTION, POWDER, LYOPHILIZED, FOR SOLUTION INTRAVENOUS at 16:19

## 2024-04-08 RX ADMIN — PRAMIPEXOLE DIHYDROCHLORIDE 0.75 MG: 0.5 TABLET ORAL at 20:32

## 2024-04-08 RX ADMIN — ENOXAPARIN SODIUM 40 MG: 100 INJECTION SUBCUTANEOUS at 20:32

## 2024-04-08 RX ADMIN — LEVALBUTEROL HYDROCHLORIDE 0.63 MG: 0.63 SOLUTION RESPIRATORY (INHALATION) at 15:31

## 2024-04-08 RX ADMIN — VANCOMYCIN HYDROCHLORIDE 750 MG: 750 INJECTION, POWDER, LYOPHILIZED, FOR SOLUTION INTRAVENOUS at 21:57

## 2024-04-08 RX ADMIN — DOCUSATE SODIUM 100 MG: 100 CAPSULE, LIQUID FILLED ORAL at 08:29

## 2024-04-08 RX ADMIN — MOMETASONE FUROATE AND FORMOTEROL FUMARATE DIHYDRATE 2 PUFF: 100; 5 AEROSOL RESPIRATORY (INHALATION) at 07:44

## 2024-04-08 RX ADMIN — SENNOSIDES 8.6 MG: 8.6 TABLET, FILM COATED ORAL at 20:32

## 2024-04-08 ASSESSMENT — PAIN SCALES - GENERAL
PAINLEVEL_OUTOF10: 3
PAINLEVEL_OUTOF10: 0
PAINLEVEL_OUTOF10: 9
PAINLEVEL_OUTOF10: 0
PAINLEVEL_OUTOF10: 6
PAINLEVEL_OUTOF10: 6

## 2024-04-08 ASSESSMENT — PAIN DESCRIPTION - LOCATION
LOCATION: ABDOMEN

## 2024-04-08 ASSESSMENT — PAIN DESCRIPTION - DESCRIPTORS
DESCRIPTORS: ACHING;CRAMPING
DESCRIPTORS: ACHING
DESCRIPTORS: ACHING

## 2024-04-08 ASSESSMENT — ENCOUNTER SYMPTOMS: DYSPNEA ACTIVITY LEVEL: AFTER AMBULATING 10 - 20 FT

## 2024-04-08 ASSESSMENT — PAIN DESCRIPTION - FREQUENCY: FREQUENCY: CONTINUOUS

## 2024-04-08 NOTE — PROGRESS NOTES
[x]     Coordination [x]       Tone [x]       Edema [x]    Activity Tolerance []  Generally decreased--fatigues with minimal activity, rest breaks required     Hand Dominance R [] L []      COGNITION/  PERCEPTION: INTACT IMPAIRED   (See Comments)   Orientation [x]     Vision [x]     Hearing [x]     Cognition  [x]     Perception [x]       MOBILITY: I Mod I S SBA CGA Min Mod Max Total  NT x2 Comments:   Bed Mobility    Rolling [] [] [] [] [] [] [] [] [] [x] []    Supine to Sit [] [] [] [x] [] [] [] [] [] [] []    Scooting [] [] [] [x] [] [] [] [] [] [] []    Sit to Supine [] [] [] [] [] [] [] [] [] [x] [] Left sitting in the chair    Transfers    Sit to Stand [] [] [] [x] [] [] [] [] [] [] []    Bed to Chair [] [] [] [x] [] [] [] [] [] [] [] No AD   Stand to Sit [] [] [] [x] [] [] [] [] [] [] []    Tub/Shower [] [] [] [] [] [] [] [] [] [x] []     Toilet [] [] [] [] [] [] [] [] [] [x] []      [] [] [] [] [] [] [] [] [] [] []    I=Independent, Mod I=Modified Independent, S=Supervision/Setup, SBA=Standby Assistance, CGA=Contact Guard Assistance, Min=Minimal Assistance, Mod=Moderate Assistance, Max=Maximal Assistance, Total=Total Assistance, NT=Not Tested    ACTIVITIES OF DAILY LIVING: I Mod I S SBA CGA Min Mod Max Total NT Comments   BASIC ADLs:              Upper Body Bathing  [] [] [] [] [] [] [] [] [] [x]     Lower Body Bathing [] [] [] [] [] [] [] [] [] [x]     Toileting [] [] [] [] [] [] [] [] [] [x]    Upper Body Dressing [] [] [] [] [] [] [] [] [] [x]    Lower Body Dressing [] [] [] [x] [] [] [] [] [] [] Socks    Feeding [] [] [] [] [] [] [] [] [] [x]    Grooming [] [] [] [] [] [] [] [] [] [x]    Personal Device Care [] [] [] [] [] [] [] [] [] [x]    Functional Mobility [] [] [] [x] [] [] [] [] [] [] No AD   I=Independent, Mod I=Modified Independent, S=Supervision/Setup, SBA=Standby Assistance, CGA=Contact Guard Assistance, Min=Minimal Assistance, Mod=Moderate Assistance, Max=Maximal Assistance, Total=Total  and OT/ CARMICHAEL    EDUCATION:  Education Given To: Patient  Education Provided: Role of Therapy;Plan of Care;Fall Prevention Strategies  Education Outcome: Verbalized understanding;Demonstrated understanding    TOTAL TREATMENT DURATION AND TIME:  Time In: 0903  Time Out: 0920  Minutes: 17    Eri Sanabria, OT

## 2024-04-08 NOTE — CONSULTS
[facility] Hematology & Oncology        Inpatient Hematology / Oncology Consult    Reason for Consult:  Acute respiratory distress [R06.03]  Bacterial pneumonia [J15.9]  Hypoxia [R09.02]  Healthcare-associated pneumonia [J18.9]  Malignant neoplasm of upper lobe of lung, unspecified laterality (HCC) [C34.10]  Referring Physician:  Marilyn Scales MD    History of Present Illness:  Ms. Mejia is a 73 y.o. female admitted on 4/7/2024. The primary encounter diagnosis was Healthcare-associated pneumonia. Diagnoses of Hypoxia, Acute respiratory distress, and Malignant neoplasm of upper lobe of lung, unspecified laterality (HCC) were also pertinent to this visit.    Ms Mejia has PMH of COPD with chronic respiratory failure on home O2. She is a patient of Dr Gagnon with [facility] of the lung involving L lung and L hilar nodes, station IIL and 7+. She received cycle 1 carbo/taxol/keytruda on 4/1/24. She was admitted with abdominal/back/flank pain. CT AP w/o contrast negative as well as UA. She also noted SOB. CT chest without PE but did show MITZI mass with interval development of consolidation of MITZI, compression of L main pulmonary artery and interval development of RUL airspace. She was empirically started on Zosyn/Vanc. BC pending. Procal and LA negative. Oncology asked to assume care.     Review of Systems:  Constitutional Denies fever, chills, weight loss, appetite changes, fatigue, night sweats.   HEENT Denies trauma, blurry vision, hearing loss, ear pain, nosebleeds, sore throat, neck pain and ear discharge.    Skin Denies lesions or rashes.   Lungs +dyspnea. Denies cough, sputum production or hemoptysis.   Cardiovascular Denies chest pain, palpitations, or lower extremity edema.   Gastrointestinal +abdominal pain. Denies nausea, vomiting, changes in bowel habits, bloody or black stools.    Denies dysuria, frequency or hesitancy of urination.   Neuro Denies headaches, visual changes or ataxia. Denies dizziness,

## 2024-04-08 NOTE — PROGRESS NOTES
ACUTE PHYSICAL THERAPY GOALS:   (Developed with and agreed upon by patient and/or caregiver.)    (1.) Fide Mejia  will move from supine to sit and sit to supine  with INDEPENDENCE within 7 treatment day(s).    (2.) Fide Mejia will transfer from bed to chair and chair to bed with INDEPENDENCE using the least restrictive device within 7 treatment day(s).    (3.) Fide Mejia will ambulate with INDEPENDENCE for 300 feet with the least restrictive device within 7 treatment day(s).   (4.) Fide Mejia will perform standing static and dynamic balance activities x 20 minutes with SUPERVISION to improve safety within 7 treatment day(s).  (5.) Fide Mejia will ascend and descend 2 stairs using 1 hand rail(s) with SUPERVISION to improve functional mobility and safety within 7 treatment day(s).  (6.) Fide Mejia will perform therapeutic exercises x 20 min for HEP with INDEPENDENCE to improve strength, endurance, and functional mobility within 7 treatment day(s).       PHYSICAL THERAPY Initial Assessment, Daily Note, and AM  (Link to Caseload Tracking: PT Visit Days : 1  Acknowledge Orders  Time In/Out  PT Charge Capture  Rehab Caseload Tracker    Fide Mejia is a 73 y.o. female   PRIMARY DIAGNOSIS: Bacterial pneumonia  Acute respiratory distress [R06.03]  Bacterial pneumonia [J15.9]  Hypoxia [R09.02]  Healthcare-associated pneumonia [J18.9]  Malignant neoplasm of upper lobe of lung, unspecified laterality (HCC) [C34.10]       Reason for Referral: Generalized Muscle Weakness (M62.81)  Difficulty in walking, Not elsewhere classified (R26.2)  Inpatient: Payor: MEDICARE / Plan: MEDICARE PART A AND B / Product Type: *No Product type* /     ASSESSMENT:     REHAB RECOMMENDATIONS:   Recommendation to date pending progress:  Setting:  Home Health Therapy    Equipment:    To Be Determined     ASSESSMENT:  Ms. Mejia is a 73 year old F who presents pneumonia, recent lung cancer diagnosis. At baseline, pt is  Treatment:   Pain Assessment: None - Denies Pain      Post Treatment: 0 Vitals        Oxygen     2L IV    RESTRICTIONS/PRECAUTIONS:                    GROSS EVALUATION: B LE Intact Impaired (Comments):   AROM [x]     PROM [x]    Strength []  Generalized weakness   Balance [] Posture: Good  Sitting - Static: Good  Sitting - Dynamic: Good  Standing - Static: Fair, +  Standing - Dynamic: Fair, +   Posture [] N/A   Sensation []     Coordination []      Tone []     Edema []    Activity Tolerance [] Patient limited by fatigue, Patient Tolerated treatment well    []      COGNITION/  PERCEPTION: Intact Impaired (Comments):   Orientation [x]     Vision [x]     Hearing [x]     Cognition  [x]       MOBILITY: I Mod I S SBA CGA Min Mod Max Total  NT x2 Comments:   Bed Mobility    Rolling [] [] [] [x] [] [] [] [] [] [] []    Supine to Sit [] [] [] [x] [] [] [] [] [] [] []    Scooting [] [] [] [x] [] [] [] [] [] [] []    Sit to Supine [] [] [] [] [] [] [] [] [] [x] []    Transfers    Sit to Stand [] [] [] [x] [] [] [] [] [] [] []    Bed to Chair [] [] [] [x] [] [] [] [] [] [] []    Stand to Sit [] [] [] [x] [] [] [] [] [] [] []     [] [] [] [] [] [] [] [] [] [] []    I=Independent, Mod I=Modified Independent, S=Supervision, SBA=Standby Assistance, CGA=Contact Guard Assistance,   Min=Minimal Assistance, Mod=Moderate Assistance, Max=Maximal Assistance, Total=Total Assistance, NT=Not Tested    GAIT: I Mod I S SBA CGA Min Mod Max Total  NT x2 Comments:   Level of Assistance [] [] [] [x] [] [] [] [] [] [] []    Distance 30 feet x 2    DME None    Gait Quality Decreased chao     Weightbearing Status      Stairs      I=Independent, Mod I=Modified Independent, S=Supervision, SBA=Standby Assistance, CGA=Contact Guard Assistance,   Min=Minimal Assistance, Mod=Moderate Assistance, Max=Maximal Assistance, Total=Total Assistance, NT=Not Tested    PLAN:   FREQUENCY AND DURATION: 3 times/week for duration of hospital stay or until stated

## 2024-04-08 NOTE — PROGRESS NOTES
VANCO DAILY NOTE  [facility]   Pharmacy Pharmacokinetic Monitoring Service - Vancomycin    Consulting Provider: Tim   Indication: CAP, s/o chemo  Target Concentration: Goal AUC/FIOR 400-600 mg*hr/L  Day of Therapy: 1  Additional Antimicrobials: Zosyn    Pertinent Laboratory Values:   Wt Readings from Last 1 Encounters:   04/07/24 56 kg (123 lb 6.4 oz)     Temp Readings from Last 1 Encounters:   04/07/24 98.7 °F (37.1 °C) (Oral)     Recent Labs     04/07/24  1412   BUN 5*   CREATININE 0.50*   WBC 5.7   PROCAL 0.06   LACTSEPSIS 1.3     Estimated Creatinine Clearance: 87 mL/min (A) (based on SCr of 0.5 mg/dL (L)).    No results found for: \"VANCOTROUGH\", \"VANCORANDOM\"    MRSA Nasal Swab: not ordered. Order placed by pharmacy    Assessment:  Date/Time Dose Concentration AUC         Note: Serum concentrations collected for AUC dosing may appear elevated if collected in close proximity to the dose administered, this is not necessarily an indication of toxicity    Plan:  Dosing recommendations based on Bayesian software  Start vancomycin 1500 mg x1, then 750 mg q 12 hours  Anticipated AUC of 454 and trough concentration of 12.1 at steady state  Renal labs as indicated   Vancomycin concentrations will be ordered as clinically appropriate   Pharmacy will continue to monitor patient and adjust therapy as indicated    Thank you for the consult,  Becca Mckinley, PharmD, BCPS

## 2024-04-08 NOTE — CONSULTS
History and Physical Initial Visit NOTE           4/8/2024    Fide Mejia                        Date of Admission:  4/7/2024    The patient's chart is reviewed and the patient is discussed with the staff.    Subjective:     Patient is a 73 y.o.  female seen and evaluated at the request of Dr. Dr Scales for obstruction on Chest CT and evaluation for intervention.    She was seen by us in January with spiculated mass of left lung that was PET positive.  She has has Stage 4 COPD and chronic respiratory failure.  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 She has been followed by Hem/ onc and is She received cycle 1 carbo/taxol/keytruda on 4/1/24.      She presented with worsening SOB and back pain. She was admitted for pneumonia and zosyn/vancomycin were initiated. Chest CT was ordered showing more central left hilar mass lesion surrounding the exiting pulmonary arteries and inflowing pulmonary veins, extensive surrounding consolidation measurements of this lesion, and compression of the left distal main pulmonary artery. She has been afebrile. She is currently on 2 lpm with sat 97%. We were asked to evaluate for possible intervention.     She is having productive cough and some blood streaked sputum. She also reports diarrhea for the past several days. She denies fever or chest pain.        Review of Systems: Comprehensive ROS negative except in HPI    Current Outpatient Medications   Medication Instructions    acetaminophen (TYLENOL) 1,000 mg, Oral, EVERY 6 HOURS PRN    albuterol sulfate HFA (PROVENTIL;VENTOLIN;PROAIR) 108 (90 Base) MCG/ACT inhaler 2 puffs, Inhalation, EVERY 4 HOURS PRN    Alpha-Lipoic Acid 600 mg, Oral, DAILY    aspirin 81 MG EC tablet Oral, DAILY    buPROPion (WELLBUTRIN SR) 200 mg, Oral, 2 TIMES DAILY    Carboxymeth-Glyc-Polysorb PF 0.5-1-0.5 % SOLN Ophthalmic    dilTIAZem (TIAZAC) 360 mg, Oral, DAILY    docusate sodium (COLACE)

## 2024-04-08 NOTE — PLAN OF CARE
Problem: Safety - Adult  Goal: Free from fall injury  4/7/2024 2227 by Dallas Hair, RN  Outcome: Progressing     Problem: Pain  Goal: Verbalizes/displays adequate comfort level or baseline comfort level  4/7/2024 2227 by Dallas Hair, RN  Outcome: Progressing     Problem: Skin/Tissue Integrity  Goal: Absence of new skin breakdown  Description: 1.  Monitor for areas of redness and/or skin breakdown  2.  Assess vascular access sites hourly  3.  Every 4-6 hours minimum:  Change oxygen saturation probe site  4.  Every 4-6 hours:  If on nasal continuous positive airway pressure, respiratory therapy assess nares and determine need for appliance change or resting period.  Outcome: Progressing     Problem: Chronic Conditions and Co-morbidities  Goal: Patient's chronic conditions and co-morbidity symptoms are monitored and maintained or improved  Outcome: Progressing  Flowsheets (Taken 4/7/2024 1930 by Brandi Lucio, RN)  Care Plan - Patient's Chronic Conditions and Co-Morbidity Symptoms are Monitored and Maintained or Improved:   Monitor and assess patient's chronic conditions and comorbid symptoms for stability, deterioration, or improvement   Collaborate with multidisciplinary team to address chronic and comorbid conditions and prevent exacerbation or deterioration

## 2024-04-08 NOTE — CARE COORDINATION
Pt chart reviewed for discharge planning. CM met with pt and mother at bedside, verified demographic information/ health insurance. Pt lives with young daughter in one level home, states was independent with ADLs until this visit, uses no DME and drives. PCP was confirmed, last seen in the office last year 2023. Pt reports [facility] in the home four times a week for PT/OT and aide. Services currently on hold at this time. [phone] [facility] inform of [facility], [facility] with [facility] [id number] MSW gave information to pt and mother at bedside. CM will follow pt plan of care and assist with supportive care referrals pending pt clinical progress.  Please consult case management if specific needs arise.        04/07/24 1935   Service Assessment   Patient Orientation Alert and Oriented   Cognition Alert   History Provided By Patient   Primary Caregiver Self   Support Systems Parent;Family Members;Children   Patient's Healthcare Decision Maker is: Legal Next of Kin   PCP Verified by CM Yes   Last Visit to PCP Within last year   Prior Functional Level Independent in ADLs/IADLs   Current Functional Level Independent in ADLs/IADLs   Can patient return to prior living arrangement Yes   Ability to make needs known: Good   Family able to assist with home care needs: Yes   Would you like for me to discuss the discharge plan with any other family members/significant others, and if so, who? Yes  (Parent)   Financial Resources Medicare   Community Resources None   Social/Functional History   Lives With Daughter   Type of Home House   Home Layout One level   Bathroom Shower/Tub Walk-in shower   Home Equipment Walker, standard   Receives Help From Family   ADL Assistance Independent   Ambulation Assistance Needs assistance  (Pt states needing to use DME)   Active  Yes   Occupation Retired   Discharge Planning   Type of Residence House   Living Arrangements Children   Current Services Prior To

## 2024-04-08 NOTE — PROGRESS NOTES
received notification that PT wished to complete HC POA.  reviewed Chart and checked with RN to discern PT's ability to make decisions. PT's ability to make decisions is not in question at this time. PT was in bed awake. Daughter was at bedside. CH introduced self. PT and Daughter gave CH completed HC POA. CH reviewed HC POA document. PT named Daya Schwartz, Daughter, as  Agent. PT signed document in presence of Witnesses.  made copies for PT.  gave US copy of HC POA for file and to scan.  returned copies to PT. PT shared about health and hospitalization.  offered empathetic spiritual presence, active listening, and therapeutic communication. PT expressed comfort in [place] and Prayer. PT is Christian.   offered prayer.  gave PT a rosary.  thanked PT and Daughter for visit and offered support.     Rev. Mariama Keith M.Div.

## 2024-04-08 NOTE — H&P
Hospitalist History and Physical   Admit Date:  2024  1:57 PM   Name:  Fide Mejia   Age:  73 y.o.  Sex:  female  :  1950   MRN:  [id number]   Room:  [facility]    Presenting/Chief Complaint: Shortness of Breath (Stage 2 lung cancer)     Reason(s) for Admission: Acute respiratory distress [R06.03]  Bacterial pneumonia [J15.9]  Hypoxia [R09.02]  Healthcare-associated pneumonia [J18.9]  Malignant neoplasm of upper lobe of lung, unspecified laterality (HCC) [C34.10]     History of Present Illness:       Fide Mejia is a 73 y.o. female with medical history of COPD, chronic hypoxia on 3 L NC, squamous cell lung cancer left, on chemo sine 2024 , who is evaluated with back and abdominal pain, LE numbness, anorexia, shortness of breath since start of her chemo.   Taking her pain meds- not helpful  No emesis, no bleeding   Has lower back pain   Has cough with sputum        CXR opacification MITZI with volume loss and MITZI perihilar mass       CTA chest no PE, left hilar mass with consolidation MITZI and compression left main pulm artery and RUL airspace disease      S/p zosyn in ED   On 3 L NC baseline      WBC normal   Lactic normal   Blood cultures pending       Daughter at bedside,Daya [phone]   FULL CODE      In discussion of current situation, she is not sure that she wants to continue with current chemo plans and is asking to see oncology for ongoing discussions      Has diffuse 4/10 abdominal pain constantly         Assessment & Plan:     Principal Problem:    Bacterial pneumonia  Plan:   Admit to remote tele   D1 zosyn/ vancomycin  Followup cultures   On baseline 3 L NC          Active Problems:    Moderate COPD (chronic obstructive pulmonary disease) (HCC)  Plan:     Chronic respiratory failure (HCC)  Plan:   Resume inhalers   O2 as needed          Squamous carcinoma of lung, left (HCC)  Plan:     Palliative care patient  Plan:   Palliative/ oncology consult for goals discussion              lobe, compression of the left main pulmonary artery and interval development of right upper lobe airspace disease. The right lower lobe consolidative phenomenon has revitalize a demonstrating excellent aeration. Minimal residual scarring at the lung bases is present.     XR CHEST PORTABLE    Result Date: 4/7/2024  Chest X-ray INDICATION: Sepsis COMPARISON: CT chest 12/12/2023, chest radiograph 12/12/2023 TECHNIQUE: AP/PA view of the chest was obtained. FINDINGS: There is increased opacification at the left lung apex since the prior with left lung volume loss suggesting left upper lobe collapse. Similar masslike opacification in the left upper lobe at the level of the aortic arch, likely representing the pulmonary mass seen on prior CT. Hyperexpansion of the right lung. Normal size of the cardiac silhouette. Atherosclerosis in the aortic arch. Right chest port with catheter in the SVC. Spondylotic changes of the spine.     Interval development of opacification in the left upper lobe with associated volume loss suggesting left upper lobe collapse. Left upper lobe perihilar mass is similar in appearance. Right chest port.        Signed:  Livier Santana MD    Part of this note may have been written by using a voice dictation software.  The note has been proof read but may still contain some grammatical/other typographical errors.

## 2024-04-08 NOTE — PLAN OF CARE
Problem: Safety - Adult  Goal: Free from fall injury  4/8/2024 1142 by Patricia Johnson RN  Outcome: Progressing  4/7/2024 2227 by Dallas Hair RN  Outcome: Progressing     Problem: Pain  Goal: Verbalizes/displays adequate comfort level or baseline comfort level  4/8/2024 1142 by Patricia Johnson RN  Outcome: Progressing  Flowsheets (Taken 4/8/2024 0826)  Verbalizes/displays adequate comfort level or baseline comfort level: Encourage patient to monitor pain and request assistance  4/7/2024 2227 by Dallas Hair, RN  Outcome: Progressing     Problem: Skin/Tissue Integrity  Goal: Absence of new skin breakdown  Description: 1.  Monitor for areas of redness and/or skin breakdown  2.  Assess vascular access sites hourly  3.  Every 4-6 hours minimum:  Change oxygen saturation probe site  4.  Every 4-6 hours:  If on nasal continuous positive airway pressure, respiratory therapy assess nares and determine need for appliance change or resting period.  4/7/2024 2227 by Dallas Hair, RN  Outcome: Progressing     Problem: Chronic Conditions and Co-morbidities  Goal: Patient's chronic conditions and co-morbidity symptoms are monitored and maintained or improved  4/7/2024 2227 by Dallas Hair, RN  Outcome: Progressing  Flowsheets (Taken 4/7/2024 1930 by Brandi Lucio, RN)  Care Plan - Patient's Chronic Conditions and Co-Morbidity Symptoms are Monitored and Maintained or Improved:   Monitor and assess patient's chronic conditions and comorbid symptoms for stability, deterioration, or improvement   Collaborate with multidisciplinary team to address chronic and comorbid conditions and prevent exacerbation or deterioration

## 2024-04-08 NOTE — ACP (ADVANCE CARE PLANNING)
Advance Care Planning     Advance Care Planning Inpatient Note  [facility] Department    Today's Date: 4/8/2024  Unit: [facility]    Received request from HealthCare Provider, patient, and family.  Upon review of chart and communication with care team, patient's decision making abilities are not in question.. Patient and Child/Children was/were present in the room during visit.    Goals of ACP Conversation:  Discuss advance care planning documents    Health Care Decision Makers:       Primary Decision Maker: Daya Schwartz - Child - [phone]  Summary:  Completed New Documents    Advance Care Planning Documents (Patient Wishes):  Healthcare Power of /Advance Directive Appointment of Health Care Agent     Assessment:  CH received notification that PT wished to complete HC POA.  CH reviewed Chart and checked with RN to discern PT's ability to make decisions. PT's ability to make decisions is not in question at this time. PT was in bed awake. Daughter was at bedside. CH introduced self. PT and Daughter gave CH completed HC POA. CH reviewed HC POA document. PT named Daya Schwartz, Daughter, as  Agent. PT signed document in presence of Witnesses.  made copies for PT.  gave US copy of HC POA for file and to scan.  returned copies to PT.    Interventions:  Provided education on documents for clarity and greater understanding  Assisted in the completion of documents according to patient's wishes at this time    Outcomes/Plan:  New advance directive completed.  Returned original document(s) to patient, as well as copies for distribution to appointed agents  Copy of advance directive given to staff to scan into medical record.    Electronically signed by Chaplain GEORGETTE on 4/8/2024 at 4:44 PM

## 2024-04-08 NOTE — CONSULTS
Palliative Care    Patient: Fide Mejia MRN: [id number]  SSN: [id number]    YOB: 1950  Age: 73 y.o.  Sex: female       Date of Request: 4/7/2024  Date of Consult:  4/8/2024  Reason for Consult:  goals of care  Requesting Physician: Dr. Livier Santana     Assessment/Plan:     Principal Diagnosis:    Bacterial pneumonia    Additional Diagnoses:   Encounter for Palliative Care  Z51.5  SCC of lung    Palliative Performance Scale (PPS):   50%    Medical Decision Making:   Reviewed and summarized recent labs, notes, and imaging. Pt seen OOB sitting in chair with her mother at bedside. Pt reports she is having continued abdominal pain around her waist line, as well as chest and back pain. IV Dilaudid 0.5mg Q4hr PRN has been very helpful for pain. We discussed her current treatment plan, which includes continuing antibiotics for PNA and pulm consult per oncology. We discussed that though she doesn't need to make this decision now, she should be considering if she does or does not want to continue cancer treatment. I reminded pt that she is in charge of her healthcare decisions. Further discussions on code status are warranted.    Will discuss findings with members of the interdisciplinary team. Palliative Care will continue to follow.    Thank you for this referral.       More than 50% of this 55 minute visit was spent counseling and coordination of care as outlined above.    Subjective:     History obtained from:  Patient, Family, and Chart    Chief Complaint: Abdominal/chest pain  History of Present Illness: Fide Mejia is a 73 y.o. female with a PMH of stage IV COPD, chronic hypoxic respiratory failure on 3L NC, SCC of L lung (pt of Dr. Gagnon, received D1C1 of Keytruda and Carbo Taxol on 4/1). Pt is known to Palliative Care at the [facility]. Pt presented to [facility] ED on 4/7 with c/o productive cough, shortness of breath, and abdominal and chest pain. CT chest revealed L hilar lung mass

## 2024-04-09 PROBLEM — Z51.89 ENCOUNTER FOR OTHER SPECIFIED AFTERCARE: Status: ACTIVE | Noted: 2024-04-08

## 2024-04-09 PROBLEM — Z78.9 DECREASED ACTIVITIES OF DAILY LIVING (ADL): Status: ACTIVE | Noted: 2024-04-09

## 2024-04-09 PROBLEM — E44.0 MODERATE PROTEIN-CALORIE MALNUTRITION (HCC): Status: ACTIVE | Noted: 2024-04-09

## 2024-04-09 LAB
ALBUMIN SERPL-MCNC: 2.2 G/DL (ref 3.2–4.6)
ALBUMIN/GLOB SERPL: 0.6 (ref 0.4–1.6)
ALP SERPL-CCNC: 91 U/L (ref 50–136)
ALT SERPL-CCNC: 11 U/L (ref 12–65)
ANION GAP SERPL CALC-SCNC: 4 MMOL/L (ref 2–11)
AST SERPL-CCNC: 19 U/L (ref 15–37)
BASOPHILS # BLD: 0 K/UL (ref 0–0.2)
BASOPHILS NFR BLD: 0 % (ref 0–2)
BILIRUB SERPL-MCNC: 0.3 MG/DL (ref 0.2–1.1)
BUN SERPL-MCNC: 3 MG/DL (ref 8–23)
C. DIFFICILE TOXIN MOLECULAR: NEGATIVE
CALCIUM SERPL-MCNC: 8.3 MG/DL (ref 8.3–10.4)
CHLORIDE SERPL-SCNC: 108 MMOL/L (ref 103–113)
CO2 SERPL-SCNC: 30 MMOL/L (ref 21–32)
CREAT SERPL-MCNC: 0.4 MG/DL (ref 0.6–1)
DIFFERENTIAL METHOD BLD: ABNORMAL
EOSINOPHIL # BLD: 0.1 K/UL (ref 0–0.8)
EOSINOPHIL NFR BLD: 2 % (ref 0.5–7.8)
ERYTHROCYTE [DISTWIDTH] IN BLOOD BY AUTOMATED COUNT: 15.9 % (ref 11.9–14.6)
GLOBULIN SER CALC-MCNC: 3.5 G/DL (ref 2.8–4.5)
GLUCOSE SERPL-MCNC: 102 MG/DL (ref 65–100)
HCT VFR BLD AUTO: 30 % (ref 35.8–46.3)
HGB BLD-MCNC: 9 G/DL (ref 11.7–15.4)
IMM GRANULOCYTES # BLD AUTO: 0 K/UL (ref 0–0.5)
IMM GRANULOCYTES NFR BLD AUTO: 0 % (ref 0–5)
LYMPHOCYTES # BLD: 0.9 K/UL (ref 0.5–4.6)
LYMPHOCYTES NFR BLD: 26 % (ref 13–44)
MAGNESIUM SERPL-MCNC: 1.7 MG/DL (ref 1.8–2.4)
MCH RBC QN AUTO: 26.2 PG (ref 26.1–32.9)
MCHC RBC AUTO-ENTMCNC: 30 G/DL (ref 31.4–35)
MCV RBC AUTO: 87.2 FL (ref 82–102)
MONOCYTES # BLD: 0.3 K/UL (ref 0.1–1.3)
MONOCYTES NFR BLD: 9 % (ref 4–12)
NEUTS SEG # BLD: 2.2 K/UL (ref 1.7–8.2)
NEUTS SEG NFR BLD: 63 % (ref 43–78)
NRBC # BLD: 0 K/UL (ref 0–0.2)
PLATELET # BLD AUTO: 305 K/UL (ref 150–450)
PMV BLD AUTO: 9.9 FL (ref 9.4–12.3)
POTASSIUM SERPL-SCNC: 3 MMOL/L (ref 3.5–5.1)
PROT SERPL-MCNC: 5.7 G/DL (ref 6.3–8.2)
RBC # BLD AUTO: 3.44 M/UL (ref 4.05–5.2)
SODIUM SERPL-SCNC: 142 MMOL/L (ref 136–146)
VANCOMYCIN SERPL-MCNC: 16 UG/ML
WBC # BLD AUTO: 3.5 K/UL (ref 4.3–11.1)

## 2024-04-09 PROCEDURE — 6360000002 HC RX W HCPCS: Performed by: INTERNAL MEDICINE

## 2024-04-09 PROCEDURE — 94761 N-INVAS EAR/PLS OXIMETRY MLT: CPT

## 2024-04-09 PROCEDURE — 80202 ASSAY OF VANCOMYCIN: CPT

## 2024-04-09 PROCEDURE — 6370000000 HC RX 637 (ALT 250 FOR IP): Performed by: INTERNAL MEDICINE

## 2024-04-09 PROCEDURE — 99233 SBSQ HOSP IP/OBS HIGH 50: CPT | Performed by: INTERNAL MEDICINE

## 2024-04-09 PROCEDURE — 83735 ASSAY OF MAGNESIUM: CPT

## 2024-04-09 PROCEDURE — 1100000000 HC RM PRIVATE

## 2024-04-09 PROCEDURE — 94640 AIRWAY INHALATION TREATMENT: CPT

## 2024-04-09 PROCEDURE — 99221 1ST HOSP IP/OBS SF/LOW 40: CPT | Performed by: STUDENT IN AN ORGANIZED HEALTH CARE EDUCATION/TRAINING PROGRAM

## 2024-04-09 PROCEDURE — 94667 MNPJ CHEST WALL 1ST: CPT

## 2024-04-09 PROCEDURE — 99232 SBSQ HOSP IP/OBS MODERATE 35: CPT | Performed by: INTERNAL MEDICINE

## 2024-04-09 PROCEDURE — 80053 COMPREHEN METABOLIC PANEL: CPT

## 2024-04-09 PROCEDURE — 85025 COMPLETE CBC W/AUTO DIFF WBC: CPT

## 2024-04-09 PROCEDURE — 36591 DRAW BLOOD OFF VENOUS DEVICE: CPT

## 2024-04-09 PROCEDURE — 6360000002 HC RX W HCPCS: Performed by: HOSPITALIST

## 2024-04-09 PROCEDURE — 97530 THERAPEUTIC ACTIVITIES: CPT

## 2024-04-09 PROCEDURE — 2500000003 HC RX 250 WO HCPCS: Performed by: INTERNAL MEDICINE

## 2024-04-09 PROCEDURE — 2580000003 HC RX 258: Performed by: INTERNAL MEDICINE

## 2024-04-09 PROCEDURE — 2700000000 HC OXYGEN THERAPY PER DAY

## 2024-04-09 RX ORDER — GUAIFENESIN 600 MG/1
1200 TABLET, EXTENDED RELEASE ORAL 2 TIMES DAILY
Status: DISCONTINUED | OUTPATIENT
Start: 2024-04-09 | End: 2024-04-11 | Stop reason: HOSPADM

## 2024-04-09 RX ORDER — POTASSIUM CHLORIDE 20 MEQ/1
20 TABLET, EXTENDED RELEASE ORAL 2 TIMES DAILY
Status: DISCONTINUED | OUTPATIENT
Start: 2024-04-09 | End: 2024-04-11 | Stop reason: HOSPADM

## 2024-04-09 RX ADMIN — ROSUVASTATIN CALCIUM 20 MG: 20 TABLET, COATED ORAL at 20:50

## 2024-04-09 RX ADMIN — SODIUM CHLORIDE: 9 INJECTION, SOLUTION INTRAVENOUS at 05:25

## 2024-04-09 RX ADMIN — PANTOPRAZOLE SODIUM 40 MG: 40 TABLET, DELAYED RELEASE ORAL at 15:04

## 2024-04-09 RX ADMIN — LEVALBUTEROL HYDROCHLORIDE 0.63 MG: 0.63 SOLUTION RESPIRATORY (INHALATION) at 07:43

## 2024-04-09 RX ADMIN — SENNOSIDES 8.6 MG: 8.6 TABLET, FILM COATED ORAL at 20:50

## 2024-04-09 RX ADMIN — PREDNISONE 20 MG: 20 TABLET ORAL at 08:27

## 2024-04-09 RX ADMIN — SODIUM CHLORIDE, PRESERVATIVE FREE 10 ML: 5 INJECTION INTRAVENOUS at 08:42

## 2024-04-09 RX ADMIN — PRAMIPEXOLE DIHYDROCHLORIDE 0.75 MG: 0.5 TABLET ORAL at 20:50

## 2024-04-09 RX ADMIN — GUAIFENESIN 1200 MG: 600 TABLET ORAL at 20:50

## 2024-04-09 RX ADMIN — PIPERACILLIN AND TAZOBACTAM 3375 MG: 3; .375 INJECTION, POWDER, LYOPHILIZED, FOR SOLUTION INTRAVENOUS at 00:37

## 2024-04-09 RX ADMIN — ASPIRIN 81 MG: 81 TABLET, COATED ORAL at 08:27

## 2024-04-09 RX ADMIN — VANCOMYCIN HYDROCHLORIDE 750 MG: 750 INJECTION, POWDER, LYOPHILIZED, FOR SOLUTION INTRAVENOUS at 08:40

## 2024-04-09 RX ADMIN — BUDESONIDE 500 MCG: 0.5 INHALANT RESPIRATORY (INHALATION) at 07:43

## 2024-04-09 RX ADMIN — HYDROMORPHONE HYDROCHLORIDE 0.5 MG: 1 INJECTION, SOLUTION INTRAMUSCULAR; INTRAVENOUS; SUBCUTANEOUS at 08:47

## 2024-04-09 RX ADMIN — ENOXAPARIN SODIUM 40 MG: 100 INJECTION SUBCUTANEOUS at 20:49

## 2024-04-09 RX ADMIN — POTASSIUM CHLORIDE 20 MEQ: 1500 TABLET, EXTENDED RELEASE ORAL at 11:23

## 2024-04-09 RX ADMIN — MIRTAZAPINE 15 MG: 15 TABLET, FILM COATED ORAL at 20:50

## 2024-04-09 RX ADMIN — LEVALBUTEROL HYDROCHLORIDE 0.63 MG: 0.63 SOLUTION RESPIRATORY (INHALATION) at 20:14

## 2024-04-09 RX ADMIN — GUAIFENESIN 1200 MG: 600 TABLET ORAL at 11:23

## 2024-04-09 RX ADMIN — HYDROMORPHONE HYDROCHLORIDE 0.5 MG: 1 INJECTION, SOLUTION INTRAMUSCULAR; INTRAVENOUS; SUBCUTANEOUS at 15:04

## 2024-04-09 RX ADMIN — PIPERACILLIN AND TAZOBACTAM 3375 MG: 3; .375 INJECTION, POWDER, LYOPHILIZED, FOR SOLUTION INTRAVENOUS at 08:34

## 2024-04-09 RX ADMIN — VENLAFAXINE 75 MG: 75 TABLET ORAL at 20:53

## 2024-04-09 RX ADMIN — LEVALBUTEROL HYDROCHLORIDE 0.63 MG: 0.63 SOLUTION RESPIRATORY (INHALATION) at 15:30

## 2024-04-09 RX ADMIN — PANTOPRAZOLE SODIUM 40 MG: 40 TABLET, DELAYED RELEASE ORAL at 05:26

## 2024-04-09 RX ADMIN — TRAZODONE HYDROCHLORIDE 150 MG: 50 TABLET ORAL at 20:50

## 2024-04-09 RX ADMIN — LEVALBUTEROL HYDROCHLORIDE 0.63 MG: 0.63 SOLUTION RESPIRATORY (INHALATION) at 11:45

## 2024-04-09 RX ADMIN — HYDROMORPHONE HYDROCHLORIDE 0.5 MG: 1 INJECTION, SOLUTION INTRAMUSCULAR; INTRAVENOUS; SUBCUTANEOUS at 19:34

## 2024-04-09 RX ADMIN — SODIUM CHLORIDE, PRESERVATIVE FREE 10 ML: 5 INJECTION INTRAVENOUS at 19:35

## 2024-04-09 RX ADMIN — BUDESONIDE 500 MCG: 0.5 INHALANT RESPIRATORY (INHALATION) at 20:14

## 2024-04-09 ASSESSMENT — PAIN DESCRIPTION - DESCRIPTORS
DESCRIPTORS: POUNDING;CRAMPING
DESCRIPTORS: CRAMPING

## 2024-04-09 ASSESSMENT — PAIN SCALES - GENERAL
PAINLEVEL_OUTOF10: 7
PAINLEVEL_OUTOF10: 0
PAINLEVEL_OUTOF10: 0
PAINLEVEL_OUTOF10: 9
PAINLEVEL_OUTOF10: 0
PAINLEVEL_OUTOF10: 0
PAINLEVEL_OUTOF10: 7
PAINLEVEL_OUTOF10: 0
PAINLEVEL_OUTOF10: 2
PAINLEVEL_OUTOF10: 9

## 2024-04-09 ASSESSMENT — PAIN DESCRIPTION - ORIENTATION
ORIENTATION: RIGHT;MID;LEFT
ORIENTATION: RIGHT;LEFT

## 2024-04-09 ASSESSMENT — PAIN DESCRIPTION - LOCATION
LOCATION: ABDOMEN

## 2024-04-09 ASSESSMENT — PAIN DESCRIPTION - PAIN TYPE: TYPE: ACUTE PAIN

## 2024-04-09 ASSESSMENT — PAIN DESCRIPTION - FREQUENCY: FREQUENCY: CONTINUOUS

## 2024-04-09 ASSESSMENT — PAIN - FUNCTIONAL ASSESSMENT: PAIN_FUNCTIONAL_ASSESSMENT: ACTIVITIES ARE NOT PREVENTED

## 2024-04-09 ASSESSMENT — PAIN DESCRIPTION - ONSET: ONSET: GRADUAL

## 2024-04-09 NOTE — PLAN OF CARE
Problem: Safety - Adult  Goal: Free from fall injury  4/9/2024 0144 by Dallas Hair RN  Outcome: Progressing     Problem: Pain  Goal: Verbalizes/displays adequate comfort level or baseline comfort level  Recent Flowsheet Documentation  Taken 4/9/2024 0847 by Patricia Johnson RN  Verbalizes/displays adequate comfort level or baseline comfort level: Encourage patient to monitor pain and request assistance  4/9/2024 0144 by Dallas Hair RN  Outcome: Progressing     Problem: Skin/Tissue Integrity  Goal: Absence of new skin breakdown  Description: 1.  Monitor for areas of redness and/or skin breakdown  2.  Assess vascular access sites hourly  3.  Every 4-6 hours minimum:  Change oxygen saturation probe site  4.  Every 4-6 hours:  If on nasal continuous positive airway pressure, respiratory therapy assess nares and determine need for appliance change or resting period.  4/9/2024 0144 by Dallas Hair RN  Outcome: Progressing     Problem: Chronic Conditions and Co-morbidities  Goal: Patient's chronic conditions and co-morbidity symptoms are monitored and maintained or improved  Recent Flowsheet Documentation  Taken 4/9/2024 0827 by Patricia Johnson RN  Care Plan - Patient's Chronic Conditions and Co-Morbidity Symptoms are Monitored and Maintained or Improved: Monitor and assess patient's chronic conditions and comorbid symptoms for stability, deterioration, or improvement  4/9/2024 0144 by Dallas Hair RN  Outcome: Progressing     Problem: Respiratory - Adult  Goal: Achieves optimal ventilation and oxygenation  4/9/2024 0750 by Marlyn Recinos RCP  Outcome: Progressing  Flowsheets (Taken 4/9/2024 0750)  Achieves optimal ventilation and oxygenation:   Assess for changes in respiratory status   Respiratory therapy support as indicated   Position to facilitate oxygenation and minimize respiratory effort   Assess and instruct to report shortness of breath or any respiratory difficulty   Encourage

## 2024-04-09 NOTE — PROGRESS NOTES
Notified on-call NP about patient's change in mental status. Patient remains alert and oriented x 4 at this time, but has moments of confusion.     Patient waxing and waning with orientation.     Patient impulsive, jumping out of the bed without calling for assistance, not being mindful of IV pole or tele box. Making statements not appropriate to situation. But does answer her name and , the current year, the location, and situation correctly.      No new orders at this time.

## 2024-04-09 NOTE — PROGRESS NOTES
ACUTE PHYSICAL THERAPY GOALS:   (Developed with and agreed upon by patient and/or caregiver.)    (1.) Fide Mejia  will move from supine to sit and sit to supine  with INDEPENDENCE within 7 treatment day(s).    (2.) Fide Mejia will transfer from bed to chair and chair to bed with INDEPENDENCE using the least restrictive device within 7 treatment day(s).    (3.) Fide Mejia will ambulate with INDEPENDENCE for 300 feet with the least restrictive device within 7 treatment day(s).   (4.) Fide Mejia will perform standing static and dynamic balance activities x 20 minutes with SUPERVISION to improve safety within 7 treatment day(s).  (5.) Fide Mejia will ascend and descend 2 stairs using 1 hand rail(s) with SUPERVISION to improve functional mobility and safety within 7 treatment day(s).  (6.) Fide Mejia will perform therapeutic exercises x 20 min for HEP with INDEPENDENCE to improve strength, endurance, and functional mobility within 7 treatment day(s).     PHYSICAL THERAPY: Daily Note PM   (Link to Caseload Tracking: PT Visit Days : 2  Time In/Out PT Charge Capture  Rehab Caseload Tracker  Orders    Fide Mejia is a 73 y.o. female   PRIMARY DIAGNOSIS: Bacterial pneumonia  Acute respiratory distress [R06.03]  Bacterial pneumonia [J15.9]  Hypoxia [R09.02]  Healthcare-associated pneumonia [J18.9]  Malignant neoplasm of upper lobe of lung, unspecified laterality (HCC) [C34.10]       Inpatient: Payor: MEDICARE / Plan: MEDICARE PART A AND B / Product Type: *No Product type* /     ASSESSMENT:     REHAB RECOMMENDATIONS:   Recommendation to date pending progress:  Setting:  Home Health Therapy    Equipment:    To Be Determined     ASSESSMENT:  Ms. Mejia was received supine in bed, agreeable to therapy. She was able to sit at EOB with SBA. She progressed today to being able to ambulate 250 ft with SBA-CGA and no AD. She does have some mild balance deficits, education provided on activity pacing

## 2024-04-09 NOTE — CONSULTS
[facility], located within [facility]  Inpatient Rehab Consult        Admission Date: 4/7/2024  Primary Care Provider: Kelsi Driscoll MD  Specialty Group / Referring Service: Oncology      Chief Complaint : weakness  Admitting Diagnosis:   Acute respiratory distress [R06.03]  Bacterial pneumonia [J15.9]  Hypoxia [R09.02]  Healthcare-associated pneumonia [J18.9]  Malignant neoplasm of upper lobe of lung, unspecified laterality ([facility]) [C34.10]    Principal Problem:    Bacterial pneumonia  Active Problems:    Fatigue    Moderate COPD (chronic obstructive pulmonary disease) ([facility])    Tobacco use disorder    Chronic respiratory failure ([facility])    Palliative care patient    Essential hypertension with goal blood pressure less than 130/85    Hypoxia    Squamous carcinoma of lung, left ([facility])    Encounter for palliative care    Malignant neoplasm of upper lobe of lung ([facility])    Postobstructive pneumonia    Acute respiratory distress    Healthcare-associated pneumonia  Resolved Problems:    * No resolved hospital problems. 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 Ku    GERD (gastroesophageal reflux disease)     managed with med    Hyperlipidemia     managed with med    Lung cancer, upper lobe (HCC)     Left lung    Moderate COPD (chronic obstructive pulmonary disease) ([facility])      Range    WBC 5.7 4.3 - 11.1 K/uL    RBC 4.19 4.05 - 5.2 M/uL    Hemoglobin 11.2 (L) 11.7 - 15.4 g/dL    Hematocrit 35.5 (L) 35.8 - 46.3 %    MCV 84.7 82 - 102 FL    MCH 26.7 26.1 - 32.9 PG    MCHC 31.5 31.4 - 35.0 g/dL    RDW 16.0 (H) 11.9 - 14.6 %    Platelets 313 150 - 450 K/uL    MPV 9.8 9.4 - 12.3 FL    nRBC 0.00 0.0 - 0.2 K/uL    Differential Type AUTOMATED      Neutrophils % 73 43 - 78 %    Lymphocytes % 20 13 - 44 %    Monocytes % 2 (L) 4.0 - 12.0 %    Eosinophils % 4 0.5 - 7.8 %    Basophils % 0 0.0 - 2.0 %    Immature Granulocytes % 1 0.0 - 5.0 %    Neutrophils Absolute 4.2 1.7 - 8.2 K/UL    Lymphocytes Absolute 1.2 0.5 - 4.6 K/UL    Monocytes Absolute 0.1 0.1 - 1.3 K/UL    Eosinophils Absolute 0.2 0.0 - 0.8 K/UL    Basophils Absolute 0.0 0.0 - 0.2 K/UL    Immature Granulocytes Absolute 0.0 0.0 - 0.5 K/UL   Lactate, Sepsis    Collection Time: 04/07/24  2:12 PM   Result Value Ref Range    Lactic Acid, Sepsis 1.3 0.4 - 2.0 MMOL/L   Procalcitonin    Collection Time: 04/07/24  2:12 PM   Result Value Ref Range    Procalcitonin 0.06 0.00 - 0.49 ng/mL   Troponin    Collection Time: 04/07/24  2:12 PM   Result Value Ref Range    Troponin, High Sensitivity 6.4 0 - 14 pg/mL   Brain Natriuretic Peptide    Collection Time: 04/07/24  2:12 PM   Result Value Ref Range    NT Pro- (H) 5 - 125 PG/ML   Blood Culture 2    Collection Time: 04/07/24  2:28 PM    Specimen: Blood   Result Value Ref Range    Special Requests RIGHT  Antecubital        Culture NO GROWTH 2 DAYS     Potassium    Collection Time: 04/07/24  7:48 PM   Result Value Ref Range    Potassium 3.2 (L) 3.5 - 5.1 mmol/L   Urinalysis w/Reflex to Micro    Collection Time: 04/07/24 11:35 PM   Result Value Ref Range    Color, UA YELLOW/STRAW      Appearance CLEAR      Specific Gravity, UA 1.028 (H) 1.001 - 1.023      pH, Urine 7.5 5.0 - 9.0      Protein, UA Negative NEG mg/dL    Glucose, UA Negative mg/dL    Ketones, Urine TRACE (A) NEG mg/dL    Bilirubin Urine

## 2024-04-09 NOTE — PROGRESS NOTES
[facility]        Inpatient Hematology / Oncology Progress Note    Reason for Consult:  Acute respiratory distress [R06.03]  Bacterial pneumonia [J15.9]  Hypoxia [R09.02]  Healthcare-associated pneumonia [J18.9]  Malignant neoplasm of upper lobe of lung, unspecified laterality (HCC) [C34.10]  Referring Physician:  Marilyn Scales MD    24 Hour Events:  VSS, afebrile - on 2-3L NC overnight  Some intermittent confusion overnight, up without assistance  Mental status better this morning but appears fatigued  Stopping abx today - low suspicion for pneumonia  7 stools documented overnight, stool studies pending  Daughter at bedside      ROS:  Constitutional: Negative for fever, chills, weakness, malaise, fatigue.  CV: Negative for chest pain, palpitations, edema.  Respiratory: Negative for dyspnea, cough, wheezing.  GI: Negative for nausea, abdominal pain, diarrhea.    10 point review of systems is otherwise negative with the exception of the elements mentioned above in the HPI.       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 Ku    GERD (gastroesophageal reflux disease)     managed with med    Hyperlipidemia     managed with med    Lung cancer, upper lobe (HCC)     Left lung    Moderate COPD (chronic obstructive pulmonary disease) ([facility])     6 Liters O2 NC daily and at night; Inhalers    Palliative care patient     Paroxysmal tachycardia ([facility]) 7/7/2016     iterative  reconstruction.    COMPARISON: None    FINDINGS:  - LUNG BASES: Bibasilar atelectasis/scarring.  - LIVER: Normal in size and appearance.  - GALLBLADDER/BILE DUCTS: No gallstones or bile duct dilatation.  - PANCREAS: Normal.  - SPLEEN: Normal.    - ADRENALS: Normal.  - KIDNEYS/URETERS: No hydronephrosis or significant mass.  - BLADDER: Normal.  - REPRODUCTIVE ORGANS: No pelvic masses.    - BOWEL: Normal caliber.  No inflammatory changes. Colonic diverticulosis.  - LYMPH NODES: No significant retroperitoneal, mesenteric, or pelvic adenopathy.  - BONES: No fracture or significant bone lesion. Moderate to space loss L5-S1.  - VASCULATURE: Heavy atherosclerotic vascular disease.- OTHER: No ascites.    Impression  No evidence of acute intra-abdominal pathology.    Colonic diverticulosis.        ASSESSMENT:      ICD-10-CM    1. Healthcare-associated pneumonia  J18.9       2. Hypoxia  R09.02       3. Acute respiratory distress  R06.03       4. Malignant neoplasm of upper lobe of lung, unspecified laterality (HCC)  C34.10         Ms. Mejia is a 73 y.o. female admitted on 4/7/2024. The primary encounter diagnosis was Healthcare-associated pneumonia. Diagnoses of Hypoxia, Acute respiratory distress, and Malignant neoplasm of upper lobe of lung, unspecified laterality (HCC) were also pertinent to this visit.    Ms Mejia has PMH of COPD with chronic respiratory failure on home O2. She is a patient of Dr Gagnon with [facility] of the lung involving L lung and L hilar nodes, station IIL and 7+. She received cycle 1 carbo/taxol/keytruda on 4/1/24. She was admitted with abdominal/back/flank pain. CT AP w/o contrast negative as well as UA. She also noted SOB. CT chest without PE but did show MITZI mass with interval development of consolidation of MITZI, compression of L main pulmonary artery and interval development of RUL airspace. She was empirically started on Zosyn/Vanc. BC pending. Procal and LA negative. Oncology asked to

## 2024-04-09 NOTE — PROGRESS NOTES
Pulmonary Daily Progress NOTE           4/9/2024    Fide Mejia                        Date of Admission:  4/7/2024    Hospital course:  Patient is a 73 y.o.  female seen and evaluated at the request of Dr. Dr Scales for obstruction on Chest CT and evaluation for intervention.    She was seen by us in January with spiculated mass of left lung that was PET positive.  She has has Stage 4 COPD and chronic respiratory failure.  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 She has been followed by Hem/ onc and is She received cycle 1 carbo/taxol/keytruda on 4/1/24.      She presented with worsening SOB and back pain. She was admitted for pneumonia and zosyn/vancomycin were initiated. Chest CT was ordered showing more central left hilar mass lesion surrounding the exiting pulmonary arteries and inflowing pulmonary veins, extensive surrounding consolidation measurements of this lesion, and compression of the left distal main pulmonary artery. She has been afebrile. She is currently on 2 lpm with sat 97%. We were asked to evaluate for possible intervention.     She is having productive cough and some blood streaked sputum. She also reports diarrhea for the past several days. She denies fever or chest pain.     The patient's chart is reviewed and the patient is discussed with the staff.    Subjective:     Patient today is congested but feels less dyspnea. Transferred to a new room today.        Review of Systems: Comprehensive ROS negative except in HPI    Current Outpatient Medications   Medication Instructions    acetaminophen (TYLENOL) 1,000 mg, Oral, EVERY 6 HOURS PRN    albuterol sulfate HFA (PROVENTIL;VENTOLIN;PROAIR) 108 (90 Base) MCG/ACT inhaler 2 puffs, Inhalation, EVERY 4 HOURS PRN    Alpha-Lipoic Acid 600 mg, Oral, DAILY    aspirin 81 MG EC tablet Oral, DAILY    buPROPion (WELLBUTRIN SR) 200 mg, Oral, 2 TIMES DAILY    Carboxymeth-Glyc-Polysorb PF  AM    CO2 29 04/08/2024 05:42 AM    BUN 3 04/08/2024 05:42 AM    CREATININE 0.30 04/08/2024 05:42 AM    GLUCOSE 104 04/08/2024 05:42 AM    CALCIUM 8.0 04/08/2024 05:42 AM      Lab Results   Component Value Date    BNP 49 09/27/2019       ECHO: No results found for this or any previous visit.    MICRO:   Recent Labs     04/07/24  1412 04/07/24  1428 04/08/24  1538   CULTURE NO GROWTH 2 DAYS NO GROWTH 2 DAYS No growth after short period of incubation. Further results to follow after overnight incubation.     No results for input(s): \"COVID19\" in the last 72 hours.  Assessment and Plan:  (Medical Decision Making)   Impression 73 year old with       Bacterial pneumonia  Plan:   --with consolidation on Left,  ?post obstructive pna vs CA  --on Abx (Vanc and zosyn) and MRSA screen is also positive. Continue for 7 days and f/u cultures  --Recent chemo on 4/1/2024.       Moderate COPD (chronic obstructive pulmonary disease) (HCC)  Plan: PFT ordered in January 2024, she is on home oxygen  --continue nebs  --add mucinex  --add flutter valve.       Tobacco use disorder  Plan: history of with squamous cell lung cancer         Chronic respiratory failure (HCC)  Plan: on 2 lpm at home and here      Palliative care patient  Plan: consulted here       Squamous carcinoma of lung, left (HCC)    Encounter for palliative care    Malignant neoplasm of upper lobe of lung (HCC)  Plan: here with squamous cell carcinoma ( with left hilar node involvement stage 2B) s/p chemo on 4/1. Chest CT with worsening consolidation MITZI and possible compression. Currently on 2 lpm.   --monitor sputum since has blood and if worse may need bronch, but hold off for now.         Full Code     More than 50% of the time documented was spent in face-to-face contact with the patient and in the care of the patient on the floor/unit where the patient is located.         Steffen Galvin MD

## 2024-04-09 NOTE — PROGRESS NOTES
Just spoke with Dr. Scales and overall we both agree do not feel has PNA and will stop abx. Also about 8+ bowel movements and C-diff negative. Procal was also only 0.06 as well which is unremarkable. All cultures are negative as well.     Steffen Galvin MD

## 2024-04-09 NOTE — PROGRESS NOTES
Patient moved closer to nursing station for safety concerns/fall risk. Impulsive and frequently getting out of bed without assistance.

## 2024-04-09 NOTE — PLAN OF CARE
Problem: Safety - Adult  Goal: Free from fall injury  Outcome: Progressing     Problem: Pain  Goal: Verbalizes/displays adequate comfort level or baseline comfort level  Outcome: Progressing     Problem: Skin/Tissue Integrity  Goal: Absence of new skin breakdown  Description: 1.  Monitor for areas of redness and/or skin breakdown  2.  Assess vascular access sites hourly  3.  Every 4-6 hours minimum:  Change oxygen saturation probe site  4.  Every 4-6 hours:  If on nasal continuous positive airway pressure, respiratory therapy assess nares and determine need for appliance change or resting period.  Outcome: Progressing     Problem: Chronic Conditions and Co-morbidities  Goal: Patient's chronic conditions and co-morbidity symptoms are monitored and maintained or improved  Outcome: Progressing

## 2024-04-09 NOTE — PROGRESS NOTES
Patient took current pills but stated that she did not want to take anymore.  Concerned that we need another stool sample.  Educated patient that she is welcome to stop treatment id she wanted to

## 2024-04-09 NOTE — PROGRESS NOTES
Comprehensive Nutrition Assessment    Type and Reason for Visit: Initial, Positive Nutrition Screen  Malnutrition Screening Tool: Malnutrition Screen  Have you recently lost weight without trying?: 2 to 13 pounds (1 point)  Have you been eating poorly because of a decreased appetite?: Yes (1 point)  Malnutrition Screening Tool Score: 2    Nutrition Recommendations/Plan:   Meals and Snacks:  Diet: Continue current order  Nutrition Supplement Therapy:  Medical food supplement therapy:  Initiate Ensure Enlive three times per day (this provides 350 kcal and 20 grams protein per bottle) - chocolate     Malnutrition Assessment:  Malnutrition Status: Moderate malnutrition  Context: Chronic Illness  Findings of clinical characteristics of malnutrition:   Energy Intake:  Mild decrease in energy intake (Comment) (Still trying to eat 3 times per day but decreased volumes)  Weight Loss:  Greater than 10% over 6 months (21# (14.3%))     Body Fat Loss:  No significant body fat loss     Muscle Mass Loss:  Mild muscle mass loss Temples (temporalis), Clavicles (pectoralis & deltoids)  Fluid Accumulation:  No significant fluid accumulation     Strength:  Not Performed     Nutrition Assessment:  Nutrition History: Patient reports weight loss initially due to her COPD effecting her PO. She state appetite and intake then significant decline after she broke her sternum in January of this year due to pain. This has been further impacted bu recent chemo. She recalls that she still tries to eat 3 times per day but her intake is significantly declined due to above factors. She has only recently started using Premier Protein drinks.      Do You Have Any Cultural, Worship, or Ethnic Food Preferences?: No   Weight History: 7/14/23 160#, 10/9/23 160#, 1/30/24 144#, 3/13/24 144#, 4/1/24 136#.   Nutrition Background:       PMH significant for COPD SqCC of left lung on chemo. She presented with abdominal and back pain. She was admitted with  Behavior, Weight    Discharge Planning:    Continue Oral Nutrition Supplement    AJAY SIMS, RD

## 2024-04-09 NOTE — PROGRESS NOTES
PT was in chair. PT stated PT was feeling better. PT updated CH on health and hospitalization. CH offered prayer. CH checked for unmet needs and offered support.     Rev. Mariama Keith M.Div.

## 2024-04-09 NOTE — CARE COORDINATION
CM spoke with pt's daughter Daya regarding d/c planning.  Per Daya her grandmother is very concerned about having [facility].  CM called [facility] - Young Díaz - to discuss d/c planning.  She stated that pt's mother called her this morning quite upset.  Young explained that CM will work with Young to ensure everything that is needed is ordered at d/c.  Pt's mother is requesting a HH aide - CM instructed to add this on the HH order.  PT/OT are recommending HH at d/c.  Pt's daughter and grandmother are concerned about pt being a fall risk at d/c and are hoping she can d/c to [facility].  CM called PT and they will re-evaluate pt today.  CM placed a referral to [facility] for pt to be evaluated.  CM will continue to follow.  LOS = 2 days    Pt denied for [facility].

## 2024-04-09 NOTE — PLAN OF CARE
Problem: Respiratory - Adult  Goal: Achieves optimal ventilation and oxygenation  Outcome: Progressing  Flowsheets (Taken 4/9/2024 0750)  Achieves optimal ventilation and oxygenation:   Assess for changes in respiratory status   Respiratory therapy support as indicated   Position to facilitate oxygenation and minimize respiratory effort   Assess and instruct to report shortness of breath or any respiratory difficulty   Encourage broncho-pulmonary hygiene including cough, deep breathe, incentive spirometry   Assess for changes in mentation and behavior

## 2024-04-10 LAB
ALBUMIN SERPL-MCNC: 2.1 G/DL (ref 3.2–4.6)
ALBUMIN/GLOB SERPL: 0.6 (ref 0.4–1.6)
ALP SERPL-CCNC: 89 U/L (ref 50–136)
ALT SERPL-CCNC: 13 U/L (ref 12–65)
ANION GAP SERPL CALC-SCNC: 2 MMOL/L (ref 2–11)
AST SERPL-CCNC: 17 U/L (ref 15–37)
BASOPHILS # BLD: 0 K/UL (ref 0–0.2)
BASOPHILS NFR BLD: 0 % (ref 0–2)
BILIRUB SERPL-MCNC: 0.2 MG/DL (ref 0.2–1.1)
BUN SERPL-MCNC: <1 MG/DL (ref 8–23)
CALCIUM SERPL-MCNC: 8.1 MG/DL (ref 8.3–10.4)
CHLORIDE SERPL-SCNC: 111 MMOL/L (ref 103–113)
CO2 SERPL-SCNC: 30 MMOL/L (ref 21–32)
CREAT SERPL-MCNC: 0.5 MG/DL (ref 0.6–1)
DIFFERENTIAL METHOD BLD: ABNORMAL
EOSINOPHIL # BLD: 0 K/UL (ref 0–0.8)
EOSINOPHIL NFR BLD: 1 % (ref 0.5–7.8)
ERYTHROCYTE [DISTWIDTH] IN BLOOD BY AUTOMATED COUNT: 15.9 % (ref 11.9–14.6)
GLOBULIN SER CALC-MCNC: 3.6 G/DL (ref 2.8–4.5)
GLUCOSE SERPL-MCNC: 107 MG/DL (ref 65–100)
HCT VFR BLD AUTO: 28.9 % (ref 35.8–46.3)
HGB BLD-MCNC: 8.9 G/DL (ref 11.7–15.4)
IMM GRANULOCYTES # BLD AUTO: 0 K/UL (ref 0–0.5)
IMM GRANULOCYTES NFR BLD AUTO: 0 % (ref 0–5)
LYMPHOCYTES # BLD: 0.9 K/UL (ref 0.5–4.6)
LYMPHOCYTES NFR BLD: 28 % (ref 13–44)
MAGNESIUM SERPL-MCNC: 1.7 MG/DL (ref 1.8–2.4)
MCH RBC QN AUTO: 26.4 PG (ref 26.1–32.9)
MCHC RBC AUTO-ENTMCNC: 30.8 G/DL (ref 31.4–35)
MCV RBC AUTO: 85.8 FL (ref 82–102)
MONOCYTES # BLD: 0.4 K/UL (ref 0.1–1.3)
MONOCYTES NFR BLD: 10 % (ref 4–12)
NEUTS SEG # BLD: 2 K/UL (ref 1.7–8.2)
NEUTS SEG NFR BLD: 61 % (ref 43–78)
NRBC # BLD: 0 K/UL (ref 0–0.2)
PLATELET # BLD AUTO: 306 K/UL (ref 150–450)
PMV BLD AUTO: 9.3 FL (ref 9.4–12.3)
POTASSIUM SERPL-SCNC: 3.2 MMOL/L (ref 3.5–5.1)
PROT SERPL-MCNC: 5.7 G/DL (ref 6.3–8.2)
RBC # BLD AUTO: 3.37 M/UL (ref 4.05–5.2)
SODIUM SERPL-SCNC: 143 MMOL/L (ref 136–146)
WBC # BLD AUTO: 3.4 K/UL (ref 4.3–11.1)

## 2024-04-10 PROCEDURE — 99232 SBSQ HOSP IP/OBS MODERATE 35: CPT | Performed by: INTERNAL MEDICINE

## 2024-04-10 PROCEDURE — 6370000000 HC RX 637 (ALT 250 FOR IP): Performed by: INTERNAL MEDICINE

## 2024-04-10 PROCEDURE — 1100000000 HC RM PRIVATE

## 2024-04-10 PROCEDURE — 83735 ASSAY OF MAGNESIUM: CPT

## 2024-04-10 PROCEDURE — 6360000002 HC RX W HCPCS: Performed by: INTERNAL MEDICINE

## 2024-04-10 PROCEDURE — 94760 N-INVAS EAR/PLS OXIMETRY 1: CPT

## 2024-04-10 PROCEDURE — 94640 AIRWAY INHALATION TREATMENT: CPT

## 2024-04-10 PROCEDURE — 2580000003 HC RX 258: Performed by: INTERNAL MEDICINE

## 2024-04-10 PROCEDURE — 85025 COMPLETE CBC W/AUTO DIFF WBC: CPT

## 2024-04-10 PROCEDURE — 80053 COMPREHEN METABOLIC PANEL: CPT

## 2024-04-10 PROCEDURE — 2500000003 HC RX 250 WO HCPCS: Performed by: INTERNAL MEDICINE

## 2024-04-10 PROCEDURE — 97530 THERAPEUTIC ACTIVITIES: CPT

## 2024-04-10 PROCEDURE — 36591 DRAW BLOOD OFF VENOUS DEVICE: CPT

## 2024-04-10 PROCEDURE — 97112 NEUROMUSCULAR REEDUCATION: CPT

## 2024-04-10 PROCEDURE — 6360000002 HC RX W HCPCS: Performed by: HOSPITALIST

## 2024-04-10 RX ORDER — LEVALBUTEROL INHALATION SOLUTION 0.63 MG/3ML
0.63 SOLUTION RESPIRATORY (INHALATION)
Status: DISCONTINUED | OUTPATIENT
Start: 2024-04-10 | End: 2024-04-11 | Stop reason: HOSPADM

## 2024-04-10 RX ORDER — HYDROMORPHONE HYDROCHLORIDE 4 MG/1
2-4 TABLET ORAL EVERY 4 HOURS PRN
Qty: 18 TABLET | Refills: 0 | Status: SHIPPED | OUTPATIENT
Start: 2024-04-10 | End: 2024-04-13

## 2024-04-10 RX ORDER — LORAZEPAM 1 MG/1
1 TABLET ORAL EVERY 6 HOURS PRN
Qty: 12 TABLET | Refills: 0 | Status: SHIPPED | OUTPATIENT
Start: 2024-04-10 | End: 2024-04-13

## 2024-04-10 RX ADMIN — HYDROMORPHONE HYDROCHLORIDE 0.5 MG: 1 INJECTION, SOLUTION INTRAMUSCULAR; INTRAVENOUS; SUBCUTANEOUS at 13:29

## 2024-04-10 RX ADMIN — ROSUVASTATIN CALCIUM 20 MG: 20 TABLET, COATED ORAL at 21:18

## 2024-04-10 RX ADMIN — TRAZODONE HYDROCHLORIDE 150 MG: 50 TABLET ORAL at 21:17

## 2024-04-10 RX ADMIN — HYDROMORPHONE HYDROCHLORIDE 0.5 MG: 1 INJECTION, SOLUTION INTRAMUSCULAR; INTRAVENOUS; SUBCUTANEOUS at 08:26

## 2024-04-10 RX ADMIN — PREDNISONE 20 MG: 20 TABLET ORAL at 08:19

## 2024-04-10 RX ADMIN — HYDROMORPHONE HYDROCHLORIDE 0.5 MG: 1 INJECTION, SOLUTION INTRAMUSCULAR; INTRAVENOUS; SUBCUTANEOUS at 18:34

## 2024-04-10 RX ADMIN — SODIUM CHLORIDE, PRESERVATIVE FREE 10 ML: 5 INJECTION INTRAVENOUS at 08:20

## 2024-04-10 RX ADMIN — PANTOPRAZOLE SODIUM 40 MG: 40 TABLET, DELAYED RELEASE ORAL at 06:27

## 2024-04-10 RX ADMIN — MIRTAZAPINE 15 MG: 15 TABLET, FILM COATED ORAL at 21:18

## 2024-04-10 RX ADMIN — LEVALBUTEROL HYDROCHLORIDE 0.63 MG: 0.63 SOLUTION RESPIRATORY (INHALATION) at 20:35

## 2024-04-10 RX ADMIN — POTASSIUM BICARBONATE 40 MEQ: 782 TABLET, EFFERVESCENT ORAL at 08:36

## 2024-04-10 RX ADMIN — SODIUM CHLORIDE, PRESERVATIVE FREE 10 ML: 5 INJECTION INTRAVENOUS at 21:18

## 2024-04-10 RX ADMIN — PRAMIPEXOLE DIHYDROCHLORIDE 0.75 MG: 0.5 TABLET ORAL at 21:17

## 2024-04-10 RX ADMIN — HYDROMORPHONE HYDROCHLORIDE 0.5 MG: 1 INJECTION, SOLUTION INTRAMUSCULAR; INTRAVENOUS; SUBCUTANEOUS at 22:37

## 2024-04-10 RX ADMIN — BUDESONIDE 500 MCG: 0.5 INHALANT RESPIRATORY (INHALATION) at 20:35

## 2024-04-10 RX ADMIN — GUAIFENESIN 1200 MG: 600 TABLET ORAL at 08:20

## 2024-04-10 RX ADMIN — VENLAFAXINE 75 MG: 75 TABLET ORAL at 21:21

## 2024-04-10 RX ADMIN — PANTOPRAZOLE SODIUM 40 MG: 40 TABLET, DELAYED RELEASE ORAL at 16:25

## 2024-04-10 RX ADMIN — ASPIRIN 81 MG: 81 TABLET, COATED ORAL at 08:20

## 2024-04-10 RX ADMIN — GUAIFENESIN 1200 MG: 600 TABLET ORAL at 21:17

## 2024-04-10 ASSESSMENT — PAIN DESCRIPTION - DESCRIPTORS
DESCRIPTORS: CRAMPING;ACHING
DESCRIPTORS: ACHING
DESCRIPTORS: ACHING;DISCOMFORT
DESCRIPTORS: ACHING;CRAMPING
DESCRIPTORS: ACHING;DULL;DISCOMFORT

## 2024-04-10 ASSESSMENT — PAIN - FUNCTIONAL ASSESSMENT
PAIN_FUNCTIONAL_ASSESSMENT: PREVENTS OR INTERFERES SOME ACTIVE ACTIVITIES AND ADLS
PAIN_FUNCTIONAL_ASSESSMENT: PREVENTS OR INTERFERES SOME ACTIVE ACTIVITIES AND ADLS

## 2024-04-10 ASSESSMENT — PAIN DESCRIPTION - ORIENTATION
ORIENTATION: RIGHT;LEFT
ORIENTATION: RIGHT;LEFT

## 2024-04-10 ASSESSMENT — PAIN DESCRIPTION - LOCATION
LOCATION: ABDOMEN;BACK
LOCATION: ABDOMEN
LOCATION: ABDOMEN
LOCATION: BACK;ABDOMEN
LOCATION: ABDOMEN
LOCATION: ABDOMEN

## 2024-04-10 ASSESSMENT — PAIN SCALES - GENERAL
PAINLEVEL_OUTOF10: 4
PAINLEVEL_OUTOF10: 0
PAINLEVEL_OUTOF10: 0
PAINLEVEL_OUTOF10: 8
PAINLEVEL_OUTOF10: 0
PAINLEVEL_OUTOF10: 8
PAINLEVEL_OUTOF10: 0
PAINLEVEL_OUTOF10: 9
PAINLEVEL_OUTOF10: 3
PAINLEVEL_OUTOF10: 8

## 2024-04-10 ASSESSMENT — PAIN DESCRIPTION - FREQUENCY
FREQUENCY: CONTINUOUS

## 2024-04-10 ASSESSMENT — PAIN DESCRIPTION - ONSET
ONSET: ON-GOING
ONSET: ON-GOING

## 2024-04-10 ASSESSMENT — PAIN DESCRIPTION - PAIN TYPE
TYPE: ACUTE PAIN
TYPE: ACUTE PAIN

## 2024-04-10 NOTE — PLAN OF CARE
Problem: Safety - Adult  Goal: Free from fall injury  Outcome: Progressing     Problem: Pain  Goal: Verbalizes/displays adequate comfort level or baseline comfort level  Outcome: Progressing     Problem: Skin/Tissue Integrity  Goal: Absence of new skin breakdown  Description: 1.  Monitor for areas of redness and/or skin breakdown  2.  Assess vascular access sites hourly  3.  Every 4-6 hours minimum:  Change oxygen saturation probe site  4.  Every 4-6 hours:  If on nasal continuous positive airway pressure, respiratory therapy assess nares and determine need for appliance change or resting period.  Outcome: Progressing     Problem: Respiratory - Adult  Goal: Achieves optimal ventilation and oxygenation  Outcome: Progressing

## 2024-04-10 NOTE — PROGRESS NOTES
ACUTE OCCUPATIONAL THERAPY GOALS:   (Developed with and agreed upon by patient and/or caregiver.)  1. Patient will complete lower body bathing and dressing with MOD I and adaptive equipment as needed.   2.Patient will complete upper body bathing and dressing with MOD I and adaptive equipment as needed.  3. Patient will complete toileting with MOD I.   4. Patient will tolerate 30 minutes of OT treatment with 1-2 rest breaks to increase activity tolerance for ADLs.   5. Patient will complete functional transfers with MOD I and adaptive equipment as needed.   6. Patient will complete functional activity with MOD I and adaptive equipment as needed.  7. Patient will complete simple grooming task standing at the sink with MOD I.     Timeframe: 7 visits     OCCUPATIONAL THERAPY: Daily Note    OT Visit Days: 2   Time In/Out  OT Charge Capture  Rehab Caseload Tracker  OT Orders    Fide Mejia is a 73 y.o. female   PRIMARY DIAGNOSIS: Bacterial pneumonia  Acute respiratory distress [R06.03]  Bacterial pneumonia [J15.9]  Hypoxia [R09.02]  Healthcare-associated pneumonia [J18.9]  Malignant neoplasm of upper lobe of lung, unspecified laterality (HCC) [C34.10]       Inpatient: Payor: MEDICARE / Plan: MEDICARE PART A AND B / Product Type: *No Product type* /     ASSESSMENT:     REHAB RECOMMENDATIONS:   Recommendation to date pending progress:  Setting:  Home with hospice    Equipment:    None     ASSESSMENT:  Ms. Mejia is progressing well towards OT goals but required max encouragement to participate. Today, pt was received sitting in the chair. Completed LB dressing, functional transfers, and ambulation without AD with supervision. Pt not interested in any further mobility or self-care today. Following treatment session, pt decided to transition to hospice services. Will d/c home with hospice tomorrow. Ms. Mejia continues to demonstrate overall deficits in strength, balance, activity tolerance, and ADL performance. Continue

## 2024-04-10 NOTE — PLAN OF CARE
Problem: Safety - Adult  Goal: Free from fall injury  4/10/2024 1148 by Patricia Johnson RN  Outcome: Progressing  4/9/2024 2202 by Dallas Hair RN  Outcome: Progressing     Problem: Pain  Goal: Verbalizes/displays adequate comfort level or baseline comfort level  4/9/2024 2202 by Dallas Hair, RN  Outcome: Progressing  Flowsheets (Taken 4/9/2024 0847 by Patricia Johnson, RN)  Verbalizes/displays adequate comfort level or baseline comfort level: Encourage patient to monitor pain and request assistance     Problem: Skin/Tissue Integrity  Goal: Absence of new skin breakdown  Description: 1.  Monitor for areas of redness and/or skin breakdown  2.  Assess vascular access sites hourly  3.  Every 4-6 hours minimum:  Change oxygen saturation probe site  4.  Every 4-6 hours:  If on nasal continuous positive airway pressure, respiratory therapy assess nares and determine need for appliance change or resting period.  4/9/2024 2202 by Dallas Hair RN  Outcome: Progressing     Problem: Chronic Conditions and Co-morbidities  Goal: Patient's chronic conditions and co-morbidity symptoms are monitored and maintained or improved  Recent Flowsheet Documentation  Taken 4/10/2024 0826 by Patricia Johnson RN  Care Plan - Patient's Chronic Conditions and Co-Morbidity Symptoms are Monitored and Maintained or Improved: Monitor and assess patient's chronic conditions and comorbid symptoms for stability, deterioration, or improvement  4/9/2024 2202 by Dallas Hair RN  Outcome: Progressing  Flowsheets (Taken 4/9/2024 0827 by Patricia Johnson RN)  Care Plan - Patient's Chronic Conditions and Co-Morbidity Symptoms are Monitored and Maintained or Improved: Monitor and assess patient's chronic conditions and comorbid symptoms for stability, deterioration, or improvement     Problem: Respiratory - Adult  Goal: Achieves optimal ventilation and oxygenation  Recent Flowsheet Documentation  Taken 4/10/2024 0826 by Alex

## 2024-04-10 NOTE — PROGRESS NOTES
(from the past 24 hour(s))   Comprehensive Metabolic Panel    Collection Time: 04/10/24  3:12 AM   Result Value Ref Range    Sodium 143 136 - 146 mmol/L    Potassium 3.2 (L) 3.5 - 5.1 mmol/L    Chloride 111 103 - 113 mmol/L    CO2 30 21 - 32 mmol/L    Anion Gap 2 2 - 11 mmol/L    Glucose 107 (H) 65 - 100 mg/dL    BUN <1 (L) 8 - 23 MG/DL    Creatinine 0.50 (L) 0.6 - 1.0 MG/DL    Est, Glom Filt Rate >90 >60 ml/min/1.73m2    Calcium 8.1 (L) 8.3 - 10.4 MG/DL    Total Bilirubin 0.2 0.2 - 1.1 MG/DL    ALT 13 12 - 65 U/L    AST 17 15 - 37 U/L    Alk Phosphatase 89 50 - 136 U/L    Total Protein 5.7 (L) 6.3 - 8.2 g/dL    Albumin 2.1 (L) 3.2 - 4.6 g/dL    Globulin 3.6 2.8 - 4.5 g/dL    Albumin/Globulin Ratio 0.6 0.4 - 1.6     Magnesium    Collection Time: 04/10/24  3:12 AM   Result Value Ref Range    Magnesium 1.7 (L) 1.8 - 2.4 mg/dL   CBC with Auto Differential    Collection Time: 04/10/24  3:12 AM   Result Value Ref Range    WBC 3.4 (L) 4.3 - 11.1 K/uL    RBC 3.37 (L) 4.05 - 5.2 M/uL    Hemoglobin 8.9 (L) 11.7 - 15.4 g/dL    Hematocrit 28.9 (L) 35.8 - 46.3 %    MCV 85.8 82 - 102 FL    MCH 26.4 26.1 - 32.9 PG    MCHC 30.8 (L) 31.4 - 35.0 g/dL    RDW 15.9 (H) 11.9 - 14.6 %    Platelets 306 150 - 450 K/uL    MPV 9.3 (L) 9.4 - 12.3 FL    nRBC 0.00 0.0 - 0.2 K/uL    Differential Type AUTOMATED      Neutrophils % 61 43 - 78 %    Lymphocytes % 28 13 - 44 %    Monocytes % 10 4.0 - 12.0 %    Eosinophils % 1 0.5 - 7.8 %    Basophils % 0 0.0 - 2.0 %    Immature Granulocytes % 0 0.0 - 5.0 %    Neutrophils Absolute 2.0 1.7 - 8.2 K/UL    Lymphocytes Absolute 0.9 0.5 - 4.6 K/UL    Monocytes Absolute 0.4 0.1 - 1.3 K/UL    Eosinophils Absolute 0.0 0.0 - 0.8 K/UL    Basophils Absolute 0.0 0.0 - 0.2 K/UL    Immature Granulocytes Absolute 0.0 0.0 - 0.5 K/UL       Imaging:    CT Result (most recent):  CT ABDOMEN PELVIS WO IV CONTRAST 04/07/2024    Narrative  CT OF THE ABDOMEN AND PELVIS    INDICATION: Abdominal pain    TECHNIQUE: Multiple 2D  abdominal/back/flank pain. CT AP w/o contrast negative as well as UA. She also noted SOB. CT chest without PE but did show MITZI mass with interval development of consolidation of MITIZ, compression of L main pulmonary artery and interval development of RUL airspace. She was empirically started on Zosyn/Vanc. BC pending. Procal and LA negative. Oncology asked to assume care.     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 PC saw while IP.    Hypoxic respiratory failure / obstruction / post-obstructive pneumonia  - CT with interval development of MITZI mass with consolidation of MITZI, compression of L main pulmonary artery and interval development of RUL airspace disease  - On empiric Zosyn/Vanc  - BC pending, procal/LA negative  - Consult pulmonology   4/9 Continues Zosyn/Vanc (day 3) but Dcing today after conversation with pulmonology and low suspicion for pneumonia/infectious process and more like cancer contributing. BC NGTD. Continues on 2-3L NC overnight. Pulm optimizing COPD treatment/management.  4/10 Remains off antibiotics, afebrile. BC NGTD. Sputum culture with GPC and GNR. Continues 2-3L NC.    Abdominal pain / diarrhea  - CT AP negative  - UA negative  - Check lipase  4/9 Lipase WNL. 7 stools documented overnight, stool studies pending. C diff negative. Stopping antibiotics and monitoring symptoms.  4/10 Improved, diarrhea resolved off antibiotics    Altered mental status  4/9 Per nursing, waxing/waning mental status overnight with periods of confusion and impulsive behavior and up without assistance with concern for falls. MRI brain 2/1 negative for intracranial mets. May need repeat. Possibly delirium. Med list reviewed. Better this morning.  4/10 No mental status changes overnight.   RESOLVED    Continue home meds  Scot SOPs  VTE prophylaxis -

## 2024-04-10 NOTE — PROGRESS NOTES
ACUTE PHYSICAL THERAPY GOALS:   (Developed with and agreed upon by patient and/or caregiver.)    (1.) Fide Mejia  will move from supine to sit and sit to supine  with INDEPENDENCE within 7 treatment day(s).    (2.) Fide Mejia will transfer from bed to chair and chair to bed with INDEPENDENCE using the least restrictive device within 7 treatment day(s).    (3.) Fide Mejia will ambulate with INDEPENDENCE for 300 feet with the least restrictive device within 7 treatment day(s).   (4.) Fide Mejia will perform standing static and dynamic balance activities x 20 minutes with SUPERVISION to improve safety within 7 treatment day(s).  (5.) Fide Mejai will ascend and descend 2 stairs using 1 hand rail(s) with SUPERVISION to improve functional mobility and safety within 7 treatment day(s).  (6.) Fide Mejia will perform therapeutic exercises x 20 min for HEP with INDEPENDENCE to improve strength, endurance, and functional mobility within 7 treatment day(s).     PHYSICAL THERAPY: Daily Note PM   (Link to Caseload Tracking: PT Visit Days : 3  Time In/Out PT Charge Capture  Rehab Caseload Tracker  Orders    Fide Mejia is a 73 y.o. female   PRIMARY DIAGNOSIS: Bacterial pneumonia  Acute respiratory distress [R06.03]  Bacterial pneumonia [J15.9]  Hypoxia [R09.02]  Healthcare-associated pneumonia [J18.9]  Malignant neoplasm of upper lobe of lung, unspecified laterality (HCC) [C34.10]       Inpatient: Payor: MEDICARE / Plan: MEDICARE PART A AND B / Product Type: *No Product type* /     ASSESSMENT:     REHAB RECOMMENDATIONS:   Recommendation to date pending progress:  Setting:  Home Health Therapy    Equipment:    To Be Determined     ASSESSMENT:  Ms. Mejia is relatively agreeable to \"get it over with\".  She moves well on the 3 liters.  Walked 250 ft with SBA.  She tells me hospice is on the way to see her.       SUBJECTIVE:   Ms. Mejia states, \"lets get it over with\"    Social/Functional Lives

## 2024-04-10 NOTE — PROGRESS NOTES
Physician Progress Note      PATIENT:               MK TREVINO  CSN #:                  [id number]  :                       1950  ADMIT DATE:       2024 1:57 PM  DISCH DATE:  RESPONDING  PROVIDER #:        Rdaha VAIL          QUERY TEXT:    Pt admitted with malignant Lung cancer. Pt noted to have AMS overnight. If   possible, please document in the progress notes and discharge summary if you   are evaluating and / or treating any of the following:    The medical record reflects the following:  Risk Factors: 73 YOF, malignant lung cancer inoperable, smoker, chronic resp   failure O2 dependent  Clinical Indicators:  Per nursing, waxing/waning mental status overnight   with periods of confusion and impulsive behavior and up without assistance   with concern for falls. MRI brain  negative for intracranial mets.  appears fatigued, hypoxia,  acute resp distress, neoplasm upper lobe of lung,  Treatment: Oncology, pulmonary consults, O2 3 LNC,    Thank You,  Shelly Lopez RN. C BSN  Clinical   O: [phone]   [email or web address]  Options provided:  -- Anoxic encephalopathy  -- Encephalopathy due to malignant neoplasm lung  -- Metabolic encephalopathy  -- Delirium due to, Please specify cause.  -- Delirium  -- Other - I will add my own diagnosis  -- Disagree - Not applicable / Not valid  -- Disagree - Clinically unable to determine / Unknown  -- Refer to Clinical Documentation Reviewer    PROVIDER RESPONSE TEXT:    Patient has delirium with unknown cause.    Query created by: Shelly Lopez on 2024 10:34 PM      Electronically signed by:  Radha VAIL 4/10/2024 7:24 AM

## 2024-04-10 NOTE — PROGRESS NOTES
Pulmonary Daily Progress NOTE           4/10/2024    Fide Mejia                        Date of Admission:  4/7/2024    Hospital course:  Patient is a 73 y.o.  female seen and evaluated at the request of Dr. Dr Scales for obstruction on Chest CT and evaluation for intervention.    She was seen by us in January with spiculated mass of left lung that was PET positive.  She has has Stage 4 COPD and chronic respiratory failure.  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 She has been followed by Hem/ onc and is She received cycle 1 carbo/taxol/keytruda on 4/1/24.      She presented with worsening SOB and back pain. She was admitted for pneumonia and zosyn/vancomycin were initiated. Chest CT was ordered showing more central left hilar mass lesion surrounding the exiting pulmonary arteries and inflowing pulmonary veins, extensive surrounding consolidation measurements of this lesion, and compression of the left distal main pulmonary artery. She has been afebrile. She is currently on 2 lpm with sat 97%. We were asked to evaluate for possible intervention.     She is having productive cough and some blood streaked sputum. She also reports diarrhea for the past several days. She denies fever or chest pain.     The patient's chart is reviewed and the patient is discussed with the staff.    Subjective:     Patient today not congested today. Reports no diarrhea today and is grateful. Feels about the same. Reports has oxygen at home. No dyspnea or cough.   \     Review of Systems: Comprehensive ROS negative except in HPI    Current Outpatient Medications   Medication Instructions    acetaminophen (TYLENOL) 1,000 mg, Oral, EVERY 6 HOURS PRN    albuterol sulfate HFA (PROVENTIL;VENTOLIN;PROAIR) 108 (90 Base) MCG/ACT inhaler 2 puffs, Inhalation, EVERY 4 HOURS PRN    Alpha-Lipoic Acid 600 mg, Oral, DAILY    aspirin 81 MG EC tablet Oral, DAILY    buPROPion (WELLBUTRIN  SR) 200 mg, Oral, 2 TIMES DAILY    Carboxymeth-Glyc-Polysorb PF 0.5-1-0.5 % SOLN Ophthalmic    dilTIAZem (TIAZAC) 360 mg, Oral, DAILY    docusate sodium (COLACE) 100 mg, Oral, DAILY PRN    fluocinolone (DERMOTIC) 0.01 % OIL oil 5 drops, Otic, 2 TIMES DAILY, Up to 2 times daily    fluticasone-salmeterol (ADVIAR) 100-50 MCG/ACT AEPB diskus inhaler 1 puff, Inhalation, EVERY 12 HOURS    Homeopathic Products (ARNICARE BRUISE) GEL 1 Dose, Topical, DAILY PRN    HYDROcodone homatropine (HYCODAN) 5-1.5 MG/5ML solution 5 mLs, Oral, EVERY 6 HOURS, Z51.5 Palliative Care    lidocaine-prilocaine (EMLA) 2.5-2.5 % cream 1 Dose, Topical, PRN, apply topically for pain 1/2 inch to port site 30-45 minutes prior to lab/chemo appt.    loperamide (IMODIUM) 2 mg, Oral, PRN, 2 caplets after the first loose stool; 1 caplet after each subsequent loose stool; but no more than 4 caplets in 24 hours.     loratadine (CLARITIN) 10 mg, Oral, DAILY PRN    methylphenidate (RITALIN) 5 mg, Oral, 2 TIMES DAILY    mirtazapine (REMERON) 15 mg, Oral, NIGHTLY    morphine (MS CONTIN) 15 mg, Oral, EVERY 12 HOURS    morphine (MSIR) 15-30 mg, Oral, EVERY 4 HOURS PRN    naloxone 4 MG/0.1ML LIQD nasal spray 1 spray, Nasal, PRN    olopatadine (PATANOL) 0.1 % ophthalmic solution 1 drop, Both Eyes, 2 TIMES DAILY    ondansetron (ZOFRAN) 8 MG tablet Take zofran every 8 hours around the clock for several days after chemo to help control nausea if needed (morning, mid-day, and bedtime).    ondansetron (ZOFRAN) 8 mg, Oral, EVERY 8 HOURS PRN    pantoprazole (PROTONIX) 40 mg, Oral, 2 TIMES DAILY    pramipexole (MIRAPEX) 0.75 mg, Oral, Nightly    predniSONE (DELTASONE) 20 mg, Oral, DAILY    prochlorperazine (COMPAZINE) 10 MG tablet For uncontrolled nausea, take compazine in between zofran doses - do not stop zofran, just take compazine in addition to zofran. Compazine can be taken every 6 hours as needed.    prochlorperazine (COMPAZINE) 10 mg, Oral, EVERY 6 HOURS PRN

## 2024-04-10 NOTE — CARE COORDINATION
Hospice order placed.  CM met with pt's daughter Daya to discuss d/c planning.  She was tearful and said that pt \"doesn't want to do it anymore.\"  CM offered emotional support and listening.  Discussed hospice agencies and pt choice - per pt and daughter's choice [facility] referral submitted.  Marisa with [facility] met with pt and daughter and they would like to d/c home tomorrow with hospice services.  Transport scheduled via [employer] for noon on 4/11.  Per family's request CM placed an order for a HH aide and [facility].  CM will continue to follow.  LOS = 3 days

## 2024-04-11 ENCOUNTER — HOME CARE VISIT (OUTPATIENT)
Dept: HOME HEALTH SERVICES | Facility: HOME HEALTH | Age: 74
End: 2024-04-11
Payer: MEDICARE

## 2024-04-11 VITALS
OXYGEN SATURATION: 96 % | DIASTOLIC BLOOD PRESSURE: 75 MMHG | HEIGHT: 64 IN | HEART RATE: 106 BPM | WEIGHT: 125.4 LBS | RESPIRATION RATE: 18 BRPM | TEMPERATURE: 97 F | SYSTOLIC BLOOD PRESSURE: 127 MMHG | BODY MASS INDEX: 21.41 KG/M2

## 2024-04-11 LAB
MM INDURATION, POC: 0 MM (ref 0–5)
MM INDURATION, POC: 0 MM (ref 0–5)
PPD, POC: NEGATIVE
PPD, POC: NEGATIVE

## 2024-04-11 PROCEDURE — 6370000000 HC RX 637 (ALT 250 FOR IP): Performed by: INTERNAL MEDICINE

## 2024-04-11 PROCEDURE — 99239 HOSP IP/OBS DSCHRG MGMT >30: CPT | Performed by: INTERNAL MEDICINE

## 2024-04-11 PROCEDURE — APPSS60 APP SPLIT SHARED TIME 46-60 MINUTES: Performed by: NURSE PRACTITIONER

## 2024-04-11 PROCEDURE — 2500000003 HC RX 250 WO HCPCS: Performed by: INTERNAL MEDICINE

## 2024-04-11 PROCEDURE — 2580000003 HC RX 258: Performed by: INTERNAL MEDICINE

## 2024-04-11 RX ADMIN — ACETAMINOPHEN 650 MG: 325 TABLET ORAL at 10:12

## 2024-04-11 RX ADMIN — ASPIRIN 81 MG: 81 TABLET, COATED ORAL at 08:52

## 2024-04-11 RX ADMIN — POTASSIUM CHLORIDE 20 MEQ: 1500 TABLET, EXTENDED RELEASE ORAL at 08:52

## 2024-04-11 RX ADMIN — PREDNISONE 20 MG: 20 TABLET ORAL at 08:53

## 2024-04-11 RX ADMIN — HYDROMORPHONE HYDROCHLORIDE 0.5 MG: 1 INJECTION, SOLUTION INTRAMUSCULAR; INTRAVENOUS; SUBCUTANEOUS at 08:59

## 2024-04-11 RX ADMIN — PANTOPRAZOLE SODIUM 40 MG: 40 TABLET, DELAYED RELEASE ORAL at 08:52

## 2024-04-11 RX ADMIN — GUAIFENESIN 1200 MG: 600 TABLET ORAL at 08:52

## 2024-04-11 RX ADMIN — SODIUM CHLORIDE, PRESERVATIVE FREE 10 ML: 5 INJECTION INTRAVENOUS at 08:53

## 2024-04-11 ASSESSMENT — PAIN SCALES - GENERAL: PAINLEVEL_OUTOF10: 5

## 2024-04-11 ASSESSMENT — PAIN DESCRIPTION - DESCRIPTORS: DESCRIPTORS: ACHING

## 2024-04-11 ASSESSMENT — PAIN DESCRIPTION - ORIENTATION: ORIENTATION: LOWER

## 2024-04-11 ASSESSMENT — PAIN DESCRIPTION - LOCATION: LOCATION: ABDOMEN;BACK

## 2024-04-11 NOTE — CARE COORDINATION
Pt to d/c home today with [facility] services.  Transport scheduled via [employer] for 1200.  Full Code.  Currently requiring 3L O2 NC.  No other supportive care needs identified.  Pt and family agree with d/c plan.  Milestones met.  LOS = 4 days       04/07/24 1935   Service Assessment   Patient Orientation Alert and Oriented;Person;Place;Self   Cognition Alert  (Confusion)   History Provided By Patient;Medical Record   Primary Caregiver Self   Accompanied By/Relationship Mother   Support Systems Parent;Family Members;Children   Patient's Healthcare Decision Maker is: Legal Next of Kin   PCP Verified by CM Yes  (Kelsi Driscoll MD)   Last Visit to PCP Within last year   Prior Functional Level Independent in ADLs/IADLs   Current Functional Level Independent in ADLs/IADLs   Can patient return to prior living arrangement Yes   Ability to make needs known: Good   Family able to assist with home care needs: Yes   Would you like for me to discuss the discharge plan with any other family members/significant others, and if so, who? Yes  (Parent)   Financial Resources Medicare   Community Resources None   CM/SW Referral Other (see comment)  (N/A)   Social/Functional History   Lives With Daughter   Type of Home House   Home Layout One level   Bathroom Shower/Tub Walk-in shower   Bathroom Toilet Standard   Bathroom Equipment Commode   Bathroom Accessibility Accessible   Home Equipment Walker, standard;Oxygen  (Home O2 @ 2-3L @ baseline through [facility] [facility])   Receives Help From Family;Home health  (Pt is current with [facility]: SN, PT, OT)   ADL Assistance Independent   Homemaking Assistance Independent   Ambulation Assistance Needs assistance  (Pt states needing to use DME)   Transfer Assistance Independent   Active  Yes   Mode of Transportation Car   Occupation Retired   Discharge Planning   Type of Residence House   Living Arrangements Alone   Current Services Prior To Admission Durable Medical Equipment;Home Care

## 2024-04-11 NOTE — DISCHARGE SUMMARY
[facility] Hematology & On[facility]: Inpatient Hematology / Oncology Discharge Summary Note    Patient ID:  Fide Mejia  [id number]  73 y.o.  1950    Admit Date: 4/7/2024    Discharge Date: 4/11/2024    Admission Diagnoses: Acute respiratory distress [R06.03]  Bacterial pneumonia [J15.9]  Hypoxia [R09.02]  Healthcare-associated pneumonia [J18.9]  Malignant neoplasm of upper lobe of lung, unspecified laterality (HCC) [C34.10]    Discharge Diagnoses:  Principal Diagnosis: Bacterial pneumonia  Principal Problem:    Bacterial pneumonia  Active Problems:    Fatigue    Moderate COPD (chronic obstructive pulmonary disease) (HCC)    Tobacco use disorder    Chronic respiratory failure (HCC)    Palliative care patient    Essential hypertension with goal blood pressure less than 130/85    Hypoxia    Squamous carcinoma of lung, left (HCC)    Encounter for other specified aftercare    Malignant neoplasm of upper lobe of lung (HCC)    Postobstructive pneumonia    Acute respiratory distress    Healthcare-associated pneumonia    Decreased activities of daily living (ADL)    Moderate protein-calorie malnutrition (HCC)  Resolved Problems:    * No resolved hospital problems. *      Hospital Course:    Ms. Mejia is a 73 y.o. female admitted on 4/7/2024. The primary encounter diagnosis was Healthcare-associated pneumonia. Diagnoses of Hypoxia, Acute respiratory distress, and Malignant neoplasm of upper lobe of lung, unspecified laterality (HCC) were also pertinent to this visit.     Ms Mejia has PMH of COPD with chronic respiratory failure on home O2. She is a patient of Dr Gagnon with SqCCa of the lung involving L lung and L hilar nodes, station IIL and 7+. She received cycle 1 carbo/taxol/keytruda on 4/1/24. She was admitted with abdominal/back/flank pain. CT AP w/o contrast negative as well as UA. She also noted SOB. CT chest without PE but did show MITZI mass with interval development of consolidation of MITZI, compression of L main      Physical Exam:  Constitutional: Well developed, well nourished female in no acute distress, sitting comfortably on the hospital bed.   HEENT: Normocephalic and atraumatic. Oropharynx is clear, mucous membranes are moist.  Extraocular muscles are intact.  Sclerae anicteric. Neck supple without JVD. No thyromegaly present.    Skin Warm and dry.  No bruising and no rash noted.  No erythema.  No pallor.    Neuro Grossly nonfocal with no obvious sensory or motor deficits.   MSK Normal range of motion in general.  No edema and no tenderness.   Psych Appropriate mood and affect.    Full exam per attending MD    ASSESSMENT:    Principal Problem:    Bacterial pneumonia  Active Problems:    Fatigue    Moderate COPD (chronic obstructive pulmonary disease) (HCC)    Tobacco use disorder    Chronic respiratory failure (HCC)    Palliative care patient    Essential hypertension with goal blood pressure less than 130/85    Hypoxia    Squamous carcinoma of lung, left (HCC)    Encounter for other specified aftercare    Malignant neoplasm of upper lobe of lung (HCC)    Postobstructive pneumonia    Acute respiratory distress    Healthcare-associated pneumonia    Decreased activities of daily living (ADL)    Moderate protein-calorie malnutrition (HCC)  Resolved Problems:    * No resolved hospital problems. *      DISPOSITION:    DC home with hospice. Call Dr Gagnon's office if needed.    Over 45 minutes was spent in discharge planning and coordination of care.            Marcie House, APRN - CNP  [facility]  [address] [place] [address]  Office : [phone]  Fax : [phone]   I personally saw, exammed and counselled the patient, and discussed with NP, agree with above history/assessment/plan. Exam: No acute distress, normal breathing effort and breath sounds, regular heart rate and heart sound, benign abd, normal ROM of limbs. 73 y.o.female lung squamous cell carcinoma of either stage

## 2024-04-11 NOTE — PROGRESS NOTES
PT was sitting on bed. PT shared about health and hospitalization including discharging with Hospice today. PT shared feelings and hopes. CH offered empathetic spiritual presence, active listening, and therapeutic communication. PT expressed comfort in knowing PT is with [facility] now and always. PT is [employer]. CH offered prayer. CH thanked PT for privilege of serving PT and offered support.     Rev. Mariama Keith M.Div.

## 2024-04-11 NOTE — PROGRESS NOTES
Pt to be transported home with home hospice services via [facility] ambulance. Port left accessed per request from hospice agency. Dressing c/d/I. Discharge instructions reviewed with patient and new medications reviewed. Prescriptions sent with patient.

## 2024-04-12 LAB
BACTERIA SPEC CULT: NORMAL
BACTERIA SPEC CULT: NORMAL
SERVICE CMNT-IMP: NORMAL
SERVICE CMNT-IMP: NORMAL

## 2024-04-13 LAB
BACTERIA SPEC CULT: ABNORMAL
BACTERIA SPEC CULT: ABNORMAL
GRAM STN SPEC: ABNORMAL
SERVICE CMNT-IMP: ABNORMAL

## 2024-04-16 ENCOUNTER — TELEPHONE (OUTPATIENT)
Dept: PULMONOLOGY | Age: 74
End: 2024-04-16

## 2024-04-16 RX ORDER — CLINDAMYCIN HYDROCHLORIDE 300 MG/1
600 CAPSULE ORAL 3 TIMES DAILY
Qty: 42 CAPSULE | Refills: 0 | Status: SHIPPED | OUTPATIENT
Start: 2024-04-16 | End: 2024-04-23

## 2024-04-16 NOTE — TELEPHONE ENCOUNTER
MRSA in sputum from hospitalization and called her daughter. They are interested in treatment. Will send prescription for clindamycin 600 mg TID to [facility] as preferred.    Payton Topete, MICK - CNP

## 2024-04-22 ENCOUNTER — HOSPITAL ENCOUNTER (OUTPATIENT)
Dept: INFUSION THERAPY | Age: 74
Setting detail: INFUSION SERIES
End: 2024-04-22

## 2024-04-22 ENCOUNTER — CLINICAL DOCUMENTATION (OUTPATIENT)
Dept: CASE MANAGEMENT | Age: 74
End: 2024-04-22

## 2024-04-22 NOTE — PROGRESS NOTES
Physician Progress Note      PATIENT:               MK TREVINO  CSN #:                  [id number]  :                       1950  ADMIT DATE:       2024 1:57 PM  DISCH DATE:        2024 1:30 PM  RESPONDING  PROVIDER #:        Radha VAIL          QUERY TEXT:    Pt admitted with bacterial PNA. Pt noted to have Left upper lobe perihilar   mass. If possible, please document in progress notes and discharge summary the   relationship,    The medical record reflects the following:  Risk Factors: 73 YOF, lung cancer, COPD,  started on chemotherapy on Monday,   O2 dependent, former smoker  Clinical Indicators: cough with blood streaked sputum  CTA chest no PE, left hilar mass with consolidation MITZI and compression left   main pulm artery and RUL airspace disease   DCS:    Continues Zosyn/Vanc (day 3) but Dcing today after   conversation with pulmonology and low suspicion for pneumonia/infectious   process and more like cancer contributing. BC NGTD. PCT and Lactate negative  Treatment: Pulmonary, Palliative,  O2 @ 3LNC, IV Zosyn, IV Vancomycin    Thank You,  Shelly Lopez RN. C BSN  Clinical   O: [phone]   [email or web address]  Options provided:  -- Hypoxia, prod cough, SOB due to malignant MITIZ mass and bacterial PNA ruled   out  -- Hypoxia, prod cough, SOB due to malignant MITZI mass and bacterial PNA  -- Other - I will add my own diagnosis  -- Disagree - Not applicable / Not valid  -- Disagree - Clinically unable to determine / Unknown  -- Refer to Clinical Documentation Reviewer    PROVIDER RESPONSE TEXT:    This patient has Hypoxia, prod cough, SOB due to malignant MITZI mass and   bacterial PNA ruled out.    Query created by: Shelly Lopez on 2024 4:48 PM      Electronically signed by:  Radha VAIL 2024 7:13 AM

## 2024-05-03 NOTE — PROGRESS NOTES
Caller: Patient    Doctor: Vikram    Reason for call: Was supposed to be having surgery but she missed one of her prop appt's. She received a phone call this morning, but not sure how it was from. No notes    Call back#: [phone]   Given her cardiac hx, would be hesitant to start this as long as she is stating she has an appetite.     Advanced Care Planning Discussed: Per previous visit -> Pt's mother inquired about hospice care. We discussed that chemotherapy is a contraindication to the philosophy of hospice care. We discussed that if pt's disease worsens despite treatment and pt is not willing to try any more treatment, hospice would be appropriate at that time.    Today discussed that the patient's treatment is in her control. If she finds that she is not tolerating this therapy and feels worse, we can discuss medications to help manage therapy, or discuss hospice care.    Will follow up in: 2-4 weeks    All questions were asked and answered to the best of my ability. In all, I spent 35 minutes in the care of Ms. Mejia today, over 50% of which was in direct counseling and coordination of care.    I have reviewed the patient's controlled substance prescription history, as maintained in the [place] prescription monitoring program, so that the prescriptions(s) for a controlled substance can be given.  Last Date Reviewed: 4/1/24         Paty Summers PA-C  Outpatient [place] [address]  Office : [phone]  Fax : [phone]

## 2024-05-30 ENCOUNTER — CLINICAL DOCUMENTATION (OUTPATIENT)
Dept: PRIMARY CARE CLINIC | Facility: CLINIC | Age: 74
End: 2024-05-30

## 2024-05-30 DIAGNOSIS — J44.1 COPD EXACERBATION (HCC): ICD-10-CM

## 2024-05-30 DIAGNOSIS — Z51.5 HOSPICE CARE PATIENT: Primary | ICD-10-CM

## 2024-05-30 NOTE — PROGRESS NOTES
Patient is on hospice with [facility]. She needs Spiriva inhalers but her insurance will no longer cover it. She is asking for samples.

## 2024-08-26 ENCOUNTER — TELEPHONE (OUTPATIENT)
Age: 74
End: 2024-08-26

## 2025-01-08 NOTE — PROGRESS NOTES
Resting in bed, call light within reach. Respirations even and unlabored on 6l.min via nc. No acute distress noted. Will continue to monitor. Statement Selected

## 2025-05-12 NOTE — PROGRESS NOTES
Navigation was changed to  as needed status as of 4/22/24 due to hospice referral .  If the patient should need to be re-engaged for navigation services, please send a staff message request to the appropriate navigation pool.

## (undated) DEVICE — SINGLE USE SUCTION VALVE MAJ-209: Brand: SINGLE USE SUCTION VALVE (STERILE)

## (undated) DEVICE — FORCEPS BX WRK L110MM CHN L2MM DIA1.7MM SUPERDIMENSION

## (undated) DEVICE — PRESSURE GUIDEWIRE: Brand: COMET™ II

## (undated) DEVICE — BAND RADIAL COMPR ARTERY 24CM -- REG BX/10

## (undated) DEVICE — SURGICAL PROCEDURE PACK BRONCH

## (undated) DEVICE — NEEDLE ASPIR 21GA L700MM US GUID TREAT DST END FOR EFFICIENT

## (undated) DEVICE — CATHETER GUID 6FR L100CM GRN PTFE JR4 TRUELUMEN HYBRID

## (undated) DEVICE — TUBING FLUIDICS BRONCHOSCOPE

## (undated) DEVICE — CATHETER DIAG AD 5FR L110CM 145DEG COR GRY HYDRPHLC NYL

## (undated) DEVICE — GLIDESHEATH SLENDER STAINLESS STEEL KIT: Brand: GLIDESHEATH SLENDER

## (undated) DEVICE — KIT INTRODUCER BRONCHOSCOPE PATIENT

## (undated) DEVICE — SINGLE USE BIOPSY VALVE MAJ-210: Brand: SINGLE USE BIOPSY VALVE (STERILE)

## (undated) DEVICE — BRONCHOSCOPES MONARCH

## (undated) DEVICE — RADIFOCUS OPTITORQUE ANGIOGRAPHIC CATHETER: Brand: OPTITORQUE

## (undated) DEVICE — GUIDEWIRE VASC L260CM DIA0.035IN RAD 3MM J TIP L7CM PTFE

## (undated) DEVICE — COPILOT BLEEDBACK CONTROL VALVE: Brand: COPILOT

## (undated) DEVICE — VALVE SHEATH BRONCHOSCOPE